# Patient Record
Sex: MALE | Race: WHITE | URBAN - METROPOLITAN AREA
[De-identification: names, ages, dates, MRNs, and addresses within clinical notes are randomized per-mention and may not be internally consistent; named-entity substitution may affect disease eponyms.]

---

## 2017-06-11 ENCOUNTER — EMERGENCY (EMERGENCY)
Facility: HOSPITAL | Age: 67
LOS: 0 days | Discharge: HOME | End: 2017-06-11

## 2017-06-11 DIAGNOSIS — K29.70 GASTRITIS, UNSPECIFIED, WITHOUT BLEEDING: ICD-10-CM

## 2017-06-11 DIAGNOSIS — N40.0 BENIGN PROSTATIC HYPERPLASIA WITHOUT LOWER URINARY TRACT SYMPTOMS: ICD-10-CM

## 2017-06-11 DIAGNOSIS — N23 UNSPECIFIED RENAL COLIC: ICD-10-CM

## 2017-06-11 DIAGNOSIS — I25.10 ATHEROSCLEROTIC HEART DISEASE OF NATIVE CORONARY ARTERY WITHOUT ANGINA PECTORIS: ICD-10-CM

## 2017-06-11 DIAGNOSIS — K35.3 ACUTE APPENDICITIS WITH LOCALIZED PERITONITIS: ICD-10-CM

## 2017-06-11 DIAGNOSIS — I10 ESSENTIAL (PRIMARY) HYPERTENSION: ICD-10-CM

## 2017-06-11 DIAGNOSIS — R07.9 CHEST PAIN, UNSPECIFIED: ICD-10-CM

## 2017-06-11 DIAGNOSIS — G47.30 SLEEP APNEA, UNSPECIFIED: ICD-10-CM

## 2017-06-26 ENCOUNTER — EMERGENCY (EMERGENCY)
Facility: HOSPITAL | Age: 67
LOS: 0 days | Discharge: HOME | End: 2017-06-26

## 2017-06-26 DIAGNOSIS — K35.3 ACUTE APPENDICITIS WITH LOCALIZED PERITONITIS: ICD-10-CM

## 2017-06-26 DIAGNOSIS — M54.9 DORSALGIA, UNSPECIFIED: ICD-10-CM

## 2017-06-26 DIAGNOSIS — Z91.040 LATEX ALLERGY STATUS: ICD-10-CM

## 2017-06-26 DIAGNOSIS — E78.00 PURE HYPERCHOLESTEROLEMIA, UNSPECIFIED: ICD-10-CM

## 2017-06-26 DIAGNOSIS — Z90.49 ACQUIRED ABSENCE OF OTHER SPECIFIED PARTS OF DIGESTIVE TRACT: ICD-10-CM

## 2017-06-26 DIAGNOSIS — Z79.82 LONG TERM (CURRENT) USE OF ASPIRIN: ICD-10-CM

## 2017-06-26 DIAGNOSIS — I10 ESSENTIAL (PRIMARY) HYPERTENSION: ICD-10-CM

## 2017-06-26 DIAGNOSIS — I25.10 ATHEROSCLEROTIC HEART DISEASE OF NATIVE CORONARY ARTERY WITHOUT ANGINA PECTORIS: ICD-10-CM

## 2017-06-26 DIAGNOSIS — K29.70 GASTRITIS, UNSPECIFIED, WITHOUT BLEEDING: ICD-10-CM

## 2017-06-26 DIAGNOSIS — Z88.2 ALLERGY STATUS TO SULFONAMIDES: ICD-10-CM

## 2017-06-26 DIAGNOSIS — N23 UNSPECIFIED RENAL COLIC: ICD-10-CM

## 2017-06-26 DIAGNOSIS — M79.1 MYALGIA: ICD-10-CM

## 2017-06-26 DIAGNOSIS — M79.602 PAIN IN LEFT ARM: ICD-10-CM

## 2017-06-26 DIAGNOSIS — G47.30 SLEEP APNEA, UNSPECIFIED: ICD-10-CM

## 2017-06-26 DIAGNOSIS — Z79.02 LONG TERM (CURRENT) USE OF ANTITHROMBOTICS/ANTIPLATELETS: ICD-10-CM

## 2017-06-26 DIAGNOSIS — I25.2 OLD MYOCARDIAL INFARCTION: ICD-10-CM

## 2017-06-26 DIAGNOSIS — R07.9 CHEST PAIN, UNSPECIFIED: ICD-10-CM

## 2017-06-26 DIAGNOSIS — K21.9 GASTRO-ESOPHAGEAL REFLUX DISEASE WITHOUT ESOPHAGITIS: ICD-10-CM

## 2017-06-26 DIAGNOSIS — N40.0 BENIGN PROSTATIC HYPERPLASIA WITHOUT LOWER URINARY TRACT SYMPTOMS: ICD-10-CM

## 2017-06-28 DIAGNOSIS — Z79.02 LONG TERM (CURRENT) USE OF ANTITHROMBOTICS/ANTIPLATELETS: ICD-10-CM

## 2017-06-28 DIAGNOSIS — K21.9 GASTRO-ESOPHAGEAL REFLUX DISEASE WITHOUT ESOPHAGITIS: ICD-10-CM

## 2017-06-28 DIAGNOSIS — Z88.2 ALLERGY STATUS TO SULFONAMIDES: ICD-10-CM

## 2017-06-28 DIAGNOSIS — R06.02 SHORTNESS OF BREATH: ICD-10-CM

## 2017-06-28 DIAGNOSIS — Z91.040 LATEX ALLERGY STATUS: ICD-10-CM

## 2017-06-28 DIAGNOSIS — Z79.82 LONG TERM (CURRENT) USE OF ASPIRIN: ICD-10-CM

## 2017-06-28 DIAGNOSIS — Z88.8 ALLERGY STATUS TO OTHER DRUGS, MEDICAMENTS AND BIOLOGICAL SUBSTANCES STATUS: ICD-10-CM

## 2017-06-28 DIAGNOSIS — R07.89 OTHER CHEST PAIN: ICD-10-CM

## 2017-06-28 DIAGNOSIS — E78.00 PURE HYPERCHOLESTEROLEMIA, UNSPECIFIED: ICD-10-CM

## 2017-06-28 DIAGNOSIS — I10 ESSENTIAL (PRIMARY) HYPERTENSION: ICD-10-CM

## 2017-06-28 DIAGNOSIS — Z88.1 ALLERGY STATUS TO OTHER ANTIBIOTIC AGENTS STATUS: ICD-10-CM

## 2017-06-28 DIAGNOSIS — Z79.899 OTHER LONG TERM (CURRENT) DRUG THERAPY: ICD-10-CM

## 2017-07-07 ENCOUNTER — EMERGENCY (EMERGENCY)
Facility: HOSPITAL | Age: 67
LOS: 0 days | Discharge: HOME | End: 2017-07-07

## 2017-07-07 DIAGNOSIS — K29.70 GASTRITIS, UNSPECIFIED, WITHOUT BLEEDING: ICD-10-CM

## 2017-07-07 DIAGNOSIS — Z02.9 ENCOUNTER FOR ADMINISTRATIVE EXAMINATIONS, UNSPECIFIED: ICD-10-CM

## 2017-07-07 DIAGNOSIS — Z91.040 LATEX ALLERGY STATUS: ICD-10-CM

## 2017-07-07 DIAGNOSIS — N23 UNSPECIFIED RENAL COLIC: ICD-10-CM

## 2017-07-07 DIAGNOSIS — K35.3 ACUTE APPENDICITIS WITH LOCALIZED PERITONITIS: ICD-10-CM

## 2017-07-07 DIAGNOSIS — I10 ESSENTIAL (PRIMARY) HYPERTENSION: ICD-10-CM

## 2017-07-07 DIAGNOSIS — Z95.5 PRESENCE OF CORONARY ANGIOPLASTY IMPLANT AND GRAFT: ICD-10-CM

## 2017-07-07 DIAGNOSIS — R07.9 CHEST PAIN, UNSPECIFIED: ICD-10-CM

## 2017-07-07 DIAGNOSIS — M54.9 DORSALGIA, UNSPECIFIED: ICD-10-CM

## 2017-07-07 DIAGNOSIS — I25.2 OLD MYOCARDIAL INFARCTION: ICD-10-CM

## 2017-07-07 DIAGNOSIS — Z96.659 PRESENCE OF UNSPECIFIED ARTIFICIAL KNEE JOINT: ICD-10-CM

## 2017-07-07 DIAGNOSIS — I25.10 ATHEROSCLEROTIC HEART DISEASE OF NATIVE CORONARY ARTERY WITHOUT ANGINA PECTORIS: ICD-10-CM

## 2017-07-07 DIAGNOSIS — E78.00 PURE HYPERCHOLESTEROLEMIA, UNSPECIFIED: ICD-10-CM

## 2017-07-07 DIAGNOSIS — Z88.2 ALLERGY STATUS TO SULFONAMIDES: ICD-10-CM

## 2017-07-07 DIAGNOSIS — Z90.49 ACQUIRED ABSENCE OF OTHER SPECIFIED PARTS OF DIGESTIVE TRACT: ICD-10-CM

## 2017-07-07 DIAGNOSIS — N39.0 URINARY TRACT INFECTION, SITE NOT SPECIFIED: ICD-10-CM

## 2017-07-07 DIAGNOSIS — K21.9 GASTRO-ESOPHAGEAL REFLUX DISEASE WITHOUT ESOPHAGITIS: ICD-10-CM

## 2017-07-07 DIAGNOSIS — K38.8 OTHER SPECIFIED DISEASES OF APPENDIX: ICD-10-CM

## 2017-07-07 DIAGNOSIS — G47.30 SLEEP APNEA, UNSPECIFIED: ICD-10-CM

## 2017-07-07 DIAGNOSIS — N40.0 BENIGN PROSTATIC HYPERPLASIA WITHOUT LOWER URINARY TRACT SYMPTOMS: ICD-10-CM

## 2017-07-09 ENCOUNTER — INPATIENT (INPATIENT)
Facility: HOSPITAL | Age: 67
LOS: 2 days | Discharge: HOME | End: 2017-07-12
Attending: SURGERY

## 2017-07-09 DIAGNOSIS — I25.10 ATHEROSCLEROTIC HEART DISEASE OF NATIVE CORONARY ARTERY WITHOUT ANGINA PECTORIS: ICD-10-CM

## 2017-07-09 DIAGNOSIS — K35.3 ACUTE APPENDICITIS WITH LOCALIZED PERITONITIS: ICD-10-CM

## 2017-07-09 DIAGNOSIS — I10 ESSENTIAL (PRIMARY) HYPERTENSION: ICD-10-CM

## 2017-07-09 DIAGNOSIS — N40.0 BENIGN PROSTATIC HYPERPLASIA WITHOUT LOWER URINARY TRACT SYMPTOMS: ICD-10-CM

## 2017-07-09 DIAGNOSIS — K29.70 GASTRITIS, UNSPECIFIED, WITHOUT BLEEDING: ICD-10-CM

## 2017-07-09 DIAGNOSIS — N23 UNSPECIFIED RENAL COLIC: ICD-10-CM

## 2017-07-09 DIAGNOSIS — R07.9 CHEST PAIN, UNSPECIFIED: ICD-10-CM

## 2017-07-09 DIAGNOSIS — G47.30 SLEEP APNEA, UNSPECIFIED: ICD-10-CM

## 2017-07-13 PROBLEM — Z00.00 ENCOUNTER FOR PREVENTIVE HEALTH EXAMINATION: Status: ACTIVE | Noted: 2017-07-13

## 2017-07-14 ENCOUNTER — INPATIENT (INPATIENT)
Facility: HOSPITAL | Age: 67
LOS: 3 days | Discharge: HOME | End: 2017-07-18
Attending: SURGERY

## 2017-07-14 DIAGNOSIS — K21.9 GASTRO-ESOPHAGEAL REFLUX DISEASE WITHOUT ESOPHAGITIS: ICD-10-CM

## 2017-07-14 DIAGNOSIS — R10.9 UNSPECIFIED ABDOMINAL PAIN: ICD-10-CM

## 2017-07-14 DIAGNOSIS — Z96.653 PRESENCE OF ARTIFICIAL KNEE JOINT, BILATERAL: ICD-10-CM

## 2017-07-14 DIAGNOSIS — K35.3 ACUTE APPENDICITIS WITH LOCALIZED PERITONITIS: ICD-10-CM

## 2017-07-14 DIAGNOSIS — Z91.041 RADIOGRAPHIC DYE ALLERGY STATUS: ICD-10-CM

## 2017-07-14 DIAGNOSIS — K29.70 GASTRITIS, UNSPECIFIED, WITHOUT BLEEDING: ICD-10-CM

## 2017-07-14 DIAGNOSIS — E78.5 HYPERLIPIDEMIA, UNSPECIFIED: ICD-10-CM

## 2017-07-14 DIAGNOSIS — R07.9 CHEST PAIN, UNSPECIFIED: ICD-10-CM

## 2017-07-14 DIAGNOSIS — G47.33 OBSTRUCTIVE SLEEP APNEA (ADULT) (PEDIATRIC): ICD-10-CM

## 2017-07-14 DIAGNOSIS — I10 ESSENTIAL (PRIMARY) HYPERTENSION: ICD-10-CM

## 2017-07-14 DIAGNOSIS — Z91.040 LATEX ALLERGY STATUS: ICD-10-CM

## 2017-07-14 DIAGNOSIS — I25.10 ATHEROSCLEROTIC HEART DISEASE OF NATIVE CORONARY ARTERY WITHOUT ANGINA PECTORIS: ICD-10-CM

## 2017-07-14 DIAGNOSIS — N40.0 BENIGN PROSTATIC HYPERPLASIA WITHOUT LOWER URINARY TRACT SYMPTOMS: ICD-10-CM

## 2017-07-14 DIAGNOSIS — Z88.2 ALLERGY STATUS TO SULFONAMIDES: ICD-10-CM

## 2017-07-14 DIAGNOSIS — N23 UNSPECIFIED RENAL COLIC: ICD-10-CM

## 2017-07-14 DIAGNOSIS — G47.30 SLEEP APNEA, UNSPECIFIED: ICD-10-CM

## 2017-07-14 DIAGNOSIS — I25.2 OLD MYOCARDIAL INFARCTION: ICD-10-CM

## 2017-07-14 DIAGNOSIS — Z95.5 PRESENCE OF CORONARY ANGIOPLASTY IMPLANT AND GRAFT: ICD-10-CM

## 2017-07-21 DIAGNOSIS — M54.9 DORSALGIA, UNSPECIFIED: ICD-10-CM

## 2017-07-21 DIAGNOSIS — I25.2 OLD MYOCARDIAL INFARCTION: ICD-10-CM

## 2017-07-21 DIAGNOSIS — I51.9 HEART DISEASE, UNSPECIFIED: ICD-10-CM

## 2017-07-21 DIAGNOSIS — K21.9 GASTRO-ESOPHAGEAL REFLUX DISEASE WITHOUT ESOPHAGITIS: ICD-10-CM

## 2017-07-21 DIAGNOSIS — I10 ESSENTIAL (PRIMARY) HYPERTENSION: ICD-10-CM

## 2017-07-21 DIAGNOSIS — T81.4XXA INFECTION FOLLOWING A PROCEDURE, INITIAL ENCOUNTER: ICD-10-CM

## 2017-07-21 DIAGNOSIS — Z95.5 PRESENCE OF CORONARY ANGIOPLASTY IMPLANT AND GRAFT: ICD-10-CM

## 2017-07-21 DIAGNOSIS — K65.1 PERITONEAL ABSCESS: ICD-10-CM

## 2017-07-21 DIAGNOSIS — G47.30 SLEEP APNEA, UNSPECIFIED: ICD-10-CM

## 2017-07-21 DIAGNOSIS — Z96.659 PRESENCE OF UNSPECIFIED ARTIFICIAL KNEE JOINT: ICD-10-CM

## 2017-07-21 DIAGNOSIS — E78.5 HYPERLIPIDEMIA, UNSPECIFIED: ICD-10-CM

## 2017-07-25 ENCOUNTER — EMERGENCY (EMERGENCY)
Facility: HOSPITAL | Age: 67
LOS: 0 days | Discharge: HOME | End: 2017-07-25

## 2017-07-25 DIAGNOSIS — I25.10 ATHEROSCLEROTIC HEART DISEASE OF NATIVE CORONARY ARTERY WITHOUT ANGINA PECTORIS: ICD-10-CM

## 2017-07-25 DIAGNOSIS — G47.30 SLEEP APNEA, UNSPECIFIED: ICD-10-CM

## 2017-07-25 DIAGNOSIS — Z90.49 ACQUIRED ABSENCE OF OTHER SPECIFIED PARTS OF DIGESTIVE TRACT: ICD-10-CM

## 2017-07-25 DIAGNOSIS — Z79.82 LONG TERM (CURRENT) USE OF ASPIRIN: ICD-10-CM

## 2017-07-25 DIAGNOSIS — K21.9 GASTRO-ESOPHAGEAL REFLUX DISEASE WITHOUT ESOPHAGITIS: ICD-10-CM

## 2017-07-25 DIAGNOSIS — N40.0 BENIGN PROSTATIC HYPERPLASIA WITHOUT LOWER URINARY TRACT SYMPTOMS: ICD-10-CM

## 2017-07-25 DIAGNOSIS — I10 ESSENTIAL (PRIMARY) HYPERTENSION: ICD-10-CM

## 2017-07-25 DIAGNOSIS — K29.70 GASTRITIS, UNSPECIFIED, WITHOUT BLEEDING: ICD-10-CM

## 2017-07-25 DIAGNOSIS — R07.9 CHEST PAIN, UNSPECIFIED: ICD-10-CM

## 2017-07-25 DIAGNOSIS — M54.6 PAIN IN THORACIC SPINE: ICD-10-CM

## 2017-07-25 DIAGNOSIS — I51.9 HEART DISEASE, UNSPECIFIED: ICD-10-CM

## 2017-07-25 DIAGNOSIS — R50.9 FEVER, UNSPECIFIED: ICD-10-CM

## 2017-07-25 DIAGNOSIS — N23 UNSPECIFIED RENAL COLIC: ICD-10-CM

## 2017-07-25 DIAGNOSIS — R10.13 EPIGASTRIC PAIN: ICD-10-CM

## 2017-07-25 DIAGNOSIS — K35.3 ACUTE APPENDICITIS WITH LOCALIZED PERITONITIS: ICD-10-CM

## 2017-07-25 DIAGNOSIS — K62.5 HEMORRHAGE OF ANUS AND RECTUM: ICD-10-CM

## 2017-07-25 DIAGNOSIS — E78.00 PURE HYPERCHOLESTEROLEMIA, UNSPECIFIED: ICD-10-CM

## 2017-07-25 DIAGNOSIS — I25.2 OLD MYOCARDIAL INFARCTION: ICD-10-CM

## 2017-07-25 DIAGNOSIS — I11.9 HYPERTENSIVE HEART DISEASE WITHOUT HEART FAILURE: ICD-10-CM

## 2017-07-27 ENCOUNTER — APPOINTMENT (OUTPATIENT)
Dept: SURGERY | Facility: CLINIC | Age: 67
End: 2017-07-27
Payer: MEDICARE

## 2017-07-27 VITALS
HEIGHT: 71 IN | SYSTOLIC BLOOD PRESSURE: 138 MMHG | WEIGHT: 295 LBS | BODY MASS INDEX: 41.3 KG/M2 | DIASTOLIC BLOOD PRESSURE: 86 MMHG

## 2017-07-27 DIAGNOSIS — E66.01 MORBID (SEVERE) OBESITY DUE TO EXCESS CALORIES: ICD-10-CM

## 2017-07-27 DIAGNOSIS — K35.2 ACUTE APPENDICITIS WITH GENERALIZED PERITONITIS: ICD-10-CM

## 2017-07-27 PROCEDURE — 99024 POSTOP FOLLOW-UP VISIT: CPT | Mod: NC

## 2017-12-17 ENCOUNTER — EMERGENCY (EMERGENCY)
Facility: HOSPITAL | Age: 67
LOS: 0 days | Discharge: HOME | End: 2017-12-17

## 2017-12-17 DIAGNOSIS — Z90.49 ACQUIRED ABSENCE OF OTHER SPECIFIED PARTS OF DIGESTIVE TRACT: ICD-10-CM

## 2017-12-17 DIAGNOSIS — N23 UNSPECIFIED RENAL COLIC: ICD-10-CM

## 2017-12-17 DIAGNOSIS — R07.9 CHEST PAIN, UNSPECIFIED: ICD-10-CM

## 2017-12-17 DIAGNOSIS — N40.0 BENIGN PROSTATIC HYPERPLASIA WITHOUT LOWER URINARY TRACT SYMPTOMS: ICD-10-CM

## 2017-12-17 DIAGNOSIS — K29.70 GASTRITIS, UNSPECIFIED, WITHOUT BLEEDING: ICD-10-CM

## 2017-12-17 DIAGNOSIS — Z79.899 OTHER LONG TERM (CURRENT) DRUG THERAPY: ICD-10-CM

## 2017-12-17 DIAGNOSIS — K35.3 ACUTE APPENDICITIS WITH LOCALIZED PERITONITIS: ICD-10-CM

## 2017-12-17 DIAGNOSIS — R04.0 EPISTAXIS: ICD-10-CM

## 2017-12-17 DIAGNOSIS — Z79.82 LONG TERM (CURRENT) USE OF ASPIRIN: ICD-10-CM

## 2017-12-17 DIAGNOSIS — K21.9 GASTRO-ESOPHAGEAL REFLUX DISEASE WITHOUT ESOPHAGITIS: ICD-10-CM

## 2017-12-17 DIAGNOSIS — Z79.02 LONG TERM (CURRENT) USE OF ANTITHROMBOTICS/ANTIPLATELETS: ICD-10-CM

## 2017-12-17 DIAGNOSIS — Z91.040 LATEX ALLERGY STATUS: ICD-10-CM

## 2017-12-17 DIAGNOSIS — I10 ESSENTIAL (PRIMARY) HYPERTENSION: ICD-10-CM

## 2017-12-17 DIAGNOSIS — I25.2 OLD MYOCARDIAL INFARCTION: ICD-10-CM

## 2017-12-17 DIAGNOSIS — Z96.659 PRESENCE OF UNSPECIFIED ARTIFICIAL KNEE JOINT: ICD-10-CM

## 2017-12-17 DIAGNOSIS — G47.30 SLEEP APNEA, UNSPECIFIED: ICD-10-CM

## 2017-12-17 DIAGNOSIS — E78.00 PURE HYPERCHOLESTEROLEMIA, UNSPECIFIED: ICD-10-CM

## 2017-12-17 DIAGNOSIS — I25.10 ATHEROSCLEROTIC HEART DISEASE OF NATIVE CORONARY ARTERY WITHOUT ANGINA PECTORIS: ICD-10-CM

## 2017-12-17 DIAGNOSIS — Z95.5 PRESENCE OF CORONARY ANGIOPLASTY IMPLANT AND GRAFT: ICD-10-CM

## 2018-06-14 ENCOUNTER — APPOINTMENT (OUTPATIENT)
Dept: UROLOGY | Facility: CLINIC | Age: 68
End: 2018-06-14

## 2018-08-09 ENCOUNTER — EMERGENCY (EMERGENCY)
Facility: HOSPITAL | Age: 68
LOS: 0 days | Discharge: HOME | End: 2018-08-09
Attending: EMERGENCY MEDICINE | Admitting: EMERGENCY MEDICINE

## 2018-08-09 VITALS
SYSTOLIC BLOOD PRESSURE: 159 MMHG | OXYGEN SATURATION: 97 % | TEMPERATURE: 98 F | RESPIRATION RATE: 18 BRPM | HEIGHT: 70 IN | DIASTOLIC BLOOD PRESSURE: 73 MMHG | HEART RATE: 65 BPM | WEIGHT: 298.06 LBS

## 2018-08-09 VITALS — HEART RATE: 66 BPM | SYSTOLIC BLOOD PRESSURE: 154 MMHG | DIASTOLIC BLOOD PRESSURE: 82 MMHG

## 2018-08-09 DIAGNOSIS — Z87.442 PERSONAL HISTORY OF URINARY CALCULI: ICD-10-CM

## 2018-08-09 DIAGNOSIS — Z91.040 LATEX ALLERGY STATUS: ICD-10-CM

## 2018-08-09 DIAGNOSIS — Z91.09 OTHER ALLERGY STATUS, OTHER THAN TO DRUGS AND BIOLOGICAL SUBSTANCES: ICD-10-CM

## 2018-08-09 DIAGNOSIS — R35.0 FREQUENCY OF MICTURITION: ICD-10-CM

## 2018-08-09 DIAGNOSIS — R10.9 UNSPECIFIED ABDOMINAL PAIN: ICD-10-CM

## 2018-08-09 DIAGNOSIS — Z88.2 ALLERGY STATUS TO SULFONAMIDES: ICD-10-CM

## 2018-08-09 LAB
ALBUMIN SERPL ELPH-MCNC: 4.4 G/DL — SIGNIFICANT CHANGE UP (ref 3.5–5.2)
ALP SERPL-CCNC: 112 U/L — SIGNIFICANT CHANGE UP (ref 30–115)
ALT FLD-CCNC: 13 U/L — SIGNIFICANT CHANGE UP (ref 0–41)
ANION GAP SERPL CALC-SCNC: 12 MMOL/L — SIGNIFICANT CHANGE UP (ref 7–14)
APPEARANCE UR: CLEAR — SIGNIFICANT CHANGE UP
AST SERPL-CCNC: 32 U/L — SIGNIFICANT CHANGE UP (ref 0–41)
BACTERIA # UR AUTO: ABNORMAL
BASOPHILS # BLD AUTO: 0.02 K/UL — SIGNIFICANT CHANGE UP (ref 0–0.2)
BASOPHILS NFR BLD AUTO: 0.3 % — SIGNIFICANT CHANGE UP (ref 0–1)
BILIRUB SERPL-MCNC: 1.3 MG/DL — HIGH (ref 0.2–1.2)
BILIRUB UR-MCNC: NEGATIVE — SIGNIFICANT CHANGE UP
BUN SERPL-MCNC: 14 MG/DL — SIGNIFICANT CHANGE UP (ref 10–20)
CALCIUM SERPL-MCNC: 9.8 MG/DL — SIGNIFICANT CHANGE UP (ref 8.5–10.1)
CHLORIDE SERPL-SCNC: 97 MMOL/L — LOW (ref 98–110)
CO2 SERPL-SCNC: 29 MMOL/L — SIGNIFICANT CHANGE UP (ref 17–32)
COLOR SPEC: YELLOW — SIGNIFICANT CHANGE UP
CREAT SERPL-MCNC: 1 MG/DL — SIGNIFICANT CHANGE UP (ref 0.7–1.5)
DIFF PNL FLD: ABNORMAL
EOSINOPHIL # BLD AUTO: 0.05 K/UL — SIGNIFICANT CHANGE UP (ref 0–0.7)
EOSINOPHIL NFR BLD AUTO: 0.7 % — SIGNIFICANT CHANGE UP (ref 0–8)
EPI CELLS # UR: ABNORMAL /HPF
GLUCOSE SERPL-MCNC: 104 MG/DL — HIGH (ref 70–99)
GLUCOSE UR QL: NEGATIVE MG/DL — SIGNIFICANT CHANGE UP
HCT VFR BLD CALC: 46.1 % — SIGNIFICANT CHANGE UP (ref 42–52)
HGB BLD-MCNC: 16.5 G/DL — SIGNIFICANT CHANGE UP (ref 14–18)
IMM GRANULOCYTES NFR BLD AUTO: 0.3 % — SIGNIFICANT CHANGE UP (ref 0.1–0.3)
KETONES UR-MCNC: NEGATIVE — SIGNIFICANT CHANGE UP
LACTATE SERPL-SCNC: 1.4 MMOL/L — SIGNIFICANT CHANGE UP (ref 0.5–2.2)
LEUKOCYTE ESTERASE UR-ACNC: ABNORMAL
LIDOCAIN IGE QN: 21 U/L — SIGNIFICANT CHANGE UP (ref 7–60)
LYMPHOCYTES # BLD AUTO: 2.27 K/UL — SIGNIFICANT CHANGE UP (ref 1.2–3.4)
LYMPHOCYTES # BLD AUTO: 32.7 % — SIGNIFICANT CHANGE UP (ref 20.5–51.1)
MCHC RBC-ENTMCNC: 32.2 PG — HIGH (ref 27–31)
MCHC RBC-ENTMCNC: 35.8 G/DL — SIGNIFICANT CHANGE UP (ref 32–37)
MCV RBC AUTO: 89.9 FL — SIGNIFICANT CHANGE UP (ref 80–94)
MONOCYTES # BLD AUTO: 0.67 K/UL — HIGH (ref 0.1–0.6)
MONOCYTES NFR BLD AUTO: 9.6 % — HIGH (ref 1.7–9.3)
NEUTROPHILS # BLD AUTO: 3.92 K/UL — SIGNIFICANT CHANGE UP (ref 1.4–6.5)
NEUTROPHILS NFR BLD AUTO: 56.4 % — SIGNIFICANT CHANGE UP (ref 42.2–75.2)
NITRITE UR-MCNC: NEGATIVE — SIGNIFICANT CHANGE UP
NRBC # BLD: 0 /100 WBCS — SIGNIFICANT CHANGE UP (ref 0–0)
PH UR: 6.5 — SIGNIFICANT CHANGE UP (ref 5–8)
PLATELET # BLD AUTO: 247 K/UL — SIGNIFICANT CHANGE UP (ref 130–400)
POTASSIUM SERPL-MCNC: 5.7 MMOL/L — HIGH (ref 3.5–5)
POTASSIUM SERPL-SCNC: 5.7 MMOL/L — HIGH (ref 3.5–5)
PROT SERPL-MCNC: 8.2 G/DL — HIGH (ref 6–8)
PROT UR-MCNC: NEGATIVE MG/DL — SIGNIFICANT CHANGE UP
RBC # BLD: 5.13 M/UL — SIGNIFICANT CHANGE UP (ref 4.7–6.1)
RBC # FLD: 12.3 % — SIGNIFICANT CHANGE UP (ref 11.5–14.5)
SODIUM SERPL-SCNC: 138 MMOL/L — SIGNIFICANT CHANGE UP (ref 135–146)
SP GR SPEC: 1.01 — SIGNIFICANT CHANGE UP (ref 1.01–1.03)
UROBILINOGEN FLD QL: 0.2 MG/DL — SIGNIFICANT CHANGE UP (ref 0.2–0.2)
WBC # BLD: 6.95 K/UL — SIGNIFICANT CHANGE UP (ref 4.8–10.8)
WBC # FLD AUTO: 6.95 K/UL — SIGNIFICANT CHANGE UP (ref 4.8–10.8)
WBC UR QL: SIGNIFICANT CHANGE UP /HPF

## 2018-08-09 RX ORDER — SODIUM CHLORIDE 9 MG/ML
1000 INJECTION INTRAMUSCULAR; INTRAVENOUS; SUBCUTANEOUS ONCE
Qty: 0 | Refills: 0 | Status: COMPLETED | OUTPATIENT
Start: 2018-08-09 | End: 2018-08-09

## 2018-08-09 RX ADMIN — SODIUM CHLORIDE 1000 MILLILITER(S): 9 INJECTION INTRAMUSCULAR; INTRAVENOUS; SUBCUTANEOUS at 12:42

## 2018-08-09 NOTE — ED PROVIDER NOTE - NS ED ROS FT
Review of Systems:  	•	CONSTITUTIONAL - no fever, no diaphoresis, no chills  	•	SKIN - no rash  	•	EYES - no eye pain, no blurry vision  	•	ENT - no change in hearing, no sore throat, no ear pain or tinnitus  	•	RESPIRATORY - no shortness of breath, no cough  	•	CARDIAC - no chest pain, no palpitations  	•	GI - + left flank pain  	•	GENITO-URINARY - no discharge, no dysuria; no hematuria, no increased urinary frequency  	•	MUSCULOSKELETAL - no joint paint, no swelling, no redness  	•	NEUROLOGIC - no weakness, no headache, no paresthesias, no LOC

## 2018-08-09 NOTE — ED PROVIDER NOTE - PHYSICAL EXAMINATION
CONST: Well appearing in NAD  EYES: PERRL, EOMI, Sclera and conjunctiva clear.   ENT: No nasal discharge. TM's clear B/L without drainage. Oropharynx normal appearing, no erythema or exudates. Uvula midline.  NECK: Non-tender, no meningeal signs  CARD: Normal S1 S2; Normal rate and rhythm  RESP: Equal BS B/L, No wheezes, rhonchi or rales. No distress  GI: + left cva tenderness, normal BS, no guarding or rebound tenderness, Soft, non-tender, non-distended.  MS: Normal ROM in all extremities. No midline spinal tenderness.  SKIN: Warm, dry, no acute rashes. Good turgor  NEURO: A&Ox3, No focal deficits. Strength 5/5 with no sensory deficits. Steady gait

## 2018-08-09 NOTE — ED PROVIDER NOTE - PROGRESS NOTE DETAILS
we discussed indications to return at this time patient is improved I will continue to monitor at thist aida

## 2018-08-09 NOTE — ED ADULT NURSE NOTE - NSIMPLEMENTINTERV_GEN_ALL_ED
Implemented All Universal Safety Interventions:  [place] to call system. Call bell, personal items and telephone within reach. Instruct patient to call for assistance. Room bathroom lighting operational. Non-slip footwear when patient is off stretcher. Physically safe environment: no spills, clutter or unnecessary equipment. Stretcher in lowest position, wheels locked, appropriate side rails in place.

## 2018-08-09 NOTE — ED PROVIDER NOTE - OBJECTIVE STATEMENT
68 year old male with pmhx noted, h/o of kidney stones,  presents with left flank pain x 1 day. Pt admits to urinary frequency. No fever, chills, N/V/D, CP, SOB, or penile or testicular pain.

## 2018-08-09 NOTE — ED PROVIDER NOTE - MEDICAL DECISION MAKING DETAILS
I have personally performed a history and physical exam on this patient and personally directed the management of the patient. Patient presents for evaluation of left flank pain and increased urinary frequency with no fevers no chills no vomiting, he has a history of kidneys stones and states that this is similar to prior. he denies any chest pain or shortness of breath. he  denies any dysuria or hesitancy.  On physical exam the patient is nc/at perrla eomi oropharynx clear cta b/l, rrr s1s2 noted abd-soft nt nd bs +e xt from with no focal deficits   A/P- patient given IV fluids, iv zofran we will obtain labs as well as ct scan considering symptoms.

## 2018-08-10 LAB
CULTURE RESULTS: NO GROWTH — SIGNIFICANT CHANGE UP
SPECIMEN SOURCE: SIGNIFICANT CHANGE UP

## 2018-08-19 ENCOUNTER — EMERGENCY (EMERGENCY)
Facility: HOSPITAL | Age: 68
LOS: 0 days | Discharge: LEFT BEFORE DISCHARGE | End: 2018-08-19
Attending: EMERGENCY MEDICINE | Admitting: EMERGENCY MEDICINE

## 2018-08-19 VITALS
TEMPERATURE: 98 F | RESPIRATION RATE: 18 BRPM | HEIGHT: 70 IN | WEIGHT: 294.98 LBS | HEART RATE: 62 BPM | SYSTOLIC BLOOD PRESSURE: 192 MMHG | DIASTOLIC BLOOD PRESSURE: 106 MMHG | OXYGEN SATURATION: 95 %

## 2018-08-19 VITALS — HEART RATE: 63 BPM | DIASTOLIC BLOOD PRESSURE: 62 MMHG | SYSTOLIC BLOOD PRESSURE: 124 MMHG

## 2018-08-19 DIAGNOSIS — R10.9 UNSPECIFIED ABDOMINAL PAIN: ICD-10-CM

## 2018-08-19 DIAGNOSIS — Z90.49 ACQUIRED ABSENCE OF OTHER SPECIFIED PARTS OF DIGESTIVE TRACT: ICD-10-CM

## 2018-08-19 DIAGNOSIS — I10 ESSENTIAL (PRIMARY) HYPERTENSION: ICD-10-CM

## 2018-08-19 DIAGNOSIS — Z79.02 LONG TERM (CURRENT) USE OF ANTITHROMBOTICS/ANTIPLATELETS: ICD-10-CM

## 2018-08-19 DIAGNOSIS — E78.5 HYPERLIPIDEMIA, UNSPECIFIED: ICD-10-CM

## 2018-08-19 DIAGNOSIS — Z79.82 LONG TERM (CURRENT) USE OF ASPIRIN: ICD-10-CM

## 2018-08-19 DIAGNOSIS — I25.2 OLD MYOCARDIAL INFARCTION: ICD-10-CM

## 2018-08-19 DIAGNOSIS — R31.9 HEMATURIA, UNSPECIFIED: ICD-10-CM

## 2018-08-19 DIAGNOSIS — Z91.040 LATEX ALLERGY STATUS: ICD-10-CM

## 2018-08-19 DIAGNOSIS — L98.429 NON-PRESSURE CHRONIC ULCER OF BACK WITH UNSPECIFIED SEVERITY: ICD-10-CM

## 2018-08-19 DIAGNOSIS — Z95.5 PRESENCE OF CORONARY ANGIOPLASTY IMPLANT AND GRAFT: ICD-10-CM

## 2018-08-19 DIAGNOSIS — Z88.2 ALLERGY STATUS TO SULFONAMIDES: ICD-10-CM

## 2018-08-19 NOTE — ED PROVIDER NOTE - NS ED ROS FT
Review of Systems    Constitutional: (-) fever  Cardiovascular: (-) chest pain, (-) syncope  Respiratory: (-) cough, (-) shortness of breath  Gastrointestinal: (-) vomiting, (-) diarrhea  Musculoskeletal: (-) neck pain,  Integumentary: (-) rash, (-) edema  Neurological: (-) headache

## 2018-08-19 NOTE — ED PROVIDER NOTE - OBJECTIVE STATEMENT
69 y/o M with PMH HTN, HLD, MI with stents on ASA and Plavix, s/p appendectomy, s/p cholecystectomy, here 1 wk ago for same symptoms with neg CT with IV contrast, negative labs, known hematuria, presents with b/l flank pain x 1 wk. Denies CP, palpitations, SOB, abdominal pain, n/v/d, black or bloody stools, fevers, sweats, chills, trauma, fall, cough, recent travel, recent illness, sick contacts, leg pain/swelling, urinary symptoms, rash, testicular pain. last bowel movement today and normal without blood. denies any current symptoms.

## 2018-08-19 NOTE — ED PROVIDER NOTE - PROGRESS NOTE DETAILS
pt told nurse was going to elope, only wanted to check urine to see that no infection. pt eloped. for an unknown reason patient eloped prior to completion of care

## 2018-08-19 NOTE — ED ADULT NURSE NOTE - NSIMPLEMENTINTERV_GEN_ALL_ED
Implemented All Universal Safety Interventions:  [place] to call system. Call bell, personal items and telephone within reach. Instruct patient to call for assistance. Room bathroom lighting operational. Non-slip footwear when patient is off stretcher. Physically safe environment: no spills, clutter or unnecessary equipment. Stretcher in lowest position, wheels locked, appropriate side rails in place.

## 2018-08-19 NOTE — ED ADULT NURSE NOTE - NSELOPED_ED_ALL_ED
Patient and/or family announced that they are leaving. They were advised to stay, advised to return if worse./Physician notified/Patient's chart was referred to charge nurse/supervisor for disposition of prescription and/or discharge instructions.

## 2018-08-19 NOTE — ED PROVIDER NOTE - PHYSICAL EXAMINATION
PHYSICAL EXAM:    GENERAL: Alert, appears stated age, well appearing, non-toxic  SKIN: Warm, pink and dry. MMM.   EYE: Normal lids/conjunctiva  ENT: Normal hearing, patent oropharynx  NECK: +supple. No meningismus  Pulm: Bilateral BS, normal resp effort, no wheezes, stridor, or retractions  CV: RRR, no M/R/G, 2+ pulses  Abd: soft, non-tender, non-distended, no hepatosplenomegaly. no CVA tenderness. no rebound/guarding/RLQ TTP.   Mskel: no erythema, cyanosis, edema. no calf tenderness. no spinal TTP.   Neuro: AAOx3,  5/5 strength throughout. normal gait.

## 2018-08-19 NOTE — ED PROVIDER NOTE - MEDICAL DECISION MAKING DETAILS
I have personally performed a history and physical exam on this patient and personally directed the management of the patient. Patient presents for flank pain.  He has a history of kidney stones for an unknown reason he eloped prior to completing care

## 2018-08-28 ENCOUNTER — EMERGENCY (EMERGENCY)
Facility: HOSPITAL | Age: 68
LOS: 0 days | Discharge: HOME | End: 2018-08-28
Attending: EMERGENCY MEDICINE | Admitting: EMERGENCY MEDICINE

## 2018-08-28 VITALS
SYSTOLIC BLOOD PRESSURE: 180 MMHG | WEIGHT: 294.98 LBS | HEART RATE: 70 BPM | OXYGEN SATURATION: 95 % | DIASTOLIC BLOOD PRESSURE: 81 MMHG | TEMPERATURE: 98 F | RESPIRATION RATE: 18 BRPM | HEIGHT: 70 IN

## 2018-08-28 VITALS
DIASTOLIC BLOOD PRESSURE: 88 MMHG | SYSTOLIC BLOOD PRESSURE: 173 MMHG | RESPIRATION RATE: 18 BRPM | OXYGEN SATURATION: 98 % | HEART RATE: 80 BPM | TEMPERATURE: 97 F

## 2018-08-28 DIAGNOSIS — Z91.040 LATEX ALLERGY STATUS: ICD-10-CM

## 2018-08-28 DIAGNOSIS — I10 ESSENTIAL (PRIMARY) HYPERTENSION: ICD-10-CM

## 2018-08-28 DIAGNOSIS — M54.9 DORSALGIA, UNSPECIFIED: ICD-10-CM

## 2018-08-28 DIAGNOSIS — R10.9 UNSPECIFIED ABDOMINAL PAIN: ICD-10-CM

## 2018-08-28 DIAGNOSIS — Z79.899 OTHER LONG TERM (CURRENT) DRUG THERAPY: ICD-10-CM

## 2018-08-28 DIAGNOSIS — E78.00 PURE HYPERCHOLESTEROLEMIA, UNSPECIFIED: ICD-10-CM

## 2018-08-28 DIAGNOSIS — R10.13 EPIGASTRIC PAIN: ICD-10-CM

## 2018-08-28 DIAGNOSIS — Z79.02 LONG TERM (CURRENT) USE OF ANTITHROMBOTICS/ANTIPLATELETS: ICD-10-CM

## 2018-08-28 DIAGNOSIS — Z91.09 OTHER ALLERGY STATUS, OTHER THAN TO DRUGS AND BIOLOGICAL SUBSTANCES: ICD-10-CM

## 2018-08-28 DIAGNOSIS — Z79.82 LONG TERM (CURRENT) USE OF ASPIRIN: ICD-10-CM

## 2018-08-28 DIAGNOSIS — Z87.442 PERSONAL HISTORY OF URINARY CALCULI: ICD-10-CM

## 2018-08-28 DIAGNOSIS — Z88.2 ALLERGY STATUS TO SULFONAMIDES: ICD-10-CM

## 2018-08-28 LAB
ALBUMIN SERPL ELPH-MCNC: 4.4 G/DL — SIGNIFICANT CHANGE UP (ref 3.5–5.2)
ALP SERPL-CCNC: 104 U/L — SIGNIFICANT CHANGE UP (ref 30–115)
ALT FLD-CCNC: 11 U/L — SIGNIFICANT CHANGE UP (ref 0–41)
ANION GAP SERPL CALC-SCNC: 12 MMOL/L — SIGNIFICANT CHANGE UP (ref 7–14)
APPEARANCE UR: CLEAR — SIGNIFICANT CHANGE UP
AST SERPL-CCNC: 15 U/L — SIGNIFICANT CHANGE UP (ref 0–41)
BACTERIA # UR AUTO: ABNORMAL
BASOPHILS # BLD AUTO: 0.03 K/UL — SIGNIFICANT CHANGE UP (ref 0–0.2)
BASOPHILS NFR BLD AUTO: 0.4 % — SIGNIFICANT CHANGE UP (ref 0–1)
BILIRUB DIRECT SERPL-MCNC: 0.2 MG/DL — SIGNIFICANT CHANGE UP (ref 0–0.2)
BILIRUB INDIRECT FLD-MCNC: 1 MG/DL — SIGNIFICANT CHANGE UP (ref 0.2–1.2)
BILIRUB SERPL-MCNC: 1.2 MG/DL — SIGNIFICANT CHANGE UP (ref 0.2–1.2)
BILIRUB UR-MCNC: NEGATIVE — SIGNIFICANT CHANGE UP
BUN SERPL-MCNC: 12 MG/DL — SIGNIFICANT CHANGE UP (ref 10–20)
CALCIUM SERPL-MCNC: 9.5 MG/DL — SIGNIFICANT CHANGE UP (ref 8.5–10.1)
CHLORIDE SERPL-SCNC: 95 MMOL/L — LOW (ref 98–110)
CO2 SERPL-SCNC: 31 MMOL/L — SIGNIFICANT CHANGE UP (ref 17–32)
COLOR SPEC: YELLOW — SIGNIFICANT CHANGE UP
CREAT SERPL-MCNC: 1 MG/DL — SIGNIFICANT CHANGE UP (ref 0.7–1.5)
DIFF PNL FLD: ABNORMAL
EOSINOPHIL # BLD AUTO: 0.05 K/UL — SIGNIFICANT CHANGE UP (ref 0–0.7)
EOSINOPHIL NFR BLD AUTO: 0.7 % — SIGNIFICANT CHANGE UP (ref 0–8)
EPI CELLS # UR: ABNORMAL /HPF
GLUCOSE SERPL-MCNC: 103 MG/DL — HIGH (ref 70–99)
GLUCOSE UR QL: NEGATIVE MG/DL — SIGNIFICANT CHANGE UP
HCT VFR BLD CALC: 43.8 % — SIGNIFICANT CHANGE UP (ref 42–52)
HGB BLD-MCNC: 15.2 G/DL — SIGNIFICANT CHANGE UP (ref 14–18)
IMM GRANULOCYTES NFR BLD AUTO: 0.4 % — HIGH (ref 0.1–0.3)
KETONES UR-MCNC: NEGATIVE — SIGNIFICANT CHANGE UP
LACTATE SERPL-SCNC: 1.8 MMOL/L — SIGNIFICANT CHANGE UP (ref 0.5–2.2)
LEUKOCYTE ESTERASE UR-ACNC: ABNORMAL
LIDOCAIN IGE QN: 19 U/L — SIGNIFICANT CHANGE UP (ref 7–60)
LYMPHOCYTES # BLD AUTO: 1.84 K/UL — SIGNIFICANT CHANGE UP (ref 1.2–3.4)
LYMPHOCYTES # BLD AUTO: 26.9 % — SIGNIFICANT CHANGE UP (ref 20.5–51.1)
MCHC RBC-ENTMCNC: 31.4 PG — HIGH (ref 27–31)
MCHC RBC-ENTMCNC: 34.7 G/DL — SIGNIFICANT CHANGE UP (ref 32–37)
MCV RBC AUTO: 90.5 FL — SIGNIFICANT CHANGE UP (ref 80–94)
MONOCYTES # BLD AUTO: 0.76 K/UL — HIGH (ref 0.1–0.6)
MONOCYTES NFR BLD AUTO: 11.1 % — HIGH (ref 1.7–9.3)
NEUTROPHILS # BLD AUTO: 4.14 K/UL — SIGNIFICANT CHANGE UP (ref 1.4–6.5)
NEUTROPHILS NFR BLD AUTO: 60.5 % — SIGNIFICANT CHANGE UP (ref 42.2–75.2)
NITRITE UR-MCNC: NEGATIVE — SIGNIFICANT CHANGE UP
NRBC # BLD: 0 /100 WBCS — SIGNIFICANT CHANGE UP (ref 0–0)
PH UR: 6 — SIGNIFICANT CHANGE UP (ref 5–8)
PLATELET # BLD AUTO: 202 K/UL — SIGNIFICANT CHANGE UP (ref 130–400)
POTASSIUM SERPL-MCNC: 3.7 MMOL/L — SIGNIFICANT CHANGE UP (ref 3.5–5)
POTASSIUM SERPL-SCNC: 3.7 MMOL/L — SIGNIFICANT CHANGE UP (ref 3.5–5)
PROT SERPL-MCNC: 7.5 G/DL — SIGNIFICANT CHANGE UP (ref 6–8)
PROT UR-MCNC: NEGATIVE MG/DL — SIGNIFICANT CHANGE UP
RBC # BLD: 4.84 M/UL — SIGNIFICANT CHANGE UP (ref 4.7–6.1)
RBC # FLD: 12.3 % — SIGNIFICANT CHANGE UP (ref 11.5–14.5)
RBC CASTS # UR COMP ASSIST: SIGNIFICANT CHANGE UP /HPF
SODIUM SERPL-SCNC: 138 MMOL/L — SIGNIFICANT CHANGE UP (ref 135–146)
SP GR SPEC: 1.01 — SIGNIFICANT CHANGE UP (ref 1.01–1.03)
UROBILINOGEN FLD QL: 0.2 MG/DL — SIGNIFICANT CHANGE UP (ref 0.2–0.2)
WBC # BLD: 6.85 K/UL — SIGNIFICANT CHANGE UP (ref 4.8–10.8)
WBC # FLD AUTO: 6.85 K/UL — SIGNIFICANT CHANGE UP (ref 4.8–10.8)
WBC UR QL: SIGNIFICANT CHANGE UP /HPF

## 2018-08-28 RX ORDER — SUCRALFATE 1 G
1 TABLET ORAL
Qty: 28 | Refills: 0
Start: 2018-08-28 | End: 2018-09-03

## 2018-08-28 RX ORDER — MORPHINE SULFATE 50 MG/1
6 CAPSULE, EXTENDED RELEASE ORAL ONCE
Qty: 0 | Refills: 0 | Status: DISCONTINUED | OUTPATIENT
Start: 2018-08-28 | End: 2018-08-28

## 2018-08-28 RX ORDER — SODIUM CHLORIDE 9 MG/ML
3 INJECTION INTRAMUSCULAR; INTRAVENOUS; SUBCUTANEOUS ONCE
Qty: 0 | Refills: 0 | Status: COMPLETED | OUTPATIENT
Start: 2018-08-28 | End: 2018-08-28

## 2018-08-28 RX ORDER — FAMOTIDINE 10 MG/ML
20 INJECTION INTRAVENOUS ONCE
Qty: 0 | Refills: 0 | Status: COMPLETED | OUTPATIENT
Start: 2018-08-28 | End: 2018-08-28

## 2018-08-28 RX ADMIN — MORPHINE SULFATE 6 MILLIGRAM(S): 50 CAPSULE, EXTENDED RELEASE ORAL at 06:34

## 2018-08-28 RX ADMIN — FAMOTIDINE 100 MILLIGRAM(S): 10 INJECTION INTRAVENOUS at 06:34

## 2018-08-28 RX ADMIN — SODIUM CHLORIDE 3 MILLILITER(S): 9 INJECTION INTRAMUSCULAR; INTRAVENOUS; SUBCUTANEOUS at 06:12

## 2018-08-28 NOTE — ED PROVIDER NOTE - MEDICAL DECISION MAKING DETAILS
68yM p/w abdomianl pin  intermittent x 2 week s- had normal CT 1 week ago  seen by GI - given PPI, and bentyl - last EGD 1 year ago  with "inflammation'  pt mentions  1episdoe of hematuria - now resolved -  normal BM no fever no syncope - no chest pain sob diaphoresis -  no paresthesia numbness or pain to LE.  no change in stool color. PE" alert nontoxic no pallor cvs rrr resp cta abd soft mild epigastric tenderness cva  tenderness -  5/5 strength LE .  CT and labs within normal limits-  discussed outpt follow up with GI,  return to ED instructions and follow up with urology for cystoscopy  pt verbalizes understanding 68yM p/w abdominal pin  intermittent x 2 week s- had normal CT 1 week ago  seen by GI - given PPI, and bentyl - last EGD 1 year ago  with "inflammation'  pt mentions  1episdoe of hematuria - now resolved -  normal BM no fever no syncope - no chest pain sob diaphoresis -  no paresthesia numbness or pain to LE.  no change in stool color. PE" alert nontoxic no pallor cvs rrr resp cta abd soft mild epigastric tenderness cva  tenderness -  5/5 strength LE .  CT and labs within normal limits-  discussed outpt follow up with GI,  return to ED instructions and follow up with urology for cystoscopy  pt verbalizes understanding

## 2018-08-28 NOTE — ED ADULT NURSE NOTE - NSIMPLEMENTINTERV_GEN_ALL_ED
Implemented All Universal Safety Interventions:  [place] to call system. Call bell, personal items and telephone within reach. Instruct patient to call for assistance. Room bathroom lighting operational. Non-slip footwear when patient is off stretcher. Physically safe environment: no spills, clutter or unnecessary equipment. Stretcher in lowest position, wheels locked, appropriate side rails in place.

## 2018-08-28 NOTE — ED PROVIDER NOTE - NS ED ROS FT
Constitutional: (-) fever  Eyes/ENT: (-) blurry vision, (-) epistaxis  Cardiovascular: (-) chest pain, (-) syncope  Respiratory: (-) cough,  Gastrointestinal: (-) vomiting, (-) diarrhea  Musculoskeletal: (-) neck pain, (+) back pain, (-) joint pain  Integumentary: (-) rash, (-) edema  Neurological: (-) headache, (-) altered mental status

## 2018-08-28 NOTE — ED PROVIDER NOTE - OBJECTIVE STATEMENT
68 year old male past medical history of renal stones, high cholestrol states that he is having pain in his right flank that started yesterday in his back and is now in the front of his abd. no nausea, vomiting, diarrhea, no headache, no dizziness, no dysuria, no hematura, no chest pain.

## 2018-11-28 ENCOUNTER — APPOINTMENT (OUTPATIENT)
Dept: UROLOGY | Facility: CLINIC | Age: 68
End: 2018-11-28

## 2018-12-05 PROBLEM — N20.0 CALCULUS OF KIDNEY: Chronic | Status: ACTIVE | Noted: 2018-08-28

## 2018-12-05 PROBLEM — I10 ESSENTIAL (PRIMARY) HYPERTENSION: Chronic | Status: ACTIVE | Noted: 2018-08-28

## 2018-12-05 PROBLEM — E78.00 PURE HYPERCHOLESTEROLEMIA, UNSPECIFIED: Chronic | Status: ACTIVE | Noted: 2018-08-28

## 2018-12-05 PROBLEM — G47.30 SLEEP APNEA, UNSPECIFIED: Chronic | Status: ACTIVE | Noted: 2018-08-28

## 2018-12-14 ENCOUNTER — APPOINTMENT (OUTPATIENT)
Dept: UROLOGY | Facility: CLINIC | Age: 68
End: 2018-12-14
Payer: MEDICARE

## 2018-12-14 VITALS
DIASTOLIC BLOOD PRESSURE: 70 MMHG | SYSTOLIC BLOOD PRESSURE: 162 MMHG | HEART RATE: 67 BPM | HEIGHT: 70 IN | BODY MASS INDEX: 41.52 KG/M2 | WEIGHT: 290 LBS

## 2018-12-14 DIAGNOSIS — E78.5 HYPERLIPIDEMIA, UNSPECIFIED: ICD-10-CM

## 2018-12-14 DIAGNOSIS — R79.89 OTHER SPECIFIED ABNORMAL FINDINGS OF BLOOD CHEMISTRY: ICD-10-CM

## 2018-12-14 DIAGNOSIS — Z87.448 PERSONAL HISTORY OF OTHER DISEASES OF URINARY SYSTEM: ICD-10-CM

## 2018-12-14 DIAGNOSIS — R35.0 FREQUENCY OF MICTURITION: ICD-10-CM

## 2018-12-14 DIAGNOSIS — N20.0 CALCULUS OF KIDNEY: ICD-10-CM

## 2018-12-14 DIAGNOSIS — G47.30 SLEEP APNEA, UNSPECIFIED: ICD-10-CM

## 2018-12-14 DIAGNOSIS — R30.0 DYSURIA: ICD-10-CM

## 2018-12-14 DIAGNOSIS — Z80.9 FAMILY HISTORY OF MALIGNANT NEOPLASM, UNSPECIFIED: ICD-10-CM

## 2018-12-14 DIAGNOSIS — N48.83 ACQUIRED BURIED PENIS: ICD-10-CM

## 2018-12-14 DIAGNOSIS — R39.198 OTHER DIFFICULTIES WITH MICTURITION: ICD-10-CM

## 2018-12-14 DIAGNOSIS — Z78.9 OTHER SPECIFIED HEALTH STATUS: ICD-10-CM

## 2018-12-14 DIAGNOSIS — E66.01 MORBID (SEVERE) OBESITY DUE TO EXCESS CALORIES: ICD-10-CM

## 2018-12-14 DIAGNOSIS — B35.6 TINEA CRURIS: ICD-10-CM

## 2018-12-14 DIAGNOSIS — R35.1 NOCTURIA: ICD-10-CM

## 2018-12-14 DIAGNOSIS — N39.41 URGE INCONTINENCE: ICD-10-CM

## 2018-12-14 DIAGNOSIS — B35.9 DERMATOPHYTOSIS, UNSPECIFIED: ICD-10-CM

## 2018-12-14 DIAGNOSIS — K21.9 GASTRO-ESOPHAGEAL REFLUX DISEASE W/OUT ESOPHAGITIS: ICD-10-CM

## 2018-12-14 DIAGNOSIS — R39.15 URGENCY OF URINATION: ICD-10-CM

## 2018-12-14 DIAGNOSIS — I10 ESSENTIAL (PRIMARY) HYPERTENSION: ICD-10-CM

## 2018-12-14 DIAGNOSIS — R33.9 RETENTION OF URINE, UNSPECIFIED: ICD-10-CM

## 2018-12-14 DIAGNOSIS — N47.1 PHIMOSIS: ICD-10-CM

## 2018-12-14 PROCEDURE — 99203 OFFICE O/P NEW LOW 30 MIN: CPT

## 2018-12-14 RX ORDER — ATORVASTATIN CALCIUM 20 MG/1
20 TABLET, FILM COATED ORAL
Refills: 0 | Status: ACTIVE | COMMUNITY

## 2018-12-14 RX ORDER — SUCRALFATE 1 G/1
TABLET ORAL
Refills: 0 | Status: ACTIVE | COMMUNITY

## 2018-12-14 RX ORDER — ATENOLOL 50 MG/1
50 TABLET ORAL
Refills: 0 | Status: ACTIVE | COMMUNITY

## 2018-12-14 RX ORDER — CLOPIDOGREL BISULFATE 75 MG/1
75 TABLET, FILM COATED ORAL
Refills: 0 | Status: ACTIVE | COMMUNITY

## 2018-12-14 RX ORDER — VALSARTAN AND HYDROCHLOROTHIAZIDE 160; 25 MG/1; MG/1
160-25 TABLET, FILM COATED ORAL
Refills: 0 | Status: ACTIVE | COMMUNITY

## 2018-12-14 RX ORDER — POTASSIUM CITRATE 10 MEQ/1
TABLET, EXTENDED RELEASE ORAL
Refills: 0 | Status: ACTIVE | COMMUNITY

## 2018-12-14 RX ORDER — CLOTRIMAZOLE AND BETAMETHASONE DIPROPIONATE 10; .5 MG/G; MG/G
1-0.05 CREAM TOPICAL
Qty: 1 | Refills: 1 | Status: ACTIVE | COMMUNITY
Start: 2018-12-14 | End: 1900-01-01

## 2018-12-14 NOTE — ASSESSMENT
[FreeTextEntry1] : His physical exam in addition to the morbid obesity shows it to be walking slowly due to severe trouble with both knees. He has dermatophytosis under both breasts, both groins, the gluteal cleft, as well as balanitis behind the fibrotic scarred foreskin which is turtled into the suprapubic fat pad. I cannot tell the BC reflex is I cannot get to the head of the penis but his rectal tone was acceptable though not great. His prostate at least 35 perhaps 40 g in size.\par \par With respect to the low testosterone going to repeat the labs of the have not been drawn in almost 8 months and I want a more complete set. Depending on what we find we may recommend testosterone replacement and or the use of Arimidex.\par \par With respect to his voiding dysfunction I’m going to ask him to keep a record of his intake and output for 24 hours noting when he had incontinence. We will try and get a clean-catch urine for urinalysis, culture and cytology. Going to send him for a renal and bladder ultrasound to see if there any signs of obstructive uropathy and when he comes in we will try and get a flow study though again it will be difficult because of the anatomical changes around the penis. I’m also going to get a PSA especially as we are considering making his testosterone back to normal.\par He also has erectile dysfunction but given his numerous comorbidities he and his wife decided to let sleeping dogs lie. Please note his quality of life being a five instead of the six is to a certain extent as he somewhat fatalistic as he asked me if he leaves everything alone and does nothing what could be the deleterious outcomes. This is a 68-year-old man who what’s the bed three or four times at night and walks around with a diaper and wants to know what happens if he does nothing.\par

## 2018-12-14 NOTE — LETTER HEADER
[FreeTextEntry3] : Lien Kwong M.D.\par Director of Urology\par [facility]\par [address] [place] [address], [address]\[place] [address]

## 2018-12-14 NOTE — LETTER BODY
[Dear  ___] : Dear  [unfilled], [Consult Letter:] : I had the pleasure of evaluating your patient, [unfilled]. [Please see my note below.] : Please see my note below. [Consult Closing:] : Thank you very much for allowing me to participate in the care of this patient.  If you have any questions, please do not hesitate to contact me. [Sincerely,] : Sincerely, [FreeTextEntry2] : Nitin Chavarria MD\par [address]\[place] [address]\par  [DrJune  ___] : Dr. HAILE

## 2018-12-14 NOTE — PHYSICAL EXAM
[General Appearance - Well Developed] : well developed [General Appearance - Well Nourished] : well nourished [Normal Appearance] : normal appearance [Well Groomed] : well groomed [General Appearance - In No Acute Distress] : no acute distress [Heart Rate And Rhythm] : Heart rate and rhythm were normal [] : no respiratory distress [Respiration, Rhythm And Depth] : normal respiratory rhythm and effort [Exaggerated Use Of Accessory Muscles For Inspiration] : no accessory muscle use [Auscultation Breath Sounds / Voice Sounds] : lungs were clear to auscultation bilaterally [Abdomen Soft] : soft [Abdomen Tenderness] : non-tender [Costovertebral Angle Tenderness] : no ~M costovertebral angle tenderness [Rectal Exam - Rectum] : digital rectal exam was normal [Prostate Tenderness] : the prostate was not tender [No Prostate Nodules] : no prostate nodules [Prostate Size ___ gm] : prostate size [unfilled] gm [Oriented To Time, Place, And Person] : oriented to person, place, and time [Affect] : the affect was normal [Mood] : the mood was normal [Not Anxious] : not anxious [Inguinal Lymph Nodes Enlarged Bilaterally] : inguinal [FreeTextEntry1] : dtrs difficult to ellicit

## 2018-12-14 NOTE — REVIEW OF SYSTEMS
[Feeling Poorly] : feeling poorly [Feeling Tired] : feeling tired [Heartburn] : heartburn [Joint Pain] : joint pain [see HPI] : see HPI [Erectile Dysfunction] : erectile dysfunction [Negative] : Heme/Lymph

## 2018-12-14 NOTE — HISTORY OF PRESENT ILLNESS
[Urinary Incontinence] : urinary incontinence [Urinary Urgency] : urinary urgency [Urinary Frequency] : urinary frequency [Nocturia] : nocturia [Straining] : straining [Weak Stream] : weak stream [Intermittency] : intermittency [Erectile Dysfunction] : Erectile Dysfunction [FreeTextEntry1] : Mark is a 68-year-old male who was referred for a subspecialty consultation by Dr. EDWIN Chavarria. For the last two years he’s had gradually increasing urologic issues as follows.\par \par His penis has slowly turned old, the foreskin has scarred down and now he cannot pull it back to clean. The tip of the penis can barely make it to the opening but when he urinates a lot of times it will hit the skin and then just rip out.\par \par He also has severe lower urinary tract symptoms as follows:\par 	Incomplete emptying – three\par 	Frequency – three\par 	Intermittency – two\par 	Urgency – two (at this point he also has some mild urge incontinence\par 	Slow stream – two\par 	Strain – two\par 	Nocturia times three with nocturnal enuresis most of the time to the point that he wears a diaper to bed\par The quality of life as it relates to this is a five.\par  [Urinary Retention] : no urinary retention [Dysuria] : no dysuria [Hematuria - Gross] : no gross hematuria [Hematuria - Microscopic] : no microscopic hematuria

## 2018-12-15 ENCOUNTER — LABORATORY RESULT (OUTPATIENT)
Age: 68
End: 2018-12-15

## 2018-12-15 ENCOUNTER — OUTPATIENT (OUTPATIENT)
Dept: OUTPATIENT SERVICES | Facility: HOSPITAL | Age: 68
LOS: 1 days | Discharge: HOME | End: 2018-12-15

## 2018-12-15 DIAGNOSIS — N39.41 URGE INCONTINENCE: ICD-10-CM

## 2018-12-15 DIAGNOSIS — E66.01 MORBID (SEVERE) OBESITY DUE TO EXCESS CALORIES: ICD-10-CM

## 2018-12-15 DIAGNOSIS — R39.15 URGENCY OF URINATION: ICD-10-CM

## 2018-12-15 DIAGNOSIS — R79.89 OTHER SPECIFIED ABNORMAL FINDINGS OF BLOOD CHEMISTRY: ICD-10-CM

## 2018-12-15 DIAGNOSIS — R35.1 NOCTURIA: ICD-10-CM

## 2018-12-15 DIAGNOSIS — R30.0 DYSURIA: ICD-10-CM

## 2018-12-15 DIAGNOSIS — R39.198 OTHER DIFFICULTIES WITH MICTURITION: ICD-10-CM

## 2018-12-17 LAB
ALBUMIN SERPL ELPH-MCNC: 4.5 G/DL
ALP BLD-CCNC: 115 U/L
ALT SERPL-CCNC: 12 U/L
AST SERPL-CCNC: 15 U/L
BASOPHILS # BLD AUTO: 0.03 K/UL
BASOPHILS NFR BLD AUTO: 0.4 %
BILIRUB DIRECT SERPL-MCNC: <0.2 MG/DL
BILIRUB INDIRECT SERPL-MCNC: >0.8 MG/DL
BILIRUB SERPL-MCNC: 1 MG/DL
EOSINOPHIL # BLD AUTO: 0.05 K/UL
EOSINOPHIL NFR BLD AUTO: 0.6 %
ESTRADIOL SERPL-MCNC: 27 PG/ML
HCT VFR BLD CALC: 47.6 %
HGB BLD-MCNC: 15.8 G/DL
IMM GRANULOCYTES NFR BLD AUTO: 0.4 %
LH SERPL-ACNC: 26.7 IU/L
LYMPHOCYTES # BLD AUTO: 2.79 K/UL
LYMPHOCYTES NFR BLD AUTO: 33.7 %
MAN DIFF?: NORMAL
MCHC RBC-ENTMCNC: 31.3 PG
MCHC RBC-ENTMCNC: 33.2 G/DL
MCV RBC AUTO: 94.3 FL
MONOCYTES # BLD AUTO: 0.89 K/UL
MONOCYTES NFR BLD AUTO: 10.8 %
NEUTROPHILS # BLD AUTO: 4.48 K/UL
NEUTROPHILS NFR BLD AUTO: 54.1 %
PLATELET # BLD AUTO: 280 K/UL
PROLACTIN SERPL-MCNC: 13.7 NG/ML
PROT SERPL-MCNC: 7.8 G/DL
PSA FREE FLD-MCNC: 32 %
PSA FREE SERPL-MCNC: 0.65 NG/ML
PSA SERPL-MCNC: 2.06 NG/ML
RBC # BLD: 5.05 M/UL
RBC # FLD: 12.9 %
WBC # FLD AUTO: 8.27 K/UL

## 2018-12-18 LAB
APPEARANCE: ABNORMAL
BACTERIA UR CULT: ABNORMAL
BILIRUBIN URINE: NEGATIVE
BLOOD URINE: ABNORMAL
COLOR: YELLOW
GLUCOSE QUALITATIVE U: NEGATIVE MG/DL
KETONES URINE: NEGATIVE
LEUKOCYTE ESTERASE URINE: ABNORMAL
NITRITE URINE: NEGATIVE
PH URINE: 6
PROTEIN URINE: 30 MG/DL
SHBG SERPL-SCNC: 58 NMOL/L
SPECIFIC GRAVITY URINE: 1.02
URINE CYTOLOGY: NORMAL
UROBILINOGEN URINE: 0.2 MG/DL (ref 0.2–?)

## 2018-12-21 ENCOUNTER — RX RENEWAL (OUTPATIENT)
Age: 68
End: 2018-12-21

## 2018-12-21 RX ORDER — CLOTRIMAZOLE 10 MG/G
1 CREAM TOPICAL
Qty: 1 | Refills: 2 | Status: ACTIVE | COMMUNITY
Start: 2018-12-14 | End: 1900-01-01

## 2018-12-24 LAB
% FREE TESTOSTERONE - ESO: 0.8 %
FREE TESTOSTERONE - ESO: 24 PG/ML
SHBG-ESOTERIX: 81.6 NMOL/L
TESTOSTERONE SERUM - ESO: 297 NG/DL

## 2018-12-27 ENCOUNTER — EMERGENCY (EMERGENCY)
Facility: HOSPITAL | Age: 68
LOS: 0 days | Discharge: LEFT BEFORE DISCHARGE | End: 2018-12-27
Attending: EMERGENCY MEDICINE | Admitting: EMERGENCY MEDICINE

## 2018-12-27 VITALS
HEIGHT: 70 IN | WEIGHT: 289.91 LBS | OXYGEN SATURATION: 96 % | RESPIRATION RATE: 20 BRPM | DIASTOLIC BLOOD PRESSURE: 95 MMHG | TEMPERATURE: 97 F | SYSTOLIC BLOOD PRESSURE: 185 MMHG | HEART RATE: 67 BPM

## 2018-12-27 DIAGNOSIS — Z91.013 ALLERGY TO SEAFOOD: ICD-10-CM

## 2018-12-27 DIAGNOSIS — M54.5 LOW BACK PAIN: ICD-10-CM

## 2018-12-27 DIAGNOSIS — Z79.02 LONG TERM (CURRENT) USE OF ANTITHROMBOTICS/ANTIPLATELETS: ICD-10-CM

## 2018-12-27 DIAGNOSIS — R35.0 FREQUENCY OF MICTURITION: ICD-10-CM

## 2018-12-27 DIAGNOSIS — Z88.2 ALLERGY STATUS TO SULFONAMIDES: ICD-10-CM

## 2018-12-27 DIAGNOSIS — Z87.19 PERSONAL HISTORY OF OTHER DISEASES OF THE DIGESTIVE SYSTEM: Chronic | ICD-10-CM

## 2018-12-27 DIAGNOSIS — E78.00 PURE HYPERCHOLESTEROLEMIA, UNSPECIFIED: ICD-10-CM

## 2018-12-27 DIAGNOSIS — Z90.49 ACQUIRED ABSENCE OF OTHER SPECIFIED PARTS OF DIGESTIVE TRACT: Chronic | ICD-10-CM

## 2018-12-27 DIAGNOSIS — Z79.82 LONG TERM (CURRENT) USE OF ASPIRIN: ICD-10-CM

## 2018-12-27 DIAGNOSIS — Z79.2 LONG TERM (CURRENT) USE OF ANTIBIOTICS: ICD-10-CM

## 2018-12-27 DIAGNOSIS — R10.9 UNSPECIFIED ABDOMINAL PAIN: ICD-10-CM

## 2018-12-27 DIAGNOSIS — I10 ESSENTIAL (PRIMARY) HYPERTENSION: ICD-10-CM

## 2018-12-27 DIAGNOSIS — Z88.8 ALLERGY STATUS TO OTHER DRUGS, MEDICAMENTS AND BIOLOGICAL SUBSTANCES: ICD-10-CM

## 2018-12-27 DIAGNOSIS — Z79.899 OTHER LONG TERM (CURRENT) DRUG THERAPY: ICD-10-CM

## 2018-12-27 LAB
ALBUMIN SERPL ELPH-MCNC: 4.1 G/DL — SIGNIFICANT CHANGE UP (ref 3.5–5.2)
ALP SERPL-CCNC: 89 U/L — SIGNIFICANT CHANGE UP (ref 30–115)
ALT FLD-CCNC: 11 U/L — SIGNIFICANT CHANGE UP (ref 0–41)
ANION GAP SERPL CALC-SCNC: 8 MMOL/L — SIGNIFICANT CHANGE UP (ref 7–14)
AST SERPL-CCNC: 27 U/L — SIGNIFICANT CHANGE UP (ref 0–41)
BASOPHILS # BLD AUTO: 0.02 K/UL — SIGNIFICANT CHANGE UP (ref 0–0.2)
BASOPHILS NFR BLD AUTO: 0.3 % — SIGNIFICANT CHANGE UP (ref 0–1)
BILIRUB SERPL-MCNC: 0.7 MG/DL — SIGNIFICANT CHANGE UP (ref 0.2–1.2)
BUN SERPL-MCNC: 20 MG/DL — SIGNIFICANT CHANGE UP (ref 10–20)
CALCIUM SERPL-MCNC: 9.6 MG/DL — SIGNIFICANT CHANGE UP (ref 8.5–10.1)
CHLORIDE SERPL-SCNC: 97 MMOL/L — LOW (ref 98–110)
CO2 SERPL-SCNC: 32 MMOL/L — SIGNIFICANT CHANGE UP (ref 17–32)
CREAT SERPL-MCNC: 1.2 MG/DL — SIGNIFICANT CHANGE UP (ref 0.7–1.5)
EOSINOPHIL # BLD AUTO: 0.05 K/UL — SIGNIFICANT CHANGE UP (ref 0–0.7)
EOSINOPHIL NFR BLD AUTO: 0.7 % — SIGNIFICANT CHANGE UP (ref 0–8)
GLUCOSE SERPL-MCNC: 101 MG/DL — HIGH (ref 70–99)
HCT VFR BLD CALC: 40.2 % — LOW (ref 42–52)
HGB BLD-MCNC: 14.3 G/DL — SIGNIFICANT CHANGE UP (ref 14–18)
IMM GRANULOCYTES NFR BLD AUTO: 0.4 % — HIGH (ref 0.1–0.3)
LYMPHOCYTES # BLD AUTO: 2.23 K/UL — SIGNIFICANT CHANGE UP (ref 1.2–3.4)
LYMPHOCYTES # BLD AUTO: 31.9 % — SIGNIFICANT CHANGE UP (ref 20.5–51.1)
MCHC RBC-ENTMCNC: 32.1 PG — HIGH (ref 27–31)
MCHC RBC-ENTMCNC: 35.6 G/DL — SIGNIFICANT CHANGE UP (ref 32–37)
MCV RBC AUTO: 90.3 FL — SIGNIFICANT CHANGE UP (ref 80–94)
MONOCYTES # BLD AUTO: 0.66 K/UL — HIGH (ref 0.1–0.6)
MONOCYTES NFR BLD AUTO: 9.4 % — HIGH (ref 1.7–9.3)
NEUTROPHILS # BLD AUTO: 4 K/UL — SIGNIFICANT CHANGE UP (ref 1.4–6.5)
NEUTROPHILS NFR BLD AUTO: 57.3 % — SIGNIFICANT CHANGE UP (ref 42.2–75.2)
NRBC # BLD: 0 /100 WBCS — SIGNIFICANT CHANGE UP (ref 0–0)
PLATELET # BLD AUTO: 156 K/UL — SIGNIFICANT CHANGE UP (ref 130–400)
POTASSIUM SERPL-MCNC: 5.1 MMOL/L — HIGH (ref 3.5–5)
POTASSIUM SERPL-SCNC: 5.1 MMOL/L — HIGH (ref 3.5–5)
PROT SERPL-MCNC: 7 G/DL — SIGNIFICANT CHANGE UP (ref 6–8)
RBC # BLD: 4.45 M/UL — LOW (ref 4.7–6.1)
RBC # FLD: SIGNIFICANT CHANGE UP % (ref 11.5–14.5)
SODIUM SERPL-SCNC: 137 MMOL/L — SIGNIFICANT CHANGE UP (ref 135–146)
WBC # BLD: 6.99 K/UL — SIGNIFICANT CHANGE UP (ref 4.8–10.8)
WBC # FLD AUTO: 6.99 K/UL — SIGNIFICANT CHANGE UP (ref 4.8–10.8)

## 2018-12-27 NOTE — ED PROVIDER NOTE - OBJECTIVE STATEMENT
68y M PMH of chronic back pain and sciatica presents with left lower back pain x1mo. Pt states the pain is aching and dull. It is worse with quick movements after sitting for prolonged periods. He has been being txd with lidocaine injections at his orthopedics office that provides relief of the sxs. Pt presents now because the pain has made it more difficult for him to become comfortable in bed. He denies numbness, weakness, incontinence, trauma, dysuria, hematuria, fever, SOB, CP, N/V/D/C

## 2018-12-27 NOTE — ED PROVIDER NOTE - PROGRESS NOTE DETAILS
Patient refusing IV and CT abdomen at this time. Will leave AMA, understands risks of leaving against medical advice including severe disability and death. DORA Kurtz: I was directly involved in the care and management of this patient while supervising PA Fellow

## 2018-12-27 NOTE — ED ADULT NURSE NOTE - NSIMPLEMENTINTERV_GEN_ALL_ED
Implemented All Universal Safety Interventions:  [place] to call system. Call bell, personal items and telephone within reach. Instruct patient to call for assistance. Room bathroom lighting operational. Non-slip footwear when patient is off stretcher. Physically safe environment: no spills, clutter or unnecessary equipment. Stretcher in lowest position, wheels locked, appropriate side rails in place.

## 2018-12-27 NOTE — ED PROVIDER NOTE - CARE PROVIDER_API CALL
Lien Kwong), Urology  [address] [place] [address]  [address]  [place] [address]  Phone: [phone]  Fax: [phone]

## 2018-12-27 NOTE — ED PROVIDER NOTE - NS ED ROS FT
Constitutional: (-) fever  Eyes/ENT: (-) blurry vision, (-) epistaxis  Cardiovascular: (-) chest pain, (-) syncope  Respiratory: (-) cough, (-) shortness of breath  Gastrointestinal: (-) vomiting, (-) diarrhea  Musculoskeletal: (-) neck pain, (+) back pain, (-) joint pain  Integumentary: (-) rash  Neurological: (-) headache, (-) altered mental status  Psychiatric: (-) hallucinations  Allergic/Immunologic: (-) pruritus

## 2018-12-27 NOTE — ED PROVIDER NOTE - MEDICAL DECISION MAKING DETAILS
rec further work up to include CT scan and labs,  Pt refusing, The patient wishes to leave against medical advice.  I have discussed the risks, benefits and alternatives (including the possibility of worsening of disease, pain, permanent disability, and/or death) with the patient and his/her family (if available).  The patient voices understanding of these risks, benefits, and alternatives and still wishes to sign out against medical advice.  The patient is awake, alert, oriented  x 3 and has demonstrated capacity to refuse/direct care.  I have advised the patient that they can and should return immediately should they develop any worse/different/additional symptoms, or if they change their mind and want to continue their care.

## 2018-12-27 NOTE — ED PROVIDER NOTE - ATTENDING CONTRIBUTION TO CARE
69 yo M PMHx noted presents with c/o left sided back/flank pain on and off x 1 month.  Pt wa seen by his pain specialist and had lidocaine injection yesterday without relief,  no fever or chills, no dysuria, Has urinary frequency dulce at night, being worked up by urology but cancelled his appt for sono.  no fever or chills, no n/v/d.  On exam pt in NAD AAO x 3, abd soft nt nd, no rash, Ext atrumatic, FROM

## 2018-12-27 NOTE — ED PROVIDER NOTE - PHYSICAL EXAMINATION
VITAL SIGNS: I have reviewed nursing notes and confirm.  CONSTITUTIONAL: Well-developed; well-nourished; in no acute distress.  SKIN: Skin exam is warm and dry, no acute rash.  NECK: Supple; non tender.  CARD: S1, S2 normal  RESP: No wheezes, rales or rhonchi. Speaking in full sentences.   ABD: Normal bowel sounds; soft; non-distended; non-tender; No rebound or guarding. No CVA tenderness.  EXT: Tenderness to palpation B/L lower back with palpable spasm, no midline tenderness. B/L pitting edema of lower extremities. No clubbing or cyanosis   NEURO: Alert, oriented. Grossly unremarkable. No focal deficits. Gait intact, balance intact, sensation intact, strength intact

## 2018-12-31 ENCOUNTER — EMERGENCY (EMERGENCY)
Facility: HOSPITAL | Age: 68
LOS: 0 days | Discharge: HOME | End: 2018-12-31
Admitting: INTERNAL MEDICINE

## 2018-12-31 ENCOUNTER — INPATIENT (INPATIENT)
Facility: HOSPITAL | Age: 68
LOS: 0 days | Discharge: HOME | End: 2019-01-01
Attending: INTERNAL MEDICINE | Admitting: INTERNAL MEDICINE

## 2018-12-31 VITALS
SYSTOLIC BLOOD PRESSURE: 178 MMHG | RESPIRATION RATE: 20 BRPM | HEART RATE: 79 BPM | TEMPERATURE: 97 F | DIASTOLIC BLOOD PRESSURE: 94 MMHG | OXYGEN SATURATION: 96 %

## 2018-12-31 DIAGNOSIS — I25.10 ATHEROSCLEROTIC HEART DISEASE OF NATIVE CORONARY ARTERY WITHOUT ANGINA PECTORIS: ICD-10-CM

## 2018-12-31 DIAGNOSIS — I25.2 OLD MYOCARDIAL INFARCTION: ICD-10-CM

## 2018-12-31 DIAGNOSIS — E78.5 HYPERLIPIDEMIA, UNSPECIFIED: ICD-10-CM

## 2018-12-31 DIAGNOSIS — Z95.5 PRESENCE OF CORONARY ANGIOPLASTY IMPLANT AND GRAFT: ICD-10-CM

## 2018-12-31 DIAGNOSIS — Z87.19 PERSONAL HISTORY OF OTHER DISEASES OF THE DIGESTIVE SYSTEM: Chronic | ICD-10-CM

## 2018-12-31 DIAGNOSIS — M54.5 LOW BACK PAIN: ICD-10-CM

## 2018-12-31 DIAGNOSIS — J44.9 CHRONIC OBSTRUCTIVE PULMONARY DISEASE, UNSPECIFIED: ICD-10-CM

## 2018-12-31 DIAGNOSIS — R07.9 CHEST PAIN, UNSPECIFIED: ICD-10-CM

## 2018-12-31 DIAGNOSIS — Z90.49 ACQUIRED ABSENCE OF OTHER SPECIFIED PARTS OF DIGESTIVE TRACT: Chronic | ICD-10-CM

## 2018-12-31 DIAGNOSIS — R07.89 OTHER CHEST PAIN: ICD-10-CM

## 2018-12-31 DIAGNOSIS — G89.29 OTHER CHRONIC PAIN: ICD-10-CM

## 2018-12-31 DIAGNOSIS — I10 ESSENTIAL (PRIMARY) HYPERTENSION: ICD-10-CM

## 2018-12-31 DIAGNOSIS — Z95.5 PRESENCE OF CORONARY ANGIOPLASTY IMPLANT AND GRAFT: Chronic | ICD-10-CM

## 2018-12-31 DIAGNOSIS — M19.90 UNSPECIFIED OSTEOARTHRITIS, UNSPECIFIED SITE: ICD-10-CM

## 2018-12-31 DIAGNOSIS — R26.9 UNSPECIFIED ABNORMALITIES OF GAIT AND MOBILITY: ICD-10-CM

## 2018-12-31 DIAGNOSIS — Z99.89 DEPENDENCE ON OTHER ENABLING MACHINES AND DEVICES: ICD-10-CM

## 2018-12-31 DIAGNOSIS — R11.0 NAUSEA: ICD-10-CM

## 2018-12-31 DIAGNOSIS — G47.33 OBSTRUCTIVE SLEEP APNEA (ADULT) (PEDIATRIC): ICD-10-CM

## 2018-12-31 DIAGNOSIS — Z90.49 ACQUIRED ABSENCE OF OTHER SPECIFIED PARTS OF DIGESTIVE TRACT: ICD-10-CM

## 2018-12-31 LAB
ALBUMIN SERPL ELPH-MCNC: 4 G/DL — SIGNIFICANT CHANGE UP (ref 3.5–5.2)
ALP SERPL-CCNC: 98 U/L — SIGNIFICANT CHANGE UP (ref 30–115)
ALT FLD-CCNC: 12 U/L — SIGNIFICANT CHANGE UP (ref 0–41)
ANION GAP SERPL CALC-SCNC: 12 MMOL/L — SIGNIFICANT CHANGE UP (ref 7–14)
APPEARANCE UR: CLEAR — SIGNIFICANT CHANGE UP
APTT BLD: 25.7 SEC — LOW (ref 27–39.2)
AST SERPL-CCNC: 18 U/L — SIGNIFICANT CHANGE UP (ref 0–41)
BASOPHILS # BLD AUTO: 0.02 K/UL — SIGNIFICANT CHANGE UP (ref 0–0.2)
BASOPHILS NFR BLD AUTO: 0.3 % — SIGNIFICANT CHANGE UP (ref 0–1)
BILIRUB SERPL-MCNC: 1.1 MG/DL — SIGNIFICANT CHANGE UP (ref 0.2–1.2)
BILIRUB UR-MCNC: NEGATIVE — SIGNIFICANT CHANGE UP
BUN SERPL-MCNC: 13 MG/DL — SIGNIFICANT CHANGE UP (ref 10–20)
CALCIUM SERPL-MCNC: 9.2 MG/DL — SIGNIFICANT CHANGE UP (ref 8.5–10.1)
CHLORIDE SERPL-SCNC: 96 MMOL/L — LOW (ref 98–110)
CK MB CFR SERPL CALC: 4.2 NG/ML — SIGNIFICANT CHANGE UP (ref 0.6–6.3)
CK SERPL-CCNC: 133 U/L — SIGNIFICANT CHANGE UP (ref 0–225)
CO2 SERPL-SCNC: 29 MMOL/L — SIGNIFICANT CHANGE UP (ref 17–32)
COLOR SPEC: YELLOW — SIGNIFICANT CHANGE UP
CREAT SERPL-MCNC: 0.9 MG/DL — SIGNIFICANT CHANGE UP (ref 0.7–1.5)
DIFF PNL FLD: ABNORMAL
EOSINOPHIL # BLD AUTO: 0.04 K/UL — SIGNIFICANT CHANGE UP (ref 0–0.7)
EOSINOPHIL NFR BLD AUTO: 0.7 % — SIGNIFICANT CHANGE UP (ref 0–8)
GLUCOSE SERPL-MCNC: 100 MG/DL — HIGH (ref 70–99)
GLUCOSE UR QL: NEGATIVE MG/DL — SIGNIFICANT CHANGE UP
HCT VFR BLD CALC: 40.8 % — LOW (ref 42–52)
HGB BLD-MCNC: 14.5 G/DL — SIGNIFICANT CHANGE UP (ref 14–18)
IMM GRANULOCYTES NFR BLD AUTO: 0.3 % — SIGNIFICANT CHANGE UP (ref 0.1–0.3)
INR BLD: 1.34 RATIO — HIGH (ref 0.65–1.3)
KETONES UR-MCNC: NEGATIVE — SIGNIFICANT CHANGE UP
LEUKOCYTE ESTERASE UR-ACNC: ABNORMAL
LYMPHOCYTES # BLD AUTO: 1.76 K/UL — SIGNIFICANT CHANGE UP (ref 1.2–3.4)
LYMPHOCYTES # BLD AUTO: 30.7 % — SIGNIFICANT CHANGE UP (ref 20.5–51.1)
MCHC RBC-ENTMCNC: 31.5 PG — HIGH (ref 27–31)
MCHC RBC-ENTMCNC: 35.5 G/DL — SIGNIFICANT CHANGE UP (ref 32–37)
MCV RBC AUTO: 88.7 FL — SIGNIFICANT CHANGE UP (ref 80–94)
MONOCYTES # BLD AUTO: 0.57 K/UL — SIGNIFICANT CHANGE UP (ref 0.1–0.6)
MONOCYTES NFR BLD AUTO: 9.9 % — HIGH (ref 1.7–9.3)
NEUTROPHILS # BLD AUTO: 3.33 K/UL — SIGNIFICANT CHANGE UP (ref 1.4–6.5)
NEUTROPHILS NFR BLD AUTO: 58.1 % — SIGNIFICANT CHANGE UP (ref 42.2–75.2)
NITRITE UR-MCNC: NEGATIVE — SIGNIFICANT CHANGE UP
NRBC # BLD: 0 /100 WBCS — SIGNIFICANT CHANGE UP (ref 0–0)
PH UR: 6 — SIGNIFICANT CHANGE UP (ref 5–8)
PLATELET # BLD AUTO: 196 K/UL — SIGNIFICANT CHANGE UP (ref 130–400)
POTASSIUM SERPL-MCNC: 3.6 MMOL/L — SIGNIFICANT CHANGE UP (ref 3.5–5)
POTASSIUM SERPL-SCNC: 3.6 MMOL/L — SIGNIFICANT CHANGE UP (ref 3.5–5)
PROT SERPL-MCNC: 7.1 G/DL — SIGNIFICANT CHANGE UP (ref 6–8)
PROT UR-MCNC: NEGATIVE MG/DL — SIGNIFICANT CHANGE UP
PROTHROM AB SERPL-ACNC: 14.6 SEC — HIGH (ref 9.95–12.87)
RBC # BLD: 4.6 M/UL — LOW (ref 4.7–6.1)
RBC # FLD: 12.3 % — SIGNIFICANT CHANGE UP (ref 11.5–14.5)
RBC CASTS # UR COMP ASSIST: ABNORMAL /HPF
SODIUM SERPL-SCNC: 137 MMOL/L — SIGNIFICANT CHANGE UP (ref 135–146)
SP GR SPEC: 1.01 — SIGNIFICANT CHANGE UP (ref 1.01–1.03)
TROPONIN T SERPL-MCNC: <0.01 NG/ML — SIGNIFICANT CHANGE UP
TROPONIN T SERPL-MCNC: <0.01 NG/ML — SIGNIFICANT CHANGE UP
UROBILINOGEN FLD QL: 0.2 MG/DL — SIGNIFICANT CHANGE UP (ref 0.2–0.2)
WBC # BLD: 5.74 K/UL — SIGNIFICANT CHANGE UP (ref 4.8–10.8)
WBC # FLD AUTO: 5.74 K/UL — SIGNIFICANT CHANGE UP (ref 4.8–10.8)
WBC UR QL: SIGNIFICANT CHANGE UP /HPF

## 2018-12-31 RX ORDER — CLOPIDOGREL BISULFATE 75 MG/1
75 TABLET, FILM COATED ORAL DAILY
Qty: 0 | Refills: 0 | Status: DISCONTINUED | OUTPATIENT
Start: 2018-12-31 | End: 2019-01-01

## 2018-12-31 RX ORDER — ATENOLOL 25 MG/1
50 TABLET ORAL DAILY
Qty: 0 | Refills: 0 | Status: DISCONTINUED | OUTPATIENT
Start: 2018-12-31 | End: 2019-01-01

## 2018-12-31 RX ORDER — LOSARTAN POTASSIUM 100 MG/1
50 TABLET, FILM COATED ORAL DAILY
Qty: 0 | Refills: 0 | Status: DISCONTINUED | OUTPATIENT
Start: 2018-12-31 | End: 2019-01-01

## 2018-12-31 RX ORDER — ASPIRIN/CALCIUM CARB/MAGNESIUM 324 MG
162 TABLET ORAL ONCE
Qty: 0 | Refills: 0 | Status: COMPLETED | OUTPATIENT
Start: 2018-12-31 | End: 2018-12-31

## 2018-12-31 RX ORDER — SUCRALFATE 1 G
1 TABLET ORAL
Qty: 0 | Refills: 0 | Status: DISCONTINUED | OUTPATIENT
Start: 2018-12-31 | End: 2019-01-01

## 2018-12-31 RX ORDER — HYDROCHLOROTHIAZIDE 25 MG
25 TABLET ORAL DAILY
Qty: 0 | Refills: 0 | Status: DISCONTINUED | OUTPATIENT
Start: 2018-12-31 | End: 2019-01-01

## 2018-12-31 RX ORDER — ONDANSETRON 8 MG/1
4 TABLET, FILM COATED ORAL ONCE
Qty: 0 | Refills: 0 | Status: COMPLETED | OUTPATIENT
Start: 2018-12-31 | End: 2018-12-31

## 2018-12-31 RX ORDER — ASPIRIN/CALCIUM CARB/MAGNESIUM 324 MG
325 TABLET ORAL ONCE
Qty: 0 | Refills: 0 | Status: DISCONTINUED | OUTPATIENT
Start: 2018-12-31 | End: 2018-12-31

## 2018-12-31 RX ORDER — ATORVASTATIN CALCIUM 80 MG/1
20 TABLET, FILM COATED ORAL AT BEDTIME
Qty: 0 | Refills: 0 | Status: DISCONTINUED | OUTPATIENT
Start: 2018-12-31 | End: 2019-01-01

## 2018-12-31 RX ORDER — OXYCODONE AND ACETAMINOPHEN 5; 325 MG/1; MG/1
2 TABLET ORAL ONCE
Qty: 0 | Refills: 0 | Status: DISCONTINUED | OUTPATIENT
Start: 2018-12-31 | End: 2018-12-31

## 2018-12-31 RX ORDER — HYDROCHLOROTHIAZIDE 25 MG
25 TABLET ORAL ONCE
Qty: 0 | Refills: 0 | Status: DISCONTINUED | OUTPATIENT
Start: 2018-12-31 | End: 2019-01-01

## 2018-12-31 RX ORDER — ASPIRIN/CALCIUM CARB/MAGNESIUM 324 MG
81 TABLET ORAL DAILY
Qty: 0 | Refills: 0 | Status: DISCONTINUED | OUTPATIENT
Start: 2018-12-31 | End: 2019-01-01

## 2018-12-31 RX ORDER — OXYCODONE AND ACETAMINOPHEN 5; 325 MG/1; MG/1
1 TABLET ORAL EVERY 6 HOURS
Qty: 0 | Refills: 0 | Status: DISCONTINUED | OUTPATIENT
Start: 2018-12-31 | End: 2019-01-01

## 2018-12-31 RX ADMIN — ATENOLOL 50 MILLIGRAM(S): 25 TABLET ORAL at 12:08

## 2018-12-31 RX ADMIN — LOSARTAN POTASSIUM 50 MILLIGRAM(S): 100 TABLET, FILM COATED ORAL at 21:29

## 2018-12-31 RX ADMIN — Medication 1 GRAM(S): at 23:38

## 2018-12-31 RX ADMIN — ONDANSETRON 4 MILLIGRAM(S): 8 TABLET, FILM COATED ORAL at 06:58

## 2018-12-31 RX ADMIN — OXYCODONE AND ACETAMINOPHEN 1 TABLET(S): 5; 325 TABLET ORAL at 18:40

## 2018-12-31 RX ADMIN — CLOPIDOGREL BISULFATE 75 MILLIGRAM(S): 75 TABLET, FILM COATED ORAL at 12:08

## 2018-12-31 RX ADMIN — Medication 1 GRAM(S): at 18:40

## 2018-12-31 RX ADMIN — OXYCODONE AND ACETAMINOPHEN 2 TABLET(S): 5; 325 TABLET ORAL at 21:00

## 2018-12-31 RX ADMIN — OXYCODONE AND ACETAMINOPHEN 2 TABLET(S): 5; 325 TABLET ORAL at 22:30

## 2018-12-31 RX ADMIN — Medication 162 MILLIGRAM(S): at 06:58

## 2018-12-31 RX ADMIN — Medication 1 GRAM(S): at 12:08

## 2018-12-31 RX ADMIN — ATORVASTATIN CALCIUM 20 MILLIGRAM(S): 80 TABLET, FILM COATED ORAL at 21:29

## 2018-12-31 NOTE — ED PROVIDER NOTE - PHYSICAL EXAMINATION
CONSTITUTIONAL: Well-appearing; well-nourished; in no apparent distress.   EYES: PERRL; EOM intact.   ENT: normal nose; no rhinorrhea; normal pharynx with no tonsillar hypertrophy.   NECK: Supple; non-tender; no cervical lymphadenopathy. No JVD.   CARDIOVASCULAR: Normal S1, S2; no murmurs, rubs, or gallops.   RESPIRATORY: Normal chest excursion with respiration; breath sounds clear and equal bilaterally; no wheezes, rhonchi, or rales.  GI/: Normal bowel sounds; non-distended; non-tender; no palpable organomegaly.   MS: No evidence of trauma or deformity. no pitting edema to b/l LE. No midline tenderness to back and neck.   SKIN: Normal for age and race; warm; dry; good turgor; no apparent lesions or exudate.   NEURO/PSYCH: A & O x 4; grossly unremarkable.

## 2018-12-31 NOTE — ED PROVIDER NOTE - PROGRESS NOTE DETAILS
signout received from DORA lopes, will follow labs and re-assess d/w dr. acevedo: approves non CCU tele

## 2018-12-31 NOTE — H&P ADULT - NSHPPHYSICALEXAM_GEN_ALL_CORE
PHYSICAL EXAM:  GENERAL: NAD, obese  HEAD:  Atraumatic, Normocephalic  EYES: EOMI, PERRLA, conjunctiva and sclera clear  NECK: Supple, No JVD  CHEST/LUNG: Clear to auscultation bilaterally  HEART: S1,S2 Regular rate and rhythm  ABDOMEN: Soft, nontender, obese, Bowel sounds present and normoactive  EXTREMITIES:  2+ peripheral pulses bilaterally and symmetrically, motor and sensory intact  PSYCH: AAOx3, normal mood and affect  NEUROLOGY: non-focal, muscle strength 5/5 all extremities

## 2018-12-31 NOTE — H&P ADULT - NSHPLABSRESULTS_GEN_ALL_CORE
14.5   5.74  )-----------( 196      ( 31 Dec 2018 06:25 )             40.8           137  |  96<L>  |  13  ----------------------------<  100<H>  3.6   |  29  |  0.9    Ca    9.2      31 Dec 2018 06:25    TPro  7.1  /  Alb  4.0  /  TBili  1.1  /  DBili  x   /  AST  18  /  ALT  12  /  AlkPhos  98                    Urinalysis Basic - ( 31 Dec 2018 06:25 )    Color: Yellow / Appearance: Clear / S.015 / pH: x  Gluc: x / Ketone: Negative  / Bili: Negative / Urobili: 0.2 mg/dL   Blood: x / Protein: Negative mg/dL / Nitrite: Negative   Leuk Esterase: Trace / RBC: 3-5 /HPF / WBC 3-5 /HPF   Sq Epi: x / Non Sq Epi: x / Bacteria: x        PT/INR - ( 31 Dec 2018 06:25 )   PT: 14.60 sec;   INR: 1.34 ratio         PTT - ( 31 Dec 2018 06:25 )  PTT:25.7 sec    Lactate Trend      CARDIAC MARKERS ( 31 Dec 2018 06:25 )  x     / <0.01 ng/mL / x     / x     / x

## 2018-12-31 NOTE — H&P ADULT - ASSESSMENT
DX; chest pain  admit to med-surg  tele monitor  labs/ecg/c-xray  cex3  pain mgmt  cardiology consult  continue previous meds  pt/rehab eval  monitor vss  monitor pt

## 2018-12-31 NOTE — ED PROVIDER NOTE - NS ED ROS FT
Constitutional: no fever, chills, no recent weight loss, change in appetite or malaise  Eyes: no redness/discharge/pain/vision changes  ENT: no rhinorrhea/ear pain/sore throat  Cardiac: see HPI  Respiratory: No cough or respiratory distress  GI: No nausea, vomiting, diarrhea or abdominal pain.  : No dysuria, frequency, urgency or hematuria  MS: + chronic back pain + flank pain, no loss of ROM, no weakness  Neuro: No headache or weakness. No LOC.  Skin: No skin rash.  Endocrine: No history of thyroid disease or diabetes.  Except as documented in the HPI, all other systems are negative.

## 2018-12-31 NOTE — PHYSICAL THERAPY INITIAL EVALUATION ADULT - IMPAIRMENTS FOUND, PT EVAL
ergonomics and body mechanics/gait, locomotion, and balance/posture/aerobic capacity/endurance/muscle strength

## 2018-12-31 NOTE — PROGRESS NOTE ADULT - SUBJECTIVE AND OBJECTIVE BOX
68y old male pmhx below presents to the ED complaining of 2days hx of intermittent chest pain that progressively got worse last night associated with nausea, no vomit. pt also complains of chronic back pain, but denies SOB, abdominal pain, fever or chills    PAST MEDICAL & SURGICAL HISTORY:  MI (myocardial infarction)  Arthritis  Kidney stones  Sleep apnea  Hypertension  High blood cholesterol  H/O heart artery stent  History of appendectomy  H/O chronic cholecystitis: cholecystectomy    MEDICATIONS  (STANDING):  aspirin  chewable 81 milliGRAM(s) Oral daily  ATENolol  Tablet 50 milliGRAM(s) Oral daily  atorvastatin 20 milliGRAM(s) Oral at bedtime  clopidogrel Tablet 75 milliGRAM(s) Oral daily  sucralfate 1 Gram(s) Oral four times a day    MEDICATIONS  (PRN):  oxyCODONE    5 mG/acetaminophen 325 mG 1 Tablet(s) Oral every 6 hours PRN Moderate Pain (4 - 6)    Vital Signs Last 24 Hrs  T(C): 36.1 (31 Dec 2018 05:54), Max: 36.1 (31 Dec 2018 05:54)  T(F): 96.9 (31 Dec 2018 05:54), Max: 96.9 (31 Dec 2018 05:54)  HR: 71 (31 Dec 2018 07:55) (71 - 79)  BP: 148/68 (31 Dec 2018 07:55) (148/68 - 178/94)  BP(mean): --  RR: 18 (31 Dec 2018 07:55) (18 - 20)  SpO2: 95% (31 Dec 2018 07:55) (95% - 96%)      PHYSICAL EXAM :     General: Alert awake oriented x 3   HEENT : PERRLA EOMI  Lungs : CTA   CARD s1 s2 rrr  abd soft obese  ext no c/c/e  neuro alert awake oriented x3                         14.5   5.74  )-----------( 196      ( 31 Dec 2018 06:25 )             40.8   12-    137  |  96<L>  |  13  ----------------------------<  100<H>  3.6   |  29  |  0.9    Ca    9.2      31 Dec 2018 06:25    TPro  7.1  /  Alb  4.0  /  TBili  1.1  /  DBili  x   /  AST  18  /  ALT  12  /  AlkPhos  98  12-    CARDIAC MARKERS ( 31 Dec 2018 06:25 )  x     / <0.01 ng/mL / x     / x     / x        Ventricular Rate 77 BPM    Atrial Rate 77 BPM    P-R Interval 180 ms    QRS Duration 98 ms    Q-T Interval 416 ms    QTC Calculation(Bezet) 470 ms    P Axis 59 degrees    R Axis -31 degrees    T Axis 31 degrees    Diagnosis Line Sinus rhythm with occasional Premature ventricular complexes and Premature  atrial complexes  Left axis deviation  Voltage criteria for left ventricular hypertrophy  Confirmed by SUNITHA LAW MD ([id number]) on 2018 11:44:31 AM      EXAM:  XR CHEST PORTABLE IMMED 1V            PROCEDURE DATE:  2018            INTERPRETATION:  Clinical History / Reason for exam: Pain    Comparison : Chest radiograph 2018.    Technique/Positionin frontal views.    Findings:    Support devices: Telemetry leads.    Cardiac/mediastinum/hilum: Unremarkable.    Lung parenchyma/Pleura: Bibasilar focal atelectasis.    Skeleton/soft tissues: Stable. Elevation of the right hemidiaphragm,   decreased.    Impression:      Bibasilarfocal atelectasis.                  DAVID GAONA M.D., ATTENDING RADIOLOGIST  This document has been electronically signed. Dec 31 2018  9:35AM

## 2018-12-31 NOTE — PHYSICAL THERAPY INITIAL EVALUATION ADULT - PERSONAL SAFETY AND JUDGMENT, REHAB EVAL
Post-Care Instructions: I reviewed with the patient in detail post-care instructions. Patient is to wear sun protection. Patients may expect sunburn like redness, discomfort and scabbing. Protocol For Broad Band Uvb: The patient received Broad Band UVB. Eye And Genital Shields: yes Treatment Number: [id number] Protocol For Photochemotherapy: Baby Oil And Nbuvb: The patient received Photochemotherapy: Baby Oil and NBUVB (baby oil applied to all lesions prior to phototherapy). Protocol For Photochemotherapy: Tar And Nbuvb (Goeckerman Treatment): The patient received Photochemotherapy: Tar and NBUVB (Goeckerman treatment). Consent: The risks were reviewed with the patient including but not limited to: burn, pigmentary changes, pain, blistering, scabbing, redness, Protocol For Protocol For Photochemotherapy For Severe Photoresponsive Dermatoses: Bath Puva: The patient received Photochemotherapy for severe photoresponsive dermatoses: Bath PUVA requiring at least 4 to 8 hours of care under direct physician supervision. Protocol For Photochemotherapy For Severe Photoresponsive Dermatoses: Petrolatum And Broad Band Uvb: The patient received Photochemotherapyfor severe photoresponsive dermatoses: Petrolatum and Broad Band UVB requiring at least 4 to 8 hours of care under direct physician supervision. Protocol For Photochemotherapy For Severe Photoresponsive Dermatoses: Petrolatum And Nbuvb: The patient received Photochemotherapy for severe photoresponsive dermatoses: Petrolatum and NBUVB requiring at least 4 to 8 hours of care under direct physician supervision. Protocol For Photochemotherapy: Petrolatum And Broad Band Uvb: The patient received Photochemotherapy: Petrolatum and Broad Band UVB. Protocol For Photochemotherapy: Petrolatum And Nbuvb: The patient received Photochemotherapy: Petrolatum and NBUVB (petrolatum applied to all lesions prior to phototherapy). Protocol For Bath Puva: The patient received Bath PUVA. intact Protocol For Puva: The patient received PUVA. Total Body Time: 1:20 max Detail Level: Generalized Protocol For Photochemotherapy For Severe Photoresponsive Dermatoses: Tar And Nbuvb (Goeckerman Treatment): The patient received Photochemotherapy for severe photoresponsive dermatoses: Tar and NBUVB (Goeckerman treatment) requiring at least 4 to 8 hours of care under direct physician supervision. Protocol For Photochemotherapy: Tar And Broad Band Uvb (Goeckerman Treatment): The patient received Photochemotherapy: Tar and Broad Band UVB (Goeckerman treatment). Protocol For Photochemotherapy For Severe Photoresponsive Dermatoses: Tar And Broad Band Uvb (Goeckerman Treatment): The patient received Photochemotherapy for severe photoresponsive dermatoses: Tar and Broad Band UVB (Goeckerman treatment) requiring at least 4 to 8 hours of care under direct physician supervision. Protocol For Nbuvb: The patient received NBUVB. Protocol: Photochemotherapy: Petrolatum and NBUVB Protocol For Photochemotherapy For Severe Photoresponsive Dermatoses: Puva: The patient received Photochemotherapy for severe photoresponsive dermatoses: PUVA requiring at least 4 to 8 hours of care under direct physician supervision. Price (Use Numbers Only, No Special Characters Or $): 5.00

## 2018-12-31 NOTE — ED PROVIDER NOTE - ATTENDING CONTRIBUTION TO CARE
I was present for and supervised the key and critical aspects of the procedures performed during the care of the patient. Patient presents for evaluation of chest pain he has a history of 2 prior stents last placed in , he also is htn, dm, non-smoker whose father  at 53 of mi presents with chest pain that started several hours prior to evaluation he denies any shortness of breath he denies any cough fevers or chills.  on physical exam he is nc/at perrla eomi oropharynx clear cta b/l, rrr s1s2 noted abd-soft nt nd bs+ ext from with no focal deficits.    A/p- given patients history of cad we obtained ekg, and obtained cardic enzymes I will admit the patient for potential provacative testing

## 2018-12-31 NOTE — PHYSICAL THERAPY INITIAL EVALUATION ADULT - GAIT DEVIATIONS NOTED, PT EVAL
increased time in double stance/decreased step length/decreased ping/decreased weight-shifting ability

## 2018-12-31 NOTE — ED PROVIDER NOTE - MEDICAL DECISION MAKING DETAILS
Patient will be admitted to a monitored setting. In my opinion, in patient treatment is medically justifiable and appropriate: continued intermittent CP with a PMHx of CAD.  pt has NANDINI 3 risk

## 2018-12-31 NOTE — ED PROVIDER NOTE - OBJECTIVE STATEMENT
67 yo male hx HTN/HLD/Arthritis/CAD s/p 2 stents and MI in 1998 present c/o substernal chest pain x 2 days. pain was sharper tonight so he came back to ED for evaluation. chest pain radiating to his back associates with nausea without vomiting. denies diaphoresis associates with the pain. patient also complains of left flank pain for few days. he was seen in ED 3 days ago for the pain but didn't complete the work up 2/2 difficulty on blood draw. also reported coughing with white sputum.   denies fever/chill/abd pain/v/d/urinary sxs.

## 2018-12-31 NOTE — ED ADULT NURSE NOTE - PMH
Arthritis    High blood cholesterol    Hypertension    Kidney stones    MI (myocardial infarction)    Sleep apnea

## 2018-12-31 NOTE — PROGRESS NOTE ADULT - ASSESSMENT
69 y/o male with intermittent chest pressure ; hx of copd and alysa     CP= r/o - enzymes   continue meds    ALYSA - cpap     discussed with patient     ad[facility] care cpr

## 2018-12-31 NOTE — PHYSICAL THERAPY INITIAL EVALUATION ADULT - GENERAL OBSERVATIONS, REHAB EVAL
14:45 - 15:05.  Chart reviewed. Patient available to be seen for physical therapy, confirmed with nurse. Patient encountered semi-reclined in bed. Patient denies pain at rest, agreeable for PT evaluation.

## 2019-01-01 ENCOUNTER — TRANSCRIPTION ENCOUNTER (OUTPATIENT)
Age: 69
End: 2019-01-01

## 2019-01-01 VITALS
HEART RATE: 67 BPM | DIASTOLIC BLOOD PRESSURE: 70 MMHG | TEMPERATURE: 98 F | SYSTOLIC BLOOD PRESSURE: 160 MMHG | RESPIRATION RATE: 16 BRPM

## 2019-01-01 LAB
ANION GAP SERPL CALC-SCNC: 8 MMOL/L — SIGNIFICANT CHANGE UP (ref 7–14)
BASOPHILS # BLD AUTO: 0.01 K/UL — SIGNIFICANT CHANGE UP (ref 0–0.2)
BASOPHILS NFR BLD AUTO: 0.2 % — SIGNIFICANT CHANGE UP (ref 0–1)
BUN SERPL-MCNC: 13 MG/DL — SIGNIFICANT CHANGE UP (ref 10–20)
CALCIUM SERPL-MCNC: 9.5 MG/DL — SIGNIFICANT CHANGE UP (ref 8.5–10.1)
CHLORIDE SERPL-SCNC: 99 MMOL/L — SIGNIFICANT CHANGE UP (ref 98–110)
CK MB CFR SERPL CALC: 3.8 NG/ML — SIGNIFICANT CHANGE UP (ref 0.6–6.3)
CK SERPL-CCNC: 114 U/L — SIGNIFICANT CHANGE UP (ref 0–225)
CO2 SERPL-SCNC: 34 MMOL/L — HIGH (ref 17–32)
CREAT SERPL-MCNC: 1 MG/DL — SIGNIFICANT CHANGE UP (ref 0.7–1.5)
CULTURE RESULTS: SIGNIFICANT CHANGE UP
EOSINOPHIL # BLD AUTO: 0.06 K/UL — SIGNIFICANT CHANGE UP (ref 0–0.7)
EOSINOPHIL NFR BLD AUTO: 1 % — SIGNIFICANT CHANGE UP (ref 0–8)
GLUCOSE SERPL-MCNC: 97 MG/DL — SIGNIFICANT CHANGE UP (ref 70–99)
HCT VFR BLD CALC: 45.4 % — SIGNIFICANT CHANGE UP (ref 42–52)
HGB BLD-MCNC: 15.4 G/DL — SIGNIFICANT CHANGE UP (ref 14–18)
IMM GRANULOCYTES NFR BLD AUTO: 0.3 % — SIGNIFICANT CHANGE UP (ref 0.1–0.3)
LYMPHOCYTES # BLD AUTO: 2.31 K/UL — SIGNIFICANT CHANGE UP (ref 1.2–3.4)
LYMPHOCYTES # BLD AUTO: 38.6 % — SIGNIFICANT CHANGE UP (ref 20.5–51.1)
MCHC RBC-ENTMCNC: 31 PG — SIGNIFICANT CHANGE UP (ref 27–31)
MCHC RBC-ENTMCNC: 33.9 G/DL — SIGNIFICANT CHANGE UP (ref 32–37)
MCV RBC AUTO: 91.5 FL — SIGNIFICANT CHANGE UP (ref 80–94)
MONOCYTES # BLD AUTO: 0.53 K/UL — SIGNIFICANT CHANGE UP (ref 0.1–0.6)
MONOCYTES NFR BLD AUTO: 8.8 % — SIGNIFICANT CHANGE UP (ref 1.7–9.3)
NEUTROPHILS # BLD AUTO: 3.06 K/UL — SIGNIFICANT CHANGE UP (ref 1.4–6.5)
NEUTROPHILS NFR BLD AUTO: 51.1 % — SIGNIFICANT CHANGE UP (ref 42.2–75.2)
NRBC # BLD: 0 /100 WBCS — SIGNIFICANT CHANGE UP (ref 0–0)
PLATELET # BLD AUTO: 212 K/UL — SIGNIFICANT CHANGE UP (ref 130–400)
POTASSIUM SERPL-MCNC: 4.1 MMOL/L — SIGNIFICANT CHANGE UP (ref 3.5–5)
POTASSIUM SERPL-SCNC: 4.1 MMOL/L — SIGNIFICANT CHANGE UP (ref 3.5–5)
RBC # BLD: 4.96 M/UL — SIGNIFICANT CHANGE UP (ref 4.7–6.1)
RBC # FLD: 12.5 % — SIGNIFICANT CHANGE UP (ref 11.5–14.5)
SODIUM SERPL-SCNC: 141 MMOL/L — SIGNIFICANT CHANGE UP (ref 135–146)
SPECIMEN SOURCE: SIGNIFICANT CHANGE UP
TROPONIN T SERPL-MCNC: <0.01 NG/ML — SIGNIFICANT CHANGE UP
WBC # BLD: 5.99 K/UL — SIGNIFICANT CHANGE UP (ref 4.8–10.8)
WBC # FLD AUTO: 5.99 K/UL — SIGNIFICANT CHANGE UP (ref 4.8–10.8)

## 2019-01-01 RX ADMIN — LOSARTAN POTASSIUM 50 MILLIGRAM(S): 100 TABLET, FILM COATED ORAL at 06:46

## 2019-01-01 RX ADMIN — CLOPIDOGREL BISULFATE 75 MILLIGRAM(S): 75 TABLET, FILM COATED ORAL at 11:21

## 2019-01-01 RX ADMIN — Medication 25 MILLIGRAM(S): at 06:46

## 2019-01-01 RX ADMIN — Medication 1 GRAM(S): at 06:45

## 2019-01-01 RX ADMIN — Medication 1 GRAM(S): at 11:21

## 2019-01-01 RX ADMIN — ATENOLOL 50 MILLIGRAM(S): 25 TABLET ORAL at 06:46

## 2019-01-01 RX ADMIN — Medication 81 MILLIGRAM(S): at 11:21

## 2019-01-01 NOTE — DISCHARGE NOTE ADULT - CARE PLAN
Principal Discharge DX:	Chest pain  Goal:	improvement  Assessment and plan of treatment:	continue meds follow up with dr gibson

## 2019-01-01 NOTE — PROGRESS NOTE ADULT - SUBJECTIVE AND OBJECTIVE BOX
68y old male pmhx below presents to the ED complaining of 2days hx of intermittent chest pain that progressively got worse last night associated with nausea, no vomit. pt also complains of chronic back pain, but denies SOB, abdominal pain, fever or chills    feeling better - reviewed rehab recommendations     PAST MEDICAL & SURGICAL HISTORY:  MI (myocardial infarction)  Arthritis  Kidney stones  Sleep apnea  Hypertension  High blood cholesterol  H/O heart artery stent  History of appendectomy  H/O chronic cholecystitis: cholecystectomy    MEDICATIONS  (STANDING):  aspirin  chewable 81 milliGRAM(s) Oral daily  ATENolol  Tablet 50 milliGRAM(s) Oral daily  atorvastatin 20 milliGRAM(s) Oral at bedtime  clopidogrel Tablet 75 milliGRAM(s) Oral daily  sucralfate 1 Gram(s) Oral four times a day    MEDICATIONS  (PRN):  oxyCODONE    5 mG/acetaminophen 325 mG 1 Tablet(s) Oral every 6 hours PRN Moderate Pain (4 - 6)    Vital Signs Last 24 Hrs  T(C): 36.8 (2019 14:11), Max: 36.8 (2019 14:11)  T(F): 98.3 (2019 14:11), Max: 98.3 (2019 14:11)  HR: 67 (2019 14:11) (67 - 70)  BP: 160/70 (2019 14:11) (160/70 - 165/76)  BP(mean): --  RR: 16 (2019 14:11) (16 - 16)  SpO2: 96% (31 Dec 2018 21:42) (96% - 96%)  PHYSICAL EXAM :     General: Alert awake oriented x 3   HEENT : PERRLA EOMI  Lungs : CTA   CARD s1 s2 rrr  abd soft obese  ext no c/c/e  neuro alert awake oriented x3                                    15.4   5.99  )-----------( 212      ( 2019 07:11 )             45.4     -    141  |  99  |  13  ----------------------------<  97  4.1   |  34<H>  |  1.0    Ca    9.5      2019 07:11    TPro  7.1  /  Alb  4.0  /  TBili  1.1  /  DBili  x   /  AST  18  /  ALT  12  /  AlkPhos  98  12-      CARDIAC MARKERS ( 2019 07:11 )  x     / <0.01 ng/mL / 114 U/L / x     / 3.8 ng/mL  CARDIAC MARKERS ( 31 Dec 2018 20:04 )  x     / <0.01 ng/mL / 133 U/L / x     / 4.2 ng/mL  CARDIAC MARKERS ( 31 Dec 2018 06:25 )  x     / <0.01 ng/mL / x     / x     / x                 Ventricular Rate 77 BPM    Atrial Rate 77 BPM    P-R Interval 180 ms    QRS Duration 98 ms    Q-T Interval 416 ms    QTC Calculation(Bezet) 470 ms    P Axis 59 degrees    R Axis -31 degrees    T Axis 31 degrees    Diagnosis Line Sinus rhythm with occasional Premature ventricular complexes and Premature  atrial complexes  Left axis deviation  Voltage criteria for left ventricular hypertrophy  Confirmed by SUNITHA LAW MD ([id number]) on 2018 11:44:31 AM      EXAM:  XR CHEST PORTABLE IMMED 1V            PROCEDURE DATE:  2018            INTERPRETATION:  Clinical History / Reason for exam: Pain    Comparison : Chest radiograph 2018.    Technique/Positionin frontal views.    Findings:    Support devices: Telemetry leads.    Cardiac/mediastinum/hilum: Unremarkable.    Lung parenchyma/Pleura: Bibasilar focal atelectasis.    Skeleton/soft tissues: Stable. Elevation of the right hemidiaphragm,   decreased.    Impression:      Bibasilarfocal atelectasis.                  DAVID GAONA M.D., ATTENDING RADIOLOGIST  This document has been electronically signed. Dec 31 2018  9:35AM

## 2019-01-01 NOTE — CONSULT NOTE ADULT - ASSESSMENT
IMPRESSION: Rehab of 69 y/o m rehab  for  LBP GD     PRECAUTIONS: [  ] Cardiac  [  ] Respiratory  [  ] Seizures [  ] Contact Isolation  [  ] Droplet Isolation  [fall  ] Other    Weight Bearing Status:     RECOMMENDATION:    Out of Bed to Chair     DVT/Decubiti Prophylaxis    REHAB PLAN:     [ xx  ] Bedside P/T 3-5 times a week   [   ]   Bedside O/T  2-3 times a week             [   ] No Rehab Therapy Indicated                   [   ]  Speech Therapy   Conditioning/ROM                                    ADL  Bed Mobility                                               Conditioning/ROM  Transfers                                                     Bed Mobility  Sitting /Standing Balance                         Transfers                                        Gait Training                                               Sitting/Standing Balance  Stair Training [   ]Applicable                    Home equipment Eval                                                                        Splinting  [   ] Only      GOALS:   ADL   [  x ]   Independent                    Transfers  [   ] Independent                          Ambulation  [   x] Independent     [ x   ] With device                            [x   ]  CG                                                         [   ]  CG                                                                  [   ] CG                            [    ] Min A                                                   [   ] Min A                                                              [   ] Min  A          DISCHARGE PLAN:   [   ]  Good candidate for Intensive Rehabilitation/Hospital based-4A [facility]                                             Will tolerate 3hrs Intensive Rehab Daily                                       [    xx]  Short Term Rehab in Skilled Nursing Facility pain david                                        [    ]  Home with Outpatient or VN services                                         [    ]  Possible Candidate for Intensive Hospital based Rehab

## 2019-01-01 NOTE — DISCHARGE NOTE ADULT - PATIENT PORTAL LINK FT
You can access the [facility] Patient Portal, offered by [facility], by registering with the following website: [email or web address]

## 2019-01-01 NOTE — DISCHARGE NOTE ADULT - REASON FOR REFUSAL (REFER PATIENT TO HEALTHCARE PROVIDER FOR FOLLOW-UP):
Surgery Consult    Subjective:      Ruperto Arrieta is a 46 y.o. male admitted 8/22 with complaint of several weeks odynophagia, weight loss and rash. He has been evaluated by GI and ID. Etiology remains unclear and he is awaiting transfer to [address] [place] [address]. We have been consulted for skin biopsy of sternal skin.      Past Medical History:   Diagnosis Date    Positive H. pylori test 2018     Past Surgical History:   Procedure Laterality Date    HX ENDOSCOPY  2018      Family History   Problem Relation Age of Onset    Family history unknown: Yes     Social History     Social History    Marital status:      Spouse name: N/A    Number of children: N/A    Years of education: N/A     Social History Main Topics    Smoking status: Never Smoker    Smokeless tobacco: Never Used    Alcohol use No    Drug use: No    Sexual activity: Not Asked     Other Topics Concern    None     Social History Narrative      Current Facility-Administered Medications   Medication Dose Route Frequency    predniSONE (DELTASONE) tablet 60 mg  60 mg Oral DAILY WITH BREAKFAST    magic mouthwash cpd (without sucralfate)  5 mL Oral AC&HS    ondansetron (ZOFRAN) injection 4 mg  4 mg IntraVENous Q4H PRN    anidulafungin (ERAXIS) 100 mg in 0.9% sodium chloride 130 mL IVPB  100 mg IntraVENous Q24H    sodium chloride (NS) flush 5-10 mL  5-10 mL IntraVENous Q8H    sodium chloride (NS) flush 5-10 mL  5-10 mL IntraVENous PRN    enoxaparin (LOVENOX) injection 40 mg  40 mg SubCUTAneous Q24H    nystatin (MYCOSTATIN) 100,000 unit/mL oral suspension 500,000 Units  500,000 Units Oral QID    0.9% sodium chloride infusion  125 mL/hr IntraVENous CONTINUOUS    morphine injection 2 mg  2 mg IntraVENous Q4H PRN      No Known Allergies    Review of Systems:REVIEW OF SYSTEMS:     []     Unable to obtain  ROS due to  []    mental status change  []    sedated   []    intubated   []    Total of 12 systems reviewed as follows:    Constitutional: + weight loss, neg for fevers, chills, malaise  Eyes: negative for blurry vision  Ears, nose, mouth, throat, and face: negative for sore throat  Respiratory: negative for SOB  Cardiovascular: negative for CP  Gastrointestinal: negative for nausea, vomiting, abdominal pain, diarrhea, constipation, melena, hematochezia  Genitourinary: negative for dysuria  Integument/breast: + for skin rash  Hematologic/lymphatic: neg for bruising  Musculoskeletal: negative for muscle aches  Neurological: no dizziness or h/a    Objective:      Patient Vitals for the past 8 hrs:   BP Temp Pulse Resp SpO2   18 1513 115/72 98.3 °F (36.8 °C) 84 18 99 %   18 1155 106/69 98.4 °F (36.9 °C) 77 18 100 %       Temp (24hrs), Av.5 °F (36.9 °C), Min:98.3 °F (36.8 °C), Max:99.4 °F (37.4 °C)      Physical Exam:  General:  Alert, cooperative, no distress, appears stated age. Eyes:  Conjunctivae/corneas clear. Nose: Nares normal. Septum midline   Mouth/Throat: Crusted lesions on lips. Neck: Supple, symmetrical, trachea midline   Lungs:   Clear to auscultation bilaterally. Heart:  Regular rate and rhythm   Abdomen:   Soft, non-tender, non-distended, no rebound, no guarding, normal bowel sounds   Extremities: Extremities normal, atraumatic, no cyanosis or edema. Skin: Skin warm. Multiple oval, scabbed skin lesions on trunk and extremities. Neuro: Alert, oriented, speech clear     Labs: Recent Labs      18   0343   WBC  6.8   HGB  12.6   HCT  37.3   PLT  321     Recent Labs      18   0343  18   1957   NA  140  137   K  4.0  3.7   CL  107  101   CO2  29  30   GLU  83  81   BUN  7  10   CREA  0.82  0.86   CA  8.2*  8.3*   ALB   --   3.3*   TBILI   --   0.2   SGOT   --   21   ALT   --   29     No results for input(s): INR in the last 72 hours.     No lab exists for component: INREXT      Assessment and Plan:     Rash of unclear etiology  Consult for skin biopsy    Discussed indication for skin biopsy and he is agreeable. Bedside punch biopsy performed, please see separate procedure note, and specimen delivered to pathology lab.        Signed By: ESPERANZA Briseno     August 24, 2018 ref

## 2019-01-01 NOTE — DISCHARGE NOTE ADULT - LAUNCH MEDICATION RECONCILIATION
Patient states took pre-medicine per dr junior for allergy to dye. Took benadryl last night at 1900 this am 0100 and 0600. Took prednisone 50 po 17 hours ago, 7 hours ago  and at 0600 this am .  
<<-----Click here for Discharge Medication Review

## 2019-01-01 NOTE — DISCHARGE NOTE ADULT - HOSPITAL COURSE
68y old male pmhx below presents to the ED complaining of 2days hx of intermittent chest pain that progressively got worse last night associated with nausea, no vomit. pt also complains of chronic back pain, but denies SOB, abdominal pain, fever or chills  69 y/o male with intermittent chest pressure ; hx of copd and alysa     CP= r/o - enzymes   ruled out seen by cardiology follow up outpatient     chronic back pain / gait    discussed with patient at length this is a workers compensation case will need   authorization following with dr cha and dr linares at [facility]     ALYSA - cpap     discussed with patient

## 2019-01-01 NOTE — CONSULT NOTE ADULT - SUBJECTIVE AND OBJECTIVE BOX
CARDIOLOGY CONSULT NOTE     CHIEF COMPLAINT/REASON FOR CONSULT:    HPI:  68y old male pmhx below presents to the ED complaining of 2days hx of intermittent chest pain that progressively got worse at night associated with nausea, no vomit. Pt also complains of chronic back pain, but denies SOB, abdominal pain, fever or chills (31 Dec 2018 09:39)      PAST MEDICAL & SURGICAL HISTORY:  MI (myocardial infarction)  Arthritis  Kidney stones  Sleep apnea  Hypertension  High blood cholesterol  H/O heart artery stent  History of appendectomy  H/O chronic cholecystitis: cholecystectomy      Cardiac Risks:   [x ]HTN, [ ] DM, [ ] Smoking, [x ] FH,  [ x] Lipids        MEDICATIONS:  MEDICATIONS  (STANDING):  aspirin  chewable 81 milliGRAM(s) Oral daily  ATENolol  Tablet 50 milliGRAM(s) Oral daily  atorvastatin 20 milliGRAM(s) Oral at bedtime  clopidogrel Tablet 75 milliGRAM(s) Oral daily  hydrochlorothiazide 25 milliGRAM(s) Oral daily  hydrochlorothiazide 25 milliGRAM(s) Oral once  losartan 50 milliGRAM(s) Oral daily  sucralfate 1 Gram(s) Oral four times a day      FAMILY HISTORY:  CAD (coronary artery disease) (Father)      SOCIAL HISTORY:      [ ] Marital status   Allergies    fish (Unknown)  iodine (Unknown)  latex (Unknown)  magnesium sulfate (Unknown)  sulfa drugs (Unknown)        	    REVIEW OF SYSTEMS:  CONSTITUTIONAL: No fever, weight loss, or fatigue  EYES: No eye pain, visual disturbances, or discharge  ENMT:  No difficulty hearing, tinnitus, vertigo; No sinus or throat pain  NECK: No pain or stiffness  RESPIRATORY: No cough, wheezing, chills or hemoptysis; No Shortness of Breath  CARDIOVASCULAR: See above  GASTROINTESTINAL: No abdominal or epigastric pain. No nausea, vomiting, or hematemesis; No diarrhea or constipation. No melena or hematochezia.  GENITOURINARY: No dysuria, frequency, hematuria, or incontinence  NEUROLOGICAL: No headaches, memory loss, loss of strength, numbness, or tremors  SKIN: No itching, burning, rashes, or lesions   	        PHYSICAL EXAM:  T(C): 35.3 (01-01-19 @ 05:57), Max: 36.9 (12-31-18 @ 14:16)  HR: 70 (01-01-19 @ 05:57) (69 - 86)  BP: 165/76 (01-01-19 @ 05:57) (162/71 - 178/74)  RR: 16 (01-01-19 @ 05:57) (16 - 18)  SpO2: 96% (12-31-18 @ 21:42) (96% - 98%)  Wt(kg): --  I&O's Summary      Appearance: Normal	  Psychiatry: A & O x 3, Mood & affect appropriate  HEENT:   Normal oral mucosa, PERRL, EOMI	  Lymphatic: No lymphadenopathy  Cardiovascular: Normal S1 S2,RRR, No JVD, No murmurs  Respiratory: Lungs clear to auscultation	  Gastrointestinal:  Soft, Non-tender, + BS	  Skin: No rashes, No ecchymoses, No cyanosis	  Neurologic: Non-focal  Extremities: Normal range of motion, No clubbing, cyanosis or edema  Vascular: Peripheral pulses palpable 2+ bilaterally      ECG:  	< from: 12 Lead ECG (12.31.18 @ 06:34) >  Diagnosis Line Sinus rhythm with occasional Premature ventricular complexes and Premature  atrial complexes  Left axis deviation  Voltage criteria for left ventricular hypertrophy  Confirmed by SUNITHA LAW MD ([id number]) on 12/31/2018 11:44:31 AM    < end of copied text >      	  LABS:	 	    CARDIAC MARKERS:                                    15.4   5.99  )-----------( 212      ( 01 Jan 2019 07:11 )             45.4     01-01    141  |  99  |  13  ----------------------------<  97  4.1   |  34<H>  |  1.0    Ca    9.5      01 Jan 2019 07:11    TPro  7.1  /  Alb  4.0  /  TBili  1.1  /  DBili  x   /  AST  18  /  ALT  12  /  AlkPhos  98  12-31    PT/INR - ( 31 Dec 2018 06:25 )   PT: 14.60 sec;   INR: 1.34 ratio         PTT - ( 31 Dec 2018 06:25 )  PTT:25.7 sec CARDIOLOGY CONSULT NOTE     CHIEF COMPLAINT/REASON FOR CONSULT:    HPI:  68y old male pmhx below presents to the ED complaining of 2days hx of intermittent chest pain that progressively got worse at night associated with nausea, no vomit. Pt also complains of chronic back pain, but denies SOB, abdominal pain, fever or chills (31 Dec 2018 09:39)      PAST MEDICAL & SURGICAL HISTORY:  MI (myocardial infarction)  Arthritis  Kidney stones  Sleep apnea  Hypertension  High blood cholesterol  H/O heart artery stent  History of appendectomy  H/O chronic cholecystitis: cholecystectomy      Cardiac Risks:   [x ]HTN, [ ] DM, [ ] Smoking, [x ] FH,  [ x] Lipids        MEDICATIONS:  MEDICATIONS  (STANDING):  aspirin  chewable 81 milliGRAM(s) Oral daily  ATENolol  Tablet 50 milliGRAM(s) Oral daily  atorvastatin 20 milliGRAM(s) Oral at bedtime  clopidogrel Tablet 75 milliGRAM(s) Oral daily  hydrochlorothiazide 25 milliGRAM(s) Oral daily  hydrochlorothiazide 25 milliGRAM(s) Oral once  losartan 50 milliGRAM(s) Oral daily  sucralfate 1 Gram(s) Oral four times a day      FAMILY HISTORY:  CAD (coronary artery disease) (Father)      SOCIAL HISTORY:      [ ] Marital status   Allergies    fish (Unknown)  iodine (Unknown)  latex (Unknown)  magnesium sulfate (Unknown)  sulfa drugs (Unknown)        	    REVIEW OF SYSTEMS:  CONSTITUTIONAL: No fever, weight loss, or fatigue  EYES: No eye pain, visual disturbances, or discharge  ENMT:  No difficulty hearing, tinnitus, vertigo; No sinus or throat pain  NECK: No pain or stiffness  RESPIRATORY: No cough, wheezing, chills or hemoptysis; No Shortness of Breath  CARDIOVASCULAR: See above  GASTROINTESTINAL: No abdominal or epigastric pain. No nausea, vomiting, or hematemesis; No diarrhea or constipation. No melena or hematochezia.  GENITOURINARY: No dysuria, frequency, hematuria, or incontinence  NEUROLOGICAL: No headaches, memory loss, loss of strength, numbness, or tremors  SKIN: No itching, burning, rashes, or lesions   	        PHYSICAL EXAM:  T(C): 35.3 (01-01-19 @ 05:57), Max: 36.9 (12-31-18 @ 14:16)  HR: 70 (01-01-19 @ 05:57) (69 - 86)  BP: 165/76 (01-01-19 @ 05:57) (162/71 - 178/74)  RR: 16 (01-01-19 @ 05:57) (16 - 18)  SpO2: 96% (12-31-18 @ 21:42) (96% - 98%)  Wt(kg): --  I&O's Summary      Appearance: Normal	  Psychiatry: A & O x 3, Mood & affect appropriate  HEENT:   Normal oral mucosa, PERRL, EOMI	  Lymphatic: No lymphadenopathy  Cardiovascular: Normal S1 S2,RRR, No JVD, No murmurs  Respiratory: Lungs clear to auscultation	  Gastrointestinal:  Soft, Non-tender, + BS	  Skin: No rashes, No ecchymoses, No cyanosis	  Neurologic: Non-focal  Extremities: Normal range of motion, No clubbing, cyanosis or edema. No calf pain  Vascular: Peripheral pulses palpable 2+ bilaterally      ECG:  	< from: 12 Lead ECG (12.31.18 @ 06:34) >  Diagnosis Line Sinus rhythm with occasional Premature ventricular complexes and Premature  atrial complexes  Left axis deviation  Voltage criteria for left ventricular hypertrophy  Confirmed by ANI MALIK, SUNITHA ([id number]) on 12/31/2018 11:44:31 AM    < end of copied text >      	  LABS:	 	    CARDIAC MARKERS:                                    15.4   5.99  )-----------( 212      ( 01 Jan 2019 07:11 )             45.4     01-01    141  |  99  |  13  ----------------------------<  97  4.1   |  34<H>  |  1.0    Ca    9.5      01 Jan 2019 07:11    TPro  7.1  /  Alb  4.0  /  TBili  1.1  /  DBili  x   /  AST  18  /  ALT  12  /  AlkPhos  98  12-31    PT/INR - ( 31 Dec 2018 06:25 )   PT: 14.60 sec;   INR: 1.34 ratio         PTT - ( 31 Dec 2018 06:25 )  PTT:25.7 sec

## 2019-01-01 NOTE — DISCHARGE NOTE ADULT - MEDICATION SUMMARY - MEDICATIONS TO TAKE
I will START or STAY ON the medications listed below when I get home from the hospital:    aspirin 81 mg oral tablet  -- 1 tab(s) by mouth once a day  -- Indication: For H/O heart artery stent    atorvastatin 20 mg oral tablet  -- Indication: For H/O heart artery stent    valsartan-hydrochlorothiazide 160mg-25mg oral tablet  -- Indication: For H/O heart artery stent    Plavix  -- Indication: For MI (myocardial infarction)    atenolol 50 mg oral tablet  -- Indication: For H/O heart artery stent    Zantac 150 oral tablet  -- Indication: For gerd    sucralfate 1 g oral tablet  -- 1 tab(s) by mouth 4 times a day   -- Do not take dairy products, antacids, or iron preparations within one hour of this medication.  It is very important that you take or use this exactly as directed.  Do not skip doses or discontinue unless directed by your doctor.  Take medication on an empty stomach 1 hour before or 2 to 3 hours after a meal unless otherwise directed by your doctor.    -- Indication: For gerd

## 2019-01-01 NOTE — CONSULT NOTE ADULT - ASSESSMENT
Patient with cardiac stent 1998. Followed by Dr Brunson. He had pleuritic pain Could last hours. Not exertional. Now pain under l axilla tender palpation. Would ambulate. Check CXR EKG. F/U DR Brunson, Consider out patent stress echo Patient with cardiac stent 1998. Followed by Dr Brunson. He had pleuritic pain Could last hours. Not exertional. Now pain under l axilla tender palpation. Would ambulate. Check CXR EKG. F/U Dr Brunson, Consider out patient stress echo

## 2019-01-01 NOTE — DISCHARGE NOTE ADULT - CARE PROVIDER_API CALL
Mario Brunson  [address] [place] [id number]  Phone: [phone]  Fax: (   )    -    gosia cha  [address] [facility]  Phone: [phone]  Fax: (   )    -

## 2019-01-01 NOTE — DISCHARGE NOTE ADULT - NS AS ACTIVITY OBS
Driving allowed/Walking-Indoors allowed/Walking-Outdoors allowed/Stairs allowed/No Heavy lifting/straining

## 2019-01-01 NOTE — CONSULT NOTE ADULT - SUBJECTIVE AND OBJECTIVE BOX
HPI: 68y old male pmhx below presents to the ED complaining of 2days hx of intermittent chest pain that progressively got worse last night associated with nausea, no vomit. pt also complains of chronic back pain, but denies SOB, abdominal pain, fever or chills (31 Dec 2018 09:39)    PTN  REFERRED TO ACUTE  REHAB  FOR  EVAL AND  TX   PAST MEDICAL & SURGICAL HISTORY:  MI (myocardial infarction)  Arthritis  Kidney stones  Sleep apnea  Hypertension  High blood cholesterol  H/O heart artery stent  History of appendectomy  H/O chronic cholecystitis: cholecystectomy      Hospital Course:    TODAY'S SUBJECTIVE & REVIEW OF SYMPTOMS:     Constitutional WNL   Cardio WNL   Resp WNL   GI WNL  Heme WNL  Endo WNL  Skin WNL  MSK WNL  Neuro WNL  Cognitive WNL  Psych WNL      MEDICATIONS  (STANDING):  aspirin  chewable 81 milliGRAM(s) Oral daily  ATENolol  Tablet 50 milliGRAM(s) Oral daily  atorvastatin 20 milliGRAM(s) Oral at bedtime  clopidogrel Tablet 75 milliGRAM(s) Oral daily  hydrochlorothiazide 25 milliGRAM(s) Oral daily  hydrochlorothiazide 25 milliGRAM(s) Oral once  losartan 50 milliGRAM(s) Oral daily  sucralfate 1 Gram(s) Oral four times a day    MEDICATIONS  (PRN):  oxyCODONE    5 mG/acetaminophen 325 mG 1 Tablet(s) Oral every 6 hours PRN Moderate Pain (4 - 6)      FAMILY HISTORY:  CAD (coronary artery disease) (Father)      Allergies    fish (Unknown)  iodine (Unknown)  latex (Unknown)  magnesium sulfate (Unknown)  sulfa drugs (Unknown)    Intolerances        SOCIAL HISTORY:    [  ] Etoh  [  ] Smoking  [  ] Substance abuse     Home Environment:  [  ] Home Alone  [x  ] Lives with Family  [  ] Home Health Aid    Dwelling:  [  ] Apartment  [ x ] Private House  [  ] Adult Home  [  ] Skilled Nursing Facility      [  ] Short Term  [  ] Long Term  [ x ] Stairs       Elevator [  ]    FUNCTIONAL STATUS PTA: (Check all that apply)  Ambulation: [ x  ]Independent    [  ] Dependent     [  ] Non-Ambulatory  Assistive Device: [  ] SA Cane  [  ]  Q Cane  [  ] Walker  [  ]  Wheelchair  ADL : [  x] Independent  [  ]  Dependent       Vital Signs Last 24 Hrs  T(C): 36.8 (2019 14:11), Max: 36.8 (2019 14:11)  T(F): 98.3 (2019 14:11), Max: 98.3 (2019 14:11)  HR: 67 (2019 14:11) (67 - 70)  BP: 160/70 (2019 14:11) (160/70 - 165/76)  BP(mean): --  RR: 16 (2019 14:11) (16 - 16)  SpO2: 96% (31 Dec 2018 21:42) (96% - 96%)      PHYSICAL EXAM: Alert & Oriented X3  GENERAL: NAD, well-groomed, well-developed  HEAD:  Atraumatic, Normocephalic  EYES: EOMI, PERRLA, conjunctiva and sclera clear  NECK: Supple, No JVD, Normal thyroid  CHEST/LUNG: Clear to percussion bilaterally; No rales, rhonchi, wheezing, or rubs  HEART: Regular rate and rhythm; No murmurs, rubs, or gallops  ABDOMEN: Soft, Nontender, Nondistended; Bowel sounds present  EXTREMITIES:  2+ Peripheral Pulses, No clubbing, cyanosis, or edema    NERVOUS SYSTEM:  Cranial Nerves 2-12 intact [ x ] Abnormal  [  ]  ROM: WFL all extremities [x  ]  Abnormal [  ]  Motor Strength: WFL all extremities  [x  ]  Abnormal [  ]  Sensation: intact to light touch [ x ] Abnormal [  ]  Reflexes: Symmetric [ x ]  Abnormal [  ]    FUNCTIONAL STATUS:  Bed Mobility: Independent [  ]  Supervision [ x ]  Needs Assistance [  ]  N/A [  ]  Transfers: Independent [  ]  Supervision [ x ]  Needs Assistance [ x ]  N/A [  ]   Ambulation: Independent [  ]  Supervision [ x ]  Needs Assistance [  x]  N/A [  ]  ADL: Independent [ x ] Requires Assistance [  ] N/A [  ]  SEE PT IE NOTES     LABS:                        15.4   5.99  )-----------( 212      ( 2019 07:11 )             45.4     01-    141  |  99  |  13  ----------------------------<  97  4.1   |  34<H>  |  1.0    Ca    9.5      2019 07:11    TPro  7.1  /  Alb  4.0  /  TBili  1.1  /  DBili  x   /  AST  18  /  ALT  12  /  AlkPhos  98  12-31    PT/INR - ( 31 Dec 2018 06:25 )   PT: 14.60 sec;   INR: 1.34 ratio         PTT - ( 31 Dec 2018 06:25 )  PTT:25.7 sec  Urinalysis Basic - ( 31 Dec 2018 06:25 )    Color: Yellow / Appearance: Clear / S.015 / pH: x  Gluc: x / Ketone: Negative  / Bili: Negative / Urobili: 0.2 mg/dL   Blood: x / Protein: Negative mg/dL / Nitrite: Negative   Leuk Esterase: Trace / RBC: 3-5 /HPF / WBC 3-5 /HPF   Sq Epi: x / Non Sq Epi: x / Bacteria: x        RADIOLOGY & ADDITIONAL STUDIES:    Assesment:

## 2019-01-01 NOTE — PROGRESS NOTE ADULT - ASSESSMENT
69 y/o male with intermittent chest pressure ; hx of copd and alysa     CP= r/o - enzymes   ruled out seen by cardiology follow up outpatient     chronic back pain / gait    discussed with patient at length this is a workers compensation case will need   authorization following with dr cha and dr linares at [facility]     ALYSA - cpap     discussed with patient     advance care cpr

## 2019-01-01 NOTE — DISCHARGE NOTE ADULT - PROVIDER TOKENS
FREE:[LAST:[Caracta],FIRST:[Mario],PHONE:[phone]],FAX:[(   )    -],ADDRESS:[[address] [place] [address]]],FREE:[LAST:[starla],FIRST:[gosia],PHONE:[phone]],FAX:[(   )    -],ADDRESS:[[address] [place] [address]]

## 2019-01-14 ENCOUNTER — FORM ENCOUNTER (OUTPATIENT)
Age: 69
End: 2019-01-14

## 2019-01-15 ENCOUNTER — OUTPATIENT (OUTPATIENT)
Dept: OUTPATIENT SERVICES | Facility: HOSPITAL | Age: 69
LOS: 1 days | Discharge: HOME | End: 2019-01-15

## 2019-01-15 DIAGNOSIS — Z87.19 PERSONAL HISTORY OF OTHER DISEASES OF THE DIGESTIVE SYSTEM: Chronic | ICD-10-CM

## 2019-01-15 DIAGNOSIS — Z95.5 PRESENCE OF CORONARY ANGIOPLASTY IMPLANT AND GRAFT: Chronic | ICD-10-CM

## 2019-01-15 DIAGNOSIS — Z90.49 ACQUIRED ABSENCE OF OTHER SPECIFIED PARTS OF DIGESTIVE TRACT: Chronic | ICD-10-CM

## 2019-01-15 DIAGNOSIS — N39.41 URGE INCONTINENCE: ICD-10-CM

## 2019-01-15 PROBLEM — I21.9 ACUTE MYOCARDIAL INFARCTION, UNSPECIFIED: Chronic | Status: ACTIVE | Noted: 2018-12-31

## 2019-01-15 PROBLEM — M19.90 UNSPECIFIED OSTEOARTHRITIS, UNSPECIFIED SITE: Chronic | Status: ACTIVE | Noted: 2018-12-31

## 2019-02-14 ENCOUNTER — APPOINTMENT (OUTPATIENT)
Dept: UROLOGY | Facility: CLINIC | Age: 69
End: 2019-02-14

## 2019-03-12 ENCOUNTER — APPOINTMENT (OUTPATIENT)
Dept: UROLOGY | Facility: CLINIC | Age: 69
End: 2019-03-12

## 2019-09-13 ENCOUNTER — EMERGENCY (EMERGENCY)
Facility: HOSPITAL | Age: 69
LOS: 0 days | Discharge: HOME | End: 2019-09-13
Attending: EMERGENCY MEDICINE | Admitting: EMERGENCY MEDICINE
Payer: MEDICARE

## 2019-09-13 VITALS
SYSTOLIC BLOOD PRESSURE: 174 MMHG | RESPIRATION RATE: 17 BRPM | DIASTOLIC BLOOD PRESSURE: 81 MMHG | TEMPERATURE: 97 F | HEART RATE: 68 BPM | OXYGEN SATURATION: 97 %

## 2019-09-13 DIAGNOSIS — N12 TUBULO-INTERSTITIAL NEPHRITIS, NOT SPECIFIED AS ACUTE OR CHRONIC: ICD-10-CM

## 2019-09-13 DIAGNOSIS — Z90.49 ACQUIRED ABSENCE OF OTHER SPECIFIED PARTS OF DIGESTIVE TRACT: Chronic | ICD-10-CM

## 2019-09-13 DIAGNOSIS — Z87.19 PERSONAL HISTORY OF OTHER DISEASES OF THE DIGESTIVE SYSTEM: Chronic | ICD-10-CM

## 2019-09-13 DIAGNOSIS — Z91.013 ALLERGY TO SEAFOOD: ICD-10-CM

## 2019-09-13 DIAGNOSIS — Z91.040 LATEX ALLERGY STATUS: ICD-10-CM

## 2019-09-13 DIAGNOSIS — R10.9 UNSPECIFIED ABDOMINAL PAIN: ICD-10-CM

## 2019-09-13 DIAGNOSIS — Z88.8 ALLERGY STATUS TO OTHER DRUGS, MEDICAMENTS AND BIOLOGICAL SUBSTANCES STATUS: ICD-10-CM

## 2019-09-13 DIAGNOSIS — Z95.5 PRESENCE OF CORONARY ANGIOPLASTY IMPLANT AND GRAFT: Chronic | ICD-10-CM

## 2019-09-13 DIAGNOSIS — Z88.2 ALLERGY STATUS TO SULFONAMIDES: ICD-10-CM

## 2019-09-13 LAB
ALBUMIN SERPL ELPH-MCNC: 4.6 G/DL — SIGNIFICANT CHANGE UP (ref 3.5–5.2)
ALP SERPL-CCNC: 115 U/L — SIGNIFICANT CHANGE UP (ref 30–115)
ALT FLD-CCNC: 12 U/L — SIGNIFICANT CHANGE UP (ref 0–41)
ANION GAP SERPL CALC-SCNC: 12 MMOL/L — SIGNIFICANT CHANGE UP (ref 7–14)
APPEARANCE UR: CLEAR — SIGNIFICANT CHANGE UP
AST SERPL-CCNC: 22 U/L — SIGNIFICANT CHANGE UP (ref 0–41)
BACTERIA # UR AUTO: ABNORMAL
BASOPHILS # BLD AUTO: 0.03 K/UL — SIGNIFICANT CHANGE UP (ref 0–0.2)
BASOPHILS NFR BLD AUTO: 0.4 % — SIGNIFICANT CHANGE UP (ref 0–1)
BILIRUB SERPL-MCNC: 1 MG/DL — SIGNIFICANT CHANGE UP (ref 0.2–1.2)
BILIRUB UR-MCNC: NEGATIVE — SIGNIFICANT CHANGE UP
BUN SERPL-MCNC: 19 MG/DL — SIGNIFICANT CHANGE UP (ref 10–20)
CALCIUM SERPL-MCNC: 9.7 MG/DL — SIGNIFICANT CHANGE UP (ref 8.5–10.1)
CHLORIDE SERPL-SCNC: 100 MMOL/L — SIGNIFICANT CHANGE UP (ref 98–110)
CO2 SERPL-SCNC: 28 MMOL/L — SIGNIFICANT CHANGE UP (ref 17–32)
COD CRY URNS QL: NEGATIVE — SIGNIFICANT CHANGE UP
COLOR SPEC: YELLOW — SIGNIFICANT CHANGE UP
CREAT SERPL-MCNC: 0.9 MG/DL — SIGNIFICANT CHANGE UP (ref 0.7–1.5)
DIFF PNL FLD: ABNORMAL
EOSINOPHIL # BLD AUTO: 0.12 K/UL — SIGNIFICANT CHANGE UP (ref 0–0.7)
EOSINOPHIL NFR BLD AUTO: 1.6 % — SIGNIFICANT CHANGE UP (ref 0–8)
EPI CELLS # UR: ABNORMAL /HPF
GLUCOSE SERPL-MCNC: 109 MG/DL — HIGH (ref 70–99)
GLUCOSE UR QL: NEGATIVE MG/DL — SIGNIFICANT CHANGE UP
GRAN CASTS # UR COMP ASSIST: NEGATIVE — SIGNIFICANT CHANGE UP
HCT VFR BLD CALC: 45 % — SIGNIFICANT CHANGE UP (ref 42–52)
HGB BLD-MCNC: 15.8 G/DL — SIGNIFICANT CHANGE UP (ref 14–18)
HYALINE CASTS # UR AUTO: NEGATIVE — SIGNIFICANT CHANGE UP
IMM GRANULOCYTES NFR BLD AUTO: 0.3 % — SIGNIFICANT CHANGE UP (ref 0.1–0.3)
KETONES UR-MCNC: NEGATIVE — SIGNIFICANT CHANGE UP
LACTATE SERPL-SCNC: 1.3 MMOL/L — SIGNIFICANT CHANGE UP (ref 0.5–2.2)
LEUKOCYTE ESTERASE UR-ACNC: ABNORMAL
LIDOCAIN IGE QN: 20 U/L — SIGNIFICANT CHANGE UP (ref 7–60)
LYMPHOCYTES # BLD AUTO: 2.45 K/UL — SIGNIFICANT CHANGE UP (ref 1.2–3.4)
LYMPHOCYTES # BLD AUTO: 32 % — SIGNIFICANT CHANGE UP (ref 20.5–51.1)
MCHC RBC-ENTMCNC: 32.4 PG — HIGH (ref 27–31)
MCHC RBC-ENTMCNC: 35.1 G/DL — SIGNIFICANT CHANGE UP (ref 32–37)
MCV RBC AUTO: 92.2 FL — SIGNIFICANT CHANGE UP (ref 80–94)
MONOCYTES # BLD AUTO: 0.9 K/UL — HIGH (ref 0.1–0.6)
MONOCYTES NFR BLD AUTO: 11.7 % — HIGH (ref 1.7–9.3)
NEUTROPHILS # BLD AUTO: 4.14 K/UL — SIGNIFICANT CHANGE UP (ref 1.4–6.5)
NEUTROPHILS NFR BLD AUTO: 54 % — SIGNIFICANT CHANGE UP (ref 42.2–75.2)
NITRITE UR-MCNC: NEGATIVE — SIGNIFICANT CHANGE UP
NRBC # BLD: 0 /100 WBCS — SIGNIFICANT CHANGE UP (ref 0–0)
PH UR: 7 — SIGNIFICANT CHANGE UP (ref 5–8)
PLATELET # BLD AUTO: 246 K/UL — SIGNIFICANT CHANGE UP (ref 130–400)
POTASSIUM SERPL-MCNC: 4.2 MMOL/L — SIGNIFICANT CHANGE UP (ref 3.5–5)
POTASSIUM SERPL-SCNC: 4.2 MMOL/L — SIGNIFICANT CHANGE UP (ref 3.5–5)
PROT SERPL-MCNC: 7.8 G/DL — SIGNIFICANT CHANGE UP (ref 6–8)
PROT UR-MCNC: NEGATIVE MG/DL — SIGNIFICANT CHANGE UP
RBC # BLD: 4.88 M/UL — SIGNIFICANT CHANGE UP (ref 4.7–6.1)
RBC # FLD: 12.7 % — SIGNIFICANT CHANGE UP (ref 11.5–14.5)
RBC CASTS # UR COMP ASSIST: SIGNIFICANT CHANGE UP /HPF
SODIUM SERPL-SCNC: 140 MMOL/L — SIGNIFICANT CHANGE UP (ref 135–146)
SP GR SPEC: 1.01 — SIGNIFICANT CHANGE UP (ref 1.01–1.03)
TRI-PHOS CRY UR QL COMP ASSIST: NEGATIVE — SIGNIFICANT CHANGE UP
URATE CRY FLD QL MICRO: NEGATIVE — SIGNIFICANT CHANGE UP
UROBILINOGEN FLD QL: 0.2 MG/DL — SIGNIFICANT CHANGE UP (ref 0.2–0.2)
WBC # BLD: 7.66 K/UL — SIGNIFICANT CHANGE UP (ref 4.8–10.8)
WBC # FLD AUTO: 7.66 K/UL — SIGNIFICANT CHANGE UP (ref 4.8–10.8)
WBC UR QL: ABNORMAL /HPF

## 2019-09-13 PROCEDURE — 74176 CT ABD & PELVIS W/O CONTRAST: CPT | Mod: 26

## 2019-09-13 PROCEDURE — 99284 EMERGENCY DEPT VISIT MOD MDM: CPT | Mod: GC

## 2019-09-13 RX ORDER — CEFPODOXIME PROXETIL 100 MG
1 TABLET ORAL
Qty: 20 | Refills: 0
Start: 2019-09-13 | End: 2019-09-22

## 2019-09-13 RX ORDER — ONDANSETRON 8 MG/1
4 TABLET, FILM COATED ORAL ONCE
Refills: 0 | Status: COMPLETED | OUTPATIENT
Start: 2019-09-13 | End: 2019-09-13

## 2019-09-13 RX ORDER — SODIUM CHLORIDE 9 MG/ML
2000 INJECTION, SOLUTION INTRAVENOUS ONCE
Refills: 0 | Status: COMPLETED | OUTPATIENT
Start: 2019-09-13 | End: 2019-09-13

## 2019-09-13 RX ORDER — KETOROLAC TROMETHAMINE 30 MG/ML
15 SYRINGE (ML) INJECTION ONCE
Refills: 0 | Status: DISCONTINUED | OUTPATIENT
Start: 2019-09-13 | End: 2019-09-13

## 2019-09-13 RX ADMIN — ONDANSETRON 4 MILLIGRAM(S): 8 TABLET, FILM COATED ORAL at 18:15

## 2019-09-13 RX ADMIN — Medication 15 MILLIGRAM(S): at 18:04

## 2019-09-13 RX ADMIN — SODIUM CHLORIDE 1333.33 MILLILITER(S): 9 INJECTION, SOLUTION INTRAVENOUS at 18:03

## 2019-09-13 NOTE — ED PROVIDER NOTE - NS ED ROS FT
Constitutional: No fevers.   Eyes:  No visual changes, eye pain or discharge.  ENMT:  No hearing changes, pain, no sore throat or runny nose, no difficulty swallowing  Cardiac:  No chest pain, SOB or edema.   Respiratory:  No cough or respiratory distress. No hemoptysis.  GI:  +nausea.   :  +left flank pain.   MS:  No myalgia, muscle weakness.  Neuro:  No headache or weakness.  No LOC.  Skin:  No skin rash.   Endocrine: No history of thyroid disease or diabetes.

## 2019-09-13 NOTE — ED PROVIDER NOTE - CARE PROVIDER_API CALL
Lien Kwong)  Urology  [address] [place] [address], [address]  [place] [address]  Phone: [phone]  Fax: [phone]  Follow Up Time:

## 2019-09-13 NOTE — ED PROVIDER NOTE - OBJECTIVE STATEMENT
Patient is a 68 yo M w/ hx of MI s/p PCI, kidney stones (passed without intervention), GERD p/w left flank pain. Pain is intermittent, sharp, radiating to LUQ, moderate x 4 days associated with increased urination and nausea. No trauma; no fevers; no hematuria, no dysuria.

## 2019-09-13 NOTE — ED PROVIDER NOTE - PHYSICAL EXAMINATION
CONSTITUTIONAL: Well-developed; well-nourished; in no acute distress.   SKIN: warm, dry.  HEAD: Normocephalic; atraumatic.  EYES: PERRL, EOMI, no conjunctival erythema  ENT: No nasal discharge; airway clear.  NECK: Supple; non tender.  CARD: S1, S2 normal; no murmurs, gallops, or rubs. Regular rate and rhythm.   RESP: No wheezes, rales or rhonchi.  ABD: soft ntnd.  : Left CVA tenderness.   EXT: Normal ROM.  No clubbing, cyanosis or edema.   NEURO: Alert, oriented, grossly unremarkable.  PSYCH: Cooperative, appropriate.

## 2019-09-13 NOTE — ED PROVIDER NOTE - PATIENT PORTAL LINK FT
You can access the FollowMyHealth Patient Portal offered by [facility] by registering at the following website: [email or web address]. By joining [employer]’s FollowMyHealth portal, you will also be able to view your health information using other applications (apps) compatible with our system.

## 2019-09-13 NOTE — ED PROVIDER NOTE - PROGRESS NOTE DETAILS
Results discussed with patient; will send abx to pharmacy. instructed to follow up with urology and close return precautions discussed with understanding.

## 2019-09-13 NOTE — ED PROVIDER NOTE - ATTENDING CONTRIBUTION TO CARE
Pt is a 68 yo male with jhx of kidney stones who reports 4d of L flank pain similar to prior episodes radiating into L groin.  Mild anusea.  No fever, no dysuria.  Preior stones passed withot intervention.    Exam: soft NT abdomen, NAD, L CVA soreness, cap refill <2s, mMM  Plan: labs, ua, ct

## 2019-09-17 ENCOUNTER — EMERGENCY (EMERGENCY)
Facility: HOSPITAL | Age: 69
LOS: 0 days | Discharge: HOME | End: 2019-09-17
Attending: EMERGENCY MEDICINE | Admitting: EMERGENCY MEDICINE
Payer: MEDICARE

## 2019-09-17 VITALS
OXYGEN SATURATION: 97 % | SYSTOLIC BLOOD PRESSURE: 201 MMHG | RESPIRATION RATE: 19 BRPM | DIASTOLIC BLOOD PRESSURE: 102 MMHG | HEART RATE: 87 BPM | TEMPERATURE: 98 F

## 2019-09-17 DIAGNOSIS — R10.84 GENERALIZED ABDOMINAL PAIN: ICD-10-CM

## 2019-09-17 DIAGNOSIS — R11.0 NAUSEA: ICD-10-CM

## 2019-09-17 DIAGNOSIS — Z91.040 LATEX ALLERGY STATUS: ICD-10-CM

## 2019-09-17 DIAGNOSIS — M54.6 PAIN IN THORACIC SPINE: ICD-10-CM

## 2019-09-17 DIAGNOSIS — R10.9 UNSPECIFIED ABDOMINAL PAIN: ICD-10-CM

## 2019-09-17 DIAGNOSIS — Z90.49 ACQUIRED ABSENCE OF OTHER SPECIFIED PARTS OF DIGESTIVE TRACT: Chronic | ICD-10-CM

## 2019-09-17 DIAGNOSIS — I10 ESSENTIAL (PRIMARY) HYPERTENSION: ICD-10-CM

## 2019-09-17 DIAGNOSIS — Z87.19 PERSONAL HISTORY OF OTHER DISEASES OF THE DIGESTIVE SYSTEM: Chronic | ICD-10-CM

## 2019-09-17 DIAGNOSIS — Z88.8 ALLERGY STATUS TO OTHER DRUGS, MEDICAMENTS AND BIOLOGICAL SUBSTANCES STATUS: ICD-10-CM

## 2019-09-17 DIAGNOSIS — Z79.899 OTHER LONG TERM (CURRENT) DRUG THERAPY: ICD-10-CM

## 2019-09-17 DIAGNOSIS — Z88.2 ALLERGY STATUS TO SULFONAMIDES: ICD-10-CM

## 2019-09-17 DIAGNOSIS — Z91.018 ALLERGY TO OTHER FOODS: ICD-10-CM

## 2019-09-17 DIAGNOSIS — Z95.5 PRESENCE OF CORONARY ANGIOPLASTY IMPLANT AND GRAFT: Chronic | ICD-10-CM

## 2019-09-17 LAB
ALBUMIN SERPL ELPH-MCNC: 4.6 G/DL — SIGNIFICANT CHANGE UP (ref 3.5–5.2)
ALP SERPL-CCNC: 112 U/L — SIGNIFICANT CHANGE UP (ref 30–115)
ALT FLD-CCNC: 12 U/L — SIGNIFICANT CHANGE UP (ref 0–41)
ANION GAP SERPL CALC-SCNC: 13 MMOL/L — SIGNIFICANT CHANGE UP (ref 7–14)
APPEARANCE UR: CLEAR — SIGNIFICANT CHANGE UP
AST SERPL-CCNC: 26 U/L — SIGNIFICANT CHANGE UP (ref 0–41)
BACTERIA # UR AUTO: SIGNIFICANT CHANGE UP /HPF
BASOPHILS # BLD AUTO: 0.03 K/UL — SIGNIFICANT CHANGE UP (ref 0–0.2)
BASOPHILS NFR BLD AUTO: 0.4 % — SIGNIFICANT CHANGE UP (ref 0–1)
BILIRUB SERPL-MCNC: 1 MG/DL — SIGNIFICANT CHANGE UP (ref 0.2–1.2)
BILIRUB UR-MCNC: NEGATIVE — SIGNIFICANT CHANGE UP
BUN SERPL-MCNC: 14 MG/DL — SIGNIFICANT CHANGE UP (ref 10–20)
CALCIUM SERPL-MCNC: 9.7 MG/DL — SIGNIFICANT CHANGE UP (ref 8.5–10.1)
CHLORIDE SERPL-SCNC: 97 MMOL/L — LOW (ref 98–110)
CO2 SERPL-SCNC: 29 MMOL/L — SIGNIFICANT CHANGE UP (ref 17–32)
COLOR SPEC: YELLOW — SIGNIFICANT CHANGE UP
COMMENT - URINE: SIGNIFICANT CHANGE UP
CREAT SERPL-MCNC: 0.9 MG/DL — SIGNIFICANT CHANGE UP (ref 0.7–1.5)
DIFF PNL FLD: ABNORMAL
EOSINOPHIL # BLD AUTO: 0.04 K/UL — SIGNIFICANT CHANGE UP (ref 0–0.7)
EOSINOPHIL NFR BLD AUTO: 0.5 % — SIGNIFICANT CHANGE UP (ref 0–8)
EPI CELLS # UR: ABNORMAL /HPF
GLUCOSE SERPL-MCNC: 110 MG/DL — HIGH (ref 70–99)
GLUCOSE UR QL: NEGATIVE MG/DL — SIGNIFICANT CHANGE UP
HCT VFR BLD CALC: 45.1 % — SIGNIFICANT CHANGE UP (ref 42–52)
HGB BLD-MCNC: 15.8 G/DL — SIGNIFICANT CHANGE UP (ref 14–18)
IMM GRANULOCYTES NFR BLD AUTO: 0.2 % — SIGNIFICANT CHANGE UP (ref 0.1–0.3)
KETONES UR-MCNC: NEGATIVE — SIGNIFICANT CHANGE UP
LACTATE SERPL-SCNC: 1.5 MMOL/L — SIGNIFICANT CHANGE UP (ref 0.5–2.2)
LEUKOCYTE ESTERASE UR-ACNC: NEGATIVE — SIGNIFICANT CHANGE UP
LIDOCAIN IGE QN: 15 U/L — SIGNIFICANT CHANGE UP (ref 7–60)
LYMPHOCYTES # BLD AUTO: 2.69 K/UL — SIGNIFICANT CHANGE UP (ref 1.2–3.4)
LYMPHOCYTES # BLD AUTO: 32.6 % — SIGNIFICANT CHANGE UP (ref 20.5–51.1)
MCHC RBC-ENTMCNC: 32.3 PG — HIGH (ref 27–31)
MCHC RBC-ENTMCNC: 35 G/DL — SIGNIFICANT CHANGE UP (ref 32–37)
MCV RBC AUTO: 92.2 FL — SIGNIFICANT CHANGE UP (ref 80–94)
MONOCYTES # BLD AUTO: 0.78 K/UL — HIGH (ref 0.1–0.6)
MONOCYTES NFR BLD AUTO: 9.5 % — HIGH (ref 1.7–9.3)
NEUTROPHILS # BLD AUTO: 4.69 K/UL — SIGNIFICANT CHANGE UP (ref 1.4–6.5)
NEUTROPHILS NFR BLD AUTO: 56.8 % — SIGNIFICANT CHANGE UP (ref 42.2–75.2)
NITRITE UR-MCNC: NEGATIVE — SIGNIFICANT CHANGE UP
NRBC # BLD: 0 /100 WBCS — SIGNIFICANT CHANGE UP (ref 0–0)
PH UR: 6.5 — SIGNIFICANT CHANGE UP (ref 5–8)
PLATELET # BLD AUTO: 247 K/UL — SIGNIFICANT CHANGE UP (ref 130–400)
POTASSIUM SERPL-MCNC: 4.2 MMOL/L — SIGNIFICANT CHANGE UP (ref 3.5–5)
POTASSIUM SERPL-SCNC: 4.2 MMOL/L — SIGNIFICANT CHANGE UP (ref 3.5–5)
PROT SERPL-MCNC: 7.9 G/DL — SIGNIFICANT CHANGE UP (ref 6–8)
PROT UR-MCNC: NEGATIVE MG/DL — SIGNIFICANT CHANGE UP
RBC # BLD: 4.89 M/UL — SIGNIFICANT CHANGE UP (ref 4.7–6.1)
RBC # FLD: 12.8 % — SIGNIFICANT CHANGE UP (ref 11.5–14.5)
RBC CASTS # UR COMP ASSIST: SIGNIFICANT CHANGE UP /HPF
SODIUM SERPL-SCNC: 139 MMOL/L — SIGNIFICANT CHANGE UP (ref 135–146)
SP GR SPEC: 1.01 — SIGNIFICANT CHANGE UP (ref 1.01–1.03)
UROBILINOGEN FLD QL: 0.2 MG/DL — SIGNIFICANT CHANGE UP (ref 0.2–0.2)
WBC # BLD: 8.25 K/UL — SIGNIFICANT CHANGE UP (ref 4.8–10.8)
WBC # FLD AUTO: 8.25 K/UL — SIGNIFICANT CHANGE UP (ref 4.8–10.8)
WBC UR QL: SIGNIFICANT CHANGE UP /HPF

## 2019-09-17 PROCEDURE — 99284 EMERGENCY DEPT VISIT MOD MDM: CPT | Mod: GC

## 2019-09-17 PROCEDURE — 71045 X-RAY EXAM CHEST 1 VIEW: CPT | Mod: 26

## 2019-09-17 RX ORDER — KETOROLAC TROMETHAMINE 30 MG/ML
15 SYRINGE (ML) INJECTION ONCE
Refills: 0 | Status: DISCONTINUED | OUTPATIENT
Start: 2019-09-17 | End: 2019-09-17

## 2019-09-17 RX ORDER — CYCLOBENZAPRINE HYDROCHLORIDE 10 MG/1
1 TABLET, FILM COATED ORAL
Qty: 21 | Refills: 0
Start: 2019-09-17 | End: 2019-09-23

## 2019-09-17 RX ORDER — SODIUM CHLORIDE 9 MG/ML
1000 INJECTION INTRAMUSCULAR; INTRAVENOUS; SUBCUTANEOUS ONCE
Refills: 0 | Status: COMPLETED | OUTPATIENT
Start: 2019-09-17 | End: 2019-09-17

## 2019-09-17 RX ORDER — ONDANSETRON 8 MG/1
4 TABLET, FILM COATED ORAL ONCE
Refills: 0 | Status: COMPLETED | OUTPATIENT
Start: 2019-09-17 | End: 2019-09-17

## 2019-09-17 RX ORDER — IBUPROFEN 200 MG
1 TABLET ORAL
Qty: 21 | Refills: 0
Start: 2019-09-17 | End: 2019-09-23

## 2019-09-17 RX ADMIN — SODIUM CHLORIDE 1000 MILLILITER(S): 9 INJECTION INTRAMUSCULAR; INTRAVENOUS; SUBCUTANEOUS at 18:49

## 2019-09-17 RX ADMIN — ONDANSETRON 4 MILLIGRAM(S): 8 TABLET, FILM COATED ORAL at 18:49

## 2019-09-17 RX ADMIN — Medication 15 MILLIGRAM(S): at 18:50

## 2019-09-17 NOTE — ED ADULT NURSE NOTE - NSIMPLEMENTINTERV_GEN_ALL_ED
Implemented All Universal Safety Interventions:  [place] to call system. Call bell, personal items and telephone within reach. Instruct patient to call for assistance. Room bathroom lighting operational. Non-slip footwear when patient is off stretcher. Physically safe environment: no spills, clutter or unnecessary equipment. Stretcher in lowest position, wheels locked, appropriate side rails in place.

## 2019-09-17 NOTE — ED PROVIDER NOTE - PATIENT PORTAL LINK FT
You can access the FollowMyHealth Patient Portal offered by [facility] by registering at the following website: [email or web address]. By joining [employer]’s FollowMyHealth portal, you will also be able to view your health information using other applications (apps) compatible with our system. You can access the FollowMyHealth Patient Portal offered by [facility] by registering at the following website: [email or web address]. By joining [employer]’s FollowMyHealth portal, you will also be able to view your health information using other applications (apps) compatible with our system.

## 2019-09-17 NOTE — ED PROVIDER NOTE - CARE PLAN
Principal Discharge DX:	Abdominal pain  Goal:	medical management  Assessment and plan of treatment:	complete course of Vantin   outpt urology follow up  Secondary Diagnosis:	Hypertension  Secondary Diagnosis:	MI (myocardial infarction)  Secondary Diagnosis:	H/O heart artery stent  Secondary Diagnosis:	Arthritis Principal Discharge DX:	Back pain  Goal:	medical management  Assessment and plan of treatment:	complete course of Vantin   outpt urology follow up  Secondary Diagnosis:	Hypertension

## 2019-09-17 NOTE — ED PROVIDER NOTE - ATTENDING CONTRIBUTION TO CARE
70yo M history of HTN DL OA CAD PCI MI nephrolithiasis cholecystectomy appendectomy presenting with flank pain- per pt, L flank pain for several days, mild nausea, no vomiting. Here 9/13, dx-d pyelo, started on vantin. Per pt, sx not improving. +pain. No fevers/chills, vomiting, abdominal pain, chest pain, shortness of breath, dysuria/hematuria, trauma, urinary retention. No other complaints. Compliant with abx.  Constitutional: Well appearing. No acute distress. Non toxic.   Eyes: PERRLA. Extraocular movements intact, no entrapment. Conjunctiva normal.   ENT: No nasal discharge. Moist mucus membranes.  Neck: Supple, non tender, full range of motion.  CV: RRR no murmurs, rubs, or gallops. +S1S2.   Pulm: Clear to auscultation bilaterally. Normal work of breathing.  Abd: soft NT ND +BS.   +b/l paraspinal lumbar tenderness, flank tenderness  no midline CTL spine ttp no saddle anesthesia  Ext: Warm and well perfused x4, moving all extremities, no edema.   Psy: Cooperative, appropriate.   Skin: Warm, dry, no rash  Neuro: CN2-12 grossly intact no sensory or motor deficits throughout, no drift, no ataxia

## 2019-09-17 NOTE — ED PROVIDER NOTE - OBJECTIVE STATEMENT
68 yo M w/ hx of MI s/p PCI, kidney stones (passed without intervention), GERD p/w left flank pain. As per patient, Pain is intermittent, sharp, radiating to LUQ, moderate x 4 days associated with increased urination and nausea. No trauma; no fevers; no hematuria, no dysuria.  To note, patient presented to ER on 09/15/19 and was given Toradol. Lab and CT abdomen was negative for acute findings. He was discharged from ED with Vantin.

## 2019-09-17 NOTE — ED PROVIDER NOTE - CLINICAL SUMMARY MEDICAL DECISION MAKING FREE TEXT BOX
68yo M history of HTN DL OA CAD PCI MI nephrolithiasis cholecystectomy appendectomy presenting with flank pain- per pt, L flank pain for several days, mild nausea, no vomiting. Here 9/13, dx-d pyelo, started on vantin. Per pt, sx not improving. +pain. No fevers/chills, vomiting, abdominal pain, chest pain, shortness of breath, dysuria/hematuria, trauma, urinary retention. No other complaints. Compliant with abx. labs imaging ekg reviewed. Aware of all results, given a copy of all available results, comfortable with discharge and follow-up outpatient, strict return precautions given. Endorses understanding of all of this and aware that they can return at any time for new or concerning symptoms. No further questions or concerns at this time

## 2019-09-17 NOTE — ED PROVIDER NOTE - NSFOLLOWUPINSTRUCTIONS_ED_ALL_ED_FT
Pyelonephritis is a kidney infection. In most cases, the infection clears up with treatment and does not cause further problems. More severe infections or chronic infections can sometimes spread to the bloodstream or lead to other problems with the kidneys. Symptoms include frequent or painful urination, abdominal pain, back pain, flank pain, fever/chills, nausea, or vomiting. If you were prescribed an antibiotic medicine, take it as told by your health care provider. Do not stop taking the antibiotic even if you start to feel better.    SEEK IMMEDIATE MEDICAL CARE IF YOU HAVE ANY OF THE FOLLOWING SYMPTOMS: inability to hold down antibiotics or fluids, worsening pain, dizziness/lightheadedness, or change in mental status Back Pain    Back pain is very common in adults. The cause of back pain is rarely dangerous and the pain often gets better over time. The cause of your back pain may not be known and may include strain of muscles or ligaments, degeneration of the spinal disks, or arthritis. Occasionally the pain may radiate down your leg(s). Over-the-counter medicines to reduce pain and inflammation are often the most helpful. Stretching and remaining active frequently helps the healing process.     SEEK IMMEDIATE MEDICAL CARE IF YOU HAVE THE FOLLOWING SYMPTOMS: bowel or bladder control problems, unusual weakness or numbness in your arms or legs, nausea or vomiting, abdominal pain, fever, dizziness/lightheadedness.     Pyelonephritis    Pyelonephritis is a kidney infection. The kidneys are the organs that filter a person's blood and move waste out of the bloodstream and into the urine. Urine passes from the kidneys, through the ureters, and into the bladder. In most cases, the infection clears up with treatment and does not cause further problems. More severe infections or chronic infections can sometimes spread to the bloodstream or lead to other problems with the kidneys. Symptoms include frequent or painful urination, abdominal pain, back pain, flank pain, fever/chills, nausea, or vomiting. If you were prescribed an antibiotic medicine, take it as told by your health care provider. Do not stop taking the antibiotic even if you start to feel better.    SEEK IMMEDIATE MEDICAL CARE IF YOU HAVE THE FOLLOWING SYMPTOMS: inability to hold down antibiotics or fluids, worsening pain, dizziness/lightheadedness, or change in mental status.

## 2019-09-17 NOTE — ED PROVIDER NOTE - NS ED ROS FT
CONSTITUTIONAL: No weakness, fevers or chills  EYES/ENT: No visual changes;  No vertigo or throat pain   NECK: No pain or stiffness  RESPIRATORY: No cough, wheezing, hemoptysis; No shortness of breath  CARDIOVASCULAR: No chest pain or palpitations  GASTROINTESTINAL: (+) abdominal pain, (+) nausea, no vomiting, or hematemesis; No diarrhea or constipation. No melena or hematochezia.  GENITOURINARY: No dysuria, frequency or hematuria  NEUROLOGICAL: No numbness or weakness  SKIN: No itching, rashes

## 2019-09-17 NOTE — ED PROVIDER NOTE - PHYSICAL EXAMINATION
T(C): 36.4 (09-17-19 @ 17:03), Max: 36.4 (09-17-19 @ 17:03)  HR: 87 (09-17-19 @ 17:03) (87 - 87)  BP: 201/102 (09-17-19 @ 17:03) (201/102 - 201/102)  RR: 19 (09-17-19 @ 17:03) (19 - 19)  SpO2: 97% (09-17-19 @ 17:03) (97% - 97%)      GENERAL: NAD, well-developed  HEAD:  Atraumatic, Normocephalic  EYES: EOMI, PERRLA, conjunctiva and sclera clear  ENT: Normal tympanic membrane. No nasal obstruction or discharge. No tonsillar exudate, swelling or erythema.  NECK: Supple, No JVD  CHEST/LUNG: Clear to auscultation bilaterally; No wheeze  HEART: Regular rate and rhythm; No murmurs, rubs, or gallops  ABDOMEN: Soft, diffuse tenderness over both CVA, Nondistended; Bowel sounds present  EXTREMITIES:  2+ Peripheral Pulses, No clubbing, cyanosis, or edema  PSYCH: AAOx3  NEUROLOGY: non-focal  SKIN: No rashes or lesions  MSK: diffuse tenderness over upper and mid back

## 2019-09-17 NOTE — ED PROVIDER NOTE - NSFOLLOWUPCLINICS_GEN_ALL_ED_FT
[facility] ([facility])  Rehabilitation  [facility] [address] [place] [address]  Phone: [phone]  Fax:   Follow Up Time:     [facility] ([facility])  Rehabilitation  [address] [place] [id number]  Phone: [phone]  Fax:   Follow Up Time:

## 2019-09-18 LAB
CULTURE RESULTS: SIGNIFICANT CHANGE UP
SPECIMEN SOURCE: SIGNIFICANT CHANGE UP

## 2020-01-21 ENCOUNTER — INPATIENT (INPATIENT)
Facility: HOSPITAL | Age: 70
LOS: 1 days | Discharge: HOME | End: 2020-01-23
Attending: HOSPITALIST | Admitting: HOSPITALIST
Payer: MEDICARE

## 2020-01-21 VITALS
WEIGHT: 220.02 LBS | TEMPERATURE: 98 F | SYSTOLIC BLOOD PRESSURE: 145 MMHG | RESPIRATION RATE: 18 BRPM | HEART RATE: 78 BPM | HEIGHT: 71 IN | OXYGEN SATURATION: 99 % | DIASTOLIC BLOOD PRESSURE: 85 MMHG

## 2020-01-21 DIAGNOSIS — Z90.49 ACQUIRED ABSENCE OF OTHER SPECIFIED PARTS OF DIGESTIVE TRACT: Chronic | ICD-10-CM

## 2020-01-21 DIAGNOSIS — Z87.19 PERSONAL HISTORY OF OTHER DISEASES OF THE DIGESTIVE SYSTEM: Chronic | ICD-10-CM

## 2020-01-21 DIAGNOSIS — Z95.5 PRESENCE OF CORONARY ANGIOPLASTY IMPLANT AND GRAFT: Chronic | ICD-10-CM

## 2020-01-21 DIAGNOSIS — R42 DIZZINESS AND GIDDINESS: ICD-10-CM

## 2020-01-21 LAB
ALBUMIN SERPL ELPH-MCNC: 4.3 G/DL — SIGNIFICANT CHANGE UP (ref 3.5–5.2)
ALP SERPL-CCNC: 113 U/L — SIGNIFICANT CHANGE UP (ref 30–115)
ALT FLD-CCNC: 11 U/L — SIGNIFICANT CHANGE UP (ref 0–41)
ANION GAP SERPL CALC-SCNC: 15 MMOL/L — HIGH (ref 7–14)
AST SERPL-CCNC: 23 U/L — SIGNIFICANT CHANGE UP (ref 0–41)
BILIRUB SERPL-MCNC: 1.2 MG/DL — SIGNIFICANT CHANGE UP (ref 0.2–1.2)
BUN SERPL-MCNC: 19 MG/DL — SIGNIFICANT CHANGE UP (ref 10–20)
CALCIUM SERPL-MCNC: 9.2 MG/DL — SIGNIFICANT CHANGE UP (ref 8.5–10.1)
CHLORIDE SERPL-SCNC: 96 MMOL/L — LOW (ref 98–110)
CO2 SERPL-SCNC: 27 MMOL/L — SIGNIFICANT CHANGE UP (ref 17–32)
CREAT SERPL-MCNC: 0.9 MG/DL — SIGNIFICANT CHANGE UP (ref 0.7–1.5)
GLUCOSE SERPL-MCNC: 139 MG/DL — HIGH (ref 70–99)
HCT VFR BLD CALC: 41.9 % — LOW (ref 42–52)
HGB BLD-MCNC: 14.7 G/DL — SIGNIFICANT CHANGE UP (ref 14–18)
MCHC RBC-ENTMCNC: 32 PG — HIGH (ref 27–31)
MCHC RBC-ENTMCNC: 35.1 G/DL — SIGNIFICANT CHANGE UP (ref 32–37)
MCV RBC AUTO: 91.3 FL — SIGNIFICANT CHANGE UP (ref 80–94)
NRBC # BLD: 0 /100 WBCS — SIGNIFICANT CHANGE UP (ref 0–0)
PLATELET # BLD AUTO: 207 K/UL — SIGNIFICANT CHANGE UP (ref 130–400)
POTASSIUM SERPL-MCNC: 4.2 MMOL/L — SIGNIFICANT CHANGE UP (ref 3.5–5)
POTASSIUM SERPL-SCNC: 4.2 MMOL/L — SIGNIFICANT CHANGE UP (ref 3.5–5)
PROT SERPL-MCNC: 7.4 G/DL — SIGNIFICANT CHANGE UP (ref 6–8)
RBC # BLD: 4.59 M/UL — LOW (ref 4.7–6.1)
RBC # FLD: 12.4 % — SIGNIFICANT CHANGE UP (ref 11.5–14.5)
SODIUM SERPL-SCNC: 138 MMOL/L — SIGNIFICANT CHANGE UP (ref 135–146)
TROPONIN T SERPL-MCNC: 0.01 NG/ML — SIGNIFICANT CHANGE UP
TROPONIN T SERPL-MCNC: 0.01 NG/ML — SIGNIFICANT CHANGE UP
WBC # BLD: 7.96 K/UL — SIGNIFICANT CHANGE UP (ref 4.8–10.8)
WBC # FLD AUTO: 7.96 K/UL — SIGNIFICANT CHANGE UP (ref 4.8–10.8)

## 2020-01-21 PROCEDURE — 99283 EMERGENCY DEPT VISIT LOW MDM: CPT

## 2020-01-21 PROCEDURE — 71260 CT THORAX DX C+: CPT | Mod: 26

## 2020-01-21 PROCEDURE — 71046 X-RAY EXAM CHEST 2 VIEWS: CPT | Mod: 26

## 2020-01-21 PROCEDURE — 99223 1ST HOSP IP/OBS HIGH 75: CPT

## 2020-01-21 PROCEDURE — 99285 EMERGENCY DEPT VISIT HI MDM: CPT

## 2020-01-21 PROCEDURE — 72125 CT NECK SPINE W/O DYE: CPT | Mod: 26

## 2020-01-21 PROCEDURE — 70450 CT HEAD/BRAIN W/O DYE: CPT | Mod: 26

## 2020-01-21 PROCEDURE — 74177 CT ABD & PELVIS W/CONTRAST: CPT | Mod: 26

## 2020-01-21 RX ORDER — ATENOLOL 25 MG/1
50 TABLET ORAL DAILY
Refills: 0 | Status: DISCONTINUED | OUTPATIENT
Start: 2020-01-21 | End: 2020-01-23

## 2020-01-21 RX ORDER — SODIUM CHLORIDE 9 MG/ML
1000 INJECTION INTRAMUSCULAR; INTRAVENOUS; SUBCUTANEOUS ONCE
Refills: 0 | Status: COMPLETED | OUTPATIENT
Start: 2020-01-21 | End: 2020-01-21

## 2020-01-21 RX ORDER — HYDROCHLOROTHIAZIDE 25 MG
25 TABLET ORAL EVERY 12 HOURS
Refills: 0 | Status: DISCONTINUED | OUTPATIENT
Start: 2020-01-21 | End: 2020-01-22

## 2020-01-21 RX ORDER — ENOXAPARIN SODIUM 100 MG/ML
40 INJECTION SUBCUTANEOUS DAILY
Refills: 0 | Status: DISCONTINUED | OUTPATIENT
Start: 2020-01-21 | End: 2020-01-23

## 2020-01-21 RX ORDER — ASPIRIN/CALCIUM CARB/MAGNESIUM 324 MG
81 TABLET ORAL DAILY
Refills: 0 | Status: DISCONTINUED | OUTPATIENT
Start: 2020-01-21 | End: 2020-01-23

## 2020-01-21 RX ORDER — CLOPIDOGREL BISULFATE 75 MG/1
0 TABLET, FILM COATED ORAL
Qty: 0 | Refills: 0 | DISCHARGE

## 2020-01-21 RX ORDER — IBUPROFEN 200 MG
400 TABLET ORAL EVERY 6 HOURS
Refills: 0 | Status: DISCONTINUED | OUTPATIENT
Start: 2020-01-21 | End: 2020-01-22

## 2020-01-21 RX ORDER — DIPHENHYDRAMINE HCL 50 MG
50 CAPSULE ORAL ONCE
Refills: 0 | Status: COMPLETED | OUTPATIENT
Start: 2020-01-21 | End: 2020-01-21

## 2020-01-21 RX ORDER — ATORVASTATIN CALCIUM 80 MG/1
20 TABLET, FILM COATED ORAL AT BEDTIME
Refills: 0 | Status: DISCONTINUED | OUTPATIENT
Start: 2020-01-21 | End: 2020-01-23

## 2020-01-21 RX ORDER — DIAZEPAM 5 MG
5 TABLET ORAL ONCE
Refills: 0 | Status: DISCONTINUED | OUTPATIENT
Start: 2020-01-21 | End: 2020-01-21

## 2020-01-21 RX ORDER — CLOPIDOGREL BISULFATE 75 MG/1
75 TABLET, FILM COATED ORAL DAILY
Refills: 0 | Status: DISCONTINUED | OUTPATIENT
Start: 2020-01-21 | End: 2020-01-23

## 2020-01-21 RX ORDER — ACETAMINOPHEN 500 MG
975 TABLET ORAL ONCE
Refills: 0 | Status: COMPLETED | OUTPATIENT
Start: 2020-01-21 | End: 2020-01-21

## 2020-01-21 RX ORDER — MECLIZINE HCL 12.5 MG
25 TABLET ORAL ONCE
Refills: 0 | Status: COMPLETED | OUTPATIENT
Start: 2020-01-21 | End: 2020-01-21

## 2020-01-21 RX ADMIN — Medication 25 MILLIGRAM(S): at 21:11

## 2020-01-21 RX ADMIN — ATORVASTATIN CALCIUM 20 MILLIGRAM(S): 80 TABLET, FILM COATED ORAL at 21:11

## 2020-01-21 RX ADMIN — Medication 5 MILLIGRAM(S): at 13:49

## 2020-01-21 RX ADMIN — SODIUM CHLORIDE 1000 MILLILITER(S): 9 INJECTION INTRAMUSCULAR; INTRAVENOUS; SUBCUTANEOUS at 10:00

## 2020-01-21 NOTE — ED PROVIDER NOTE - PATIENT PORTAL LINK FT
You can access the FollowMyHealth Patient Portal offered by [facility] by registering at the following website: [email or web address]. By joining [employer]’s FollowMyHealth portal, you will also be able to view your health information using other applications (apps) compatible with our system.

## 2020-01-21 NOTE — ED ADULT TRIAGE NOTE - CHIEF COMPLAINT QUOTE
pt BIBA from home, pt states he was walking to the bathroom, became dizzy and fell, pt denies LOC but states he hit the back of his head. pt took 5mg of oxycodone 2 hours prior

## 2020-01-21 NOTE — ED PROVIDER NOTE - CARE PLAN
Principal Discharge DX:	Dizziness Principal Discharge DX:	Dizziness  Secondary Diagnosis:	Fall from standing, initial encounter  Secondary Diagnosis:	Closed head injury  Secondary Diagnosis:	Neck pain, acute

## 2020-01-21 NOTE — H&P ADULT - NSHPPHYSICALEXAM_GEN_ALL_CORE
Gen NAD, A&Ox3  CV +S1 and S2, RR  Resp +air entry B/L  Abd obese, soft, NT, ND  Ext no CT  Neuro CN II-XII grossly intact    Vital Signs Last 24 Hrs  T(C): 36.8 (21 Jan 2020 09:02), Max: 36.8 (21 Jan 2020 09:02)  T(F): 98.2 (21 Jan 2020 09:02), Max: 98.2 (21 Jan 2020 09:02)  HR: 76 (21 Jan 2020 16:15) (76 - 78)  BP: 141/76 (21 Jan 2020 17:09) (141/76 - 145/85)  BP(mean): --  RR: 18 (21 Jan 2020 17:09) (18 - 18)  SpO2: 99% (21 Jan 2020 17:09) (99% - 99%)

## 2020-01-21 NOTE — H&P ADULT - NSHPLABSRESULTS_GEN_ALL_CORE
14.7   7.96  )-----------( 207      ( 21 Jan 2020 09:51 )             41.9       01-21    138  |  96<L>  |  19  ----------------------------<  139<H>  4.2   |  27  |  0.9    Ca    9.2      21 Jan 2020 09:51    TPro  7.4  /  Alb  4.3  /  TBili  1.2  /  DBili  x   /  AST  23  /  ALT  11  /  AlkPhos  113  01-21              CARDIAC MARKERS ( 21 Jan 2020 09:51 )  x     / 0.01 ng/mL / x     / x     / x            < from: CT Abdomen and Pelvis w/ IV Cont (01.21.20 @ 12:21) >    1.  No CT evidence of acute traumatic injury in the chest abdomen or pelvis.  2.  Chronic findings in the body of the report.          < end of copied text >    < from: CT Cervical Spine No Cont (01.21.20 @ 12:21) >    MPRESSION:  Head CT: Small right parietal scalp hematoma. No acute intracranial hemorrhage or calvarial fracture. Stable chronic findings as above.    Cervical spine CT: No acute cervical spine fracture or evidence of traumatic malalignment. Multilevel cervical spondylosis with most notable degree of canal stenosis at C4/C5 and C5/C6 as above.    < end of copied text >        < from: CT Chest w/ IV Cont (01.21.20 @ 12:21) >    1.  No CT evidence of acute traumatic injury in the chest abdomen or pelvis.  2.  Chronic findings in the body of the report.    < end of copied text >

## 2020-01-21 NOTE — H&P ADULT - NSICDXPASTMEDICALHX_GEN_ALL_CORE_FT
PAST MEDICAL HISTORY:  Arthritis     High blood cholesterol     Hypertension     Kidney stones     MI (myocardial infarction)     Sleep apnea

## 2020-01-21 NOTE — ED PROVIDER NOTE - IMAGING STUDIES ADDITIONAL INFORMATION FREE TEXT
CXR: unclear posterior rib abnormality by ribs 5,6. DW radiology who could not say for certain if this was the area of pt's pain complaint.  CT Head - No acute bony injury, intracranial hemorrhage or territorial infarct.

## 2020-01-21 NOTE — ED PROVIDER NOTE - ATTENDING CONTRIBUTION TO CARE
69 y M PMH HTN, CAD x2 stents on plavix, pw dizziness episode resolved spontaneously, fell backwards onto back of head, back. complaining of headache and pain throughout back. No fever, chills, nausea, vomiting, diarrhea, dysuria, hematuria, lasting dizziness, vision changes, numbness, tingling, weakness.  Exam: NAD, NCAT, HEENT: mmm, EOMI, PERRLA, Neck: supple, ttp mild b/l muscular components, none midline, nl ROM, Heart: RRR, no murmur, Lungs: BCTA, no signs of increased WOB, Abd: NTND, no guarding or rebound, no hernia palpated, no CVAT. MSK: diffuse ttp of elements of posterior ribs and low back, not midline, though present b/l. Neuro: A&Ox3, CN II-XII intact, normal strength 5/5 all aspects of b/l UE and LE, no drift of b/l UE and LE, nl sensation throughout all 4 ext, normal speech and coordination.   A/P: Eval for traumatic injury associated with fall. Screen for causes of dizziness, though less likely a central cause as it is resolved now and neuro exam normal.

## 2020-01-21 NOTE — H&P ADULT - HISTORY OF PRESENT ILLNESS
68 y/o male with PMH CAD, s/p cardiac stents x 2 presented to ER following a fall at home. Pt reports taking a percocet this morning around 6 am and stood up from couch an hour later and subsequently felt dizzy, continued to walk to kitchen, dizziness persisted and pt fell, hitting head on tile. Pt reports mild pain to area. He denies associated symptoms including chest pain, palpitations, visual changes,or other complaints.  Pt currently reports resolution in symptoms.     Pt follows with cardiology and is scheduled for next follow up in 1 month.

## 2020-01-21 NOTE — ED PROVIDER NOTE - CLINICAL SUMMARY MEDICAL DECISION MAKING FREE TEXT BOX
pw dizziness leading to a fall. Imaging including CXR, followed by CT for acute fracture, intracranial injury or bleeding or cervical spinal fracture negative. Persistently feeling unsteady to walk. Patient to be admitted to an inpatient floor. Case discussed with and care endorsed to Admitting physician.

## 2020-01-21 NOTE — ED ADULT NURSE NOTE - NSIMPLEMENTINTERV_GEN_ALL_ED
Implemented All Fall Risk Interventions:  [place] to call system. Call bell, personal items and telephone within reach. Instruct patient to call for assistance. Room bathroom lighting operational. Non-slip footwear when patient is off stretcher. Physically safe environment: no spills, clutter or unnecessary equipment. Stretcher in lowest position, wheels locked, appropriate side rails in place. Provide visual cue, wrist band, yellow gown, etc. Monitor gait and stability. Monitor for mental status changes and reorient to person, place, and time. Review medications for side effects contributing to fall risk. Reinforce activity limits and safety measures with patient and family.

## 2020-01-21 NOTE — ED PROVIDER NOTE - OBJECTIVE STATEMENT
68 y/o male with pmhx of CAD x 2 stents, HTN, DLD presents with dizziness. Patient states  he was on his way back from the bathroom, felt lightheaded and sustained a fall backward secondary to the lightheadedness. Patient admits to being able to get back and ambulate. Denies syncope. Admits to lightheadedness and mild headache at this time. Denies fevers/chills, sob, chest pain, n/v/d, abdominal pain, dysuria, leg swelling, extremity pain/injury.

## 2020-01-21 NOTE — H&P ADULT - NSICDXPASTSURGICALHX_GEN_ALL_CORE_FT
PAST SURGICAL HISTORY:  H/O chronic cholecystitis cholecystectomy    H/O heart artery stent     History of appendectomy

## 2020-01-21 NOTE — ED PROVIDER NOTE - NS ED ROS FT
Constitutional: See HPI.  Eyes: No visual changes, eye pain or discharge.  ENMT: No hearing changes, pain, discharge or infections. No neck pain or stiffness.  Cardiac: No chest pain, SOB or edema. No chest pain with exertion.  Respiratory: No cough or respiratory distress.   GI: No nausea, vomiting, diarrhea or abdominal pain.  : No dysuria, frequency or burning.  MS: No myalgia, muscle weakness, joint pain or back pain.  Neuro: +lightheadedness; No headache or weakness. No LOC.  Skin: No skin rash.  Endo: No hx of DM, thyroid disease  Except as documented in HPI, all other review of systems is negative

## 2020-01-21 NOTE — H&P ADULT - ASSESSMENT
Pt is a 68 y/o male with hx CAD/Cardiac stents, admitted with dizziness, s/p fall    Case d/w Dr Unger, who advised  Cardiac enzymes x 2 sets  Cardiology Consult  Neurology Consult  TTE  CTA Head/Neck  Orthostatic VS  Continue current meds  DVT Prophylaxis Pt is a 70 y/o male with hx CADx 2Cardiac stents, admitted with dizziness, s/p fall with head trauma, no LOC, likley secondary to opioid use.   Due to risk factors will rule out neurogenic and cardiogenic shock    Case d/w Dr Unger, who advised  Cardiac enzymes x 2 sets  Cardiology Consult  Neurology Consult  TTE  CTA Head/Neck  Orthostatic VS  Continue current meds  PT/Rehab Consult  DVT Prophylaxis

## 2020-01-21 NOTE — ED PROVIDER NOTE - PROGRESS NOTE DETAILS
Arturo: Patient is feeling better, no longer complaining of dizziness. Will D/C with follow up. Arturo: Patient reassessed - states he doesn't feel steady on his feet, would like to stay in the hospital. Arturo: Patient reassessed after valium - states he still doesn't feel steady on his feet. Will admit.

## 2020-01-21 NOTE — ED PROVIDER NOTE - PHYSICAL EXAMINATION
CONSTITUTIONAL: Well-developed; well-nourished; in no acute distress.   SKIN: warm, dry  HEAD: Normocephalic; atraumatic.  EYES: no conj injection  ENT: No nasal discharge; airway clear.  NECK: Supple; non tender.  CARD: S1, S2 normal; no murmurs, gallops, or rubs. Regular rate and rhythm.   RESP: No wheezes, rales or rhonchi.  ABD: soft ntnd  EXT: Normal ROM.  No clubbing, cyanosis or edema.   NEURO: Alert, oriented, grossly unremarkable; CN II-XII intact, 5/5 motor strength, neurovascularly intact  PSYCH: Cooperative, appropriate.

## 2020-01-22 LAB
ANION GAP SERPL CALC-SCNC: 14 MMOL/L — SIGNIFICANT CHANGE UP (ref 7–14)
APPEARANCE UR: CLEAR — SIGNIFICANT CHANGE UP
BILIRUB UR-MCNC: NEGATIVE — SIGNIFICANT CHANGE UP
BUN SERPL-MCNC: 21 MG/DL — HIGH (ref 10–20)
CALCIUM SERPL-MCNC: 9 MG/DL — SIGNIFICANT CHANGE UP (ref 8.5–10.1)
CHLORIDE SERPL-SCNC: 99 MMOL/L — SIGNIFICANT CHANGE UP (ref 98–110)
CO2 SERPL-SCNC: 26 MMOL/L — SIGNIFICANT CHANGE UP (ref 17–32)
COLOR SPEC: YELLOW — SIGNIFICANT CHANGE UP
CREAT SERPL-MCNC: 0.9 MG/DL — SIGNIFICANT CHANGE UP (ref 0.7–1.5)
DIFF PNL FLD: NEGATIVE — SIGNIFICANT CHANGE UP
GLUCOSE SERPL-MCNC: 110 MG/DL — HIGH (ref 70–99)
GLUCOSE UR QL: NEGATIVE MG/DL — SIGNIFICANT CHANGE UP
HCT VFR BLD CALC: 39 % — LOW (ref 42–52)
HGB BLD-MCNC: 13.7 G/DL — LOW (ref 14–18)
KETONES UR-MCNC: ABNORMAL
LEUKOCYTE ESTERASE UR-ACNC: NEGATIVE — SIGNIFICANT CHANGE UP
MAGNESIUM SERPL-MCNC: 1.7 MG/DL — LOW (ref 1.8–2.4)
MCHC RBC-ENTMCNC: 32.2 PG — HIGH (ref 27–31)
MCHC RBC-ENTMCNC: 35.1 G/DL — SIGNIFICANT CHANGE UP (ref 32–37)
MCV RBC AUTO: 91.8 FL — SIGNIFICANT CHANGE UP (ref 80–94)
NITRITE UR-MCNC: NEGATIVE — SIGNIFICANT CHANGE UP
NRBC # BLD: 0 /100 WBCS — SIGNIFICANT CHANGE UP (ref 0–0)
PH UR: 6 — SIGNIFICANT CHANGE UP (ref 5–8)
PHOSPHATE SERPL-MCNC: 3.9 MG/DL — SIGNIFICANT CHANGE UP (ref 2.1–4.9)
PLATELET # BLD AUTO: 208 K/UL — SIGNIFICANT CHANGE UP (ref 130–400)
POTASSIUM SERPL-MCNC: 3.2 MMOL/L — LOW (ref 3.5–5)
POTASSIUM SERPL-SCNC: 3.2 MMOL/L — LOW (ref 3.5–5)
PROT UR-MCNC: NEGATIVE MG/DL — SIGNIFICANT CHANGE UP
RBC # BLD: 4.25 M/UL — LOW (ref 4.7–6.1)
RBC # FLD: 12.7 % — SIGNIFICANT CHANGE UP (ref 11.5–14.5)
SODIUM SERPL-SCNC: 139 MMOL/L — SIGNIFICANT CHANGE UP (ref 135–146)
SP GR SPEC: 1.02 — SIGNIFICANT CHANGE UP (ref 1.01–1.03)
TROPONIN T SERPL-MCNC: 0.02 NG/ML — HIGH
UROBILINOGEN FLD QL: 0.2 MG/DL — SIGNIFICANT CHANGE UP (ref 0.2–0.2)
WBC # BLD: 6.95 K/UL — SIGNIFICANT CHANGE UP (ref 4.8–10.8)
WBC # FLD AUTO: 6.95 K/UL — SIGNIFICANT CHANGE UP (ref 4.8–10.8)

## 2020-01-22 PROCEDURE — 99222 1ST HOSP IP/OBS MODERATE 55: CPT

## 2020-01-22 PROCEDURE — 70498 CT ANGIOGRAPHY NECK: CPT | Mod: 26

## 2020-01-22 PROCEDURE — 70496 CT ANGIOGRAPHY HEAD: CPT | Mod: 26

## 2020-01-22 PROCEDURE — 99233 SBSQ HOSP IP/OBS HIGH 50: CPT

## 2020-01-22 RX ORDER — HYDROCHLOROTHIAZIDE 25 MG
25 TABLET ORAL DAILY
Refills: 0 | Status: DISCONTINUED | OUTPATIENT
Start: 2020-01-22 | End: 2020-01-23

## 2020-01-22 RX ORDER — TRAMADOL HYDROCHLORIDE 50 MG/1
25 TABLET ORAL ONCE
Refills: 0 | Status: DISCONTINUED | OUTPATIENT
Start: 2020-01-22 | End: 2020-01-22

## 2020-01-22 RX ORDER — ACETAMINOPHEN WITH CODEINE 300MG-30MG
1 TABLET ORAL ONCE
Refills: 0 | Status: DISCONTINUED | OUTPATIENT
Start: 2020-01-22 | End: 2020-01-22

## 2020-01-22 RX ORDER — POTASSIUM CHLORIDE 20 MEQ
40 PACKET (EA) ORAL ONCE
Refills: 0 | Status: COMPLETED | OUTPATIENT
Start: 2020-01-22 | End: 2020-01-22

## 2020-01-22 RX ORDER — DIPHENHYDRAMINE HCL 50 MG
50 CAPSULE ORAL ONCE
Refills: 0 | Status: COMPLETED | OUTPATIENT
Start: 2020-01-22 | End: 2020-01-22

## 2020-01-22 RX ADMIN — Medication 400 MILLIGRAM(S): at 04:03

## 2020-01-22 RX ADMIN — ENOXAPARIN SODIUM 40 MILLIGRAM(S): 100 INJECTION SUBCUTANEOUS at 11:23

## 2020-01-22 RX ADMIN — Medication 1 TABLET(S): at 06:23

## 2020-01-22 RX ADMIN — Medication 81 MILLIGRAM(S): at 11:23

## 2020-01-22 RX ADMIN — ATORVASTATIN CALCIUM 20 MILLIGRAM(S): 80 TABLET, FILM COATED ORAL at 21:24

## 2020-01-22 RX ADMIN — Medication 50 MILLIGRAM(S): at 11:21

## 2020-01-22 RX ADMIN — Medication 40 MILLIEQUIVALENT(S): at 11:22

## 2020-01-22 RX ADMIN — Medication 25 MILLIGRAM(S): at 05:26

## 2020-01-22 RX ADMIN — TRAMADOL HYDROCHLORIDE 25 MILLIGRAM(S): 50 TABLET ORAL at 22:54

## 2020-01-22 RX ADMIN — CLOPIDOGREL BISULFATE 75 MILLIGRAM(S): 75 TABLET, FILM COATED ORAL at 11:23

## 2020-01-22 RX ADMIN — ATENOLOL 50 MILLIGRAM(S): 25 TABLET ORAL at 05:26

## 2020-01-22 RX ADMIN — Medication 400 MILLIGRAM(S): at 04:33

## 2020-01-22 RX ADMIN — Medication 20 MILLILITER(S): at 02:00

## 2020-01-22 NOTE — CONSULT NOTE ADULT - ASSESSMENT
Patient with cardiac stent 1998. He has back problems on comp. Claims slipped and fell and hit head. No co sob palpitations. He now denies dizzy. Continue meds. F/U Dr Brunson

## 2020-01-22 NOTE — PHYSICAL THERAPY INITIAL EVALUATION ADULT - GAIT DEVIATIONS NOTED, PT EVAL
decreased ping/stooped posture, dec heel strike/pushoff/decreased step length/decreased weight-shifting ability

## 2020-01-22 NOTE — CONSULT NOTE ADULT - ASSESSMENT
IMPRESSION: Rehab of 70 y/o  m  rehab  for  gd  debility      PRECAUTIONS: [  ] Cardiac  [  ] Respiratory  [  ] Seizures [  ] Contact Isolation  [  ] Droplet Isolation  [ FALL ] Other    Weight Bearing Status:     RECOMMENDATION:    Out of Bed to Chair     DVT/Decubiti Prophylaxis    REHAB PLAN:     [  x ] Bedside P/T 3-5 times a week   [   ]   Bedside O/T  2-3 times a week             [   ] No Rehab Therapy Indicated                   [   ]  Speech Therapy   Conditioning/ROM                                    ADL  Bed Mobility                                               Conditioning/ROM  Transfers                                                     Bed Mobility  Sitting /Standing Balance                         Transfers                                        Gait Training                                               Sitting/Standing Balance  Stair Training [   ]Applicable                    Home equipment Eval                                                                        Splinting  [   ] Only      GOALS:   ADL   [x  ]   Independent                    Transfers  [  x ] Independent                          Ambulation  [  x ] Independent     [    ] With device                            [  x ]  CG                                                         [ x  ]  CG                                                                  [  x ] CG                            [    ] Min A                                                   [   ] Min A                                                              [   ] Min  A          DISCHARGE PLAN:   [   ]  Good candidate for Intensive Rehabilitation/Hospital based-4A [facility]                                             Will tolerate 3hrs Intensive Rehab Daily                                       [    ]  Short Term Rehab in Skilled Nursing Facility                                       [  xx  ]  Home with Outpatient or VN services                                         [    ]  Possible Candidate for Intensive Hospital based Rehab

## 2020-01-22 NOTE — CONSULT NOTE ADULT - SUBJECTIVE AND OBJECTIVE BOX
HPI: 70 y/o male with PMH CAD, s/p cardiac stents x 2 presented to ER following a fall at home. Pt reports taking a percocet this morning around 6 am and stood up from couch an hour later and subsequently felt dizzy, continued to walk to kitchen, dizziness persisted and pt fell, hitting head on tile. Pt reports mild pain to area. He denies associated symptoms including chest pain, palpitations, visual changes,or other complaints.  Pt currently reports resolution in symptoms.   Pt follows with cardiology and is scheduled for next follow up in 1 month. ptn  seen and exam  at  bed side       PTN  REFERRED TO ACUTE  REHAB  FOR  EVAL AND  TX   PAST MEDICAL & SURGICAL HISTORY:  MI (myocardial infarction)  Arthritis  Kidney stones  Sleep apnea  Hypertension  High blood cholesterol  H/O heart artery stent  History of appendectomy  H/O chronic cholecystitis: cholecystectomy  bl  tkr  oa    Hospital Course:    TODAY'S SUBJECTIVE & REVIEW OF SYMPTOMS:     Constitutional WNL   Cardio WNL   Resp WNL   GI WNL  Heme WNL  Endo WNL  Skin WNL  MSK WNL  Neuro WNL  Cognitive WNL  Psych WNL      MEDICATIONS  (STANDING):  aspirin  chewable 81 milliGRAM(s) Oral daily  ATENolol  Tablet 50 milliGRAM(s) Oral daily  atorvastatin 20 milliGRAM(s) Oral at bedtime  clopidogrel Tablet 75 milliGRAM(s) Oral daily  enoxaparin Injectable 40 milliGRAM(s) SubCutaneous daily  hydrochlorothiazide 25 milliGRAM(s) Oral daily    MEDICATIONS  (PRN):      FAMILY HISTORY:  CAD (coronary artery disease) (Father)      Allergies    fish (Unknown)  iodine (Rash)  latex (Unknown)  magnesium sulfate (Rash)  sulfa drugs (Rash)    Intolerances        SOCIAL HISTORY:    [  ] Etoh  [  ] Smoking  [  ] Substance abuse     Home Environment:  [  ] Home Alone  [ x ] Lives with Family  [  ] Home Health Aid    Dwelling:  [  ] Apartment  [ x ] Private House  [  ] Adult Home  [  ] Skilled Nursing Facility      [  ] Short Term  [  ] Long Term  [ x ] Stairs       Elevator [  ]    FUNCTIONAL STATUS PTA: (Check all that apply)  Ambulation: [ x  ]Independent    [  ] Dependent     [  ] Non-Ambulatory  Assistive Device: [ x ] SA Cane  [  ]  Q Cane  [  ] Walker  [  ]  Wheelchair  ADL : [ x ] Independent  [  ]  Dependent       Vital Signs Last 24 Hrs  T(C): 36.8 (22 Jan 2020 14:09), Max: 36.8 (22 Jan 2020 14:09)  T(F): 98.3 (22 Jan 2020 14:09), Max: 98.3 (22 Jan 2020 14:09)  HR: 79 (22 Jan 2020 14:09) (77 - 80)  BP: 101/54 (22 Jan 2020 14:09) (101/54 - 165/78)  BP(mean): --  RR: 16 (22 Jan 2020 14:09) (16 - 16)  SpO2: 96% (22 Jan 2020 15:14) (96% - 96%)      PHYSICAL EXAM: Alert & Oriented X3  GENERAL: NAD, well-groomed, well-developed  HEAD:  Atraumatic, Normocephalic  EYES: EOMI, PERRLA, conjunctiva and sclera clear  NECK: Supple, No JVD, Normal thyroid  CHEST/LUNG: Clear to percussion bilaterally; No rales, rhonchi, wheezing, or rubs  HEART: Regular rate and rhythm; No murmurs, rubs, or gallops  ABDOMEN: Soft, Nontender, Nondistended; Bowel sounds present  EXTREMITIES:  2+ Peripheral Pulses, No clubbing, cyanosis, or edema    NERVOUS SYSTEM:  Cranial Nerves 2-12 intact [ x ] Abnormal  [  ]  ROM: WFL all extremities [ x ]  Abnormal [  ]  Motor Strength: WFL all extremities  [x  ]  Abnormal [  ]  Sensation: intact to light touch [ x ] Abnormal [  ]  Reflexes: Symmetric [x  ]  Abnormal [  ]    FUNCTIONAL STATUS:  Bed Mobility: Independent [  ]  Supervision [x  ]  Needs Assistance [  ]  N/A [  ]  Transfers: Independent [  ]  Supervision [x  ]  Needs Assistance [  ]  N/A [  ]   Ambulation: Independent [  ]  Supervision [x  ]  Needs Assistance [  ]  N/A [  ]  ADL: Independent [ x ] Requires Assistance [  ] N/A [  ]  SEE PT/OT IE NOTES    LABS:                        13.7   6.95  )-----------( 208      ( 22 Jan 2020 07:16 )             39.0     01-22    139  |  99  |  21<H>  ----------------------------<  110<H>  3.2<L>   |  26  |  0.9    Ca    9.0      22 Jan 2020 07:16  Phos  3.9     01-22  Mg     1.7     01-22    TPro  7.4  /  Alb  4.3  /  TBili  1.2  /  DBili  x   /  AST  23  /  ALT  11  /  AlkPhos  113  01-21          RADIOLOGY & ADDITIONAL STUDIES:    Assesment:

## 2020-01-22 NOTE — PHYSICAL THERAPY INITIAL EVALUATION ADULT - GENERAL OBSERVATIONS, REHAB EVAL
10:55-11:20 Chart reviewed. Pt encountered semireclined in bed,  may be seen by Physical Therapist as confirmed with Nurse. Patient denied pain at rest but "legs feel weak"; +tele

## 2020-01-22 NOTE — PROGRESS NOTE ADULT - ASSESSMENT
69M PMHx CAD s/p stent 1998, sciatica, HTN here with mechanical fall in setting of dizziness.    #Fall, mechanical  pt unclear why he fell, unclear if associated with dizziness or back pain  taking oxycodone 5 for past 3 mos due to back pain  orthostatic neg  possible bppv  f/u neuro  PT  #CAD  dapt  lipitor 20  #Sciatica  pain control  outpt f/u  #HTN  hctz 25  atenolol 50  #DVT ppx  lovenox    #Progress Note Handoff:  Pending (specify):  Consults_________, Tests________, Test Results_______, Other___PT______  Family discussion:d/w pt at bedside re: treatment plan, primary dx  Disposition: Home___/SNF___/Other________/Unknown at this time__x______

## 2020-01-22 NOTE — CHART NOTE - NSCHARTNOTEFT_GEN_A_CORE
Patient notes swelling, redness and pain to both feet after he took his statin pill. On my exam there is some mild swelling and redness to both feet, no signs of trauma. Feet are not warm (doubt trauma, allergic reaction, infection) For now will try weak opioid and advise patient not to get out of bed without assistance

## 2020-01-22 NOTE — PROGRESS NOTE ADULT - SUBJECTIVE AND OBJECTIVE BOX
INTERVAL HPI/OVERNIGHT EVENTS:    SUBJECTIVE: Patient seen and examined at bedside.     no cp, sob, abd pain, fever  +dizziness, no room spinning, lightheadedness, no nausea, vomiting    OBJECTIVE:    VITAL SIGNS:  Vital Signs Last 24 Hrs  T(C): 36.7 (22 Jan 2020 05:11), Max: 36.7 (22 Jan 2020 05:11)  T(F): 98 (22 Jan 2020 05:11), Max: 98 (22 Jan 2020 05:11)  HR: 80 (21 Jan 2020 22:08) (76 - 80)  BP: 119/60 (21 Jan 2020 22:08) (119/60 - 165/78)  BP(mean): --  RR: 16 (21 Jan 2020 22:08) (16 - 18)  SpO2: 98% (21 Jan 2020 17:27) (98% - 99%)      PHYSICAL EXAM:    General: NAD  HEENT: NC/AT; PERRL, clear conjunctiva  Neck: supple  Respiratory: CTA b/l  Cardiovascular: +S1/S2; RRR  Abdomen: soft, NT/ND; +BS x4  Extremities: WWP, 2+ peripheral pulses b/l; 1+ pit edema bl  Skin: normal color and turgor; no rash  Neurological:    MEDICATIONS:  MEDICATIONS  (STANDING):  aspirin  chewable 81 milliGRAM(s) Oral daily  ATENolol  Tablet 50 milliGRAM(s) Oral daily  atorvastatin 20 milliGRAM(s) Oral at bedtime  clopidogrel Tablet 75 milliGRAM(s) Oral daily  diphenhydrAMINE   Injectable 50 milliGRAM(s) IV Push once  enoxaparin Injectable 40 milliGRAM(s) SubCutaneous daily  hydrochlorothiazide 25 milliGRAM(s) Oral every 12 hours  potassium chloride    Tablet ER 40 milliEquivalent(s) Oral once    MEDICATIONS  (PRN):      ALLERGIES:  Allergies    fish (Unknown)  iodine (Rash)  latex (Unknown)  magnesium sulfate (Rash)  sulfa drugs (Rash)    Intolerances        LABS:                        13.7   6.95  )-----------( 208      ( 22 Jan 2020 07:16 )             39.0     Hemoglobin: 13.7 g/dL (01-22 @ 07:16)  Hemoglobin: 14.7 g/dL (01-21 @ 09:51)    CBC Full  -  ( 22 Jan 2020 07:16 )  WBC Count : 6.95 K/uL  RBC Count : 4.25 M/uL  Hemoglobin : 13.7 g/dL  Hematocrit : 39.0 %  Platelet Count - Automated : 208 K/uL  Mean Cell Volume : 91.8 fL  Mean Cell Hemoglobin : 32.2 pg  Mean Cell Hemoglobin Concentration : 35.1 g/dL  Auto Neutrophil # : x  Auto Lymphocyte # : x  Auto Monocyte # : x  Auto Eosinophil # : x  Auto Basophil # : x  Auto Neutrophil % : x  Auto Lymphocyte % : x  Auto Monocyte % : x  Auto Eosinophil % : x  Auto Basophil % : x    01-22    139  |  99  |  21<H>  ----------------------------<  110<H>  3.2<L>   |  26  |  0.9    Ca    9.0      22 Jan 2020 07:16  Phos  3.9     01-22  Mg     1.7     01-22    TPro  7.4  /  Alb  4.3  /  TBili  1.2  /  DBili  x   /  AST  23  /  ALT  11  /  AlkPhos  113  01-21    Creatinine Trend: 0.9<--, 0.9<--  LIVER FUNCTIONS - ( 21 Jan 2020 09:51 )  Alb: 4.3 g/dL / Pro: 7.4 g/dL / ALK PHOS: 113 U/L / ALT: 11 U/L / AST: 23 U/L / GGT: x               hs Troponin:              CSF:                      EKG:   MICROBIOLOGY:    IMAGING:      Labs, imaging, EKG personally reviewed    RADIOLOGY & ADDITIONAL TESTS: Reviewed.

## 2020-01-22 NOTE — CONSULT NOTE ADULT - SUBJECTIVE AND OBJECTIVE BOX
CARDIOLOGY CONSULT NOTE     CHIEF COMPLAINT/REASON FOR CONSULT:    HPI:  70 y/o male with PMH CAD, s/p cardiac stents x 2 presented to ER following a fall at home. Pt reports taking a percocet this morning around 6 am and stood up from couch an hour later and subsequently   pt fell, hitting head on tile. Pt reports mild pain to area. He denies associated symptoms including chest pain, palpitations, visual changes,or other complaints.  Pt currently reports resolution in symptoms.     Pt follows with cardiology and is scheduled for next follow up in 1 month. (21 Jan 2020 16:53)      PAST MEDICAL & SURGICAL HISTORY:  MI (myocardial infarction)  Arthritis  Kidney stones  Sleep apnea  Hypertension  High blood cholesterol  H/O heart artery stent  History of appendectomy  H/O chronic cholecystitis: cholecystectomy      Cardiac Risks:   [x ]HTN, [ ] DM, [ ] Smoking, [ x] FH,  [x ] Lipids        MEDICATIONS:  MEDICATIONS  (STANDING):  aspirin  chewable 81 milliGRAM(s) Oral daily  ATENolol  Tablet 50 milliGRAM(s) Oral daily  atorvastatin 20 milliGRAM(s) Oral at bedtime  clopidogrel Tablet 75 milliGRAM(s) Oral daily  diphenhydrAMINE   Injectable 50 milliGRAM(s) IV Push once  enoxaparin Injectable 40 milliGRAM(s) SubCutaneous daily  hydrochlorothiazide 25 milliGRAM(s) Oral every 12 hours      FAMILY HISTORY:  CAD (coronary artery disease) (Father)      SOCIAL HISTORY:      [ ] Marital status    Allergies    fish (Unknown)  iodine (Rash)  latex (Unknown)  magnesium sulfate (Rash)  sulfa drugs (Rash)        	    REVIEW OF SYSTEMS:  CONSTITUTIONAL: No fever, weight loss, or fatigue  EYES: No eye pain, visual disturbances, or discharge  ENMT:  No difficulty hearing, tinnitus, vertigo; No sinus or throat pain  NECK: No pain or stiffness  RESPIRATORY: No cough, wheezing, chills or hemoptysis; No Shortness of Breath  CARDIOVASCULAR: No chest pain, palpitations, passing out, dizziness, or leg swelling  GASTROINTESTINAL: No abdominal or epigastric pain. No nausea, vomiting, or hematemesis; No diarrhea or constipation. No melena or hematochezia.  GENITOURINARY: No dysuria, frequency, hematuria, or incontinence  NEUROLOGICAL: No headaches, memory loss, loss of strength, numbness, or tremors  SKIN: No itching, burning, rashes, or lesions   	        PHYSICAL EXAM:  T(C): 36.7 (01-22-20 @ 05:11), Max: 36.7 (01-22-20 @ 05:11)  HR: 80 (01-21-20 @ 22:08) (76 - 80)  BP: 119/60 (01-21-20 @ 22:08) (119/60 - 165/78)  RR: 16 (01-21-20 @ 22:08) (16 - 18)  SpO2: 98% (01-21-20 @ 17:27) (98% - 99%)  Wt(kg): --  I&O's Summary      Appearance: Normal	  Psychiatry: A & O x 3, Mood & affect appropriate  HEENT:   Normal oral mucosa, PERRL, EOMI	  Lymphatic: No lymphadenopathy  Cardiovascular: Normal S1 S2,RRR, No JVD, No murmurs  Respiratory: Lungs clear to auscultation	  Gastrointestinal:  Soft, Non-tender, + BS	  Skin: No rashes, No ecchymoses, No cyanosis	  Neurologic: Non-focal  Extremities: Normal range of motion, No clubbing, cyanosis or edema  Vascular: Peripheral pulses palpable 2+ bilaterally      ECG:  	not available    	  LABS:	 	    CARDIAC MARKERS:                                    13.7   6.95  )-----------( 208      ( 22 Jan 2020 07:16 )             39.0     01-22    139  |  99  |  21<H>  ----------------------------<  110<H>  3.2<L>   |  26  |  0.9    Ca    9.0      22 Jan 2020 07:16  Phos  3.9     01-22  Mg     1.7     01-22    TPro  7.4  /  Alb  4.3  /  TBili  1.2  /  DBili  x   /  AST  23  /  ALT  11  /  AlkPhos  113  01-21

## 2020-01-22 NOTE — CONSULT NOTE ADULT - SUBJECTIVE AND OBJECTIVE BOX
Neurology Consultation note    Name  LIZZY KENT    HPI:  70 y/o male with PMH CAD, s/p cardiac stents x 2 presented to ER following a fall at home. Pt reports taking a percocet this morning around 6 am and stood up from couch an hour later and subsequently felt dizzy, continued to walk to kitchen, dizziness persisted and pt fell, hitting head on tile. Pt reports mild pain to area. He denies associated symptoms including chest pain, palpitations, visual changes,or other complaints.  Pt currently reports resolution in symptoms.     Pt follows with cardiology and is scheduled for next follow up in 1 month. (21 Jan 2020 16:53)      NEURO  PATIENT IS A 68 YO MAN WITH A TRANSIENT EPISODE OF DIZZINESS (NON DESCRIPT) AND FALL 2/2 DIZZINESS. PATIENT WTH HX AS ABOVE  PATIENT IS AT BASELINE TODAY WITH NORMAL EXAM. PATIENT HAD TAKEN OXYCODONE FOR BACK PAIN FOR FIRST TIME PRIOR TO THIS  PATIENT WITH CHRONIC B SCIATICA    Vital Signs Last 24 Hrs  T(C): 36.7 (22 Jan 2020 05:11), Max: 36.7 (22 Jan 2020 05:11)  T(F): 98 (22 Jan 2020 05:11), Max: 98 (22 Jan 2020 05:11)  HR: 80 (21 Jan 2020 22:08) (76 - 80)  BP: 119/60 (21 Jan 2020 22:08) (119/60 - 165/78)  BP(mean): --  RR: 16 (21 Jan 2020 22:08) (16 - 18)  SpO2: 98% (21 Jan 2020 17:27) (98% - 99%)    Neurological Exam:   Mental status: Awake, alert and oriented x3.  Recent and remote memory intact.  Naming, repetition and comprehension intact.  Attention/concentration intact.  No dysarthria, no aphasia.  Fund of knowledge appropriate.    Cranial nerves: Pupils equally round and reactive to light, visual fields full, no nystagmus, extraocular muscles intact, V1 through V3 intact bilaterally and symmetric, face symmetric, hearing intact to finger rub, palate elevation symmetric, tongue was midline.  Motor:  MRC grading 5/5 b/l UE/LE.   strength 5/5.  Normal tone and bulk.  No abnormal movements.    Sensation:DECREASED VIBR TO KNEES  Coordination: No dysmetria on finger-to-nose and heel-to-shin.  No dysdiadokinesia.  Reflexes: 1+ X 4   Gait: Narrow and steady. No ataxia.  Romberg negative    Medications  aspirin  chewable 81 milliGRAM(s) Oral daily  ATENolol  Tablet 50 milliGRAM(s) Oral daily  atorvastatin 20 milliGRAM(s) Oral at bedtime  clopidogrel Tablet 75 milliGRAM(s) Oral daily  enoxaparin Injectable 40 milliGRAM(s) SubCutaneous daily  hydrochlorothiazide 25 milliGRAM(s) Oral daily      Lab  01-22    139  |  99  |  21<H>  ----------------------------<  110<H>  3.2<L>   |  26  |  0.9    Ca    9.0      22 Jan 2020 07:16  Phos  3.9     01-22  Mg     1.7     01-22    TPro  7.4  /  Alb  4.3  /  TBili  1.2  /  DBili  x   /  AST  23  /  ALT  11  /  AlkPhos  113  01-21                          13.7   6.95  )-----------( 208      ( 22 Jan 2020 07:16 )             39.0     LIVER FUNCTIONS - ( 21 Jan 2020 09:51 )  Alb: 4.3 g/dL / Pro: 7.4 g/dL / ALK PHOS: 113 U/L / ALT: 11 U/L / AST: 23 U/L / GGT: x             1.7        Radiology  CT Head No Cont:   EXAM:  CT CERVICAL SPINE        EXAM:  CT BRAIN            PROCEDURE DATE:  01/21/2020            INTERPRETATION:  CLINICAL HISTORY / REASON FOR EXAM: Trauma. Status post fall with dizziness.    TECHNIQUE: A noncontrast head CT and a noncontrast cervical spine CT were performed. The head CT was performed with contiguous 5 mm transaxial images from the skull base to vertex. Images were reviewed in brain, stroke, subdural and bone windows.  The cervical spine CT was performed with transaxial thinsection images from the occiput to the T2 level.  Coronal and sagittal reformatted images were created from the transaxial source data.  Images were reviewed in bone and soft tissue windows.    COMPARISON: CT head from 7/10/2017    FINDINGS:    Head CT:  There is a new small right parietal scalp hematoma. The underlying calvarium is intact. There is no acute intracranial hemorrhage, mass effect from vasogenic edema or evidence for acute large vascular territory infarct. Stable mild chronic microvascular changes as manifested by few patchy areas of low-attenuation in the bihemispheric white matter.    The ventricles, sulci and cisternal spaces are stable in size and configuration demonstrating age-appropriate cerebral volume loss.  There is nomidline shift or abnormal extra-axial fluid collection.    The calvarium is intact.  There are no osteoblastic or lytic calvarial or skull base lesions.  The paranasal sinuses and mastoid air cells are clear.    Cervical spine CT:  There is straightening of the normal cervical lordosis with mild anterolisthesis of C4 on C5, likely on a degenerative basis. There are no acute fractures or evidence of traumatic malalignment. The vertebral body heights are maintained. Multilevel facet alignment is preserved. The atlanto-dental interval is within normal limits and the craniocervical junction is unremarkable. There are no  suspicious osteoblastic or lytic lesions.    There is no evidence of prevertebral soft tissue swelling or epidural hematoma.    There are multilevel degenerative changes including intervertebral disc space narrowing, disc osteophyte complex, uncovertebral joint spurring, facet arthropathy and ligamentum flavum thickening. Findings contribute to multilevel canal and foraminal stenosis, the degree of which is suboptimally assessed on this modality but appears to contribute to the greatest degree of canal stenosis at C4/C5 and C5/C6 there is at least moderate to perhaps severe canal stenosis. Varying degrees of multilevel moderate neural foraminal narrowing.    The visualized soft tissues of the neck have an unremarkable unenhanced appearance. The lung apices are clear.    IMPRESSION:  Head CT: Small right parietal scalp hematoma. No acute intracranial hemorrhage or calvarial fracture. Stable chronic findings as above.    Cervical spine CT: No acute cervical spine fracture or evidence of traumatic malalignment. Multilevel cervical spondylosis with most notable degree of canal stenosis at C4/C5 and C5/C6 as above.                  EARL MURRAY   This document has been electronically signed. Jan 21 2020 12:27PM             (01-21-20 @ 12:21)      Assessment: 68 YO MAN WITH FALL 2/2 DIZZINESS AFTER TAKING OXYCODONE . PT IS OPIATE NAIVE  PATIENT WITH CHRONIC B SCIATICA  THERE WAS NO LOC. EVENT LIKELY MED RELATED  PT IS AT BASELINE  I WOULD LIKE TO WORK HIM UP FOR L/S RADICULOPATHY AS WELL AS POLYNEUROPATHY AS AN OUTPATIENT    Plan:  EMG AND MRI L/S SPINE AS OUTPATIENT  NO OTHER INPT W/U AT THIS TIME   PLEASE CALL WITH ANY QUESTIONS

## 2020-01-23 ENCOUNTER — TRANSCRIPTION ENCOUNTER (OUTPATIENT)
Age: 70
End: 2020-01-23

## 2020-01-23 VITALS
SYSTOLIC BLOOD PRESSURE: 124 MMHG | HEART RATE: 68 BPM | DIASTOLIC BLOOD PRESSURE: 64 MMHG | RESPIRATION RATE: 16 BRPM | TEMPERATURE: 96 F

## 2020-01-23 LAB
ANION GAP SERPL CALC-SCNC: 12 MMOL/L — SIGNIFICANT CHANGE UP (ref 7–14)
BUN SERPL-MCNC: 24 MG/DL — HIGH (ref 10–20)
CALCIUM SERPL-MCNC: 9.3 MG/DL — SIGNIFICANT CHANGE UP (ref 8.5–10.1)
CHLORIDE SERPL-SCNC: 100 MMOL/L — SIGNIFICANT CHANGE UP (ref 98–110)
CO2 SERPL-SCNC: 31 MMOL/L — SIGNIFICANT CHANGE UP (ref 17–32)
CREAT SERPL-MCNC: 0.9 MG/DL — SIGNIFICANT CHANGE UP (ref 0.7–1.5)
ESTIMATED AVERAGE GLUCOSE: 105 MG/DL — SIGNIFICANT CHANGE UP (ref 68–114)
GLUCOSE SERPL-MCNC: 106 MG/DL — HIGH (ref 70–99)
HBA1C BLD-MCNC: 5.3 % — SIGNIFICANT CHANGE UP (ref 4–5.6)
HCT VFR BLD CALC: 41.5 % — LOW (ref 42–52)
HCV AB S/CO SERPL IA: 0.12 S/CO — SIGNIFICANT CHANGE UP (ref 0–0.99)
HCV AB SERPL-IMP: SIGNIFICANT CHANGE UP
HGB BLD-MCNC: 14.2 G/DL — SIGNIFICANT CHANGE UP (ref 14–18)
INR BLD: 1.33 RATIO — HIGH (ref 0.65–1.3)
MAGNESIUM SERPL-MCNC: 1.8 MG/DL — SIGNIFICANT CHANGE UP (ref 1.8–2.4)
MCHC RBC-ENTMCNC: 32.2 PG — HIGH (ref 27–31)
MCHC RBC-ENTMCNC: 34.2 G/DL — SIGNIFICANT CHANGE UP (ref 32–37)
MCV RBC AUTO: 94.1 FL — HIGH (ref 80–94)
NRBC # BLD: 0 /100 WBCS — SIGNIFICANT CHANGE UP (ref 0–0)
PHOSPHATE SERPL-MCNC: 3.1 MG/DL — SIGNIFICANT CHANGE UP (ref 2.1–4.9)
PLATELET # BLD AUTO: 214 K/UL — SIGNIFICANT CHANGE UP (ref 130–400)
POTASSIUM SERPL-MCNC: 4 MMOL/L — SIGNIFICANT CHANGE UP (ref 3.5–5)
POTASSIUM SERPL-SCNC: 4 MMOL/L — SIGNIFICANT CHANGE UP (ref 3.5–5)
PROTHROM AB SERPL-ACNC: 15.2 SEC — HIGH (ref 9.95–12.87)
RBC # BLD: 4.41 M/UL — LOW (ref 4.7–6.1)
RBC # FLD: 12.6 % — SIGNIFICANT CHANGE UP (ref 11.5–14.5)
SODIUM SERPL-SCNC: 143 MMOL/L — SIGNIFICANT CHANGE UP (ref 135–146)
WBC # BLD: 7.56 K/UL — SIGNIFICANT CHANGE UP (ref 4.8–10.8)
WBC # FLD AUTO: 7.56 K/UL — SIGNIFICANT CHANGE UP (ref 4.8–10.8)

## 2020-01-23 PROCEDURE — 99239 HOSP IP/OBS DSCHRG MGMT >30: CPT

## 2020-01-23 RX ADMIN — Medication 81 MILLIGRAM(S): at 12:01

## 2020-01-23 RX ADMIN — CLOPIDOGREL BISULFATE 75 MILLIGRAM(S): 75 TABLET, FILM COATED ORAL at 12:01

## 2020-01-23 RX ADMIN — Medication 25 MILLIGRAM(S): at 06:10

## 2020-01-23 RX ADMIN — ATENOLOL 50 MILLIGRAM(S): 25 TABLET ORAL at 06:10

## 2020-01-23 NOTE — DISCHARGE NOTE PROVIDER - NSDCCPCAREPLAN_GEN_ALL_CORE_FT
PRINCIPAL DISCHARGE DIAGNOSIS  Diagnosis: Dizziness  Assessment and Plan of Treatment: -   - You came in with dizziness, syncopal episode with a fall. Fall likely from oxycodone 5 but we needed to make sure there was no cardiac or neurological issues. We did orthostatic blood pressure readings which were negative. Cardiology saw you inpatient and fails to find any cardiac issues at this time. Neurology saw you and recommends outpatient work up with MRI and EEG.

## 2020-01-23 NOTE — PROGRESS NOTE ADULT - ASSESSMENT
69M PMHx CAD s/p stent 1998, sciatica, HTN here with mechanical fall in setting of dizziness.    #Fall, mechanical  pt unclear why he fell, unclear if associated with dizziness or back pain  taking oxycodone 5 for past 3 mos due to back pain  orthostatic neg  possible bppv  outpt mri l/s spine, emg per neuro  ambulated well with PT  stable for d/c today, outpt pmd f/u  #CAD  dapt  lipitor 20  #Sciatica  pain control  outpt f/u  #HTN  hctz 25  atenolol 50  #DVT ppx  lovenox

## 2020-01-23 NOTE — DISCHARGE NOTE PROVIDER - CARE PROVIDER_API CALL
Caryn Adler)  Neurology  [address] [place] [address], [address]  [place] [address]  Phone: [phone]  Fax: [phone]  Follow Up Time: 1 week    Henrique Brunson  [address] [place] [address] [place] [address]  Phone: [phone]  Fax: (   )    -  Follow Up Time: 1-3 days

## 2020-01-23 NOTE — DISCHARGE NOTE PROVIDER - NSDCMRMEDTOKEN_GEN_ALL_CORE_FT
aspirin 81 mg oral tablet: 1 tab(s) orally once a day  atenolol 50 mg oral tablet:   atorvastatin 20 mg oral tablet:   Plavix: 75 milligram(s) orally once a day  valsartan-hydrochlorothiazide 160mg-25mg oral tablet: 1 tab(s) orally every 12 hours  Zantac 150 oral tablet:

## 2020-01-23 NOTE — DISCHARGE NOTE PROVIDER - CARE PROVIDERS DIRECT ADDRESSES
[email or web address]

## 2020-01-23 NOTE — PROGRESS NOTE ADULT - SUBJECTIVE AND OBJECTIVE BOX
INTERVAL HPI/OVERNIGHT EVENTS:    SUBJECTIVE: Patient seen and examined at bedside.     no cp, sob, abd pain, fever  no syncope, lightheadedness, ha, dizziness    OBJECTIVE:    VITAL SIGNS:  Vital Signs Last 24 Hrs  T(C): 35.6 (2020 04:52), Max: 36.8 (2020 14:09)  T(F): 96.1 (2020 04:52), Max: 98.3 (2020 14:09)  HR: 68 (2020 04:52) (68 - 79)  BP: 124/64 (2020 04:52) (101/54 - 142/69)  BP(mean): --  RR: 16 (2020 04:52) (16 - 16)  SpO2: 96% (2020 15:14) (96% - 96%)      PHYSICAL EXAM:    General: NAD  HEENT: NC/AT; PERRL, clear conjunctiva  Neck: supple  Respiratory: CTA b/l  Cardiovascular: +S1/S2; RRR  Abdomen: soft, NT/ND; +BS x4  Extremities: WWP, 2+ peripheral pulses b/l; 1+ pit edema bl  Skin: normal color and turgor; no rash  Neurological:    MEDICATIONS:  MEDICATIONS  (STANDING):  aspirin  chewable 81 milliGRAM(s) Oral daily  ATENolol  Tablet 50 milliGRAM(s) Oral daily  atorvastatin 20 milliGRAM(s) Oral at bedtime  clopidogrel Tablet 75 milliGRAM(s) Oral daily  enoxaparin Injectable 40 milliGRAM(s) SubCutaneous daily  hydrochlorothiazide 25 milliGRAM(s) Oral daily    MEDICATIONS  (PRN):      ALLERGIES:  Allergies    fish (Unknown)  iodine (Rash)  latex (Unknown)  magnesium sulfate (Rash)  sulfa drugs (Rash)    Intolerances        LABS:                        14.2   7.56  )-----------( 214      ( 2020 12:22 )             41.5     Hemoglobin: 14.2 g/dL ( @ 12:22)  Hemoglobin: 13.7 g/dL ( @ 07:16)  Hemoglobin: 14.7 g/dL ( @ 09:51)    CBC Full  -  ( 2020 12:22 )  WBC Count : 7.56 K/uL  RBC Count : 4.41 M/uL  Hemoglobin : 14.2 g/dL  Hematocrit : 41.5 %  Platelet Count - Automated : 214 K/uL  Mean Cell Volume : 94.1 fL  Mean Cell Hemoglobin : 32.2 pg  Mean Cell Hemoglobin Concentration : 34.2 g/dL  Auto Neutrophil # : x  Auto Lymphocyte # : x  Auto Monocyte # : x  Auto Eosinophil # : x  Auto Basophil # : x  Auto Neutrophil % : x  Auto Lymphocyte % : x  Auto Monocyte % : x  Auto Eosinophil % : x  Auto Basophil % : x        143  |  100  |  24<H>  ----------------------------<  106<H>  4.0   |  31  |  0.9    Ca    9.3      2020 12:22  Phos  3.1       Mg     1.8           Creatinine Trend: 0.9<--, 0.9<--, 0.9<--    PT/INR - ( 2020 12:22 )   PT: 15.20 sec;   INR: 1.33 ratio             hs Troponin:            Urinalysis Basic - ( 2020 20:47 )    Color: Yellow / Appearance: Clear / S.020 / pH: x  Gluc: x / Ketone: Trace  / Bili: Negative / Urobili: 0.2 mg/dL   Blood: x / Protein: Negative mg/dL / Nitrite: Negative   Leuk Esterase: Negative / RBC: x / WBC x   Sq Epi: x / Non Sq Epi: x / Bacteria: x      CSF:                      EKG:   MICROBIOLOGY:    IMAGING:      Labs, imaging, EKG personally reviewed    RADIOLOGY & ADDITIONAL TESTS: Reviewed.

## 2020-01-23 NOTE — DISCHARGE NOTE NURSING/CASE MANAGEMENT/SOCIAL WORK - PATIENT PORTAL LINK FT
You can access the FollowMyHealth Patient Portal offered by [facility] by registering at the following website: [email or web address]. By joining "[employer]’s FollowMyHealth portal, you will also be able to view your health information using other applications (apps) compatible with our system.

## 2020-01-23 NOTE — DISCHARGE NOTE PROVIDER - PROVIDER TOKENS
PROVIDER:[TOKEN:[[id number]],FOLLOWUP:[1 week]],FREE:[LAST:[Carmilla],FIRST:[Henrique],PHONE:[phone]],FAX:[(   [phone]    -],ADDRESS:[[address] [place] [address]],FOLLOWUP:[1-3 days]]

## 2020-01-23 NOTE — DISCHARGE NOTE PROVIDER - HOSPITAL COURSE
To be completed by the attending 69M PMHx CAD s/p stent 1998, sciatica, HTN here with mechanical fall in setting of dizziness.    No syncope per pt, unclear why he fell. Orthostatics neg, seen by PT, ambulated well with walker; has walker at home. He was seen by neuro, will need outpt f/u for emg and mri spine. He was stable for d/c on 1/23 needs outpt pmd and neuro f/u.        31 minutes spent on discharge planning.

## 2020-01-28 DIAGNOSIS — E78.5 HYPERLIPIDEMIA, UNSPECIFIED: ICD-10-CM

## 2020-01-28 DIAGNOSIS — Z82.49 FAMILY HISTORY OF ISCHEMIC HEART DISEASE AND OTHER DISEASES OF THE CIRCULATORY SYSTEM: ICD-10-CM

## 2020-01-28 DIAGNOSIS — I25.2 OLD MYOCARDIAL INFARCTION: ICD-10-CM

## 2020-01-28 DIAGNOSIS — R42 DIZZINESS AND GIDDINESS: ICD-10-CM

## 2020-01-28 DIAGNOSIS — G47.33 OBSTRUCTIVE SLEEP APNEA (ADULT) (PEDIATRIC): ICD-10-CM

## 2020-01-28 DIAGNOSIS — Z79.82 LONG TERM (CURRENT) USE OF ASPIRIN: ICD-10-CM

## 2020-01-28 DIAGNOSIS — Z96.653 PRESENCE OF ARTIFICIAL KNEE JOINT, BILATERAL: ICD-10-CM

## 2020-01-28 DIAGNOSIS — Z87.442 PERSONAL HISTORY OF URINARY CALCULI: ICD-10-CM

## 2020-01-28 DIAGNOSIS — Z79.01 LONG TERM (CURRENT) USE OF ANTICOAGULANTS: ICD-10-CM

## 2020-01-28 DIAGNOSIS — I25.10 ATHEROSCLEROTIC HEART DISEASE OF NATIVE CORONARY ARTERY WITHOUT ANGINA PECTORIS: ICD-10-CM

## 2020-01-28 DIAGNOSIS — Z91.013 ALLERGY TO SEAFOOD: ICD-10-CM

## 2020-01-28 DIAGNOSIS — M54.30 SCIATICA, UNSPECIFIED SIDE: ICD-10-CM

## 2020-01-28 DIAGNOSIS — Z88.2 ALLERGY STATUS TO SULFONAMIDES: ICD-10-CM

## 2020-01-28 DIAGNOSIS — T40.2X5A ADVERSE EFFECT OF OTHER OPIOIDS, INITIAL ENCOUNTER: ICD-10-CM

## 2020-01-28 DIAGNOSIS — I10 ESSENTIAL (PRIMARY) HYPERTENSION: ICD-10-CM

## 2020-01-28 DIAGNOSIS — Z95.5 PRESENCE OF CORONARY ANGIOPLASTY IMPLANT AND GRAFT: ICD-10-CM

## 2020-02-03 ENCOUNTER — APPOINTMENT (OUTPATIENT)
Dept: NEUROLOGY | Facility: CLINIC | Age: 70
End: 2020-02-03

## 2020-03-26 ENCOUNTER — EMERGENCY (EMERGENCY)
Facility: HOSPITAL | Age: 70
LOS: 0 days | Discharge: HOME | End: 2020-03-26
Admitting: INTERNAL MEDICINE

## 2020-03-26 ENCOUNTER — INPATIENT (INPATIENT)
Facility: HOSPITAL | Age: 70
LOS: 0 days | Discharge: HOME | End: 2020-03-27
Attending: INTERNAL MEDICINE | Admitting: INTERNAL MEDICINE
Payer: MEDICARE

## 2020-03-26 VITALS
HEART RATE: 103 BPM | RESPIRATION RATE: 20 BRPM | HEIGHT: 70 IN | WEIGHT: 270.07 LBS | TEMPERATURE: 98 F | OXYGEN SATURATION: 98 %

## 2020-03-26 DIAGNOSIS — Z95.5 PRESENCE OF CORONARY ANGIOPLASTY IMPLANT AND GRAFT: Chronic | ICD-10-CM

## 2020-03-26 DIAGNOSIS — Z90.49 ACQUIRED ABSENCE OF OTHER SPECIFIED PARTS OF DIGESTIVE TRACT: Chronic | ICD-10-CM

## 2020-03-26 DIAGNOSIS — Z91.041 RADIOGRAPHIC DYE ALLERGY STATUS: ICD-10-CM

## 2020-03-26 DIAGNOSIS — I10 ESSENTIAL (PRIMARY) HYPERTENSION: ICD-10-CM

## 2020-03-26 DIAGNOSIS — Z91.040 LATEX ALLERGY STATUS: ICD-10-CM

## 2020-03-26 DIAGNOSIS — G47.33 OBSTRUCTIVE SLEEP APNEA (ADULT) (PEDIATRIC): ICD-10-CM

## 2020-03-26 DIAGNOSIS — I25.10 ATHEROSCLEROTIC HEART DISEASE OF NATIVE CORONARY ARTERY WITHOUT ANGINA PECTORIS: ICD-10-CM

## 2020-03-26 DIAGNOSIS — Z87.19 PERSONAL HISTORY OF OTHER DISEASES OF THE DIGESTIVE SYSTEM: Chronic | ICD-10-CM

## 2020-03-26 DIAGNOSIS — R07.89 OTHER CHEST PAIN: ICD-10-CM

## 2020-03-26 DIAGNOSIS — Z88.2 ALLERGY STATUS TO SULFONAMIDES: ICD-10-CM

## 2020-03-26 DIAGNOSIS — Z91.013 ALLERGY TO SEAFOOD: ICD-10-CM

## 2020-03-26 DIAGNOSIS — R07.9 CHEST PAIN, UNSPECIFIED: ICD-10-CM

## 2020-03-26 DIAGNOSIS — E78.5 HYPERLIPIDEMIA, UNSPECIFIED: ICD-10-CM

## 2020-03-26 DIAGNOSIS — Z95.5 PRESENCE OF CORONARY ANGIOPLASTY IMPLANT AND GRAFT: ICD-10-CM

## 2020-03-26 LAB
ALBUMIN SERPL ELPH-MCNC: 3.9 G/DL — SIGNIFICANT CHANGE UP (ref 3.5–5.2)
ALP SERPL-CCNC: 93 U/L — SIGNIFICANT CHANGE UP (ref 30–115)
ALT FLD-CCNC: 11 U/L — SIGNIFICANT CHANGE UP (ref 0–41)
ANION GAP SERPL CALC-SCNC: 14 MMOL/L — SIGNIFICANT CHANGE UP (ref 7–14)
AST SERPL-CCNC: 35 U/L — SIGNIFICANT CHANGE UP (ref 0–41)
BILIRUB SERPL-MCNC: 1 MG/DL — SIGNIFICANT CHANGE UP (ref 0.2–1.2)
BUN SERPL-MCNC: 19 MG/DL — SIGNIFICANT CHANGE UP (ref 10–20)
CALCIUM SERPL-MCNC: 9 MG/DL — SIGNIFICANT CHANGE UP (ref 8.5–10.1)
CHLORIDE SERPL-SCNC: 100 MMOL/L — SIGNIFICANT CHANGE UP (ref 98–110)
CO2 SERPL-SCNC: 25 MMOL/L — SIGNIFICANT CHANGE UP (ref 17–32)
CREAT SERPL-MCNC: 1 MG/DL — SIGNIFICANT CHANGE UP (ref 0.7–1.5)
D DIMER BLD IA.RAPID-MCNC: 357 NG/ML DDU — HIGH (ref 0–230)
FLU A RESULT: NEGATIVE — SIGNIFICANT CHANGE UP
FLU A RESULT: NEGATIVE — SIGNIFICANT CHANGE UP
FLUAV AG NPH QL: NEGATIVE — SIGNIFICANT CHANGE UP
FLUBV AG NPH QL: NEGATIVE — SIGNIFICANT CHANGE UP
GLUCOSE SERPL-MCNC: 103 MG/DL — HIGH (ref 70–99)
HCT VFR BLD CALC: 39.7 % — LOW (ref 42–52)
HGB BLD-MCNC: 13.9 G/DL — LOW (ref 14–18)
LIDOCAIN IGE QN: 20 U/L — SIGNIFICANT CHANGE UP (ref 7–60)
MCHC RBC-ENTMCNC: 32 PG — HIGH (ref 27–31)
MCHC RBC-ENTMCNC: 35 G/DL — SIGNIFICANT CHANGE UP (ref 32–37)
MCV RBC AUTO: 91.3 FL — SIGNIFICANT CHANGE UP (ref 80–94)
NRBC # BLD: 0 /100 WBCS — SIGNIFICANT CHANGE UP (ref 0–0)
PLATELET # BLD AUTO: 191 K/UL — SIGNIFICANT CHANGE UP (ref 130–400)
POTASSIUM SERPL-MCNC: 4.2 MMOL/L — SIGNIFICANT CHANGE UP (ref 3.5–5)
POTASSIUM SERPL-SCNC: 4.2 MMOL/L — SIGNIFICANT CHANGE UP (ref 3.5–5)
PROT SERPL-MCNC: 7 G/DL — SIGNIFICANT CHANGE UP (ref 6–8)
RBC # BLD: 4.35 M/UL — LOW (ref 4.7–6.1)
RBC # FLD: 12.7 % — SIGNIFICANT CHANGE UP (ref 11.5–14.5)
RSV RESULT: NEGATIVE — SIGNIFICANT CHANGE UP
RSV RNA RESP QL NAA+PROBE: NEGATIVE — SIGNIFICANT CHANGE UP
SODIUM SERPL-SCNC: 139 MMOL/L — SIGNIFICANT CHANGE UP (ref 135–146)
TROPONIN T SERPL-MCNC: 0.01 NG/ML — SIGNIFICANT CHANGE UP
TROPONIN T SERPL-MCNC: 0.01 NG/ML — SIGNIFICANT CHANGE UP
WBC # BLD: 5.83 K/UL — SIGNIFICANT CHANGE UP (ref 4.8–10.8)
WBC # FLD AUTO: 5.83 K/UL — SIGNIFICANT CHANGE UP (ref 4.8–10.8)

## 2020-03-26 PROCEDURE — 93970 EXTREMITY STUDY: CPT | Mod: 26

## 2020-03-26 PROCEDURE — 71045 X-RAY EXAM CHEST 1 VIEW: CPT | Mod: 26

## 2020-03-26 PROCEDURE — 76937 US GUIDE VASCULAR ACCESS: CPT | Mod: 26

## 2020-03-26 PROCEDURE — 99285 EMERGENCY DEPT VISIT HI MDM: CPT | Mod: GC

## 2020-03-26 PROCEDURE — 36620 INSERTION CATHETER ARTERY: CPT

## 2020-03-26 RX ORDER — DIPHENHYDRAMINE HCL 50 MG
50 CAPSULE ORAL ONCE
Refills: 0 | Status: COMPLETED | OUTPATIENT
Start: 2020-03-26 | End: 2020-03-26

## 2020-03-26 RX ORDER — ACETAMINOPHEN 500 MG
975 TABLET ORAL ONCE
Refills: 0 | Status: COMPLETED | OUTPATIENT
Start: 2020-03-26 | End: 2020-03-26

## 2020-03-26 RX ORDER — HEPARIN SODIUM 5000 [USP'U]/ML
5000 INJECTION INTRAVENOUS; SUBCUTANEOUS EVERY 8 HOURS
Refills: 0 | Status: DISCONTINUED | OUTPATIENT
Start: 2020-03-26 | End: 2020-03-27

## 2020-03-26 RX ORDER — PANTOPRAZOLE SODIUM 20 MG/1
40 TABLET, DELAYED RELEASE ORAL ONCE
Refills: 0 | Status: COMPLETED | OUTPATIENT
Start: 2020-03-26 | End: 2020-03-26

## 2020-03-26 RX ORDER — ASPIRIN/CALCIUM CARB/MAGNESIUM 324 MG
81 TABLET ORAL DAILY
Refills: 0 | Status: DISCONTINUED | OUTPATIENT
Start: 2020-03-26 | End: 2020-03-27

## 2020-03-26 RX ORDER — CLOPIDOGREL BISULFATE 75 MG/1
75 TABLET, FILM COATED ORAL DAILY
Refills: 0 | Status: DISCONTINUED | OUTPATIENT
Start: 2020-03-26 | End: 2020-03-27

## 2020-03-26 RX ORDER — ACETAMINOPHEN 500 MG
650 TABLET ORAL EVERY 6 HOURS
Refills: 0 | Status: DISCONTINUED | OUTPATIENT
Start: 2020-03-26 | End: 2020-03-27

## 2020-03-26 RX ORDER — SUCRALFATE 1 G
1 TABLET ORAL ONCE
Refills: 0 | Status: COMPLETED | OUTPATIENT
Start: 2020-03-26 | End: 2020-03-26

## 2020-03-26 RX ORDER — ASPIRIN/CALCIUM CARB/MAGNESIUM 324 MG
243 TABLET ORAL ONCE
Refills: 0 | Status: COMPLETED | OUTPATIENT
Start: 2020-03-26 | End: 2020-03-26

## 2020-03-26 RX ORDER — ATENOLOL 25 MG/1
50 TABLET ORAL DAILY
Refills: 0 | Status: DISCONTINUED | OUTPATIENT
Start: 2020-03-26 | End: 2020-03-27

## 2020-03-26 RX ORDER — ATORVASTATIN CALCIUM 80 MG/1
20 TABLET, FILM COATED ORAL AT BEDTIME
Refills: 0 | Status: DISCONTINUED | OUTPATIENT
Start: 2020-03-26 | End: 2020-03-27

## 2020-03-26 RX ORDER — FAMOTIDINE 10 MG/ML
20 INJECTION INTRAVENOUS ONCE
Refills: 0 | Status: COMPLETED | OUTPATIENT
Start: 2020-03-26 | End: 2020-03-26

## 2020-03-26 RX ADMIN — Medication 975 MILLIGRAM(S): at 16:18

## 2020-03-26 RX ADMIN — Medication 50 MILLIGRAM(S): at 09:40

## 2020-03-26 RX ADMIN — Medication 125 MILLIGRAM(S): at 09:40

## 2020-03-26 RX ADMIN — Medication 975 MILLIGRAM(S): at 14:00

## 2020-03-26 RX ADMIN — Medication 1 GRAM(S): at 16:17

## 2020-03-26 RX ADMIN — HEPARIN SODIUM 5000 UNIT(S): 5000 INJECTION INTRAVENOUS; SUBCUTANEOUS at 23:32

## 2020-03-26 RX ADMIN — Medication 243 MILLIGRAM(S): at 07:58

## 2020-03-26 RX ADMIN — ATORVASTATIN CALCIUM 20 MILLIGRAM(S): 80 TABLET, FILM COATED ORAL at 23:32

## 2020-03-26 RX ADMIN — FAMOTIDINE 20 MILLIGRAM(S): 10 INJECTION INTRAVENOUS at 07:58

## 2020-03-26 RX ADMIN — Medication 50 MILLIGRAM(S): at 14:00

## 2020-03-26 NOTE — ED PROVIDER NOTE - CLINICAL SUMMARY MEDICAL DECISION MAKING FREE TEXT BOX
69y male above PMH with a few days intermittent burning epigastric pain radiating up into chest worse when supine, no exertional component, no dyspnea, +mild nonproductive cough, no f/c, no leg pain or swelling, no melena/hematochezia, on exam vital signs appreciated, nontoxic appearing, head nc/at, perrla, EOMI, conj pink op clear neck supple cor rrr lungs cta abd snt no c/c/e pulses equal calves nontender neuro intact, labs and studies reviewed, pt not comfortable going home while still having pain, will admit

## 2020-03-26 NOTE — H&P ADULT - ASSESSMENT
69y M w/ PMH of CAD s/p stent x 3, HTN, HLD, and KELSEY presents with CP that woke him up from sleep earlier today.    1. Chest pain, ACS ruled out. Likely secondary to GERD: Will obtain 3rd set of cardic enzymes tomorrow AM. D-dimer was elevated, but low suspicion for PE. LE duplex negative for acute DVT  2. Fever, r/o COVID: will follow-up COVID results. Likely DC tomorrow with self-isolation    GI/DVT PPX

## 2020-03-26 NOTE — ED ADULT NURSE NOTE - NSIMPLEMENTINTERV_GEN_ALL_ED
Implemented All Universal Safety Interventions:  [place] to call system. Call bell, personal items and telephone within reach. Instruct patient to call for assistance. Room bathroom lighting operational. Non-slip footwear when patient is off stretcher. Physically safe environment: no spills, clutter or unnecessary equipment. Stretcher in lowest position, wheels locked, appropriate side rails in place.

## 2020-03-26 NOTE — ED PROVIDER NOTE - PROGRESS NOTE DETAILS
Pt has unknown allergy to iodine. Will give steroids before getting contrast study. Discussed with vascular tech. Duplex neg for DVTs. labs and studies reviewed, ddimer noted, do not suspect PE, now with fever, covid is possible, swab sent, will admit; ddimer noted, do not suspect PE, pt unable to get CT as multiple lines have infiltrated

## 2020-03-26 NOTE — H&P ADULT - HISTORY OF PRESENT ILLNESS
69y M w/ PMH of CAD s/p stent x 3, HTN, HLD, and KELSEY presents with CP that woke him up from sleep earlier today. Substernal pressure sensation in epigastric/lower substernal region without radiation. Worse with deep inspiration, no alleviating factors. Denies fever, chills, nasal congestion, sore throat, SOB, abd pain, n/v.

## 2020-03-26 NOTE — ED PROVIDER NOTE - OBJECTIVE STATEMENT
69y M w/ PMH of CAD s/p stent x 3, HTN, HLD, and KELSEY presents with CP that woke him up from sleep earlier today. Substernal pressure sensation in epigastric/lower substernal region without radiation. Worse with deep inspiration, no alleviating factors. Denies fever, chills, nasal congestion, sore throat, SOB, abd pain, n/v, or numbness/tingling.

## 2020-03-26 NOTE — CHART NOTE - NSCHARTNOTEFT_GEN_A_CORE
Patient had multiple failed attempts at 18G IV placement for CT Angio Chest, Right AC 18G successfully placed under sonogram guidance, IV infiltrated while patient was in CT scan, patient explained need for test to rule out PE although suspicion is low, patient declines for test at this time, patient denies shortness of breath, will continue to monitor closely.

## 2020-03-27 ENCOUNTER — TRANSCRIPTION ENCOUNTER (OUTPATIENT)
Age: 70
End: 2020-03-27

## 2020-03-27 VITALS
DIASTOLIC BLOOD PRESSURE: 74 MMHG | SYSTOLIC BLOOD PRESSURE: 145 MMHG | RESPIRATION RATE: 16 BRPM | HEART RATE: 72 BPM | TEMPERATURE: 98 F

## 2020-03-27 LAB
ANION GAP SERPL CALC-SCNC: 17 MMOL/L — HIGH (ref 7–14)
BUN SERPL-MCNC: 21 MG/DL — HIGH (ref 10–20)
CALCIUM SERPL-MCNC: 9.2 MG/DL — SIGNIFICANT CHANGE UP (ref 8.5–10.1)
CHLORIDE SERPL-SCNC: 100 MMOL/L — SIGNIFICANT CHANGE UP (ref 98–110)
CO2 SERPL-SCNC: 24 MMOL/L — SIGNIFICANT CHANGE UP (ref 17–32)
CREAT SERPL-MCNC: 1 MG/DL — SIGNIFICANT CHANGE UP (ref 0.7–1.5)
GLUCOSE SERPL-MCNC: 127 MG/DL — HIGH (ref 70–99)
HCT VFR BLD CALC: 45.3 % — SIGNIFICANT CHANGE UP (ref 42–52)
HGB BLD-MCNC: 16.3 G/DL — SIGNIFICANT CHANGE UP (ref 14–18)
MCHC RBC-ENTMCNC: 32.3 PG — HIGH (ref 27–31)
MCHC RBC-ENTMCNC: 36 G/DL — SIGNIFICANT CHANGE UP (ref 32–37)
MCV RBC AUTO: 89.7 FL — SIGNIFICANT CHANGE UP (ref 80–94)
NRBC # BLD: 0 /100 WBCS — SIGNIFICANT CHANGE UP (ref 0–0)
PLATELET # BLD AUTO: 206 K/UL — SIGNIFICANT CHANGE UP (ref 130–400)
POTASSIUM SERPL-MCNC: 4.1 MMOL/L — SIGNIFICANT CHANGE UP (ref 3.5–5)
POTASSIUM SERPL-SCNC: 4.1 MMOL/L — SIGNIFICANT CHANGE UP (ref 3.5–5)
RBC # BLD: 5.05 M/UL — SIGNIFICANT CHANGE UP (ref 4.7–6.1)
RBC # FLD: 12.5 % — SIGNIFICANT CHANGE UP (ref 11.5–14.5)
SARS-COV-2 RNA SPEC QL NAA+PROBE: SIGNIFICANT CHANGE UP
SODIUM SERPL-SCNC: 141 MMOL/L — SIGNIFICANT CHANGE UP (ref 135–146)
TROPONIN T SERPL-MCNC: 0.01 NG/ML — SIGNIFICANT CHANGE UP
WBC # BLD: 8.85 K/UL — SIGNIFICANT CHANGE UP (ref 4.8–10.8)
WBC # FLD AUTO: 8.85 K/UL — SIGNIFICANT CHANGE UP (ref 4.8–10.8)

## 2020-03-27 PROCEDURE — 99238 HOSP IP/OBS DSCHRG MGMT 30/<: CPT

## 2020-03-27 RX ORDER — RANITIDINE HYDROCHLORIDE 150 MG/1
0 TABLET, FILM COATED ORAL
Qty: 0 | Refills: 0 | DISCHARGE

## 2020-03-27 RX ADMIN — ATENOLOL 50 MILLIGRAM(S): 25 TABLET ORAL at 05:19

## 2020-03-27 RX ADMIN — CLOPIDOGREL BISULFATE 75 MILLIGRAM(S): 75 TABLET, FILM COATED ORAL at 11:11

## 2020-03-27 RX ADMIN — Medication 81 MILLIGRAM(S): at 11:11

## 2020-03-27 RX ADMIN — HEPARIN SODIUM 5000 UNIT(S): 5000 INJECTION INTRAVENOUS; SUBCUTANEOUS at 05:19

## 2020-03-27 NOTE — PROGRESS NOTE ADULT - ASSESSMENT
69y M w/ PMH of CAD s/p stent x 3, HTN, HLD, and KELSEY presents with CP that woke him up from sleep earlier today.    1. Chest pain, ACS ruled out. Likely secondary to GERD: D-dimer was elevated, but low suspicion for PE. LE duplex negative for acute DVT  2. Fever, r/o COVID: will follow-up COVID results. DC today with self isolation at home    GI/DVT PPX    DC planning

## 2020-03-27 NOTE — DISCHARGE NOTE NURSING/CASE MANAGEMENT/SOCIAL WORK - PATIENT PORTAL LINK FT
You can access the FollowMyHealth Patient Portal offered by [facility] by registering at the following website: [email or web address]. By joining [employer]’s FollowMyHealth portal, you will also be able to view your health information using other applications (apps) compatible with our system.

## 2020-03-27 NOTE — PROGRESS NOTE ADULT - SUBJECTIVE AND OBJECTIVE BOX
YOLILIZZY  69y, Male  Allergy: fish (Unknown)  iodine (Rash)  latex (Unknown)  magnesium sulfate (Rash)  sulfa drugs (Rash)    Hospital Day: 1d    Patient seen and examined earlier today. No acute events overnight.    PMH/PSH:  PAST MEDICAL & SURGICAL HISTORY:  MI (myocardial infarction)  Arthritis  Kidney stones  Sleep apnea  Hypertension  High blood cholesterol  H/O heart artery stent  History of appendectomy  H/O chronic cholecystitis: cholecystectomy    VITALS:  T(F): 98.2 (03-27-20 @ 05:00), Max: 98.2 (03-26-20 @ 21:33)  HR: 72 (03-27-20 @ 05:00)  BP: 145/74 (03-27-20 @ 05:00) (144/66 - 169/80)  RR: 16 (03-27-20 @ 05:00)  SpO2: 96% (03-26-20 @ 23:57)    TESTS & MEASUREMENTS:  Weight (Kg): 117.5 (03-26-20 @ 17:43)  BMI (kg/m2): 37.2 (03-26)                        16.3   8.85  )-----------( 206      ( 27 Mar 2020 09:30 )             45.3     03-27    141  |  100  |  21<H>  ----------------------------<  127<H>  4.1   |  24  |  1.0    Ca    9.2      27 Mar 2020 09:30    TPro  7.0  /  Alb  3.9  /  TBili  1.0  /  DBili  x   /  AST  35  /  ALT  11  /  AlkPhos  93  03-26    LIVER FUNCTIONS - ( 26 Mar 2020 08:00 )  Alb: 3.9 g/dL / Pro: 7.0 g/dL / ALK PHOS: 93 U/L / ALT: 11 U/L / AST: 35 U/L / GGT: x           CARDIAC MARKERS ( 27 Mar 2020 09:30 )  x     / 0.01 ng/mL / x     / x     / x      CARDIAC MARKERS ( 26 Mar 2020 10:52 )  x     / 0.01 ng/mL / x     / x     / x      CARDIAC MARKERS ( 26 Mar 2020 08:00 )  x     / 0.01 ng/mL / x     / x     / x        RECENT DIAGNOSTIC ORDERS:  COVID-19 PCR: STAT  Specimen Source: Nasopharyngeal (03-26-20 @ 13:25)  Diet, Regular:   DASH/TLC Sodium & Cholesterol Restricted (03-27-20 @ 00:43)    MEDICATIONS:  MEDICATIONS  (STANDING):  aspirin  chewable 81 milliGRAM(s) Oral daily  ATENolol Oral Tab/Cap - Peds 50 milliGRAM(s) Oral daily  atorvastatin 20 milliGRAM(s) Oral at bedtime  clopidogrel Tablet 75 milliGRAM(s) Oral daily  heparin  Injectable 5000 Unit(s) SubCutaneous every 8 hours    MEDICATIONS  (PRN):  acetaminophen   Tablet .. 650 milliGRAM(s) Oral every 6 hours PRN Temp greater or equal to 38C (100.4F)  aluminum hydroxide/magnesium hydroxide/simethicone Suspension 30 milliLiter(s) Oral every 6 hours PRN Dyspepsia    HOME MEDICATIONS:  aspirin 81 mg oral tablet (03-26)  atenolol 50 mg oral tablet (03-26)  atorvastatin 20 mg oral tablet (03-26)  Plavix (03-26)  valsartan-hydrochlorothiazide 160mg-25mg oral tablet (03-26)  Zantac 150 oral tablet (03-26)    REVIEW OF SYSTEMS:  All other review of systems is negative unless indicated above.     PHYSICAL EXAM:  GENERAL: NAD  HEENT: No Swelling  CHEST/LUNG: Good air entry, No wheezing  HEART: RRR, No murmurs  ABDOMEN: Soft, Bowel sounds present  EXTREMITIES:  No clubbing

## 2020-03-27 NOTE — DISCHARGE NOTE PROVIDER - NSDCCPCAREPLAN_GEN_ALL_CORE_FT
PRINCIPAL DISCHARGE DIAGNOSIS  Diagnosis: Chest pain, unspecified type  Assessment and Plan of Treatment: Follow up with your PCP within 2-4 weeks

## 2020-03-27 NOTE — DISCHARGE NOTE PROVIDER - HOSPITAL COURSE
69y M w/ PMH of CAD s/p stent x 3, HTN, HLD, and KELSEY presents with CP that woke him up from sleep earlier today. Substernal pressure sensation in epigastric/lower substernal region without radiation. Worse with deep inspiration, no alleviating factors. Denies fever, chills, nasal congestion, sore throat, SOB, abd pain, n/v.        ACS was ruled out during admission. Patient's symptoms are likely due to GERD. Coronavirus test was sent due to low grade fever. Patient is medically stable for discharge.

## 2020-03-27 NOTE — DISCHARGE NOTE PROVIDER - NSDCMRMEDTOKEN_GEN_ALL_CORE_FT
aspirin 81 mg oral tablet: 1 tab(s) orally once a day  atenolol 50 mg oral tablet:   atorvastatin 20 mg oral tablet:   Plavix: 75 milligram(s) orally once a day  valsartan-hydrochlorothiazide 160mg-25mg oral tablet: 1 tab(s) orally every 12 hours

## 2020-04-09 DIAGNOSIS — R50.9 FEVER, UNSPECIFIED: ICD-10-CM

## 2020-04-09 DIAGNOSIS — Z91.040 LATEX ALLERGY STATUS: ICD-10-CM

## 2020-04-09 DIAGNOSIS — Z91.013 ALLERGY TO SEAFOOD: ICD-10-CM

## 2020-04-09 DIAGNOSIS — M19.90 UNSPECIFIED OSTEOARTHRITIS, UNSPECIFIED SITE: ICD-10-CM

## 2020-04-09 DIAGNOSIS — Z91.09 OTHER ALLERGY STATUS, OTHER THAN TO DRUGS AND BIOLOGICAL SUBSTANCES: ICD-10-CM

## 2020-04-09 DIAGNOSIS — I10 ESSENTIAL (PRIMARY) HYPERTENSION: ICD-10-CM

## 2020-04-09 DIAGNOSIS — Z20.828 CONTACT WITH AND (SUSPECTED) EXPOSURE TO OTHER VIRAL COMMUNICABLE DISEASES: ICD-10-CM

## 2020-04-09 DIAGNOSIS — K21.9 GASTRO-ESOPHAGEAL REFLUX DISEASE WITHOUT ESOPHAGITIS: ICD-10-CM

## 2020-04-09 DIAGNOSIS — G47.33 OBSTRUCTIVE SLEEP APNEA (ADULT) (PEDIATRIC): ICD-10-CM

## 2020-04-09 DIAGNOSIS — Z88.2 ALLERGY STATUS TO SULFONAMIDES: ICD-10-CM

## 2020-04-09 DIAGNOSIS — E78.5 HYPERLIPIDEMIA, UNSPECIFIED: ICD-10-CM

## 2020-04-09 DIAGNOSIS — Z95.5 PRESENCE OF CORONARY ANGIOPLASTY IMPLANT AND GRAFT: ICD-10-CM

## 2020-04-09 DIAGNOSIS — Z79.82 LONG TERM (CURRENT) USE OF ASPIRIN: ICD-10-CM

## 2020-04-09 DIAGNOSIS — I25.2 OLD MYOCARDIAL INFARCTION: ICD-10-CM

## 2020-04-09 DIAGNOSIS — I25.10 ATHEROSCLEROTIC HEART DISEASE OF NATIVE CORONARY ARTERY WITHOUT ANGINA PECTORIS: ICD-10-CM

## 2020-04-09 DIAGNOSIS — R07.9 CHEST PAIN, UNSPECIFIED: ICD-10-CM

## 2020-05-26 ENCOUNTER — RX RENEWAL (OUTPATIENT)
Age: 70
End: 2020-05-26

## 2020-06-01 ENCOUNTER — INPATIENT (INPATIENT)
Facility: HOSPITAL | Age: 70
LOS: 0 days | Discharge: NURSING, REHAB OR LONG-TERM CARE | End: 2020-06-02
Attending: HOSPITALIST | Admitting: HOSPITALIST
Payer: MEDICARE

## 2020-06-01 ENCOUNTER — EMERGENCY (EMERGENCY)
Facility: HOSPITAL | Age: 70
LOS: 0 days | Discharge: HOME | End: 2020-06-01
Admitting: HOSPITALIST

## 2020-06-01 VITALS
SYSTOLIC BLOOD PRESSURE: 180 MMHG | OXYGEN SATURATION: 96 % | TEMPERATURE: 96 F | HEART RATE: 67 BPM | RESPIRATION RATE: 18 BRPM | WEIGHT: 279.99 LBS | DIASTOLIC BLOOD PRESSURE: 87 MMHG

## 2020-06-01 DIAGNOSIS — G47.33 OBSTRUCTIVE SLEEP APNEA (ADULT) (PEDIATRIC): ICD-10-CM

## 2020-06-01 DIAGNOSIS — Z95.5 PRESENCE OF CORONARY ANGIOPLASTY IMPLANT AND GRAFT: Chronic | ICD-10-CM

## 2020-06-01 DIAGNOSIS — Z87.19 PERSONAL HISTORY OF OTHER DISEASES OF THE DIGESTIVE SYSTEM: Chronic | ICD-10-CM

## 2020-06-01 DIAGNOSIS — Z90.49 ACQUIRED ABSENCE OF OTHER SPECIFIED PARTS OF DIGESTIVE TRACT: Chronic | ICD-10-CM

## 2020-06-01 DIAGNOSIS — Z91.040 LATEX ALLERGY STATUS: ICD-10-CM

## 2020-06-01 DIAGNOSIS — Z95.5 PRESENCE OF CORONARY ANGIOPLASTY IMPLANT AND GRAFT: ICD-10-CM

## 2020-06-01 DIAGNOSIS — R07.89 OTHER CHEST PAIN: ICD-10-CM

## 2020-06-01 DIAGNOSIS — I25.10 ATHEROSCLEROTIC HEART DISEASE OF NATIVE CORONARY ARTERY WITHOUT ANGINA PECTORIS: ICD-10-CM

## 2020-06-01 DIAGNOSIS — Z88.2 ALLERGY STATUS TO SULFONAMIDES: ICD-10-CM

## 2020-06-01 DIAGNOSIS — Z74.09 OTHER REDUCED MOBILITY: ICD-10-CM

## 2020-06-01 DIAGNOSIS — E78.5 HYPERLIPIDEMIA, UNSPECIFIED: ICD-10-CM

## 2020-06-01 DIAGNOSIS — I10 ESSENTIAL (PRIMARY) HYPERTENSION: ICD-10-CM

## 2020-06-01 DIAGNOSIS — Z79.02 LONG TERM (CURRENT) USE OF ANTITHROMBOTICS/ANTIPLATELETS: ICD-10-CM

## 2020-06-01 DIAGNOSIS — Z79.82 LONG TERM (CURRENT) USE OF ASPIRIN: ICD-10-CM

## 2020-06-01 DIAGNOSIS — Z91.041 RADIOGRAPHIC DYE ALLERGY STATUS: ICD-10-CM

## 2020-06-01 DIAGNOSIS — Z96.653 PRESENCE OF ARTIFICIAL KNEE JOINT, BILATERAL: ICD-10-CM

## 2020-06-01 DIAGNOSIS — Z91.013 ALLERGY TO SEAFOOD: ICD-10-CM

## 2020-06-01 LAB
ALBUMIN SERPL ELPH-MCNC: 4.4 G/DL — SIGNIFICANT CHANGE UP (ref 3.5–5.2)
ALP SERPL-CCNC: 119 U/L — HIGH (ref 30–115)
ALT FLD-CCNC: 9 U/L — SIGNIFICANT CHANGE UP (ref 0–41)
ANION GAP SERPL CALC-SCNC: 12 MMOL/L — SIGNIFICANT CHANGE UP (ref 7–14)
APPEARANCE UR: CLEAR — SIGNIFICANT CHANGE UP
APTT BLD: 29.9 SEC — SIGNIFICANT CHANGE UP (ref 27–39.2)
AST SERPL-CCNC: 15 U/L — SIGNIFICANT CHANGE UP (ref 0–41)
BACTERIA # UR AUTO: ABNORMAL
BASOPHILS # BLD AUTO: 0.02 K/UL — SIGNIFICANT CHANGE UP (ref 0–0.2)
BASOPHILS NFR BLD AUTO: 0.3 % — SIGNIFICANT CHANGE UP (ref 0–1)
BILIRUB SERPL-MCNC: 1.2 MG/DL — SIGNIFICANT CHANGE UP (ref 0.2–1.2)
BILIRUB UR-MCNC: NEGATIVE — SIGNIFICANT CHANGE UP
BUN SERPL-MCNC: 16 MG/DL — SIGNIFICANT CHANGE UP (ref 10–20)
CALCIUM SERPL-MCNC: 9.5 MG/DL — SIGNIFICANT CHANGE UP (ref 8.5–10.1)
CHLORIDE SERPL-SCNC: 98 MMOL/L — SIGNIFICANT CHANGE UP (ref 98–110)
CO2 SERPL-SCNC: 29 MMOL/L — SIGNIFICANT CHANGE UP (ref 17–32)
COLOR SPEC: YELLOW — SIGNIFICANT CHANGE UP
CREAT SERPL-MCNC: 0.9 MG/DL — SIGNIFICANT CHANGE UP (ref 0.7–1.5)
DIFF PNL FLD: ABNORMAL
EOSINOPHIL # BLD AUTO: 0.08 K/UL — SIGNIFICANT CHANGE UP (ref 0–0.7)
EOSINOPHIL NFR BLD AUTO: 1.4 % — SIGNIFICANT CHANGE UP (ref 0–8)
EPI CELLS # UR: ABNORMAL /HPF
ETHANOL SERPL-MCNC: <10 MG/DL — SIGNIFICANT CHANGE UP
GLUCOSE SERPL-MCNC: 93 MG/DL — SIGNIFICANT CHANGE UP (ref 70–99)
GLUCOSE UR QL: NEGATIVE MG/DL — SIGNIFICANT CHANGE UP
HCT VFR BLD CALC: 44 % — SIGNIFICANT CHANGE UP (ref 42–52)
HGB BLD-MCNC: 15.6 G/DL — SIGNIFICANT CHANGE UP (ref 14–18)
IMM GRANULOCYTES NFR BLD AUTO: 0.3 % — SIGNIFICANT CHANGE UP (ref 0.1–0.3)
INR BLD: 1.3 RATIO — SIGNIFICANT CHANGE UP (ref 0.65–1.3)
KETONES UR-MCNC: NEGATIVE — SIGNIFICANT CHANGE UP
LACTATE SERPL-SCNC: 2.3 MMOL/L — HIGH (ref 0.7–2)
LEUKOCYTE ESTERASE UR-ACNC: NEGATIVE — SIGNIFICANT CHANGE UP
LIDOCAIN IGE QN: 18 U/L — SIGNIFICANT CHANGE UP (ref 7–60)
LYMPHOCYTES # BLD AUTO: 1.93 K/UL — SIGNIFICANT CHANGE UP (ref 1.2–3.4)
LYMPHOCYTES # BLD AUTO: 32.8 % — SIGNIFICANT CHANGE UP (ref 20.5–51.1)
MCHC RBC-ENTMCNC: 32.4 PG — HIGH (ref 27–31)
MCHC RBC-ENTMCNC: 35.5 G/DL — SIGNIFICANT CHANGE UP (ref 32–37)
MCV RBC AUTO: 91.3 FL — SIGNIFICANT CHANGE UP (ref 80–94)
MONOCYTES # BLD AUTO: 0.55 K/UL — SIGNIFICANT CHANGE UP (ref 0.1–0.6)
MONOCYTES NFR BLD AUTO: 9.3 % — SIGNIFICANT CHANGE UP (ref 1.7–9.3)
NEUTROPHILS # BLD AUTO: 3.29 K/UL — SIGNIFICANT CHANGE UP (ref 1.4–6.5)
NEUTROPHILS NFR BLD AUTO: 55.9 % — SIGNIFICANT CHANGE UP (ref 42.2–75.2)
NITRITE UR-MCNC: NEGATIVE — SIGNIFICANT CHANGE UP
NRBC # BLD: 0 /100 WBCS — SIGNIFICANT CHANGE UP (ref 0–0)
PH UR: 6.5 — SIGNIFICANT CHANGE UP (ref 5–8)
PLATELET # BLD AUTO: 205 K/UL — SIGNIFICANT CHANGE UP (ref 130–400)
POTASSIUM SERPL-MCNC: 3.8 MMOL/L — SIGNIFICANT CHANGE UP (ref 3.5–5)
POTASSIUM SERPL-SCNC: 3.8 MMOL/L — SIGNIFICANT CHANGE UP (ref 3.5–5)
PROT SERPL-MCNC: 7.2 G/DL — SIGNIFICANT CHANGE UP (ref 6–8)
PROT UR-MCNC: NEGATIVE MG/DL — SIGNIFICANT CHANGE UP
PROTHROM AB SERPL-ACNC: 14.9 SEC — HIGH (ref 9.95–12.87)
RBC # BLD: 4.82 M/UL — SIGNIFICANT CHANGE UP (ref 4.7–6.1)
RBC # FLD: 12.6 % — SIGNIFICANT CHANGE UP (ref 11.5–14.5)
SODIUM SERPL-SCNC: 139 MMOL/L — SIGNIFICANT CHANGE UP (ref 135–146)
SP GR SPEC: 1.01 — SIGNIFICANT CHANGE UP (ref 1.01–1.03)
TROPONIN T SERPL-MCNC: <0.01 NG/ML — SIGNIFICANT CHANGE UP
TROPONIN T SERPL-MCNC: <0.01 NG/ML — SIGNIFICANT CHANGE UP
UROBILINOGEN FLD QL: 0.2 MG/DL — SIGNIFICANT CHANGE UP (ref 0.2–0.2)
WBC # BLD: 5.89 K/UL — SIGNIFICANT CHANGE UP (ref 4.8–10.8)
WBC # FLD AUTO: 5.89 K/UL — SIGNIFICANT CHANGE UP (ref 4.8–10.8)
WBC UR QL: SIGNIFICANT CHANGE UP /HPF

## 2020-06-01 PROCEDURE — 70450 CT HEAD/BRAIN W/O DYE: CPT | Mod: 26

## 2020-06-01 PROCEDURE — 72125 CT NECK SPINE W/O DYE: CPT | Mod: 26

## 2020-06-01 PROCEDURE — 99283 EMERGENCY DEPT VISIT LOW MDM: CPT | Mod: GC

## 2020-06-01 PROCEDURE — 99285 EMERGENCY DEPT VISIT HI MDM: CPT | Mod: GC

## 2020-06-01 PROCEDURE — 71045 X-RAY EXAM CHEST 1 VIEW: CPT | Mod: 26

## 2020-06-01 PROCEDURE — 74177 CT ABD & PELVIS W/CONTRAST: CPT | Mod: 26

## 2020-06-01 PROCEDURE — 71260 CT THORAX DX C+: CPT | Mod: 26

## 2020-06-01 RX ORDER — ACETAMINOPHEN 500 MG
650 TABLET ORAL EVERY 6 HOURS
Refills: 0 | Status: DISCONTINUED | OUTPATIENT
Start: 2020-06-01 | End: 2020-06-02

## 2020-06-01 RX ORDER — LOSARTAN POTASSIUM 100 MG/1
100 TABLET, FILM COATED ORAL DAILY
Refills: 0 | Status: DISCONTINUED | OUTPATIENT
Start: 2020-06-01 | End: 2020-06-02

## 2020-06-01 RX ORDER — PANTOPRAZOLE SODIUM 20 MG/1
40 TABLET, DELAYED RELEASE ORAL
Refills: 0 | Status: DISCONTINUED | OUTPATIENT
Start: 2020-06-01 | End: 2020-06-02

## 2020-06-01 RX ORDER — HYDROCHLOROTHIAZIDE 25 MG
25 TABLET ORAL DAILY
Refills: 0 | Status: DISCONTINUED | OUTPATIENT
Start: 2020-06-01 | End: 2020-06-02

## 2020-06-01 RX ORDER — ATORVASTATIN CALCIUM 80 MG/1
1 TABLET, FILM COATED ORAL
Qty: 0 | Refills: 0 | DISCHARGE

## 2020-06-01 RX ORDER — ATENOLOL 25 MG/1
50 TABLET ORAL DAILY
Refills: 0 | Status: DISCONTINUED | OUTPATIENT
Start: 2020-06-01 | End: 2020-06-02

## 2020-06-01 RX ORDER — CLOPIDOGREL BISULFATE 75 MG/1
75 TABLET, FILM COATED ORAL DAILY
Refills: 0 | Status: DISCONTINUED | OUTPATIENT
Start: 2020-06-01 | End: 2020-06-02

## 2020-06-01 RX ORDER — MORPHINE SULFATE 50 MG/1
4 CAPSULE, EXTENDED RELEASE ORAL ONCE
Refills: 0 | Status: DISCONTINUED | OUTPATIENT
Start: 2020-06-01 | End: 2020-06-01

## 2020-06-01 RX ORDER — SUCRALFATE 1 G
1 TABLET ORAL
Refills: 0 | Status: DISCONTINUED | OUTPATIENT
Start: 2020-06-01 | End: 2020-06-02

## 2020-06-01 RX ORDER — DIPHENHYDRAMINE HCL 50 MG
50 CAPSULE ORAL ONCE
Refills: 0 | Status: COMPLETED | OUTPATIENT
Start: 2020-06-01 | End: 2020-06-01

## 2020-06-01 RX ORDER — ASPIRIN/CALCIUM CARB/MAGNESIUM 324 MG
81 TABLET ORAL DAILY
Refills: 0 | Status: DISCONTINUED | OUTPATIENT
Start: 2020-06-01 | End: 2020-06-02

## 2020-06-01 RX ORDER — ATENOLOL 25 MG/1
0 TABLET ORAL
Qty: 0 | Refills: 0 | DISCHARGE

## 2020-06-01 RX ORDER — ATORVASTATIN CALCIUM 80 MG/1
0 TABLET, FILM COATED ORAL
Qty: 0 | Refills: 0 | DISCHARGE

## 2020-06-01 RX ORDER — POTASSIUM CHLORIDE 20 MEQ
20 PACKET (EA) ORAL DAILY
Refills: 0 | Status: DISCONTINUED | OUTPATIENT
Start: 2020-06-01 | End: 2020-06-02

## 2020-06-01 RX ORDER — CHLORHEXIDINE GLUCONATE 213 G/1000ML
1 SOLUTION TOPICAL
Refills: 0 | Status: DISCONTINUED | OUTPATIENT
Start: 2020-06-01 | End: 2020-06-02

## 2020-06-01 RX ORDER — ATORVASTATIN CALCIUM 80 MG/1
20 TABLET, FILM COATED ORAL
Qty: 0 | Refills: 0 | DISCHARGE

## 2020-06-01 RX ORDER — ONDANSETRON 8 MG/1
4 TABLET, FILM COATED ORAL ONCE
Refills: 0 | Status: COMPLETED | OUTPATIENT
Start: 2020-06-01 | End: 2020-06-01

## 2020-06-01 RX ORDER — ENOXAPARIN SODIUM 100 MG/ML
40 INJECTION SUBCUTANEOUS DAILY
Refills: 0 | Status: DISCONTINUED | OUTPATIENT
Start: 2020-06-01 | End: 2020-06-02

## 2020-06-01 RX ORDER — LIDOCAINE 4 G/100G
1 CREAM TOPICAL DAILY
Refills: 0 | Status: DISCONTINUED | OUTPATIENT
Start: 2020-06-01 | End: 2020-06-02

## 2020-06-01 RX ORDER — SODIUM CHLORIDE 9 MG/ML
1000 INJECTION INTRAMUSCULAR; INTRAVENOUS; SUBCUTANEOUS
Refills: 0 | Status: DISCONTINUED | OUTPATIENT
Start: 2020-06-01 | End: 2020-06-02

## 2020-06-01 RX ORDER — ATORVASTATIN CALCIUM 80 MG/1
20 TABLET, FILM COATED ORAL AT BEDTIME
Refills: 0 | Status: DISCONTINUED | OUTPATIENT
Start: 2020-06-01 | End: 2020-06-02

## 2020-06-01 RX ADMIN — LOSARTAN POTASSIUM 100 MILLIGRAM(S): 100 TABLET, FILM COATED ORAL at 17:59

## 2020-06-01 RX ADMIN — LIDOCAINE 1 PATCH: 4 CREAM TOPICAL at 17:59

## 2020-06-01 RX ADMIN — LIDOCAINE 1 PATCH: 4 CREAM TOPICAL at 18:00

## 2020-06-01 RX ADMIN — SODIUM CHLORIDE 60 MILLILITER(S): 9 INJECTION INTRAMUSCULAR; INTRAVENOUS; SUBCUTANEOUS at 17:20

## 2020-06-01 RX ADMIN — Medication 50 MILLIGRAM(S): at 09:38

## 2020-06-01 RX ADMIN — PANTOPRAZOLE SODIUM 40 MILLIGRAM(S): 20 TABLET, DELAYED RELEASE ORAL at 17:58

## 2020-06-01 RX ADMIN — ONDANSETRON 4 MILLIGRAM(S): 8 TABLET, FILM COATED ORAL at 09:22

## 2020-06-01 RX ADMIN — Medication 650 MILLIGRAM(S): at 17:57

## 2020-06-01 RX ADMIN — Medication 81 MILLIGRAM(S): at 15:44

## 2020-06-01 RX ADMIN — ATORVASTATIN CALCIUM 20 MILLIGRAM(S): 80 TABLET, FILM COATED ORAL at 21:21

## 2020-06-01 RX ADMIN — MORPHINE SULFATE 4 MILLIGRAM(S): 50 CAPSULE, EXTENDED RELEASE ORAL at 09:22

## 2020-06-01 RX ADMIN — Medication 25 MILLIGRAM(S): at 17:58

## 2020-06-01 RX ADMIN — Medication 125 MILLIGRAM(S): at 09:38

## 2020-06-01 RX ADMIN — Medication 1 GRAM(S): at 17:59

## 2020-06-01 RX ADMIN — ENOXAPARIN SODIUM 40 MILLIGRAM(S): 100 INJECTION SUBCUTANEOUS at 17:58

## 2020-06-01 RX ADMIN — Medication 20 MILLIEQUIVALENT(S): at 15:45

## 2020-06-01 NOTE — H&P ADULT - NSHPREVIEWOFSYSTEMS_GEN_ALL_CORE
· CONSTITUTIONAL: no fever and no chills.  · EYES: no discharge, no irritation, no pain, no redness, and no visual changes.  · ENMT: Ears: no ear pain and no hearing problems. Nose: no nasal congestion and no nasal drainage. Mouth/Throat: no dysphagia, no hoarseness and no throat pain. Neck: no lumps, no pain, no stiffness and no swollen glands.  · CARDIOVASCULAR: no chest pain and no edema.  · RESPIRATORY: no chest pain, no cough, and no shortness of breath.  · GASTROINTESTINAL: no abdominal pain, no bloating, no constipation, no diarrhea, no nausea and no vomiting.  · GENITOURINARY: no dysuria, no frequency, and no hematuria.  · MUSCULOSKELETAL: no back pain, no gout, no musculoskeletal pain, no neck pain, and no weakness.  · SKIN: + contusion, no abrasions, no jaundice, no lesions, no pruritis, and no rashes.  · NEURO: no loss of consciousness, no headache, no sensory deficits, and no weakness.

## 2020-06-01 NOTE — ED PROVIDER NOTE - ATTENDING CONTRIBUTION TO CARE
69 M to ED s/p fall  he was walking down steps last night and at the bottom of the steps had a fall onto L side  + head trauma, + L chest wall trauma, no extrem injury.  Exam as noted, CTAB, RRR, abdomen soft NTND,

## 2020-06-01 NOTE — PHYSICAL THERAPY INITIAL EVALUATION ADULT - GENERAL OBSERVATIONS, REHAB EVAL
15:35-15:49. Chart reviewed. Pt available to be seen by PT, confirmed with nurse. pt encountered semi-reclined in bed, reports some pain to his LUE, and would like to work with PT now.

## 2020-06-01 NOTE — H&P ADULT - HISTORY OF PRESENT ILLNESS
70 yo M with hx of CAD s/p stent x 3 on plavix, HTN, HLD, and KELSEY, bilateral knee replacement, presents for evaluation of fall, onset last night, associated with pain to the left chest, unknown LOC per pt. No fever, no chills, no headache, no n/v/d, no back pain, no abdominal pain. Pt states that he was walking last night and then fell. He cannot recall if he tripped and fell or if he fainted. Pt denies any other symptoms. 68 yo M with hx of CAD s/p stent x 3 on plavix, HTN, HLD, and KELSEY, bilateral knee replacement, presents for evaluation of fall, onset last night, associated with pain to the left chest, unknown LOC per pt. No fever, no chills, no headache, no n/v/d, no back pain, no abdominal pain. Pt states that he was walking last night and then fell. He cannot recall if he tripped and fell or if he fainted. Pt denies any other symptoms.    Pt states having a crowded house with his wife and two disabled grandchildren   Pt states feeling weak all the time.   Pt was receiving epidural injections from Pain Management but has not follow up since covid started. He also stopped taking his opiates due to increase falls over the past few months.     No change in medications since being discharged  Is on plavix since stents placed in 1998

## 2020-06-01 NOTE — H&P ADULT - ATTENDING COMMENTS
Patient is a 70 yo M with hx of CAD s/p stent x 3 on plavix, HTN, HLD, and KELSEY, bilateral knee replacement, presents with fall, associated with pain to the left chest. Patient is not able to recall if he had LOS. Patient fall is likely mechanical fall; will r/o pre-syncopal episode in the setting of extensive cardiac hx vs orthostatic hypotension in the setting of diuretic use.  PLAN  -telemetry, ECHO, 2nd set of trop, cardiac eval given extensive hx  -f/u orthostatic vitals; if positive bolus 1L NS   -PT eval, fall precautions  -cont home medications    Agree with the assessment and plan by Zane JOHN; discussed the case with him.

## 2020-06-01 NOTE — PHYSICAL THERAPY INITIAL EVALUATION ADULT - ADDITIONAL COMMENTS
pt reports living in a house with his wife. pt reports about 6 steps to enter and about 10 once inside. pt reports primarily amb with a SC, however has a RW at home in case. pt reports being independent with ADL's.

## 2020-06-01 NOTE — H&P ADULT - ASSESSMENT
68 yo M with hx of CAD s/p stent x 3 on plavix, HTN, HLD, and KELSEY, bilateral knee replacement, presents for evaluation of fall, onset last night, associated with pain to the left chest, unknown LOC per pt. No fever, no chills, no headache, no n/v/d, no back pain, no abdominal pain. Pt states that he was walking last night and then fell. He cannot recall if he tripped and fell or if he fainted. Pt denies any other symptoms.    # S/P FALL mechanical vs syncope which i think is less likely   - PT eval   - fall pre-caution   - poss dispo to STR  - orthostatic vitals     # ACS   - first set CE negative   - order CE 2nd set   - echo   - tele monitoring   - cards eval done in 1/20 : no inpt intervention and to f/u with own cardiologist     # cad with stents 1998  - c/w plavix/ asa/ statin     # HTN   - c/w all home meds   - monitor vitals    # gerd   - c/w carafate     DVT/ GI PROPHYLAXIS    CASE D/W DR. PARSONS 68 yo M with hx of CAD s/p stent x 3 on plavix, HTN, HLD, and KELSEY, bilateral knee replacement, presents for evaluation of fall, onset last night, associated with pain to the left chest, unknown LOC per pt. No fever, no chills, no headache, no n/v/d, no back pain, no abdominal pain. Pt states that he was walking last night and then fell. He cannot recall if he tripped and fell or if he fainted. Pt denies any other symptoms.    # S/P FALL mechanical vs syncope which i think is less likely   - PT eval   - fall pre-caution   - poss dispo to STR  - orthostatic vitals     # ACS   - first set CE negative   - order CE 2nd set   - echo   - tele monitoring   - cards eval done in 1/20 : no inpt intervention and to f/u with own cardiologist   - repeat lactate in am after gentle hydration     # cad with stents 1998  - c/w plavix/ asa/ statin     # HTN   - c/w all home meds   - monitor vitals    # gerd   - c/w carafate     DVT/ GI PROPHYLAXIS    CASE D/W DR. PARSONS 70 yo M with hx of CAD s/p stent x 3 on plavix, HTN, HLD, and KELSEY, bilateral knee replacement, presents for evaluation of fall, onset last night, associated with pain to the left chest, unknown LOC per pt. No fever, no chills, no headache, no n/v/d, no back pain, no abdominal pain. Pt states that he was walking last night and then fell. He cannot recall if he tripped and fell or if he fainted. Pt denies any other symptoms.    # S/P FALL mechanical vs syncope which i think is less likely   - PT eval   - fall pre-caution   - poss dispo to STR  - orthostatic vitals   - b12/ tsh levels in am   - routine labs in am   - lidocaine patch for lbp     # ACS   - first set CE negative   - order CE 2nd set   - echo   - tele monitoring   - cards eval done in 1/20 : no inpt intervention and to f/u with own cardiologist   - repeat lactate in am after gentle hydration     # cad with stents 1998  - c/w plavix/ asa/ statin     # HTN   - c/w all home meds   - monitor vitals    # gerd   - c/w carafate     DVT/ GI PROPHYLAXIS    CASE D/W DR. PARSONS

## 2020-06-01 NOTE — ED PROVIDER NOTE - OBJECTIVE STATEMENT
70 yo M with hx of CAD s/p stent x 3 on plavix, HTN, HLD, and KELSEY, bilateral knee replacement, presents for evaluation of fall, onset last night, associated with pain to the left chest, unknown LOC per pt. No fever, no chills, no headache, no n/v/d, no back pain, no abdominal pain. Pt states that he was walking last night and then fell. He cannot recall if he tripped and fell or if he fainted. Pt denies any other symptoms.

## 2020-06-01 NOTE — ED PROVIDER NOTE - PROGRESS NOTE DETAILS
pt in no distress pt states "too weak to walk", will not ambulate in the ED Case discussed with Dr. Domínguez, hospitalist.

## 2020-06-01 NOTE — H&P ADULT - NSHPLABSRESULTS_GEN_ALL_CORE
06-01    139  |  98  |  16  ----------------------------<  93  3.8   |  29  |  0.9    Ca    9.5      01 Jun 2020 08:29    TPro  7.2  /  Alb  4.4  /  TBili  1.2  /  DBili  x   /  AST  15  /  ALT  9   /  AlkPhos  119<H>  06-01                            15.6   5.89  )-----------( 205      ( 01 Jun 2020 08:29 )             44.0     CAPILLARY BLOOD GLUCOSE        CARDIAC MARKERS ( 01 Jun 2020 08:29 )  x     / <0.01 ng/mL / x     / x     / x        < from: CT Abdomen and Pelvis w/ IV Cont (06.01.20 @ 11:14) >    IMPRESSION:     1.  No CT evidence of acute traumatic injury in the chest abdomen or pelvis.  2.  Chronic findings above.    < end of copied text >    < from: CT Head No Cont (06.01.20 @ 11:14) >      IMPRESSION:    In comparison with the prior noncontrast CT scan of the brain dated January 21, 2020:    No acute intracranial hemorrhage.    Resorption of the previously described right parietal extracalvarial hematoma.    Otherwise stable examination.    < end of copied text >    < from: Xray Chest 1 View-PORTABLE IMMEDIATE (06.01.20 @ 08:57) >    IMPRESSION:     Low lung volumes. Bibasilar atelectasis.    No focal consolidation.    < end of copied text >

## 2020-06-01 NOTE — ED ADULT TRIAGE NOTE - CCCP TRG CHIEF CMPLNT
FINAL REPORT



EXAM:  XR ABDOMEN 2V



HISTORY:  constipation 



COMPARISON:  None available. 



FINDINGS:  

AP views of the abdomen obtained. Moderate stool in the rectosigmoid colon. Gas scattered within non 
dilated bowl loops. No gross pathologic calcifications. Bony structures are grossly intact.



IMPRESSION:  

Moderate stool in the rectosigmoid colon. Nonobstructive bowel gas pattern. fall

## 2020-06-01 NOTE — PHYSICAL THERAPY INITIAL EVALUATION ADULT - IMPAIRMENTS FOUND, PT EVAL
posture/muscle strength/ergonomics and body mechanics/gait, locomotion, and balance/aerobic capacity/endurance

## 2020-06-01 NOTE — ED PROVIDER NOTE - CARDIAC, MLM
Normal rate, regular rhythm.  Heart sounds S1, S2.  No murmurs, rubs or gallops. Chest wall: contusion and tenderness to ribs 6/7th

## 2020-06-01 NOTE — ED ADULT NURSE NOTE - NSIMPLEMENTINTERV_GEN_ALL_ED
Implemented All Fall with Harm Risk Interventions:  [place] to call system. Call bell, personal items and telephone within reach. Instruct patient to call for assistance. Room bathroom lighting operational. Non-slip footwear when patient is off stretcher. Physically safe environment: no spills, clutter or unnecessary equipment. Stretcher in lowest position, wheels locked, appropriate side rails in place. Provide visual cue, wrist band, yellow gown, etc. Monitor gait and stability. Monitor for mental status changes and reorient to person, place, and time. Review medications for side effects contributing to fall risk. Reinforce activity limits and safety measures with patient and family. Provide visual clues: red socks.

## 2020-06-01 NOTE — H&P ADULT - NSHPPHYSICALEXAM_GEN_ALL_CORE
Vital Signs Last 24 Hrs  T(C): 35.7 (01 Jun 2020 07:07), Max: 35.7 (01 Jun 2020 07:07)  T(F): 96.3 (01 Jun 2020 07:07), Max: 96.3 (01 Jun 2020 07:07)  HR: 67 (01 Jun 2020 07:07) (67 - 67)  BP: 180/87 (01 Jun 2020 07:07) (180/87 - 180/87)  RR: 18 (01 Jun 2020 07:07) (18 - 18)  SpO2: 96% (01 Jun 2020 07:07) (96% - 96%)    · CONSTITUTIONAL: Well appearing, awake, alert, oriented to person, place, time/situation  · ENMT: Airway patent, Nasal mucosa clear. Mouth with normal mucosa. Throat has no vesicles, no oropharyngeal exudates and uvula is midline.  · EYES: Clear bilaterally, pupils equal, round and reactive to light, EOMI.  · CARDIAC: Normal rate, regular rhythm.  Heart sounds S1, S2.  No murmurs, rubs or gallops. Chest wall: contusion and tenderness to ribs 6/7th  · RESPIRATORY: Breath sounds clear and equal bilaterally.  · GASTROINTESTINAL: Abdomen soft, non-tender, non distended, no rebound, no rigidity, no guarding.  · MUSCULOSKELETAL: Spine appears normal, range of motion is not limited, no muscle or joint tenderness  · NEUROLOGICAL: Alert and oriented, no focal deficits, no motor or sensory deficits.  · SKIN: Skin normal color for race, warm, dry and intact. No evidence of rash. Vital Signs Last 24 Hrs  T(C): 35.7 (01 Jun 2020 07:07), Max: 35.7 (01 Jun 2020 07:07)  T(F): 96.3 (01 Jun 2020 07:07), Max: 96.3 (01 Jun 2020 07:07)  HR: 67 (01 Jun 2020 07:07) (67 - 67)  BP: 180/87 (01 Jun 2020 07:07) (180/87 - 180/87)  RR: 18 (01 Jun 2020 07:07) (18 - 18)  SpO2: 96% (01 Jun 2020 07:07) (96% - 96%)    · CONSTITUTIONAL: Well appearing, awake, alert, oriented to person, place, time/situation  · ENMT: Airway patent, Nasal mucosa clear. Mouth with normal mucosa.   · EYES: Clear bilaterally,  EOMI.  · CARDIAC: Normal rate, regular rhythm.  Heart sounds S1, S2.  No murmurs, rubs or gallops.     Chest wall: contusion and tenderness to ribs 6/7th  · RESPIRATORY: Breath sounds clear and equal bilaterally.  · GASTROINTESTINAL:  Obese Abdomen soft, non-tender, non distended, no rebound, no rigidity, no guarding.  · MUSCULOSKELETAL: Spine appears normal, range of motion is limited due to pain in b/l knees TKR scars healed b/l                                   min. ecchymosis noted in left flank region with TTP , no edeme noted of the LE  · NEUROLOGICAL: Alert and oriented, no focal deficits, no motor or sensory deficits.  · SKIN: Skin normal color for race, warm, dry and intact. No evidence of rash.

## 2020-06-01 NOTE — ED ADULT TRIAGE NOTE - CHIEF COMPLAINT QUOTE
BIBA for fall. PT states he fell last night around 10pm. PT states he is not sure how he fell. Denies LOC or hitting his head. PT on plavix. C/o back pain

## 2020-06-02 ENCOUNTER — TRANSCRIPTION ENCOUNTER (OUTPATIENT)
Age: 70
End: 2020-06-02

## 2020-06-02 VITALS
HEART RATE: 79 BPM | RESPIRATION RATE: 16 BRPM | TEMPERATURE: 97 F | SYSTOLIC BLOOD PRESSURE: 133 MMHG | DIASTOLIC BLOOD PRESSURE: 65 MMHG

## 2020-06-02 LAB
ANION GAP SERPL CALC-SCNC: 12 MMOL/L — SIGNIFICANT CHANGE UP (ref 7–14)
BUN SERPL-MCNC: 22 MG/DL — HIGH (ref 10–20)
CALCIUM SERPL-MCNC: 9.2 MG/DL — SIGNIFICANT CHANGE UP (ref 8.5–10.1)
CHLORIDE SERPL-SCNC: 98 MMOL/L — SIGNIFICANT CHANGE UP (ref 98–110)
CO2 SERPL-SCNC: 29 MMOL/L — SIGNIFICANT CHANGE UP (ref 17–32)
CREAT SERPL-MCNC: 1 MG/DL — SIGNIFICANT CHANGE UP (ref 0.7–1.5)
GLUCOSE SERPL-MCNC: 119 MG/DL — HIGH (ref 70–99)
HCT VFR BLD CALC: 42 % — SIGNIFICANT CHANGE UP (ref 42–52)
HGB BLD-MCNC: 14.8 G/DL — SIGNIFICANT CHANGE UP (ref 14–18)
LACTATE SERPL-SCNC: 2.4 MMOL/L — HIGH (ref 0.7–2)
MAGNESIUM SERPL-MCNC: 1.7 MG/DL — LOW (ref 1.8–2.4)
MCHC RBC-ENTMCNC: 32.3 PG — HIGH (ref 27–31)
MCHC RBC-ENTMCNC: 35.2 G/DL — SIGNIFICANT CHANGE UP (ref 32–37)
MCV RBC AUTO: 91.7 FL — SIGNIFICANT CHANGE UP (ref 80–94)
NRBC # BLD: 0 /100 WBCS — SIGNIFICANT CHANGE UP (ref 0–0)
PLATELET # BLD AUTO: 235 K/UL — SIGNIFICANT CHANGE UP (ref 130–400)
POTASSIUM SERPL-MCNC: 4.2 MMOL/L — SIGNIFICANT CHANGE UP (ref 3.5–5)
POTASSIUM SERPL-SCNC: 4.2 MMOL/L — SIGNIFICANT CHANGE UP (ref 3.5–5)
RBC # BLD: 4.58 M/UL — LOW (ref 4.7–6.1)
RBC # FLD: 12.3 % — SIGNIFICANT CHANGE UP (ref 11.5–14.5)
SARS-COV-2 RNA SPEC QL NAA+PROBE: SIGNIFICANT CHANGE UP
SODIUM SERPL-SCNC: 139 MMOL/L — SIGNIFICANT CHANGE UP (ref 135–146)
TROPONIN T SERPL-MCNC: <0.01 NG/ML — SIGNIFICANT CHANGE UP
TSH SERPL-MCNC: 1.43 UIU/ML — SIGNIFICANT CHANGE UP (ref 0.27–4.2)
VIT B12 SERPL-MCNC: 164 PG/ML — LOW (ref 232–1245)
WBC # BLD: 8.77 K/UL — SIGNIFICANT CHANGE UP (ref 4.8–10.8)
WBC # FLD AUTO: 8.77 K/UL — SIGNIFICANT CHANGE UP (ref 4.8–10.8)

## 2020-06-02 PROCEDURE — 99238 HOSP IP/OBS DSCHRG MGMT 30/<: CPT

## 2020-06-02 RX ORDER — MAGNESIUM OXIDE 400 MG ORAL TABLET 241.3 MG
400 TABLET ORAL ONCE
Refills: 0 | Status: COMPLETED | OUTPATIENT
Start: 2020-06-02 | End: 2020-06-02

## 2020-06-02 RX ORDER — LIDOCAINE 4 G/100G
1 CREAM TOPICAL ONCE
Refills: 0 | Status: COMPLETED | OUTPATIENT
Start: 2020-06-02 | End: 2020-06-02

## 2020-06-02 RX ORDER — KETOROLAC TROMETHAMINE 30 MG/ML
15 SYRINGE (ML) INJECTION ONCE
Refills: 0 | Status: DISCONTINUED | OUTPATIENT
Start: 2020-06-02 | End: 2020-06-02

## 2020-06-02 RX ORDER — ATENOLOL 25 MG/1
50 TABLET ORAL
Qty: 0 | Refills: 0 | DISCHARGE

## 2020-06-02 RX ORDER — KETOROLAC TROMETHAMINE 30 MG/ML
30 SYRINGE (ML) INJECTION ONCE
Refills: 0 | Status: DISCONTINUED | OUTPATIENT
Start: 2020-06-02 | End: 2020-06-02

## 2020-06-02 RX ORDER — PANTOPRAZOLE SODIUM 20 MG/1
40 TABLET, DELAYED RELEASE ORAL ONCE
Refills: 0 | Status: COMPLETED | OUTPATIENT
Start: 2020-06-02 | End: 2020-06-02

## 2020-06-02 RX ORDER — LIDOCAINE 4 G/100G
2 CREAM TOPICAL DAILY
Refills: 0 | Status: DISCONTINUED | OUTPATIENT
Start: 2020-06-02 | End: 2020-06-02

## 2020-06-02 RX ORDER — ACETAMINOPHEN 500 MG
2 TABLET ORAL
Qty: 240 | Refills: 0
Start: 2020-06-02 | End: 2020-07-01

## 2020-06-02 RX ORDER — DIPHENHYDRAMINE HCL 50 MG
25 CAPSULE ORAL ONCE
Refills: 0 | Status: DISCONTINUED | OUTPATIENT
Start: 2020-06-02 | End: 2020-06-02

## 2020-06-02 RX ORDER — LIDOCAINE 4 G/100G
0 CREAM TOPICAL
Qty: 0 | Refills: 0 | DISCHARGE
Start: 2020-06-02

## 2020-06-02 RX ORDER — MAGNESIUM SULFATE 500 MG/ML
2 VIAL (ML) INJECTION ONCE
Refills: 0 | Status: DISCONTINUED | OUTPATIENT
Start: 2020-06-02 | End: 2020-06-02

## 2020-06-02 RX ORDER — ACETAMINOPHEN 500 MG
650 TABLET ORAL EVERY 6 HOURS
Refills: 0 | Status: DISCONTINUED | OUTPATIENT
Start: 2020-06-02 | End: 2020-06-02

## 2020-06-02 RX ADMIN — Medication 25 MILLIGRAM(S): at 05:24

## 2020-06-02 RX ADMIN — LIDOCAINE 1 PATCH: 4 CREAM TOPICAL at 09:18

## 2020-06-02 RX ADMIN — LOSARTAN POTASSIUM 100 MILLIGRAM(S): 100 TABLET, FILM COATED ORAL at 06:57

## 2020-06-02 RX ADMIN — Medication 20 MILLIEQUIVALENT(S): at 11:52

## 2020-06-02 RX ADMIN — MAGNESIUM OXIDE 400 MG ORAL TABLET 400 MILLIGRAM(S): 241.3 TABLET ORAL at 14:27

## 2020-06-02 RX ADMIN — LIDOCAINE 1 PATCH: 4 CREAM TOPICAL at 06:15

## 2020-06-02 RX ADMIN — Medication 650 MILLIGRAM(S): at 07:00

## 2020-06-02 RX ADMIN — ENOXAPARIN SODIUM 40 MILLIGRAM(S): 100 INJECTION SUBCUTANEOUS at 11:52

## 2020-06-02 RX ADMIN — CLOPIDOGREL BISULFATE 75 MILLIGRAM(S): 75 TABLET, FILM COATED ORAL at 11:52

## 2020-06-02 RX ADMIN — ATENOLOL 50 MILLIGRAM(S): 25 TABLET ORAL at 05:22

## 2020-06-02 RX ADMIN — CHLORHEXIDINE GLUCONATE 1 APPLICATION(S): 213 SOLUTION TOPICAL at 05:22

## 2020-06-02 RX ADMIN — LIDOCAINE 1 PATCH: 4 CREAM TOPICAL at 07:48

## 2020-06-02 RX ADMIN — Medication 650 MILLIGRAM(S): at 03:02

## 2020-06-02 RX ADMIN — Medication 15 MILLIGRAM(S): at 17:04

## 2020-06-02 RX ADMIN — Medication 81 MILLIGRAM(S): at 11:53

## 2020-06-02 RX ADMIN — Medication 1 GRAM(S): at 06:57

## 2020-06-02 RX ADMIN — PANTOPRAZOLE SODIUM 40 MILLIGRAM(S): 20 TABLET, DELAYED RELEASE ORAL at 00:19

## 2020-06-02 RX ADMIN — Medication 650 MILLIGRAM(S): at 14:34

## 2020-06-02 RX ADMIN — PANTOPRAZOLE SODIUM 40 MILLIGRAM(S): 20 TABLET, DELAYED RELEASE ORAL at 06:53

## 2020-06-02 RX ADMIN — MAGNESIUM OXIDE 400 MG ORAL TABLET 400 MILLIGRAM(S): 241.3 TABLET ORAL at 11:52

## 2020-06-02 NOTE — PROGRESS NOTE ADULT - ASSESSMENT
68 yo M with hx of CAD s/p stent x 3 on plavix, HTN, HLD, and KELSEY, bilateral knee replacement, presents for evaluation of fall, onset last night, associated with pain to the left chest, unknown LOC per pt. No fever, no chills, no headache, no n/v/d, no back pain, no abdominal pain. Pt states that he was walking last night and then fell. He cannot recall if he tripped and fell or if he fainted. Pt denies any other symptoms.    # S/P FALL mechanical vs syncope which i think is less likely   - PT eval   - fall pre-caution   - dischahrge planning for STR  - lidocaine patch for lbp     #left chest wall pain likely musculoskeletal  - ACS ruled out  - first set CE negative   PLAN  - Caridology eval apprecaited  - echo - patient refused    # cad with stents 1998  - c/w plavix/ asa/ statin     # HTN   - c/w all home meds   - monitor vitals    # gerd   - c/w carafate     Progress Note Handoff  Pending Consults: None  Pending Tests: None  Pending Results: None  Family Discussion: None  Disposition: Home_____/SNF______/Other__X___/Unknown at this time_____

## 2020-06-02 NOTE — DISCHARGE NOTE PROVIDER - NSDCCPCAREPLAN_GEN_ALL_CORE_FT
PRINCIPAL DISCHARGE DIAGNOSIS  Diagnosis: Decreased ambulation status  Assessment and Plan of Treatment:

## 2020-06-02 NOTE — DISCHARGE NOTE NURSING/CASE MANAGEMENT/SOCIAL WORK - PATIENT PORTAL LINK FT
You can access the FollowMyHealth Patient Portal offered by [facility] by registering at the following website: [email or web address]. By joining [facility]’s FollowMyHealth portal, you will also be able to view your health information using other applications (apps) compatible with our system.

## 2020-06-02 NOTE — CHART NOTE - NSCHARTNOTEFT_GEN_A_CORE
-pt prepared for discharge  -magnesium low 1. 7   -pt allergic to sulfa drugs and mg sulfate as per pt 10 yrs ago developed rash in his legs due to some medication  -pt hx of CAD will give 400 mg of Mg oxide and monitor for reaction if pt tolerates will give another dose   case discussed with the attending -pt prepared for discharge  -magnesium low 1. 7   -pt allergic to sulfa drugs and mg sulfate as per pt 10 yrs ago developed rash in his legs due to some medication  -pt hx of CAD will give 400 mg of Mg oxide and monitor for reaction if pt tolerates will give another dose   case discussed with the attending      addendum   -pt tolerated Mg oxide ,no sign of allergic reaction   no rash, tongue swelling or difficulty breathing   will give second dose of magnesium and then pt can be discharged

## 2020-06-02 NOTE — DISCHARGE NOTE PROVIDER - NSDCMRMEDTOKEN_GEN_ALL_CORE_FT
aspirin 81 mg oral tablet: 1 tab(s) orally once a day  atenolol 50 mg oral tablet: 1 tab(s) orally once a day  atenolol 50 mg oral tablet: 50 milligram(s) orally 2 times a day  atorvastatin 20 mg oral tablet: 1 tab(s) orally once a day  Plavix: 75 milligram(s) orally once a day  potassium citrate 10 mEq oral tablet, extended release: 1 tab(s) orally once a day (at bedtime)  sucralfate 1 g oral tablet: 1 tab(s) orally 2 times a day (after meals)  valsartan-hydrochlorothiazide 160mg-25mg oral tablet: 1 tab(s) orally every 12 hours

## 2020-06-02 NOTE — CHART NOTE - NSCHARTNOTESELECT_GEN_ALL_CORE
Kinjal at the start of shift had c/o nausea and pain. Was treated with ODT Zofran with good relief and Oxycodone 10mg with good relief. When rounding at the end of shift she stated that she was feeling pretty good and currently did not need anything for pain. Up walking independently around the nurses' station. Abdominal incision remains C/D with dermabond intact. Will continue to monitor and report any significant changes.   Event Note

## 2020-06-02 NOTE — CONSULT NOTE ADULT - SUBJECTIVE AND OBJECTIVE BOX
CARDIOLOGY CONSULT NOTE     CHIEF COMPLAINT/REASON FOR CONSULT:    HPI:  70 yo M with hx of CAD s/p stent x 3 on plavix, HTN, HLD, and KELSEY, bilateral knee replacement, presents for evaluation of fall, onset last night, associated with pain to the left chest, unknown LOC per pt. No fever, no chills, no headache, no n/v/d, no back pain, no abdominal pain. Pt states that he was walking last night and then fell. He cannot recall if he tripped and fell or if he fainted. Pt denies any other symptoms.    Pt states having a crowded house with his wife and two disabled grandchildren   Pt states feeling weak all the time.   Pt was receiving epidural injections from Pain Management but has not follow up since covid started. He also stopped taking his opiates due to increase falls over the past few months.     No change in medications since being discharged  Is on plavix since stents placed in 1998 (01 Jun 2020 13:23)      PAST MEDICAL & SURGICAL HISTORY:  MI (myocardial infarction)  Arthritis  Kidney stones  Sleep apnea  Hypertension  High blood cholesterol  H/O heart artery stent  History of appendectomy  H/O chronic cholecystitis: cholecystectomy      Cardiac Risks:   [x ]HTN, [ ] DM, [ ] Smoking, [x ] FH,  [ x] Lipids        MEDICATIONS:  MEDICATIONS  (STANDING):  aspirin  chewable 81 milliGRAM(s) Oral daily  ATENolol  Tablet 50 milliGRAM(s) Oral daily  atorvastatin 20 milliGRAM(s) Oral at bedtime  chlorhexidine 4% Liquid 1 Application(s) Topical <User Schedule>  clopidogrel Tablet 75 milliGRAM(s) Oral daily  enoxaparin Injectable 40 milliGRAM(s) SubCutaneous daily  hydrochlorothiazide 25 milliGRAM(s) Oral daily  lidocaine   Patch 2 Patch Transdermal daily  losartan 100 milliGRAM(s) Oral daily  magnesium sulfate  IVPB 2 Gram(s) IV Intermittent once  pantoprazole    Tablet 40 milliGRAM(s) Oral before breakfast  potassium chloride    Tablet ER 20 milliEquivalent(s) Oral daily  sodium chloride 0.9%. 1000 milliLiter(s) (60 mL/Hr) IV Continuous <Continuous>  sucralfate 1 Gram(s) Oral two times a day      FAMILY HISTORY:  CAD (coronary artery disease) (Father)      SOCIAL HISTORY:      [ ] Marital status    Allergies    fish (Unknown)  iodine (Rash)  latex (Unknown)  magnesium sulfate (Rash)  sulfa drugs (Rash)        	    REVIEW OF SYSTEMS:  CONSTITUTIONAL: No fever, weight loss, or fatigue  EYES: No eye pain, visual disturbances, or discharge  ENMT:  No difficulty hearing, tinnitus, vertigo; No sinus or throat pain  NECK: No pain or stiffness  RESPIRATORY: No cough, wheezing, chills or hemoptysis; No Shortness of Breath  CARDIOVASCULAR: No chest pain, palpitations, passing out, dizziness, or leg swelling  GASTROINTESTINAL: No abdominal or epigastric pain. No nausea, vomiting, or hematemesis; No diarrhea or constipation. No melena or hematochezia.  GENITOURINARY: No dysuria, frequency, hematuria, or incontinence  NEUROLOGICAL: No headaches, memory loss, loss of strength, numbness, or tremors  SKIN: No itching, burning, rashes, or lesions   	        PHYSICAL EXAM:  T(C): 36.4 (06-02-20 @ 05:00), Max: 36.7 (06-01-20 @ 13:59)  HR: 83 (06-02-20 @ 05:00) (81 - 85)  BP: 149/67 (06-02-20 @ 05:00) (145/87 - 169/78)  RR: 18 (06-02-20 @ 05:00) (18 - 18)  SpO2: 95% (06-01-20 @ 13:59) (95% - 95%)  Wt(kg): --  I&O's Summary    01 Jun 2020 07:01  -  02 Jun 2020 07:00  --------------------------------------------------------  IN: 0 mL / OUT: 125 mL / NET: -125 mL    02 Jun 2020 07:01  -  02 Jun 2020 10:45  --------------------------------------------------------  IN: 0 mL / OUT: 200 mL / NET: -200 mL        Appearance: Normal	  Psychiatry: A & O x 3, Mood & affect appropriate  HEENT:   Normal oral mucosa, PERRL, EOMI	  Lymphatic: No lymphadenopathy  Cardiovascular: Normal S1 S2,RRR, No JVD, No murmurs  Respiratory: Lungs clear to auscultation	  Gastrointestinal:  Soft, Non-tender, + BS	  Skin: No rashes, No ecchymoses, No cyanosis	  Neurologic: Non-focal  Extremities: Normal range of motion, No clubbing, cyanosis or edema  Vascular: Peripheral pulses palpable 2+ bilaterally      ECG:  	not available    	  LABS:	 	    CARDIAC MARKERS:                                    14.8   8.77  )-----------( 235      ( 02 Jun 2020 05:54 )             42.0     06-02    139  |  98  |  22<H>  ----------------------------<  119<H>  4.2   |  29  |  1.0    Ca    9.2      02 Jun 2020 05:54  Mg     1.7     06-02    TPro  7.2  /  Alb  4.4  /  TBili  1.2  /  DBili  x   /  AST  15  /  ALT  9   /  AlkPhos  119<H>  06-01    PT/INR - ( 01 Jun 2020 08:29 )   PT: 14.90 sec;   INR: 1.30 ratio         PTT - ( 01 Jun 2020 08:29 )  PTT:29.9 sec

## 2020-06-02 NOTE — DISCHARGE NOTE PROVIDER - HOSPITAL COURSE
68 yo M with hx of CAD s/p stent x 3 on plavix, HTN, HLD, and KELSEY, bilateral knee replacement, presents for evaluation of fall, onset last night, associated with pain to the left chest, unknown LOC per pt. No fever, no chills, no headache, no n/v/d, no back pain, no abdominal pain. Pt states that he was walking last night and then fell. He cannot recall if he tripped and fell or if he fainted. Pt denies any other symptoms. Pt was receiving epidural injections from Pain Management but has not follow up since covid started. He also stopped taking his opiates due to increase falls over the past few months.         ACS ruled out troponin negative x 3     PT seen by cardiology , echo was ordered pt refused     pt to follow up with own cardiologist         # S/P FALL mechanical vs syncope which i think is less likely     -pt seen by PT , pt ambulated 10 feet; x2, with rolling walker     - dispo to STR        pt was found to have hypomagnesemia , replenished 70 yo M with hx of CAD s/p stent x 3 on plavix, HTN, HLD, and KELSEY, bilateral knee replacement, presents for evaluation of fall, onset last night, associated with pain to the left chest, unknown LOC per pt. No fever, no chills, no headache, no n/v/d, no back pain, no abdominal pain. Pt states that he was walking last night and then fell. He cannot recall if he tripped and fell or if he fainted. Pt denies any other symptoms. Pt was receiving epidural injections from Pain Management but has not follow up since covid started. He also stopped taking his opiates due to increase falls over the past few months.         Patient was admitted for chest pain rule out ACS and for possible syncope. His ECG was unremarkable, he was monitored on telemetry. ACS was ruled out. He was evaluated by cardiology; patient refused ECHO. Physical therapy and  evaluated the patient for patient's mechanical fall. Patient was seen by PT, patient ambulated 10 feet; x2, with rolling walker. Patient's was also found to have hypomagnesemia, which was repleted. Patient is currently medically stable for a hospital discharge to [facility] [[email or web address]]

## 2020-06-02 NOTE — CONSULT NOTE ADULT - ASSESSMENT
Patient now claims tripped on pants. Not sure if any LOC. Chest pain from fall area tender palpation.  No mi. Can check ortho bp. Consider echo. Manage pain. Lidocaine patch helping

## 2020-06-02 NOTE — DISCHARGE NOTE PROVIDER - CARE PROVIDER_API CALL
EMILEE CORONADO  Internal Medicine  [address] [place] [address]  Phone: [phone]  Fax: [phone]  Follow Up Time: 1 week

## 2020-06-02 NOTE — PROGRESS NOTE ADULT - SUBJECTIVE AND OBJECTIVE BOX
YOLILIZZY  69y  Male      Patient is a 69y old  Male who presents with a chief complaint of fall (2020 11:39)      INTERVAL HPI/OVERNIGHT EVENTS:  Patient seen and examined earlier this morning. Patient denies any F/C, CP, palpitaitons, SOB, N/V, abd pain. He reports that his pain underneath left rib is improved with lidocaine patch. He reports that he was evaluated by physical therapy and rehab.       REVIEW OF SYSTEMS:  CONSTITUTIONAL: No fever, weight loss, or fatigue  EYES: No eye pain, visual disturbances, or discharge  ENMT:  No difficulty hearing, tinnitus, vertigo; No sinus or throat pain  NECK: No pain or stiffness  RESPIRATORY: No cough, wheezing, chills or hemoptysis; No shortness of breath  CARDIOVASCULAR: No chest pain, palpitations, dizziness, or leg swelling  GASTROINTESTINAL: No abdominal or epigastric pain. No nausea, vomiting, or hematemesis; No diarrhea or constipation. No melena or hematochezia.  GENITOURINARY: No dysuria, frequency, hematuria, or incontinence  NEUROLOGICAL: No headaches, memory loss, loss of strength, numbness, or tremors  MUSCULOSKELETAL: +left rib pain  PSYCHIATRIC: No depression, anxiety, mood swings, or difficulty sleeping    T(C): 36.4 (20 @ 05:00), Max: 36.7 (20 @ 13:59)  HR: 83 (20 @ 05:00) (81 - 85)  BP: 149/67 (20 @ 05:00) (145/87 - 169/78)  RR: 18 (20 @ 05:00) (18 - 18)  SpO2: 95% (20 @ 13:59) (95% - 95%)    PHYSICAL EXAM:  GENERAL: NAD, well-groomed, well-developed  HEAD:  Atraumatic, Normocephalic  EYES: EOMI, PERRLA, conjunctiva and sclera clear  ENMT: No tonsillar erythema, exudates, or enlargement; Moist mucous membranes, Good dentition, No lesions  NECK: Supple, No JVD, Normal thyroid  NERVOUS SYSTEM:  Alert & Oriented X3, Good concentration; Motor Strength 5/5 B/L upper and lower extremities; DTRs 2+ intact and symmetric  CHEST/LUNG: Clear to percussion bilaterally; No rales, rhonchi, wheezing, or rubs: tender to palpation in left rib (underneath breast)  HEART: Regular rate and rhythm; No murmurs, rubs, or gallops  ABDOMEN: Soft, Nontender, Nondistended; Bowel sounds present  EXTREMITIES:  2+ Peripheral Pulses, No clubbing, cyanosis, or edema    Consultant(s) Notes Reviewed:  [x ] YES  [ ] NO  Care Discussed with Consultants/Other Providers [ x] YES  [ ] NO    LAB:                        14.8   8.77  )-----------( 235      ( 2020 05:54 )             42.0     06-    139  |  98  |  22<H>  ----------------------------<  119<H>  4.2   |  29  |  1.0    Ca    9.2      2020 05:54  Mg     1.7     06-    TPro  7.2  /  Alb  4.4  /  TBili  1.2  /  DBili  x   /  AST  15  /  ALT  9   /  AlkPhos  119<H>  06-01    LIVER FUNCTIONS - ( 2020 08:29 )  Alb: 4.4 g/dL / Pro: 7.2 g/dL / ALK PHOS: 119 U/L / ALT: 9 U/L / AST: 15 U/L / GGT: x           CARDIAC MARKERS ( 2020 05:54 )  x     / <0.01 ng/mL / x     / x     / x      CARDIAC MARKERS ( 2020 19:00 )  x     / <0.01 ng/mL / x     / x     / x      CARDIAC MARKERS ( 2020 08:29 )  x     / <0.01 ng/mL / x     / x     / x                  Drug Dosing Weight  Height (cm): 177.8 (2020 02:30)  Weight (kg): 126.8 (2020 02:30)  BMI (kg/m2): 40.1 (2020 02:30)  BSA (m2): 2.41 (2020 02:30)  Height (cm): 177.8 (20 @ 02:30)  Weight (kg): 126.8 (20 @ 02:30)  BMI (kg/m2): 40.1 (20 @ 02:30)  BSA (m2): 2.41 (20 @ 02:30)  CAPILLARY BLOOD GLUCOSE        I&O's Summary    2020 07:01  -  2020 07:00  --------------------------------------------------------  IN: 0 mL / OUT: 125 mL / NET: -125 mL    2020 07:01  -  2020 12:35  --------------------------------------------------------  IN: 0 mL / OUT: 200 mL / NET: -200 mL      Urinalysis Basic - ( 2020 10:43 )    Color: Yellow / Appearance: Clear / S.015 / pH: x  Gluc: x / Ketone: Negative  / Bili: Negative / Urobili: 0.2 mg/dL   Blood: x / Protein: Negative mg/dL / Nitrite: Negative   Leuk Esterase: Negative / RBC: x / WBC 1-2 /HPF   Sq Epi: x / Non Sq Epi: Occasional /HPF / Bacteria: Few        RADIOLOGY & ADDITIONAL TESTS:  Imaging Personally Reviewed:  [x] YES  [ ] NO    HEALTH ISSUES - PROBLEM Dx:          MEDS:  acetaminophen   Tablet .. 650 milliGRAM(s) Oral every 6 hours PRN  aspirin  chewable 81 milliGRAM(s) Oral daily  ATENolol  Tablet 50 milliGRAM(s) Oral daily  atorvastatin 20 milliGRAM(s) Oral at bedtime  chlorhexidine 4% Liquid 1 Application(s) Topical <User Schedule>  clopidogrel Tablet 75 milliGRAM(s) Oral daily  diphenhydrAMINE   Injectable 25 milliGRAM(s) IV Push once PRN  enoxaparin Injectable 40 milliGRAM(s) SubCutaneous daily  hydrochlorothiazide 25 milliGRAM(s) Oral daily  lidocaine   Patch 2 Patch Transdermal daily  losartan 100 milliGRAM(s) Oral daily  pantoprazole    Tablet 40 milliGRAM(s) Oral before breakfast  potassium chloride    Tablet ER 20 milliEquivalent(s) Oral daily  sodium chloride 0.9%. 1000 milliLiter(s) IV Continuous <Continuous>  sucralfate 1 Gram(s) Oral two times a day

## 2020-06-16 DIAGNOSIS — M13.80 OTHER SPECIFIED ARTHRITIS, UNSPECIFIED SITE: ICD-10-CM

## 2020-06-16 DIAGNOSIS — I25.10 ATHEROSCLEROTIC HEART DISEASE OF NATIVE CORONARY ARTERY WITHOUT ANGINA PECTORIS: ICD-10-CM

## 2020-06-16 DIAGNOSIS — Z96.653 PRESENCE OF ARTIFICIAL KNEE JOINT, BILATERAL: ICD-10-CM

## 2020-06-16 DIAGNOSIS — E83.42 HYPOMAGNESEMIA: ICD-10-CM

## 2020-06-16 DIAGNOSIS — K21.9 GASTRO-ESOPHAGEAL REFLUX DISEASE WITHOUT ESOPHAGITIS: ICD-10-CM

## 2020-06-16 DIAGNOSIS — E78.00 PURE HYPERCHOLESTEROLEMIA, UNSPECIFIED: ICD-10-CM

## 2020-06-16 DIAGNOSIS — Z91.040 LATEX ALLERGY STATUS: ICD-10-CM

## 2020-06-16 DIAGNOSIS — G47.33 OBSTRUCTIVE SLEEP APNEA (ADULT) (PEDIATRIC): ICD-10-CM

## 2020-06-16 DIAGNOSIS — I10 ESSENTIAL (PRIMARY) HYPERTENSION: ICD-10-CM

## 2020-06-16 DIAGNOSIS — R26.89 OTHER ABNORMALITIES OF GAIT AND MOBILITY: ICD-10-CM

## 2020-06-16 DIAGNOSIS — R07.9 CHEST PAIN, UNSPECIFIED: ICD-10-CM

## 2020-06-16 DIAGNOSIS — Z88.2 ALLERGY STATUS TO SULFONAMIDES: ICD-10-CM

## 2020-06-16 DIAGNOSIS — Z91.013 ALLERGY TO SEAFOOD: ICD-10-CM

## 2020-06-16 DIAGNOSIS — E78.5 HYPERLIPIDEMIA, UNSPECIFIED: ICD-10-CM

## 2020-06-16 DIAGNOSIS — R07.89 OTHER CHEST PAIN: ICD-10-CM

## 2020-06-16 DIAGNOSIS — I25.2 OLD MYOCARDIAL INFARCTION: ICD-10-CM

## 2020-06-16 DIAGNOSIS — Z95.5 PRESENCE OF CORONARY ANGIOPLASTY IMPLANT AND GRAFT: ICD-10-CM

## 2020-06-16 DIAGNOSIS — Z88.8 ALLERGY STATUS TO OTHER DRUGS, MEDICAMENTS AND BIOLOGICAL SUBSTANCES STATUS: ICD-10-CM

## 2020-08-18 NOTE — PATIENT PROFILE ADULT - FUNCTIONAL SCREEN CURRENT LEVEL: DRESSING, MLM
----- Message from Zander Heredia PA-C sent at 8/18/2020  4:40 AM CDT -----  Regarding: f/u Dr Alfaro  Please schedule f/u w Dr Alfaro for February.   Thanks. PANCHO     0 = independent

## 2020-09-19 ENCOUNTER — EMERGENCY (EMERGENCY)
Facility: HOSPITAL | Age: 70
LOS: 0 days | Discharge: HOME | End: 2020-09-19
Attending: EMERGENCY MEDICINE | Admitting: EMERGENCY MEDICINE
Payer: MEDICARE

## 2020-09-19 VITALS
DIASTOLIC BLOOD PRESSURE: 84 MMHG | SYSTOLIC BLOOD PRESSURE: 165 MMHG | HEART RATE: 79 BPM | TEMPERATURE: 97 F | OXYGEN SATURATION: 97 % | RESPIRATION RATE: 18 BRPM

## 2020-09-19 VITALS
RESPIRATION RATE: 19 BRPM | DIASTOLIC BLOOD PRESSURE: 74 MMHG | TEMPERATURE: 98 F | SYSTOLIC BLOOD PRESSURE: 160 MMHG | HEART RATE: 80 BPM | OXYGEN SATURATION: 96 %

## 2020-09-19 DIAGNOSIS — R10.13 EPIGASTRIC PAIN: ICD-10-CM

## 2020-09-19 DIAGNOSIS — Z88.2 ALLERGY STATUS TO SULFONAMIDES: ICD-10-CM

## 2020-09-19 DIAGNOSIS — Z91.040 LATEX ALLERGY STATUS: ICD-10-CM

## 2020-09-19 DIAGNOSIS — R19.7 DIARRHEA, UNSPECIFIED: ICD-10-CM

## 2020-09-19 DIAGNOSIS — Z90.49 ACQUIRED ABSENCE OF OTHER SPECIFIED PARTS OF DIGESTIVE TRACT: Chronic | ICD-10-CM

## 2020-09-19 DIAGNOSIS — Z91.013 ALLERGY TO SEAFOOD: ICD-10-CM

## 2020-09-19 DIAGNOSIS — R10.9 UNSPECIFIED ABDOMINAL PAIN: ICD-10-CM

## 2020-09-19 DIAGNOSIS — Z98.84 BARIATRIC SURGERY STATUS: ICD-10-CM

## 2020-09-19 DIAGNOSIS — Z87.19 PERSONAL HISTORY OF OTHER DISEASES OF THE DIGESTIVE SYSTEM: Chronic | ICD-10-CM

## 2020-09-19 DIAGNOSIS — Z95.5 PRESENCE OF CORONARY ANGIOPLASTY IMPLANT AND GRAFT: Chronic | ICD-10-CM

## 2020-09-19 LAB
ALBUMIN SERPL ELPH-MCNC: 3.9 G/DL — SIGNIFICANT CHANGE UP (ref 3.5–5.2)
ALP SERPL-CCNC: 97 U/L — SIGNIFICANT CHANGE UP (ref 30–115)
ALT FLD-CCNC: 11 U/L — SIGNIFICANT CHANGE UP (ref 0–41)
ANION GAP SERPL CALC-SCNC: 13 MMOL/L — SIGNIFICANT CHANGE UP (ref 7–14)
APTT BLD: 28 SEC — SIGNIFICANT CHANGE UP (ref 27–39.2)
AST SERPL-CCNC: 18 U/L — SIGNIFICANT CHANGE UP (ref 0–41)
BASOPHILS # BLD AUTO: 0.02 K/UL — SIGNIFICANT CHANGE UP (ref 0–0.2)
BASOPHILS NFR BLD AUTO: 0.3 % — SIGNIFICANT CHANGE UP (ref 0–1)
BILIRUB SERPL-MCNC: 0.7 MG/DL — SIGNIFICANT CHANGE UP (ref 0.2–1.2)
BUN SERPL-MCNC: 21 MG/DL — HIGH (ref 10–20)
CALCIUM SERPL-MCNC: 9.6 MG/DL — SIGNIFICANT CHANGE UP (ref 8.5–10.1)
CHLORIDE SERPL-SCNC: 102 MMOL/L — SIGNIFICANT CHANGE UP (ref 98–110)
CO2 SERPL-SCNC: 28 MMOL/L — SIGNIFICANT CHANGE UP (ref 17–32)
CREAT SERPL-MCNC: 1 MG/DL — SIGNIFICANT CHANGE UP (ref 0.7–1.5)
EOSINOPHIL # BLD AUTO: 0.07 K/UL — SIGNIFICANT CHANGE UP (ref 0–0.7)
EOSINOPHIL NFR BLD AUTO: 1 % — SIGNIFICANT CHANGE UP (ref 0–8)
GLUCOSE SERPL-MCNC: 101 MG/DL — HIGH (ref 70–99)
HCT VFR BLD CALC: 41.1 % — LOW (ref 42–52)
HGB BLD-MCNC: 14.1 G/DL — SIGNIFICANT CHANGE UP (ref 14–18)
IMM GRANULOCYTES NFR BLD AUTO: 0.4 % — HIGH (ref 0.1–0.3)
INR BLD: 1.28 RATIO — SIGNIFICANT CHANGE UP (ref 0.65–1.3)
LACTATE SERPL-SCNC: 1.3 MMOL/L — SIGNIFICANT CHANGE UP (ref 0.7–2)
LIDOCAIN IGE QN: 21 U/L — SIGNIFICANT CHANGE UP (ref 7–60)
LYMPHOCYTES # BLD AUTO: 2.26 K/UL — SIGNIFICANT CHANGE UP (ref 1.2–3.4)
LYMPHOCYTES # BLD AUTO: 32.9 % — SIGNIFICANT CHANGE UP (ref 20.5–51.1)
MCHC RBC-ENTMCNC: 32.2 PG — HIGH (ref 27–31)
MCHC RBC-ENTMCNC: 34.3 G/DL — SIGNIFICANT CHANGE UP (ref 32–37)
MCV RBC AUTO: 93.8 FL — SIGNIFICANT CHANGE UP (ref 80–94)
MONOCYTES # BLD AUTO: 0.65 K/UL — HIGH (ref 0.1–0.6)
MONOCYTES NFR BLD AUTO: 9.5 % — HIGH (ref 1.7–9.3)
NEUTROPHILS # BLD AUTO: 3.83 K/UL — SIGNIFICANT CHANGE UP (ref 1.4–6.5)
NEUTROPHILS NFR BLD AUTO: 55.9 % — SIGNIFICANT CHANGE UP (ref 42.2–75.2)
NRBC # BLD: 0 /100 WBCS — SIGNIFICANT CHANGE UP (ref 0–0)
PLATELET # BLD AUTO: 194 K/UL — SIGNIFICANT CHANGE UP (ref 130–400)
POTASSIUM SERPL-MCNC: 3.7 MMOL/L — SIGNIFICANT CHANGE UP (ref 3.5–5)
POTASSIUM SERPL-SCNC: 3.7 MMOL/L — SIGNIFICANT CHANGE UP (ref 3.5–5)
PROT SERPL-MCNC: 6.6 G/DL — SIGNIFICANT CHANGE UP (ref 6–8)
PROTHROM AB SERPL-ACNC: 14.7 SEC — HIGH (ref 9.95–12.87)
RBC # BLD: 4.38 M/UL — LOW (ref 4.7–6.1)
RBC # FLD: 12.6 % — SIGNIFICANT CHANGE UP (ref 11.5–14.5)
SODIUM SERPL-SCNC: 143 MMOL/L — SIGNIFICANT CHANGE UP (ref 135–146)
TROPONIN T SERPL-MCNC: 0.01 NG/ML — SIGNIFICANT CHANGE UP
WBC # BLD: 6.86 K/UL — SIGNIFICANT CHANGE UP (ref 4.8–10.8)
WBC # FLD AUTO: 6.86 K/UL — SIGNIFICANT CHANGE UP (ref 4.8–10.8)

## 2020-09-19 PROCEDURE — 71045 X-RAY EXAM CHEST 1 VIEW: CPT | Mod: 26

## 2020-09-19 PROCEDURE — 99285 EMERGENCY DEPT VISIT HI MDM: CPT

## 2020-09-19 PROCEDURE — 74176 CT ABD & PELVIS W/O CONTRAST: CPT | Mod: 26

## 2020-09-19 RX ORDER — ASPIRIN/CALCIUM CARB/MAGNESIUM 324 MG
1 TABLET ORAL
Qty: 0 | Refills: 0 | DISCHARGE

## 2020-09-19 RX ORDER — ONDANSETRON 8 MG/1
4 TABLET, FILM COATED ORAL ONCE
Refills: 0 | Status: COMPLETED | OUTPATIENT
Start: 2020-09-19 | End: 2020-09-19

## 2020-09-19 RX ORDER — MORPHINE SULFATE 50 MG/1
4 CAPSULE, EXTENDED RELEASE ORAL ONCE
Refills: 0 | Status: DISCONTINUED | OUTPATIENT
Start: 2020-09-19 | End: 2020-09-19

## 2020-09-19 RX ORDER — FAMOTIDINE 10 MG/ML
20 INJECTION INTRAVENOUS ONCE
Refills: 0 | Status: COMPLETED | OUTPATIENT
Start: 2020-09-19 | End: 2020-09-19

## 2020-09-19 RX ORDER — SODIUM CHLORIDE 9 MG/ML
1000 INJECTION INTRAMUSCULAR; INTRAVENOUS; SUBCUTANEOUS ONCE
Refills: 0 | Status: COMPLETED | OUTPATIENT
Start: 2020-09-19 | End: 2020-09-19

## 2020-09-19 RX ADMIN — ONDANSETRON 4 MILLIGRAM(S): 8 TABLET, FILM COATED ORAL at 04:54

## 2020-09-19 RX ADMIN — MORPHINE SULFATE 4 MILLIGRAM(S): 50 CAPSULE, EXTENDED RELEASE ORAL at 04:54

## 2020-09-19 RX ADMIN — FAMOTIDINE 20 MILLIGRAM(S): 10 INJECTION INTRAVENOUS at 04:54

## 2020-09-19 RX ADMIN — SODIUM CHLORIDE 1000 MILLILITER(S): 9 INJECTION INTRAMUSCULAR; INTRAVENOUS; SUBCUTANEOUS at 04:53

## 2020-09-19 NOTE — ED PROVIDER NOTE - CLINICAL SUMMARY MEDICAL DECISION MAKING FREE TEXT BOX
Labs unremarkable. EKG NSR no stemi. CT abdo no acute pathology. Given IVF, Pepcid, morphine and Zofran with improvement. Will D/C to follow up with GI.

## 2020-09-19 NOTE — ED PROVIDER NOTE - CARE PROVIDER_API CALL
Scot Pacheco  GASTROENTEROLOGY  [address] [place] [address]  Phone: [phone]  Fax: [phone]  Follow Up Time:

## 2020-09-19 NOTE — ED PROVIDER NOTE - PATIENT PORTAL LINK FT
You can access the FollowMyHealth Patient Portal offered by [facility] by registering at the following website: [email or web address]. By joining [email or web address]’s FollowMyHealth portal, you will also be able to view your health information using other applications (apps) compatible with our system.

## 2020-09-19 NOTE — ED PROVIDER NOTE - ATTENDING CONTRIBUTION TO CARE
I personally evaluated the patient. I reviewed the Resident’s or Physician Assistant’s note (as assigned above), and agree with the findings and plan except as documented in my note.  Chart reviewed. H/O HTN, HLD, cardiac stent, presents with epigastric pain and diarrhea x 1 episode. Exam shows alert patient in no distress, HEENT NCAT, lungs clear, RR S1S2, abdomen soft +epigastric tenderness +BS, no rebound or guarding, no CCE.

## 2020-09-19 NOTE — ED PROVIDER NOTE - NSFOLLOWUPINSTRUCTIONS_ED_ALL_ED_FT
Abdominal Pain    Many things can cause abdominal pain. Usually, abdominal pain is not caused by a disease and will improve without treatment. Your health care provider will do a physical exam and possibly order blood tests and imaging to help determine the seriousness of your pain. However, in many cases, no cause may be found and you may need further testing as an outpatient. Monitor your abdominal pain for any changes.     SEEK IMMEDIATE MEDICAL CARE IF YOU HAVE THE FOLLOWING SYMPTOMS: worsening abdominal pain, vomiting, diarrhea, inability to have bowel movements or pass gas, black or bloody stool, fever accompanying chest pain or back pain, or dizziness/lightheadedness.     Diarrhea    Diarrhea is frequent loose and watery bowel movements that has many causes. Diarrhea can make you feel weak and cause you to become dehydrated. Diarrhea typically lasts 2–3 days. However, it can last longer if it is a sign of something more serious. Drink clear fluids to prevent dehydration. Eat bland, easy-to-digest foods as tolerated.     SEEK IMMEDIATE MEDICAL CARE IF YOU HAVE THE FOLLOWING SYMPTOMS: fever, lightheadedness/dizziness, chest pain, black or bloody stools, shortness of breath, severe abdominal or back pain, or any signs of dehydration.

## 2020-09-19 NOTE — ED PROVIDER NOTE - PHYSICAL EXAMINATION
CONSTITUTIONAL: Well-appearing; well-nourished; in no apparent distress.   EYES: PERRL; EOM intact.   CARDIOVASCULAR: Normal S1, S2; no murmurs, rubs, or gallops.   RESPIRATORY: Normal chest excursion with respiration; breath sounds clear and equal bilaterally; no wheezes, rhonchi, or rales.  GI/: + epigastric tenderness. obese round and soft abdomen. Normal bowel sounds; non-distended; no palpable organomegaly.   MS: No evidence of trauma or deformity to extremities   SKIN: Normal for age and race; warm; dry; good turgor; no apparent lesions or exudate.   NEURO/PSYCH: A & O x 4; grossly unremarkable.

## 2020-09-30 ENCOUNTER — EMERGENCY (EMERGENCY)
Facility: HOSPITAL | Age: 70
LOS: 0 days | Discharge: HOME | End: 2020-09-30
Attending: EMERGENCY MEDICINE | Admitting: EMERGENCY MEDICINE
Payer: MEDICARE

## 2020-09-30 VITALS
HEIGHT: 70 IN | DIASTOLIC BLOOD PRESSURE: 76 MMHG | SYSTOLIC BLOOD PRESSURE: 159 MMHG | OXYGEN SATURATION: 94 % | TEMPERATURE: 99 F | WEIGHT: 220.02 LBS | HEART RATE: 73 BPM | RESPIRATION RATE: 20 BRPM

## 2020-09-30 VITALS
TEMPERATURE: 96 F | OXYGEN SATURATION: 98 % | DIASTOLIC BLOOD PRESSURE: 75 MMHG | HEART RATE: 66 BPM | SYSTOLIC BLOOD PRESSURE: 149 MMHG | RESPIRATION RATE: 20 BRPM

## 2020-09-30 DIAGNOSIS — G47.30 SLEEP APNEA, UNSPECIFIED: ICD-10-CM

## 2020-09-30 DIAGNOSIS — Z95.5 PRESENCE OF CORONARY ANGIOPLASTY IMPLANT AND GRAFT: Chronic | ICD-10-CM

## 2020-09-30 DIAGNOSIS — E78.5 HYPERLIPIDEMIA, UNSPECIFIED: ICD-10-CM

## 2020-09-30 DIAGNOSIS — R06.02 SHORTNESS OF BREATH: ICD-10-CM

## 2020-09-30 DIAGNOSIS — R07.81 PLEURODYNIA: ICD-10-CM

## 2020-09-30 DIAGNOSIS — I10 ESSENTIAL (PRIMARY) HYPERTENSION: ICD-10-CM
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
## 2020-09-30 DIAGNOSIS — I25.2 OLD MYOCARDIAL INFARCTION: ICD-10-CM
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
## 2020-09-30 DIAGNOSIS — R05 COUGH: ICD-10-CM
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
## 2020-09-30 DIAGNOSIS — Z90.49 ACQUIRED ABSENCE OF OTHER SPECIFIED PARTS OF DIGESTIVE TRACT: Chronic | ICD-10-CM

## 2020-09-30 DIAGNOSIS — E78.00 PURE HYPERCHOLESTEROLEMIA, UNSPECIFIED: ICD-10-CM

## 2020-09-30 DIAGNOSIS — Z87.19 PERSONAL HISTORY OF OTHER DISEASES OF THE DIGESTIVE SYSTEM: Chronic | ICD-10-CM

## 2020-09-30 DIAGNOSIS — I25.10 ATHEROSCLEROTIC HEART DISEASE OF NATIVE CORONARY ARTERY WITHOUT ANGINA PECTORIS: ICD-10-CM

## 2020-09-30 LAB
ALBUMIN SERPL ELPH-MCNC: 4.1 G/DL — SIGNIFICANT CHANGE UP (ref 3.5–5.2)
ALP SERPL-CCNC: 97 U/L — SIGNIFICANT CHANGE UP (ref 30–115)
ALT FLD-CCNC: 10 U/L — SIGNIFICANT CHANGE UP (ref 0–41)
ANION GAP SERPL CALC-SCNC: 12 MMOL/L — SIGNIFICANT CHANGE UP (ref 7–14)
APTT BLD: 27.5 SEC — SIGNIFICANT CHANGE UP (ref 27–39.2)
AST SERPL-CCNC: 15 U/L — SIGNIFICANT CHANGE UP (ref 0–41)
BASOPHILS # BLD AUTO: 0.02 K/UL — SIGNIFICANT CHANGE UP (ref 0–0.2)
BASOPHILS NFR BLD AUTO: 0.3 % — SIGNIFICANT CHANGE UP (ref 0–1)
BILIRUB SERPL-MCNC: 1 MG/DL — SIGNIFICANT CHANGE UP (ref 0.2–1.2)
BUN SERPL-MCNC: 16 MG/DL — SIGNIFICANT CHANGE UP (ref 10–20)
CALCIUM SERPL-MCNC: 9.3 MG/DL — SIGNIFICANT CHANGE UP (ref 8.5–10.1)
CHLORIDE SERPL-SCNC: 99 MMOL/L — SIGNIFICANT CHANGE UP (ref 98–110)
CO2 SERPL-SCNC: 29 MMOL/L — SIGNIFICANT CHANGE UP (ref 17–32)
CREAT SERPL-MCNC: 0.9 MG/DL — SIGNIFICANT CHANGE UP (ref 0.7–1.5)
EOSINOPHIL # BLD AUTO: 0.05 K/UL — SIGNIFICANT CHANGE UP (ref 0–0.7)
EOSINOPHIL NFR BLD AUTO: 0.8 % — SIGNIFICANT CHANGE UP (ref 0–8)
GLUCOSE SERPL-MCNC: 103 MG/DL — HIGH (ref 70–99)
HCT VFR BLD CALC: 44 % — SIGNIFICANT CHANGE UP (ref 42–52)
HGB BLD-MCNC: 15.4 G/DL — SIGNIFICANT CHANGE UP (ref 14–18)
IMM GRANULOCYTES NFR BLD AUTO: 0.3 % — SIGNIFICANT CHANGE UP (ref 0.1–0.3)
INR BLD: 1.29 RATIO — SIGNIFICANT CHANGE UP (ref 0.65–1.3)
LYMPHOCYTES # BLD AUTO: 1.84 K/UL — SIGNIFICANT CHANGE UP (ref 1.2–3.4)
LYMPHOCYTES # BLD AUTO: 28.7 % — SIGNIFICANT CHANGE UP (ref 20.5–51.1)
MCHC RBC-ENTMCNC: 32.6 PG — HIGH (ref 27–31)
MCHC RBC-ENTMCNC: 35 G/DL — SIGNIFICANT CHANGE UP (ref 32–37)
MCV RBC AUTO: 93 FL — SIGNIFICANT CHANGE UP (ref 80–94)
MONOCYTES # BLD AUTO: 0.63 K/UL — HIGH (ref 0.1–0.6)
MONOCYTES NFR BLD AUTO: 9.8 % — HIGH (ref 1.7–9.3)
NEUTROPHILS # BLD AUTO: 3.86 K/UL — SIGNIFICANT CHANGE UP (ref 1.4–6.5)
NEUTROPHILS NFR BLD AUTO: 60.1 % — SIGNIFICANT CHANGE UP (ref 42.2–75.2)
NRBC # BLD: 0 /100 WBCS — SIGNIFICANT CHANGE UP (ref 0–0)
NT-PROBNP SERPL-SCNC: 184 PG/ML — SIGNIFICANT CHANGE UP (ref 0–300)
PLATELET # BLD AUTO: 200 K/UL — SIGNIFICANT CHANGE UP (ref 130–400)
POTASSIUM SERPL-MCNC: 3.4 MMOL/L — LOW (ref 3.5–5)
POTASSIUM SERPL-SCNC: 3.4 MMOL/L — LOW (ref 3.5–5)
PROT SERPL-MCNC: 6.7 G/DL — SIGNIFICANT CHANGE UP (ref 6–8)
PROTHROM AB SERPL-ACNC: 14.8 SEC — HIGH (ref 9.95–12.87)
RAPID RVP RESULT: SIGNIFICANT CHANGE UP
RBC # BLD: 4.73 M/UL — SIGNIFICANT CHANGE UP (ref 4.7–6.1)
RBC # FLD: 12.9 % — SIGNIFICANT CHANGE UP (ref 11.5–14.5)
SARS-COV-2 RNA SPEC QL NAA+PROBE: SIGNIFICANT CHANGE UP
SODIUM SERPL-SCNC: 140 MMOL/L — SIGNIFICANT CHANGE UP (ref 135–146)
TROPONIN T SERPL-MCNC: 0.01 NG/ML — SIGNIFICANT CHANGE UP
TROPONIN T SERPL-MCNC: <0.01 NG/ML — SIGNIFICANT CHANGE UP
WBC # BLD: 6.42 K/UL — SIGNIFICANT CHANGE UP (ref 4.8–10.8)
WBC # FLD AUTO: 6.42 K/UL — SIGNIFICANT CHANGE UP (ref 4.8–10.8)

## 2020-09-30 PROCEDURE — 71275 CT ANGIOGRAPHY CHEST: CPT | Mod: 26

## 2020-09-30 PROCEDURE — 71045 X-RAY EXAM CHEST 1 VIEW: CPT | Mod: 26

## 2020-09-30 PROCEDURE — 99220: CPT | Mod: GC

## 2020-09-30 PROCEDURE — 93010 ELECTROCARDIOGRAM REPORT: CPT

## 2020-09-30 PROCEDURE — 78452 HT MUSCLE IMAGE SPECT MULT: CPT | Mod: 26

## 2020-09-30 PROCEDURE — 93016 CV STRESS TEST SUPVJ ONLY: CPT

## 2020-09-30 PROCEDURE — 93018 CV STRESS TEST I&R ONLY: CPT

## 2020-09-30 RX ORDER — POTASSIUM CHLORIDE 20 MEQ
20 PACKET (EA) ORAL ONCE
Refills: 0 | Status: COMPLETED | OUTPATIENT
Start: 2020-09-30 | End: 2020-09-30

## 2020-09-30 RX ORDER — ASPIRIN/CALCIUM CARB/MAGNESIUM 324 MG
81 TABLET ORAL DAILY
Refills: 0 | Status: DISCONTINUED | OUTPATIENT
Start: 2020-09-30 | End: 2020-09-30

## 2020-09-30 RX ORDER — POTASSIUM CHLORIDE 20 MEQ
10 PACKET (EA) ORAL DAILY
Refills: 0 | Status: DISCONTINUED | OUTPATIENT
Start: 2020-09-30 | End: 2020-09-30

## 2020-09-30 RX ORDER — KETOROLAC TROMETHAMINE 30 MG/ML
15 SYRINGE (ML) INJECTION ONCE
Refills: 0 | Status: DISCONTINUED | OUTPATIENT
Start: 2020-09-30 | End: 2020-09-30

## 2020-09-30 RX ORDER — SUCRALFATE 1 G
1 TABLET ORAL ONCE
Refills: 0 | Status: COMPLETED | OUTPATIENT
Start: 2020-09-30 | End: 2020-09-30

## 2020-09-30 RX ORDER — ATENOLOL 25 MG/1
50 TABLET ORAL ONCE
Refills: 0 | Status: COMPLETED | OUTPATIENT
Start: 2020-09-30 | End: 2020-09-30

## 2020-09-30 RX ORDER — ADENOSINE 3 MG/ML
60 INJECTION INTRAVENOUS ONCE
Refills: 0 | Status: COMPLETED | OUTPATIENT
Start: 2020-09-30 | End: 2020-09-30

## 2020-09-30 RX ORDER — ACETAMINOPHEN 500 MG
975 TABLET ORAL ONCE
Refills: 0 | Status: COMPLETED | OUTPATIENT
Start: 2020-09-30 | End: 2020-09-30

## 2020-09-30 RX ORDER — DIPHENHYDRAMINE HCL 50 MG
50 CAPSULE ORAL ONCE
Refills: 0 | Status: COMPLETED | OUTPATIENT
Start: 2020-09-30 | End: 2020-09-30

## 2020-09-30 RX ORDER — CLOPIDOGREL BISULFATE 75 MG/1
75 TABLET, FILM COATED ORAL DAILY
Refills: 0 | Status: DISCONTINUED | OUTPATIENT
Start: 2020-09-30 | End: 2020-09-30

## 2020-09-30 RX ORDER — FAMOTIDINE 10 MG/ML
20 INJECTION INTRAVENOUS ONCE
Refills: 0 | Status: COMPLETED | OUTPATIENT
Start: 2020-09-30 | End: 2020-09-30

## 2020-09-30 RX ORDER — ATORVASTATIN CALCIUM 80 MG/1
20 TABLET, FILM COATED ORAL AT BEDTIME
Refills: 0 | Status: DISCONTINUED | OUTPATIENT
Start: 2020-09-30 | End: 2020-09-30

## 2020-09-30 RX ADMIN — Medication 975 MILLIGRAM(S): at 05:40

## 2020-09-30 RX ADMIN — Medication 975 MILLIGRAM(S): at 16:38

## 2020-09-30 RX ADMIN — Medication 20 MILLIEQUIVALENT(S): at 08:19

## 2020-09-30 RX ADMIN — Medication 50 MILLIGRAM(S): at 08:26

## 2020-09-30 RX ADMIN — Medication 15 MILLIGRAM(S): at 08:26

## 2020-09-30 RX ADMIN — FAMOTIDINE 20 MILLIGRAM(S): 10 INJECTION INTRAVENOUS at 08:27

## 2020-09-30 RX ADMIN — ADENOSINE 600 MILLIGRAM(S): 3 INJECTION INTRAVENOUS at 14:25

## 2020-09-30 NOTE — ED ADULT TRIAGE NOTE - HEIGHT IN INCHES
Regarding: Medication advise, vomiting and fever  ----- Message from Becky Couch sent at 9/2/2020  8:19 AM PDT -----  Patient is vomiting and had a fever. Mom Lisset would like advise to medication while waits for appointment with primary care this afternoon.      Mother says she lost the syringe that came with the Tylenol. She said she lives near a pharmacy. Suggested she ask to pharmacist for another 5cc syringe.  
10

## 2020-09-30 NOTE — ED CDU PROVIDER DISPOSITION NOTE - NSFOLLOWUPINSTRUCTIONS_ED_ALL_ED_FT
follow up PMD/ CARDIOLOGY     Chest Pain    Chest pain can be caused by many different conditions which may or may not be dangerous. Causes include heartburn, lung infections, heart attack, blood clot in lungs, skin infections, strain or damage to muscle, cartilage, or bones, etc. In addition to a history and physical examination, an electrocardiogram (ECG) or other lab tests may have been performed to determine the cause of your chest pain. Follow up with your primary care provider or with a cardiologist as instructed.     SEEK IMMEDIATE MEDICAL CARE IF YOU HAVE ANY OF THE FOLLOWING SYMPTOMS: worsening chest pain, coughing up blood, unexplained back/neck/jaw pain, severe abdominal pain, dizziness or lightheadedness, fainting, shortness of breath, sweaty or clammy skin, vomiting, or racing heart beat. These symptoms may represent a serious problem that is an emergency. Do not wait to see if the symptoms will go away. Get medical help right away. Call 911 and do not drive yourself to the hospital.

## 2020-09-30 NOTE — ED CDU PROVIDER INITIAL DAY NOTE - OBJECTIVE STATEMENT
pt is a 70y with pmhx, HTN, HLD, CAD s/p 3 stents by his cardiologist Dr. Brunson in 1998 comes to the ed c/p abdominal pain/ rib pain s/p falling in june. pt had rib fractures and now has different type pain associated with palpitations and SOB. pt comes t the ed cause he is worried and hasn't had cardiac work up in a long time. pt has + family hx of CAD father and is non smoker. pt denies fever, chills, nausea, vomiting, dizziness, headache, visual changes, back pain, leg swelling, recent travel.

## 2020-09-30 NOTE — ED PROVIDER NOTE - NSFOLLOWUPINSTRUCTIONS_ED_ALL_ED_FT
Chest Wall Pain    Chest wall pain is pain in or around the bones and muscles of your chest. Chest wall pain may be caused by:  •An injury.      •Coughing a lot.      •Using your chest and arm muscles too much.      Sometimes, the cause may not be known. This pain may take a few weeks or longer to get better.      Follow these instructions at home:      Managing pain, stiffness, and swelling   If told, put ice on the painful area:  •Put ice in a plastic bag.      •Place a towel between your skin and the bag.      •Leave the ice on for 20 minutes, 2–3 times a day.      Activity     •Rest as told by your doctor.      •Avoid doing things that cause pain. This includes lifting heavy items.      •Ask your doctor what activities are safe for you.        General instructions      •Take over-the-counter and prescription medicines only as told by your doctor.      • Do not use any products that contain nicotine or tobacco, such as cigarettes, e-cigarettes, and chewing tobacco. If you need help quitting, ask your doctor.      •Keep all follow-up visits as told by your doctor. This is important.        Contact a doctor if:    •You have a fever.      •Your chest pain gets worse.      •You have new symptoms.        Get help right away if:    •You feel sick to your stomach (nauseous) or you throw up (vomit).      •You feel sweaty or light-headed.      •You have a cough with mucus from your lungs (sputum) or you cough up blood.      •You are short of breath.      These symptoms may be an emergency. Do not wait to see if the symptoms will go away. Get medical help right away. Call your local emergency services (911 in the [place] Do not drive yourself to the hospital.       Summary    •Chest wall pain is pain in or around the bones and muscles of your chest.      •It may be treated with ice, rest, and medicines. Your condition may also get better if you avoid doing things that cause pain.      •Contact a doctor if you have a fever, chest pain that gets worse, or new symptoms.      •Get help right away if you feel light-headed or you get short of breath. These symptoms may be an emergency.      This information is not intended to replace advice given to you by your health care provider. Make sure you discuss any questions you have with your health care provider.

## 2020-09-30 NOTE — ED PROVIDER NOTE - PHYSICAL EXAMINATION
PHYSICAL EXAM:    GENERAL: Alert, appears stated age, well appearing, non-toxic  SKIN: Warm, pink and dry. MMM.   EYE: Normal lids/conjunctiva  ENT: Normal hearing, patent oropharynx   NECK: +supple. No meningismus, or JVD, +Trachea midline. no tenderness/step offs.   Pulm: Bilateral BS, normal resp effort, no wheezes, stridor, or retractions  CV: RRR, no M/R/G, 2+and = radial pulses. +L rib TTP. no other TTP. no skin changes.   Abd: soft, non-tender, non-distended  Mskel: no erythema, cyanosis, edema. no calf tenderness  Neuro: AAOx3, moving all extremities. PHYSICAL EXAM:    GENERAL: Alert, appears stated age, well appearing, non-toxic  SKIN: Warm, pink and dry. MMM.   EYE: Normal lids/conjunctiva  ENT: Normal hearing, patent oropharynx   NECK: +supple. No meningismus, or JVD, +Trachea midline. no tenderness/step offs.   Pulm: Bilateral BS, normal resp effort, no wheezes, stridor, or retractions. speaking in full sentences.   CV: RRR, no M/R/G, 2+and = radial pulses. +L rib TTP. no other TTP. no skin changes.   Abd: soft, non-tender, non-distended  Mskel: no erythema, cyanosis, edema. no calf tenderness  Neuro: AAOx3, moving all extremities.

## 2020-09-30 NOTE — ED ADULT TRIAGE NOTE - SPO2 (%)
94 Size Of Lesion: 1 X Size Of Lesion In Cm (Optional): 0.8 Body Location Override (Optional - Billing Will Still Be Based On Selected Body Map Location If Applicable): left dorsal hand Detail Level: Detailed Incorporate Mauc In Note: Yes Location Indication Override (Is Already Calculated Based On Selected Body Location): Area H

## 2020-09-30 NOTE — ED CDU PROVIDER DISPOSITION NOTE - PATIENT PORTAL LINK FT
You can access the FollowMyHealth Patient Portal offered by [facility] by registering at the following website: [email or web address]. By joining [email or web address]’s FollowMyHealth portal, you will also be able to view your health information using other applications (apps) compatible with our system.

## 2020-09-30 NOTE — ED PROVIDER NOTE - ATTENDING CONTRIBUTION TO CARE
I personally evaluated the patient. I reviewed the Resident’s or Physician Assistant’s note (as assigned above), and agree with the findings and plan except as documented in my note.    69y M w/ PMH of CAD s/p stent x 3, HTN, HLD, and KELSEY presents w 2 weeks of SOB and exertional dyspnea. Also reports left sided cp for 4 weeks since the fall. No abd pain. No leg swelling.    CONSTITUTIONAL: Well-developed; well-nourished; in no acute distress. Sitting up and providing appropriate history and physical examination  SKIN: skin exam is warm and dry, no acute rash.  HEAD: Normocephalic; atraumatic.  EYES: PERRL, 3 mm bilateral, no nystagmus, EOM intact; conjunctiva and sclera clear.  ENT: No nasal discharge; airway clear.  NECK: Supple; non tender.+ full passive ROM in all directions. No JVD  CARD: S1, S2 normal; no murmurs, gallops, or rubs. Regular rate and rhythm. + Symmetric Strong Pulses  RESP: No wheezes, rales or rhonchi. Good air movement bilaterally  ABD: soft; non-distended; non-tender. No Rebound, No Gaurding, No signs of peritnitis, No CVA tenderness  EXT: Normal ROM. No clubbing, cyanosis or edema. Dp and Pt Pulses intact. Cap refill less than 3 seconds  NEURO: CN 2-12 intact, normal finger to nose, normal romberg, stable gait, no sensory or motor deficits, Alert, oriented, grossly unremarkable. No Focal deficits. GCS 15. NIH 0  PSYCH: Cooperative, appropriate.    Plan- labs, ECG, CXR, CT to r/o PE, reassess

## 2020-09-30 NOTE — ED PROVIDER NOTE - PROGRESS NOTE DETAILS
PALEVY: O2 saturation baseline. Reviewed all results and necessity for follow up. Counseled on red flags and to return for them.  Patient appears well on discharge. WILI: sign out received from DORA Tomas, patient here with cough, left rib pain since fall several weeks ago. currently resting comfortably, no complaints. pending cta, disposition. will continue to monitor. WILI: CTA negative per attending Dr. Quevedo (abnl indicator on results tab), will admit to OBS for stress, DORA Bell aware.

## 2020-09-30 NOTE — ED CDU PROVIDER INITIAL DAY NOTE - MEDICAL DECISION MAKING DETAILS
Patient to remain on continuous telemetry.  For repeat cardiac enzymes and EKG.  Likely nuclear stress testing.

## 2020-09-30 NOTE — ED PROVIDER NOTE - NS ED ROS FT
Review of Systems    Constitutional: (-) fever   Eyes/ENT: (-) vision changes  Cardiovascular: (+) chest pain, (-) syncope (-) palpitations  Respiratory: (+) cough, (-) shortness of breath  Gastrointestinal: (-) vomiting, (-) diarrhea (-)black/bloody stools (-) abdominal pain  Genitourinary:  (-) dysuria   Musculoskeletal: (-) neck pain, (-) back pain, (-) leg pain/swelling  Integumentary: (-) rash, (-) edema  Neurological: (-) headache  Hematologic: (-) easy bruising   Allergic/Immunologic: (-) pruritus

## 2020-09-30 NOTE — ED ADULT NURSE REASSESSMENT NOTE - NS ED NURSE REASSESS COMMENT FT1
pt endorsed, pt A&ox3, calm, laying in stretcher, pt states he came in for left side pain, pt states history of left rib fx after a fall in june. pt denies CP at this time. pulse ox monitor in place sating 98% on room air. pt has no s/s of acute distress. awaiting CT, safety measures maintained, will continue to monitor

## 2020-09-30 NOTE — ED CDU PROVIDER DISPOSITION NOTE - CARE PROVIDER_API CALL
EMILEE CORONADO  Internal Medicine  [address] [place] [address]  Phone: [phone]  Fax: [phone]  Follow Up Time:

## 2020-09-30 NOTE — ED PROVIDER NOTE - OBJECTIVE STATEMENT
69 y/o M with PMH HTN, HLD, CAD s/p PCI x 3 (1998, Dr. Brunson) on plavix, KELSEY presents with L rib pain x mos s/p fall in june with rib fractures on this side. no change in sxs. +worse with palpation, better with rest.   +cough, chronic unchanged.   +SOB.   no leg pain/swelling.   no abdominal pain/fever.

## 2020-09-30 NOTE — ED PROVIDER NOTE - CARE PLAN
Principal Discharge DX:	Rib pain   Principal Discharge DX:	Rib pain  Secondary Diagnosis:	Shortness of breath

## 2020-09-30 NOTE — ED PROVIDER NOTE - CLINICAL SUMMARY MEDICAL DECISION MAKING FREE TEXT BOX
pt presents w/ L sided chest pain and extertional dyspnea x 2 weeks. ed w/u negative- basic labs, trop. cta chest w/o PE or acute pulm findings. will obs for acs r/o

## 2020-09-30 NOTE — ED PROVIDER NOTE - PATIENT PORTAL LINK FT
You can access the FollowMyHealth Patient Portal offered by [facility] by registering at the following website: [email or web address]. By joining [employer]’s FollowMyHealth portal, you will also be able to view your health information using other applications (apps) compatible with our system.

## 2020-09-30 NOTE — ED CDU PROVIDER DISPOSITION NOTE - CLINICAL COURSE
pt comes in c/o rib pain associated with CP, palpitations and SOB. Pt has not had recetn cardiac work up in a while, non smoker + family hx. Pt had negative ekg and tropsx2 and NORMAL NUCLEAR STRESS TEST no evidence of ischemia. will dispo home with out patient follow up with his cardiologist. plan discussed with patient bedside.

## 2020-10-24 ENCOUNTER — INPATIENT (INPATIENT)
Facility: HOSPITAL | Age: 70
LOS: 4 days | Discharge: NURSING, REHAB OR LONG-TERM CARE | End: 2020-10-29
Attending: INTERNAL MEDICINE | Admitting: INTERNAL MEDICINE
Payer: MEDICARE

## 2020-10-24 VITALS
SYSTOLIC BLOOD PRESSURE: 169 MMHG | HEIGHT: 70 IN | OXYGEN SATURATION: 97 % | HEART RATE: 116 BPM | WEIGHT: 265 LBS | RESPIRATION RATE: 18 BRPM | DIASTOLIC BLOOD PRESSURE: 96 MMHG | TEMPERATURE: 98 F

## 2020-10-24 DIAGNOSIS — I25.10 ATHEROSCLEROTIC HEART DISEASE OF NATIVE CORONARY ARTERY WITHOUT ANGINA PECTORIS: ICD-10-CM

## 2020-10-24 DIAGNOSIS — E87.6 HYPOKALEMIA: ICD-10-CM

## 2020-10-24 DIAGNOSIS — S22.42XD MULTIPLE FRACTURES OF RIBS, LEFT SIDE, SUBSEQUENT ENCOUNTER FOR FRACTURE WITH ROUTINE HEALING: ICD-10-CM

## 2020-10-24 DIAGNOSIS — R94.31 ABNORMAL ELECTROCARDIOGRAM [ECG] [EKG]: ICD-10-CM

## 2020-10-24 DIAGNOSIS — Z95.5 PRESENCE OF CORONARY ANGIOPLASTY IMPLANT AND GRAFT: Chronic | ICD-10-CM

## 2020-10-24 DIAGNOSIS — Z90.49 ACQUIRED ABSENCE OF OTHER SPECIFIED PARTS OF DIGESTIVE TRACT: Chronic | ICD-10-CM

## 2020-10-24 DIAGNOSIS — R56.9 UNSPECIFIED CONVULSIONS: ICD-10-CM

## 2020-10-24 DIAGNOSIS — I25.2 OLD MYOCARDIAL INFARCTION: ICD-10-CM

## 2020-10-24 DIAGNOSIS — R77.8 OTHER SPECIFIED ABNORMALITIES OF PLASMA PROTEINS: ICD-10-CM

## 2020-10-24 DIAGNOSIS — I25.83 CORONARY ATHEROSCLEROSIS DUE TO LIPID RICH PLAQUE: ICD-10-CM

## 2020-10-24 DIAGNOSIS — M10.9 GOUT, UNSPECIFIED: ICD-10-CM

## 2020-10-24 DIAGNOSIS — Z90.49 ACQUIRED ABSENCE OF OTHER SPECIFIED PARTS OF DIGESTIVE TRACT: ICD-10-CM

## 2020-10-24 DIAGNOSIS — Z96.653 PRESENCE OF ARTIFICIAL KNEE JOINT, BILATERAL: ICD-10-CM

## 2020-10-24 DIAGNOSIS — G47.33 OBSTRUCTIVE SLEEP APNEA (ADULT) (PEDIATRIC): ICD-10-CM

## 2020-10-24 DIAGNOSIS — R55 SYNCOPE AND COLLAPSE: ICD-10-CM

## 2020-10-24 DIAGNOSIS — Z95.5 PRESENCE OF CORONARY ANGIOPLASTY IMPLANT AND GRAFT: ICD-10-CM

## 2020-10-24 DIAGNOSIS — G89.21 CHRONIC PAIN DUE TO TRAUMA: ICD-10-CM

## 2020-10-24 DIAGNOSIS — Z87.19 PERSONAL HISTORY OF OTHER DISEASES OF THE DIGESTIVE SYSTEM: Chronic | ICD-10-CM

## 2020-10-24 DIAGNOSIS — R91.1 SOLITARY PULMONARY NODULE: ICD-10-CM

## 2020-10-24 DIAGNOSIS — R32 UNSPECIFIED URINARY INCONTINENCE: ICD-10-CM

## 2020-10-24 DIAGNOSIS — I10 ESSENTIAL (PRIMARY) HYPERTENSION: ICD-10-CM

## 2020-10-24 DIAGNOSIS — E83.42 HYPOMAGNESEMIA: ICD-10-CM

## 2020-10-24 LAB
ALBUMIN SERPL ELPH-MCNC: 4 G/DL — SIGNIFICANT CHANGE UP (ref 3.5–5.2)
ALP SERPL-CCNC: 100 U/L — SIGNIFICANT CHANGE UP (ref 30–115)
ALT FLD-CCNC: 11 U/L — SIGNIFICANT CHANGE UP (ref 0–41)
ANION GAP SERPL CALC-SCNC: 12 MMOL/L — SIGNIFICANT CHANGE UP (ref 7–14)
AST SERPL-CCNC: 16 U/L — SIGNIFICANT CHANGE UP (ref 0–41)
BASOPHILS # BLD AUTO: 0.02 K/UL — SIGNIFICANT CHANGE UP (ref 0–0.2)
BASOPHILS NFR BLD AUTO: 0.3 % — SIGNIFICANT CHANGE UP (ref 0–1)
BILIRUB SERPL-MCNC: 0.9 MG/DL — SIGNIFICANT CHANGE UP (ref 0.2–1.2)
BUN SERPL-MCNC: 22 MG/DL — HIGH (ref 10–20)
CALCIUM SERPL-MCNC: 9.6 MG/DL — SIGNIFICANT CHANGE UP (ref 8.5–10.1)
CHLORIDE SERPL-SCNC: 103 MMOL/L — SIGNIFICANT CHANGE UP (ref 98–110)
CO2 SERPL-SCNC: 27 MMOL/L — SIGNIFICANT CHANGE UP (ref 17–32)
CREAT SERPL-MCNC: 1 MG/DL — SIGNIFICANT CHANGE UP (ref 0.7–1.5)
D DIMER BLD IA.RAPID-MCNC: 400 NG/ML DDU — HIGH (ref 0–230)
EOSINOPHIL # BLD AUTO: 0.03 K/UL — SIGNIFICANT CHANGE UP (ref 0–0.7)
EOSINOPHIL NFR BLD AUTO: 0.4 % — SIGNIFICANT CHANGE UP (ref 0–8)
GLUCOSE SERPL-MCNC: 101 MG/DL — HIGH (ref 70–99)
HCT VFR BLD CALC: 42.5 % — SIGNIFICANT CHANGE UP (ref 42–52)
HGB BLD-MCNC: 14.6 G/DL — SIGNIFICANT CHANGE UP (ref 14–18)
IMM GRANULOCYTES NFR BLD AUTO: 0.3 % — SIGNIFICANT CHANGE UP (ref 0.1–0.3)
LYMPHOCYTES # BLD AUTO: 2.12 K/UL — SIGNIFICANT CHANGE UP (ref 1.2–3.4)
LYMPHOCYTES # BLD AUTO: 30.3 % — SIGNIFICANT CHANGE UP (ref 20.5–51.1)
MAGNESIUM SERPL-MCNC: 1.7 MG/DL — LOW (ref 1.8–2.4)
MCHC RBC-ENTMCNC: 32.4 PG — HIGH (ref 27–31)
MCHC RBC-ENTMCNC: 34.4 G/DL — SIGNIFICANT CHANGE UP (ref 32–37)
MCV RBC AUTO: 94.4 FL — HIGH (ref 80–94)
MONOCYTES # BLD AUTO: 0.65 K/UL — HIGH (ref 0.1–0.6)
MONOCYTES NFR BLD AUTO: 9.3 % — SIGNIFICANT CHANGE UP (ref 1.7–9.3)
NEUTROPHILS # BLD AUTO: 4.15 K/UL — SIGNIFICANT CHANGE UP (ref 1.4–6.5)
NEUTROPHILS NFR BLD AUTO: 59.4 % — SIGNIFICANT CHANGE UP (ref 42.2–75.2)
NRBC # BLD: 0 /100 WBCS — SIGNIFICANT CHANGE UP (ref 0–0)
PLATELET # BLD AUTO: 209 K/UL — SIGNIFICANT CHANGE UP (ref 130–400)
POTASSIUM SERPL-MCNC: 3.8 MMOL/L — SIGNIFICANT CHANGE UP (ref 3.5–5)
POTASSIUM SERPL-SCNC: 3.8 MMOL/L — SIGNIFICANT CHANGE UP (ref 3.5–5)
PROT SERPL-MCNC: 6.8 G/DL — SIGNIFICANT CHANGE UP (ref 6–8)
RBC # BLD: 4.5 M/UL — LOW (ref 4.7–6.1)
RBC # FLD: 12.6 % — SIGNIFICANT CHANGE UP (ref 11.5–14.5)
SODIUM SERPL-SCNC: 142 MMOL/L — SIGNIFICANT CHANGE UP (ref 135–146)
TROPONIN T SERPL-MCNC: 0.02 NG/ML — HIGH
TROPONIN T SERPL-MCNC: <0.01 NG/ML — SIGNIFICANT CHANGE UP
WBC # BLD: 6.99 K/UL — SIGNIFICANT CHANGE UP (ref 4.8–10.8)
WBC # FLD AUTO: 6.99 K/UL — SIGNIFICANT CHANGE UP (ref 4.8–10.8)

## 2020-10-24 PROCEDURE — 99285 EMERGENCY DEPT VISIT HI MDM: CPT | Mod: CS,GC

## 2020-10-24 PROCEDURE — 71045 X-RAY EXAM CHEST 1 VIEW: CPT | Mod: 26

## 2020-10-24 PROCEDURE — 71275 CT ANGIOGRAPHY CHEST: CPT | Mod: 26

## 2020-10-24 PROCEDURE — 93010 ELECTROCARDIOGRAM REPORT: CPT

## 2020-10-24 RX ORDER — ACETAMINOPHEN 500 MG
650 TABLET ORAL EVERY 6 HOURS
Refills: 0 | Status: DISCONTINUED | OUTPATIENT
Start: 2020-10-24 | End: 2020-10-24

## 2020-10-24 RX ORDER — CLOPIDOGREL BISULFATE 75 MG/1
75 TABLET, FILM COATED ORAL DAILY
Refills: 0 | Status: DISCONTINUED | OUTPATIENT
Start: 2020-10-25 | End: 2020-10-29

## 2020-10-24 RX ORDER — ACETAMINOPHEN 500 MG
650 TABLET ORAL ONCE
Refills: 0 | Status: COMPLETED | OUTPATIENT
Start: 2020-10-24 | End: 2020-10-24

## 2020-10-24 RX ORDER — ALPRAZOLAM 0.25 MG
0.5 TABLET ORAL ONCE
Refills: 0 | Status: DISCONTINUED | OUTPATIENT
Start: 2020-10-24 | End: 2020-10-24

## 2020-10-24 RX ORDER — HYDROCHLOROTHIAZIDE 25 MG
25 TABLET ORAL DAILY
Refills: 0 | Status: DISCONTINUED | OUTPATIENT
Start: 2020-10-24 | End: 2020-10-27

## 2020-10-24 RX ORDER — SUCRALFATE 1 G
1 TABLET ORAL
Refills: 0 | Status: DISCONTINUED | OUTPATIENT
Start: 2020-10-24 | End: 2020-10-29

## 2020-10-24 RX ORDER — ATORVASTATIN CALCIUM 80 MG/1
20 TABLET, FILM COATED ORAL AT BEDTIME
Refills: 0 | Status: DISCONTINUED | OUTPATIENT
Start: 2020-10-24 | End: 2020-10-29

## 2020-10-24 RX ORDER — CHLORHEXIDINE GLUCONATE 213 G/1000ML
1 SOLUTION TOPICAL
Refills: 0 | Status: DISCONTINUED | OUTPATIENT
Start: 2020-10-24 | End: 2020-10-29

## 2020-10-24 RX ORDER — ASPIRIN/CALCIUM CARB/MAGNESIUM 324 MG
325 TABLET ORAL ONCE
Refills: 0 | Status: COMPLETED | OUTPATIENT
Start: 2020-10-24 | End: 2020-10-24

## 2020-10-24 RX ORDER — ACETAMINOPHEN 500 MG
650 TABLET ORAL EVERY 6 HOURS
Refills: 0 | Status: DISCONTINUED | OUTPATIENT
Start: 2020-10-24 | End: 2020-10-29

## 2020-10-24 RX ORDER — OXYCODONE AND ACETAMINOPHEN 5; 325 MG/1; MG/1
1 TABLET ORAL EVERY 6 HOURS
Refills: 0 | Status: DISCONTINUED | OUTPATIENT
Start: 2020-10-24 | End: 2020-10-29

## 2020-10-24 RX ORDER — POTASSIUM CITRATE MONOHYDRATE 100 %
1 POWDER (GRAM) MISCELLANEOUS
Qty: 0 | Refills: 0 | DISCHARGE

## 2020-10-24 RX ORDER — SUCRALFATE 1 G
1 TABLET ORAL ONCE
Refills: 0 | Status: COMPLETED | OUTPATIENT
Start: 2020-10-24 | End: 2020-10-24

## 2020-10-24 RX ORDER — ENOXAPARIN SODIUM 100 MG/ML
40 INJECTION SUBCUTANEOUS DAILY
Refills: 0 | Status: DISCONTINUED | OUTPATIENT
Start: 2020-10-24 | End: 2020-10-29

## 2020-10-24 RX ORDER — ATENOLOL 25 MG/1
50 TABLET ORAL DAILY
Refills: 0 | Status: DISCONTINUED | OUTPATIENT
Start: 2020-10-24 | End: 2020-10-29

## 2020-10-24 RX ORDER — ASPIRIN/CALCIUM CARB/MAGNESIUM 324 MG
81 TABLET ORAL DAILY
Refills: 0 | Status: DISCONTINUED | OUTPATIENT
Start: 2020-10-24 | End: 2020-10-29

## 2020-10-24 RX ORDER — LOSARTAN POTASSIUM 100 MG/1
50 TABLET, FILM COATED ORAL DAILY
Refills: 0 | Status: DISCONTINUED | OUTPATIENT
Start: 2020-10-24 | End: 2020-10-29

## 2020-10-24 RX ORDER — ONDANSETRON 8 MG/1
4 TABLET, FILM COATED ORAL ONCE
Refills: 0 | Status: COMPLETED | OUTPATIENT
Start: 2020-10-24 | End: 2020-10-24

## 2020-10-24 RX ADMIN — Medication 650 MILLIGRAM(S): at 15:44

## 2020-10-24 RX ADMIN — Medication 1 GRAM(S): at 17:08

## 2020-10-24 RX ADMIN — Medication 325 MILLIGRAM(S): at 10:07

## 2020-10-24 RX ADMIN — Medication 650 MILLIGRAM(S): at 16:51

## 2020-10-24 RX ADMIN — ONDANSETRON 4 MILLIGRAM(S): 8 TABLET, FILM COATED ORAL at 10:07

## 2020-10-24 RX ADMIN — ATORVASTATIN CALCIUM 20 MILLIGRAM(S): 80 TABLET, FILM COATED ORAL at 21:18

## 2020-10-24 NOTE — ED PROVIDER NOTE - ATTENDING CONTRIBUTION TO CARE
70M PMH CAD stents, HTN HL p/w cp x 1 week. intermittent L sided pressure at rest radiates to back. sob assoc. no fever. +prod cough clear sputum. no body aches. no abd pain, nvdc. no le edema, le pain, immobilization, hormones, hemoptysis. no tearing back pain. no fall. cards- caracta. never smoker. last stress neg 9/2020. pleuritic pain also. reports months of bilat ankle swelling unchanged.     on exam, AFVSS, well killian nad, ncat, eomi, perrla, mmm, lctab, rrr nl s1s2 no mrg, abd soft ntnd, aaox3, no focal deficits, no le edema or calf ttp,     a/p; CP/sob, high risk for ACS, tachy cant perc out. will do labs, ekg/trop, ddimer, CXR ,re-eval

## 2020-10-24 NOTE — ED PROVIDER NOTE - CLINICAL SUMMARY MEDICAL DECISION MAKING FREE TEXT BOX
pt p/w CP. hx CAD stents. initial troponin elevated. d/w pt's cardiologist. admit to tele for further work up/treatment.

## 2020-10-24 NOTE — ED ADULT NURSE NOTE - OBJECTIVE STATEMENT
Pt S/P fall 2 month ago with left side rib fracture ,C/O worsening pain on the left side with activity and inspiration left arm pain B/L leg pain weakness unable to ambulate . Pt HX- knee surgery in the pat .

## 2020-10-24 NOTE — ED PROVIDER NOTE - PHYSICAL EXAMINATION
CONSTITUTIONAL: well-appearing, in NAD  SKIN: Warm dry, normal skin turgor  HEAD: NCAT  EYES: EOMI, PERRLA, no scleral icterus, conjunctiva pink  ENT: normal pharynx with no erythema or exudates  NECK: Supple; non tender. Full ROM.  CARD: RRR, no murmurs.  RESP: clear to ausculation b/l. No crackles or wheezing.  ABD: soft, non-tender, non-distended, no rebound or guarding.  EXT: Full ROM, no bony tenderness, no pedal edema, no calf tenderness  NEURO: normal motor. normal sensory. CN II-XII intact. Normal gait.  PSYCH: Cooperative, appropriate.

## 2020-10-24 NOTE — H&P ADULT - NSHPLABSRESULTS_GEN_ALL_CORE
14.6   6.99  )-----------( 209      ( 24 Oct 2020 09:00 )             42.5       10-24    142  |  103  |  22<H>  ----------------------------<  101<H>  3.8   |  27  |  1.0    Ca    9.6      24 Oct 2020 09:00  Mg     1.7     10-24    TPro  6.8  /  Alb  4.0  /  TBili  0.9  /  DBili  x   /  AST  16  /  ALT  11  /  AlkPhos  100  10-24      RADIOLOGY  < from: CT Angio Chest w/ IV Cont (10.24.20 @ 13:28) >      IMPRESSION:    No evidence of a pulmonaryembolism.    Elevated right hemidiaphragm.    Right middle lobe and right lower lobe nodular opacity and subcentimeter nodules up to 1.1 cm. This largest nodule was also present on the 9/30/2020 scan but appeared more linear. Recommend a three-month follow-up chest CT to reassess.      < end of copied text >    < from: Xray Chest 1 View-PORTABLE IMMEDIATE (10.24.20 @ 09:21) >      Impression:    Low lung volumes. Left basal opacity/atelectasis. No pneumothorax.        < end of copied text >

## 2020-10-24 NOTE — H&P ADULT - NSHPSOCIALHISTORY_GEN_ALL_CORE
Negative for cigarettes, EtOH, illicit drugs  Lives at home with wife  Ambulates with cane at baseline.

## 2020-10-24 NOTE — ED ADULT TRIAGE NOTE - TEMPERATURE IN FAHRENHEIT (DEGREES F)
98.4
What Type Of Note Output Would You Prefer (Optional)?: Bullet Format
Is This A New Presentation, Or A Follow-Up?: Skin Lesions
Has Your Skin Lesion Been Treated?: not been treated
Is This A New Presentation, Or A Follow-Up?: Skin Lesion

## 2020-10-24 NOTE — ED ADULT TRIAGE NOTE - CHIEF COMPLAINT QUOTE
Pt c/o left rib pain; pt had 5 rib fractures approx 2 months ago after a fall & was hospitalized here. Pt also c/o new b/l leg pain; denies injury or trauma to area. Pt reports pain when he takes deep breaths.

## 2020-10-24 NOTE — H&P ADULT - HISTORY OF PRESENT ILLNESS
68 yo M with hx of CAD s/p stent x 3 on plavix, HTN, HLD, and KELSEY, bilateral knee replacement, presents for L. chest and rib pain following fall and fracture of 5 L ribs in May 2020. Patient said that since that time he has had pain on the left side that radiates to the axilla and down to his left side. Pain is intermittent lasting 1-2 hours, sharp, 9/10, worse with movement and deep inspiration. Associated tingling of L chest that radiates down the lateral aspect of arm to the elbow. Has had associated decreased appetite and 30 lb weight loss in past 2-3 months.  Patient follows Dr. Brunson with cardiology. Had stress test done in September 2020, which was normal. EF = 48%. He denies fever/chills, sore throat, palpitations, abdominal pain, N/V/D/constipation, urinary symptoms or lower extremity swelling.     In ED /76, HR 89. Physical exam significant for tenderness to palpation over left ribs and sternum. Temp 98.1. trops 0.02, which decreased to 0 next set. Dd 400. CT angio negative for PE. 1.1 cm LLL nodule found. Given tylenol, , Zofran in the ED. To be admitted to tele for further workup and management.

## 2020-10-24 NOTE — ED PROVIDER NOTE - OBJECTIVE STATEMENT
71 yo M with PMH of HTN, HLD, GERD, CAD s/p 3 stents in 1998 presenting with L sided rib pain for 1 week. Pain is intermittent lasting 1-2 hours, sharp, 9/10, worse with movement and deep inspiration, and radiates down the left side to both ankles. Associated tingling of L chest that radiates down the lateral aspect of arm to the elbow. Endorses nonbloody productive cough of clear sputum since yesterday. Endorses shortness of breath for 2-3 months. Has had associated decreased appetite and 30lb weight loss in past 2-3 months. Denies cigarettes, alcohol, drugs. Patient follows Dr. Brunson with cardiology. Had stress test done in September, which was normal.

## 2020-10-24 NOTE — H&P ADULT - NSICDXFAMILYHX_GEN_ALL_CORE_FT
FAMILY HISTORY:  Father  Still living? No  CAD (coronary artery disease), Age at diagnosis: 41-50

## 2020-10-24 NOTE — H&P ADULT - ATTENDING COMMENTS
HPI as above.  Interval history: Pt seen and examined at bedside. No cp or sob.  Overnight had 3 runs of NSVT on the monitor    Vital Signs (24 Hrs):  T(C): 36.8 (10-25-20 @ 13:38), Max: 36.8 (10-24-20 @ 21:01)  HR: 61 (10-25-20 @ 13:38) (61 - 90)  BP: 116/57 (10-25-20 @ 13:38) (106/71 - 174/80)  RR: 18 (10-25-20 @ 13:38) (18 - 20)  SpO2: 95% (10-25-20 @ 09:16) (95% - 100%)  Wt(kg): --  Daily Height in cm: 177.8 (24 Oct 2020 17:51)    Daily     I&O's Summary    24 Oct 2020 07:01  -  25 Oct 2020 07:00  --------------------------------------------------------  IN: 120 mL / OUT: 0 mL / NET: 120 mL    25 Oct 2020 07:01  -  25 Oct 2020 15:13  --------------------------------------------------------  IN: 0 mL / OUT: 300 mL / NET: -300 mL      PHYSICAL EXAM:  GENERAL: NAD, well-developed  HEAD:  Atraumatic, Normocephalic  EYES: EOMI, PERRLA, conjunctiva and sclera clear  NECK: Supple, No JVD  CHEST/LUNG: Clear to auscultation bilaterally; No wheeze  HEART: Regular rate and rhythm; No murmurs, rubs, or gallops  ABDOMEN: Soft, Nontender, Nondistended; Bowel sounds present  EXTREMITIES:  2+ Peripheral Pulses, No clubbing, cyanosis, or edema  PSYCH: AAOx3  NEUROLOGY: non-focal  SKIN: No rashes or lesions    Labs reviewed  Imaging reviewed: < from: CT Angio Chest w/ IV Cont (10.24.20 @ 13:28) >    IMPRESSION:    No evidence of a pulmonaryembolism.    Elevated right hemidiaphragm.    Right middle lobe and right lower lobe nodular opacity and subcentimeter nodules up to 1.1 cm. This largest nodule was also present on the 9/30/2020 scan but appeared more linear. Recommend a three-month follow-up chest CT to reassess.    < end of copied text >      EKG reviewed: < from: 12 Lead ECG (10.24.20 @ 10:31) >    Diagnosis Line Sinus rhythm with PACs, period of atrial tachycardia.  Left axis deviation  Incomplete right bundle branch block  Minimal voltage criteria for LVH, may be normal variant  Prolonged QT  Abnormal ECG  Confirmed by DONNELL MALIK, [facility] ([id number]) on 10/24/2020 11:24:04 PM    < end of copied text >    Plan  #chest pain   possible cardiology related as he had 3 runs of NSVT overnight, not tender on exam  -recent stress test neg, ekg no changes has prolonged QT  -has cardiac hx   - cardio consult   - check echo  -trops neg   - cardiologist is named Dr. Aguilera [phone]    #prolonged QTC- pt has allergy to mag sulfate intravenous gets rash, will try oral replacement as mag 1.6     #suspected hypomagnesia- mag po     #rest as above    #Progress Note Handoff  Pending (specify):  cardio consult and echo  Family discussion: adri pt and agreed to plan   Disposition: Home__x_/SNF___/Other________/Unknown at this time________

## 2020-10-24 NOTE — H&P ADULT - NSHPPHYSICALEXAM_GEN_ALL_CORE
GENERAL: NAD, speaks in full sentences, no signs of respiratory distress  HEAD: Atraumatic  NECK: Supple  CHEST/LUNG: Clear to auscultation bilaterally; No wheeze or crackles  HEART: S1, S2; RRR; No murmurs, rubs, or gallops  ABDOMEN: BS+; Soft, Non-tender, Non-distended  EXTREMITIES:  2+ Peripheral Pulses, No clubbing, cyanosis, or edema  PSYCH: AAOx3  NEUROLOGY: non-focal  SKIN: No rashes or lesions

## 2020-10-24 NOTE — ED PROVIDER NOTE - NS ED ROS FT
Constitutional:  (-) fever, (-) chills, (-) lethargy  Eyes:  (-) eye pain (-) visual changes  ENMT: (-) nasal discharge, (-) sore throat. (-) neck pain or stiffness  Cardiac: (+) chest pain, L sided rib pain (-) palpitations  Respiratory:  (+) cough (-) respiratory distress.   GI:  (-) nausea (-) vomiting (-) diarrhea (-) abdominal pain.  :  (-) dysuria (-) frequency (-) burning.  MS:  (-) back pain (-) joint pain.  Neuro:  (-) headache (-) numbness (+) tingling, L arm (-) focal weakness  Skin:  (-) rash  Except as documented in the HPI,  all other systems are negative

## 2020-10-25 LAB
ALBUMIN SERPL ELPH-MCNC: 3.5 G/DL — SIGNIFICANT CHANGE UP (ref 3.5–5.2)
ALP SERPL-CCNC: 92 U/L — SIGNIFICANT CHANGE UP (ref 30–115)
ALT FLD-CCNC: 10 U/L — SIGNIFICANT CHANGE UP (ref 0–41)
ANION GAP SERPL CALC-SCNC: 9 MMOL/L — SIGNIFICANT CHANGE UP (ref 7–14)
AST SERPL-CCNC: 14 U/L — SIGNIFICANT CHANGE UP (ref 0–41)
BASOPHILS # BLD AUTO: 0.01 K/UL — SIGNIFICANT CHANGE UP (ref 0–0.2)
BASOPHILS NFR BLD AUTO: 0.2 % — SIGNIFICANT CHANGE UP (ref 0–1)
BILIRUB SERPL-MCNC: 0.8 MG/DL — SIGNIFICANT CHANGE UP (ref 0.2–1.2)
BUN SERPL-MCNC: 15 MG/DL — SIGNIFICANT CHANGE UP (ref 10–20)
CALCIUM SERPL-MCNC: 8.9 MG/DL — SIGNIFICANT CHANGE UP (ref 8.5–10.1)
CHLORIDE SERPL-SCNC: 101 MMOL/L — SIGNIFICANT CHANGE UP (ref 98–110)
CO2 SERPL-SCNC: 30 MMOL/L — SIGNIFICANT CHANGE UP (ref 17–32)
CREAT SERPL-MCNC: 0.9 MG/DL — SIGNIFICANT CHANGE UP (ref 0.7–1.5)
EOSINOPHIL # BLD AUTO: 0.11 K/UL — SIGNIFICANT CHANGE UP (ref 0–0.7)
EOSINOPHIL NFR BLD AUTO: 1.7 % — SIGNIFICANT CHANGE UP (ref 0–8)
GLUCOSE SERPL-MCNC: 99 MG/DL — SIGNIFICANT CHANGE UP (ref 70–99)
HCT VFR BLD CALC: 39.7 % — LOW (ref 42–52)
HGB BLD-MCNC: 13.7 G/DL — LOW (ref 14–18)
IMM GRANULOCYTES NFR BLD AUTO: 0.3 % — SIGNIFICANT CHANGE UP (ref 0.1–0.3)
LYMPHOCYTES # BLD AUTO: 2.06 K/UL — SIGNIFICANT CHANGE UP (ref 1.2–3.4)
LYMPHOCYTES # BLD AUTO: 32.5 % — SIGNIFICANT CHANGE UP (ref 20.5–51.1)
MCHC RBC-ENTMCNC: 32.2 PG — HIGH (ref 27–31)
MCHC RBC-ENTMCNC: 34.5 G/DL — SIGNIFICANT CHANGE UP (ref 32–37)
MCV RBC AUTO: 93.2 FL — SIGNIFICANT CHANGE UP (ref 80–94)
MONOCYTES # BLD AUTO: 0.71 K/UL — HIGH (ref 0.1–0.6)
MONOCYTES NFR BLD AUTO: 11.2 % — HIGH (ref 1.7–9.3)
NEUTROPHILS # BLD AUTO: 3.43 K/UL — SIGNIFICANT CHANGE UP (ref 1.4–6.5)
NEUTROPHILS NFR BLD AUTO: 54.1 % — SIGNIFICANT CHANGE UP (ref 42.2–75.2)
NRBC # BLD: 0 /100 WBCS — SIGNIFICANT CHANGE UP (ref 0–0)
PLATELET # BLD AUTO: 184 K/UL — SIGNIFICANT CHANGE UP (ref 130–400)
POTASSIUM SERPL-MCNC: 3.6 MMOL/L — SIGNIFICANT CHANGE UP (ref 3.5–5)
POTASSIUM SERPL-SCNC: 3.6 MMOL/L — SIGNIFICANT CHANGE UP (ref 3.5–5)
PROT SERPL-MCNC: 5.8 G/DL — LOW (ref 6–8)
RBC # BLD: 4.26 M/UL — LOW (ref 4.7–6.1)
RBC # FLD: 12.6 % — SIGNIFICANT CHANGE UP (ref 11.5–14.5)
SARS-COV-2 RNA SPEC QL NAA+PROBE: SIGNIFICANT CHANGE UP
SODIUM SERPL-SCNC: 140 MMOL/L — SIGNIFICANT CHANGE UP (ref 135–146)
TROPONIN T SERPL-MCNC: <0.01 NG/ML — SIGNIFICANT CHANGE UP
WBC # BLD: 6.34 K/UL — SIGNIFICANT CHANGE UP (ref 4.8–10.8)
WBC # FLD AUTO: 6.34 K/UL — SIGNIFICANT CHANGE UP (ref 4.8–10.8)

## 2020-10-25 PROCEDURE — 99233 SBSQ HOSP IP/OBS HIGH 50: CPT

## 2020-10-25 RX ORDER — ONDANSETRON 8 MG/1
4 TABLET, FILM COATED ORAL ONCE
Refills: 0 | Status: COMPLETED | OUTPATIENT
Start: 2020-10-25 | End: 2020-10-25

## 2020-10-25 RX ORDER — MAGNESIUM OXIDE 400 MG ORAL TABLET 241.3 MG
400 TABLET ORAL
Refills: 0 | Status: DISCONTINUED | OUTPATIENT
Start: 2020-10-25 | End: 2020-10-26

## 2020-10-25 RX ADMIN — Medication 81 MILLIGRAM(S): at 11:05

## 2020-10-25 RX ADMIN — CLOPIDOGREL BISULFATE 75 MILLIGRAM(S): 75 TABLET, FILM COATED ORAL at 11:05

## 2020-10-25 RX ADMIN — Medication 0.5 MILLIGRAM(S): at 00:19

## 2020-10-25 RX ADMIN — Medication 1 GRAM(S): at 17:00

## 2020-10-25 RX ADMIN — ATENOLOL 50 MILLIGRAM(S): 25 TABLET ORAL at 08:55

## 2020-10-25 RX ADMIN — ONDANSETRON 4 MILLIGRAM(S): 8 TABLET, FILM COATED ORAL at 17:27

## 2020-10-25 RX ADMIN — Medication 650 MILLIGRAM(S): at 01:20

## 2020-10-25 RX ADMIN — Medication 1 GRAM(S): at 05:51

## 2020-10-25 RX ADMIN — Medication 25 MILLIGRAM(S): at 10:11

## 2020-10-25 RX ADMIN — ATORVASTATIN CALCIUM 20 MILLIGRAM(S): 80 TABLET, FILM COATED ORAL at 22:30

## 2020-10-25 RX ADMIN — LOSARTAN POTASSIUM 50 MILLIGRAM(S): 100 TABLET, FILM COATED ORAL at 10:12

## 2020-10-25 RX ADMIN — MAGNESIUM OXIDE 400 MG ORAL TABLET 400 MILLIGRAM(S): 241.3 TABLET ORAL at 18:53

## 2020-10-25 RX ADMIN — Medication 650 MILLIGRAM(S): at 00:19

## 2020-10-25 RX ADMIN — ENOXAPARIN SODIUM 40 MILLIGRAM(S): 100 INJECTION SUBCUTANEOUS at 11:05

## 2020-10-25 NOTE — PHYSICAL THERAPY INITIAL EVALUATION ADULT - PERTINENT HX OF CURRENT PROBLEM, REHAB EVAL
pt is a 69 y/o male admitted for  L. chest and rib pain following fall and fracture of 5 L ribs in May 2020. Patient said that since that time he has had pain on the left side that radiates to the axilla and down to his left side. Pain is intermittent lasting 1-2 hours, sharp, 9/10, worse with movement and deep inspiration. Associated tingling of L chest that radiates down the lateral aspect of arm to the elbow.

## 2020-10-25 NOTE — PHYSICAL THERAPY INITIAL EVALUATION ADULT - OCCUPATION
pt lives with spouse in pvt home, has 6 steps to enter, 8 steps to main level -hi-ranch style home. Spouse as per pt is in [facility] .

## 2020-10-25 NOTE — PHYSICAL THERAPY INITIAL EVALUATION ADULT - GENERAL OBSERVATIONS, REHAB EVAL
9:20--9:43 Pt encountered semifowler in bed in NAD. + tele. Agreeable for PT. Pt with no c/o of chest pain at this time, reports stiffness to B knees.

## 2020-10-25 NOTE — CONSULT NOTE ADULT - ASSESSMENT
IMPRESSION:  - Atypical chest pain (reproducible upon palpation of the epigastric area)  - Negative ecg and cardiac enzymes and normal recent nuclear stress test    PLAN:  - Keep on telemetry for 24h more  - check 2d echo  - If no events, patient can follow as outpatient with his cardiologist  - Continue aspirin, plavix, statin, ARB and BB IMPRESSION:  - Atypical chest pain (reproducible upon palpation of the epigastric area)  - Negative ecg and cardiac enzymes and normal recent nuclear stress test    PLAN:  - Keep on telemetry for 24h more  - check 2d echo  - If no events, patient can follow as outpatient with his cardiologist  - Continue aspirin, plavix, statin, ARB and BB, if more frequent PVC's, increase atenolol to 75 mg.

## 2020-10-25 NOTE — CONSULT NOTE ADULT - SUBJECTIVE AND OBJECTIVE BOX
HPI:  70 yo M with hx of CAD s/p stent x 3 on plavix, HTN, HLD, and KELSEY, bilateral knee replacement, presents for L. chest and rib pain following fall and fracture of 5 L ribs in May 2020. Patient said that since that time he has had pain on the left side that radiates to the axilla and down to his left side. Pain is intermittent lasting 1-2 hours, sharp, 9/10, worse with movement and deep inspiration. Associated tingling of L chest that radiates down the lateral aspect of arm to the elbow. Has had associated decreased appetite and 30 lb weight loss in past 2-3 months.  Patient follows Dr. Brunson with cardiology. Had stress test done in September 2020, which was normal. EF = 48%. He denies fever/chills, sore throat, palpitations, abdominal pain, N/V/D/constipation, urinary symptoms or lower extremity swelling.     In ED /76, HR 89. Physical exam significant for tenderness to palpation over left ribs and sternum. Temp 98.1. trops 0.02, which decreased to 0 next set. Dd 400. CT angio negative for PE. 1.1 cm LLL nodule found. Given tylenol, , Zofran in the ED. To be admitted to [facility] for further workup and management.  (24 Oct 2020 16:43)    PAST MEDICAL & SURGICAL HISTORY  MI (myocardial infarction)    Arthritis    Kidney stones    Sleep apnea    Hypertension    High blood cholesterol    H/O heart artery stent    History of appendectomy    H/O chronic cholecystitis  cholecystectomy        FAMILY HISTORY:  FAMILY HISTORY:  CAD (coronary artery disease) (Father)        SOCIAL HISTORY:  []smoker  []Alcohol  []Drug    ALLERGIES:  fish (Unknown)  iodine (Rash)  latex (Unknown)  magnesium sulfate (Rash)  sulfa drugs (Rash)      MEDICATIONS:  MEDICATIONS  (STANDING):  aspirin enteric coated 81 milliGRAM(s) Oral daily  ATENolol  Tablet 50 milliGRAM(s) Oral daily  atorvastatin 20 milliGRAM(s) Oral at bedtime  chlorhexidine 4% Liquid 1 Application(s) Topical <User Schedule>  clopidogrel Tablet 75 milliGRAM(s) Oral daily  enoxaparin Injectable 40 milliGRAM(s) SubCutaneous daily  hydrochlorothiazide 25 milliGRAM(s) Oral daily  losartan 50 milliGRAM(s) Oral daily  magnesium oxide 400 milliGRAM(s) Oral three times a day with meals  sucralfate 1 Gram(s) Oral two times a day    MEDICATIONS  (PRN):  acetaminophen    Suspension .. 650 milliGRAM(s) Oral every 6 hours PRN Temp greater or equal to 38C (100.4F), Mild Pain (1 - 3)  oxycodone    5 mG/acetaminophen 325 mG 1 Tablet(s) Oral every 6 hours PRN Moderate Pain (4 - 6)      HOME MEDICATIONS:  Home Medications:  aspirin 81 mg oral tablet:  (24 Oct 2020 16:49)  atenolol 50 mg oral tablet: 1 tab(s) orally once a day (24 Oct 2020 16:49)  atorvastatin 20 mg oral tablet: 1 tab(s) orally once a day (24 Oct 2020 16:49)  Plavix: 75 milligram(s) orally once a day (24 Oct 2020 16:49)  sucralfate 1 g oral tablet: 1 tab(s) orally 2 times a day (after meals) (24 Oct 2020 16:49)  valsartan-hydrochlorothiazide 160mg-25mg oral tablet: 1 tab(s) orally every 12 hours (24 Oct 2020 16:49)      VITALS:   T(F): 98.2 (10-25 @ 13:38), Max: 98.4 (10-24 @ 07:26)  HR: 61 (10-25 @ 13:38) (61 - 116)  BP: 116/57 (10-25 @ 13:38) (106/71 - 174/80)  BP(mean): --  RR: 18 (10-25 @ 13:38) (18 - 20)  SpO2: 95% (10-25 @ 09:16) (95% - 100%)    I&O's Summary    24 Oct 2020 07:01  -  25 Oct 2020 07:00  --------------------------------------------------------  IN: 120 mL / OUT: 0 mL / NET: 120 mL    25 Oct 2020 07:01  -  25 Oct 2020 18:04  --------------------------------------------------------  IN: 0 mL / OUT: 450 mL / NET: -450 mL        REVIEW OF SYSTEMS:  CONSTITUTIONAL: No weakness, fevers or chills  EYES: No visual changes  ENT: No vertigo or throat pain   NECK: No pain or stiffness  RESPIRATORY: No cough, wheezing, hemoptysis; No shortness of breath  CARDIOVASCULAR: No chest pain or palpitations  GASTROINTESTINAL: No abdominal or epigastric pain. No nausea, vomiting, or hematemesis; No diarrhea or constipation. No melena or hematochezia.  GENITOURINARY: No dysuria, frequency or hematuria  NEUROLOGICAL: No numbness or weakness  SKIN: No itching, no rashes  MSK: No pain    PHYSICAL EXAM:  NEURO: patient is awake , alert and oriented  GEN: Not in acute distress  NECK: no thyroid enlargement, no JVD  LUNGS: Clear to auscultation bilaterally   CARDIOVASCULAR: S1/S2 present, RRR , no murmurs or rubs, no carotid bruits,  + PP bilaterally  ABD: Soft, reproducible pain when palpating the epigastric area  EXT: No LYNDA  SKIN: Intact    LABS:                        13.7   6.34  )-----------( 184      ( 25 Oct 2020 06:42 )             39.7     10-25    140  |  101  |  15  ----------------------------<  99  3.6   |  30  |  0.9    Ca    8.9      25 Oct 2020 06:42  Mg     1.7     10-24    TPro  5.8<L>  /  Alb  3.5  /  TBili  0.8  /  DBili  x   /  AST  14  /  ALT  10  /  AlkPhos  92  10-25      Troponin T, Serum: <0.01 ng/mL (10-25-20 @ 06:42)    CARDIAC MARKERS ( 25 Oct 2020 06:42 )  x     / <0.01 ng/mL / x     / x     / x      CARDIAC MARKERS ( 24 Oct 2020 14:48 )  x     / <0.01 ng/mL / x     / x     / x      CARDIAC MARKERS ( 24 Oct 2020 09:00 )  x     / 0.02 ng/mL / x     / x     / x          RADIOLOGY:  -CXR: < from: Xray Chest 1 View-PORTABLE IMMEDIATE (10.24.20 @ 09:21) >  Low lung volumes. Left basal opacity/atelectasis. No pneumothorax.    < end of copied text >    -TTE: pending    -CCTA:  -STRESS TEST: < from: NM Nuclear Stress Pharmacologic Multiple (09.30.20 @ 15:26) >  1. NORMAL ADENOSINE / REST MYOCARDIAL PERFUSION SPECT TOMOGRAPHY, WITH NO EVIDENCE FOR ISCHEMIA DURING ADENOSINE INFUSION.  2. THE LEFT VENTRICLE IS DILATED WITH BORDERLINE NORMAL RESTING LEFT VENTRICULAR WALL MOTION AND WALL THICKENING.  3. LEFT VENTRICULAR EJECTION FRACTION OF  48 % WHICH IS BELOW THE LOWER LIMIT OF NORMAL OF 50%. WALL MOTION ANALYSIS SUGGESTS EJECTION FRACTION OF 50%.    < end of copied text >    -CATHETERIZATION:    ECG:  sinus at 90bpm, incomplete RBBB, PACs    TELEMETRY EVENTS:  Episodes of Couplets and Triplets and PVCs HPI:  70 yo M with hx of CAD s/p stent x 3 on plavix, HTN, HLD, and KELSEY, bilateral knee replacement, presents for L. chest and rib pain following fall and fracture of 5 L ribs in May 2020. Patient said that since that time he has had pain on the left side that radiates to the axilla and down to his left side. Pain is intermittent lasting 1-2 hours, sharp, 9/10, worse with movement and deep inspiration. Associated tingling of L chest that radiates down the lateral aspect of arm to the elbow. Has had associated decreased appetite and 30 lb weight loss in past 2-3 months.  Patient follows Dr. Brunson with cardiology. Had stress test done in September 2020, which was normal. EF = 48%. He denies fever/chills, sore throat, palpitations, abdominal pain, N/V/D/constipation, urinary symptoms or lower extremity swelling.       In ED /76, HR 89. Physical exam significant for tenderness to palpation over left ribs and sternum. Temp 98.1. trops 0.02, which decreased to 0 next set. Dd 400. CT angio negative for PE. 1.1 cm LLL nodule found. Given tylenol, , Zofran in the ED. To be admitted to [facility] for further workup and management.  (24 Oct 2020 16:43)    PAST MEDICAL & SURGICAL HISTORY  MI (myocardial infarction)    Arthritis    Kidney stones    Sleep apnea    Hypertension    High blood cholesterol    H/O heart artery stent    History of appendectomy    H/O chronic cholecystitis  cholecystectomy        FAMILY HISTORY:  FAMILY HISTORY:  CAD (coronary artery disease) (Father)        SOCIAL HISTORY:  []smoker  []Alcohol  []Drug    ALLERGIES:  fish (Unknown)  iodine (Rash)  latex (Unknown)  magnesium sulfate (Rash)  sulfa drugs (Rash)      MEDICATIONS:  MEDICATIONS  (STANDING):  aspirin enteric coated 81 milliGRAM(s) Oral daily  ATENolol  Tablet 50 milliGRAM(s) Oral daily  atorvastatin 20 milliGRAM(s) Oral at bedtime  chlorhexidine 4% Liquid 1 Application(s) Topical <User Schedule>  clopidogrel Tablet 75 milliGRAM(s) Oral daily  enoxaparin Injectable 40 milliGRAM(s) SubCutaneous daily  hydrochlorothiazide 25 milliGRAM(s) Oral daily  losartan 50 milliGRAM(s) Oral daily  magnesium oxide 400 milliGRAM(s) Oral three times a day with meals  sucralfate 1 Gram(s) Oral two times a day    MEDICATIONS  (PRN):  acetaminophen    Suspension .. 650 milliGRAM(s) Oral every 6 hours PRN Temp greater or equal to 38C (100.4F), Mild Pain (1 - 3)  oxycodone    5 mG/acetaminophen 325 mG 1 Tablet(s) Oral every 6 hours PRN Moderate Pain (4 - 6)      HOME MEDICATIONS:  Home Medications:  aspirin 81 mg oral tablet:  (24 Oct 2020 16:49)  atenolol 50 mg oral tablet: 1 tab(s) orally once a day (24 Oct 2020 16:49)  atorvastatin 20 mg oral tablet: 1 tab(s) orally once a day (24 Oct 2020 16:49)  Plavix: 75 milligram(s) orally once a day (24 Oct 2020 16:49)  sucralfate 1 g oral tablet: 1 tab(s) orally 2 times a day (after meals) (24 Oct 2020 16:49)  valsartan-hydrochlorothiazide 160mg-25mg oral tablet: 1 tab(s) orally every 12 hours (24 Oct 2020 16:49)      VITALS:   T(F): 98.2 (10-25 @ 13:38), Max: 98.4 (10-24 @ 07:26)  HR: 61 (10-25 @ 13:38) (61 - 116)  BP: 116/57 (10-25 @ 13:38) (106/71 - 174/80)  BP(mean): --  RR: 18 (10-25 @ 13:38) (18 - 20)  SpO2: 95% (10-25 @ 09:16) (95% - 100%)    I&O's Summary    24 Oct 2020 07:01  -  25 Oct 2020 07:00  --------------------------------------------------------  IN: 120 mL / OUT: 0 mL / NET: 120 mL    25 Oct 2020 07:01  -  25 Oct 2020 18:04  --------------------------------------------------------  IN: 0 mL / OUT: 450 mL / NET: -450 mL        REVIEW OF SYSTEMS:  CONSTITUTIONAL: No weakness, fevers or chills  EYES: No visual changes  ENT: No vertigo or throat pain   NECK: No pain or stiffness  RESPIRATORY: No cough, wheezing, hemoptysis; No shortness of breath  CARDIOVASCULAR: No chest pain or palpitations  GASTROINTESTINAL: No abdominal or epigastric pain. No nausea, vomiting, or hematemesis; No diarrhea or constipation. No melena or hematochezia.  GENITOURINARY: No dysuria, frequency or hematuria  NEUROLOGICAL: No numbness or weakness  SKIN: No itching, no rashes  MSK: No pain    PHYSICAL EXAM:  NEURO: patient is awake , alert and oriented, obese  GEN: Not in acute distress  NECK: no thyroid enlargement, no JVD  LUNGS: Clear to auscultation bilaterally   CARDIOVASCULAR: S1/S2 present, RRR , no murmurs or rubs, no carotid bruits,  + PP bilaterally  ABD: Soft, reproducible pain when palpating the epigastric area  EXT: No LYNDA  SKIN: Intact    LABS:                        13.7   6.34  )-----------( 184      ( 25 Oct 2020 06:42 )             39.7     10-25    140  |  101  |  15  ----------------------------<  99  3.6   |  30  |  0.9    Ca    8.9      25 Oct 2020 06:42  Mg     1.7     10-24    TPro  5.8<L>  /  Alb  3.5  /  TBili  0.8  /  DBili  x   /  AST  14  /  ALT  10  /  AlkPhos  92  10-25      Troponin T, Serum: <0.01 ng/mL (10-25-20 @ 06:42)    CARDIAC MARKERS ( 25 Oct 2020 06:42 )  x     / <0.01 ng/mL / x     / x     / x      CARDIAC MARKERS ( 24 Oct 2020 14:48 )  x     / <0.01 ng/mL / x     / x     / x      CARDIAC MARKERS ( 24 Oct 2020 09:00 )  x     / 0.02 ng/mL / x     / x     / x          RADIOLOGY:  -CXR: < from: Xray Chest 1 View-PORTABLE IMMEDIATE (10.24.20 @ 09:21) >  Low lung volumes. Left basal opacity/atelectasis. No pneumothorax.    < end of copied text >    -TTE: pending    -CCTA:  -STRESS TEST: < from: NM Nuclear Stress Pharmacologic Multiple (09.30.20 @ 15:26) >  1. NORMAL ADENOSINE / REST MYOCARDIAL PERFUSION SPECT TOMOGRAPHY, WITH NO EVIDENCE FOR ISCHEMIA DURING ADENOSINE INFUSION.  2. THE LEFT VENTRICLE IS DILATED WITH BORDERLINE NORMAL RESTING LEFT VENTRICULAR WALL MOTION AND WALL THICKENING.  3. LEFT VENTRICULAR EJECTION FRACTION OF  48 % WHICH IS BELOW THE LOWER LIMIT OF NORMAL OF 50%. WALL MOTION ANALYSIS SUGGESTS EJECTION FRACTION OF 50%.    < end of copied text >    -CATHETERIZATION:    ECG:  sinus at 90bpm, incomplete RBBB, PACs    TELEMETRY EVENTS:  Episodes of Couplets and Triplets and PVCs

## 2020-10-25 NOTE — PROGRESS NOTE ADULT - SUBJECTIVE AND OBJECTIVE BOX
LIZZY KENT 70y Male  MRN#: [id number]   Hospital Day: 1d    SUBJECTIVE  Patient is a 70y old Male who presents with a chief complaint of Non-specific Chest Pain (24 Oct 2020 16:43)  Currently admitted to medicine with the primary diagnosis of Chest pain      INTERVAL HPI AND OVERNIGHT EVENTS:  Patient was examined and seen at bedside. This morning he is resting comfortably in bed and reports no issues. Patient was anxious overnight and given alprazolam 0.5mg PO with resolution of symptoms.    REVIEW OF SYMPTOMS:  CONSTITUTIONAL: No weakness, fevers or chills; No headaches  EYES: No visual changes, eye pain, or discharge  ENT: No vertigo; No ear pain or change in hearing; No sore throat or difficulty swallowing  NECK: No pain or stiffness  RESPIRATORY: No cough, wheezing, or hemoptysis; No shortness of breath  CARDIOVASCULAR: No chest pain or palpitations  GASTROINTESTINAL: No abdominal or epigastric pain; No nausea, vomiting, or hematemesis; No diarrhea or constipation; No melena or hematochezia  GENITOURINARY: No dysuria, frequency or hematuria  MUSCULOSKELETAL: No joint pain, no muscle pain, no weakness  NEUROLOGICAL: No numbness or weakness  SKIN: No itching or rashes    OBJECTIVE  PAST MEDICAL & SURGICAL HISTORY  MI (myocardial infarction)    Arthritis    Kidney stones    Sleep apnea    Hypertension    High blood cholesterol    H/O heart artery stent    History of appendectomy    H/O chronic cholecystitis  cholecystectomy      ALLERGIES:  fish (Unknown)  iodine (Rash)  latex (Unknown)  magnesium sulfate (Rash)  sulfa drugs (Rash)    MEDICATIONS:  STANDING MEDICATIONS  aspirin enteric coated 81 milliGRAM(s) Oral daily  ATENolol  Tablet 50 milliGRAM(s) Oral daily  atorvastatin 20 milliGRAM(s) Oral at bedtime  chlorhexidine 4% Liquid 1 Application(s) Topical <User Schedule>  clopidogrel Tablet 75 milliGRAM(s) Oral daily  enoxaparin Injectable 40 milliGRAM(s) SubCutaneous daily  hydrochlorothiazide 25 milliGRAM(s) Oral daily  losartan 50 milliGRAM(s) Oral daily  sucralfate 1 Gram(s) Oral two times a day    PRN MEDICATIONS  acetaminophen    Suspension .. 650 milliGRAM(s) Oral every 6 hours PRN  oxycodone    5 mG/acetaminophen 325 mG 1 Tablet(s) Oral every 6 hours PRN      VITAL SIGNS: Last 24 Hours  T(C): 36.1 (25 Oct 2020 05:00), Max: 36.8 (24 Oct 2020 21:01)  T(F): 96.9 (25 Oct 2020 05:00), Max: 98.3 (24 Oct 2020 21:01)  HR: 63 (25 Oct 2020 09:16) (61 - 90)  BP: 151/60 (25 Oct 2020 09:16) (106/71 - 174/80)  BP(mean): --  RR: 18 (25 Oct 2020 05:00) (18 - 20)  SpO2: 95% (25 Oct 2020 09:16) (95% - 100%)    LABS:                        13.7   6.34  )-----------( 184      ( 25 Oct 2020 06:42 )             39.7     10-25    140  |  101  |  15  ----------------------------<  99  3.6   |  30  |  0.9    Ca    8.9      25 Oct 2020 06:42  Mg     1.7     10-24    TPro  5.8<L>  /  Alb  3.5  /  TBili  0.8  /  DBili  x   /  AST  14  /  ALT  10  /  AlkPhos  92  10-25    Troponin T, Serum: <0.01 ng/mL (10-25-20 @ 06:42)  Troponin T, Serum: <0.01 ng/mL (10-24-20 @ 14:48)      CARDIAC MARKERS ( 25 Oct 2020 06:42 )  x     / <0.01 ng/mL / x     / x     / x      CARDIAC MARKERS ( 24 Oct 2020 14:48 )  x     / <0.01 ng/mL / x     / x     / x      CARDIAC MARKERS ( 24 Oct 2020 09:00 )  x     / 0.02 ng/mL / x     / x     / x          RADIOLOGY:      PHYSICAL EXAM:  CONSTITUTIONAL: No acute distress, well-developed, well-groomed, AAOx3  HEAD: Atraumatic, normocephalic  EYES: EOM intact, PERRLA, conjunctiva and sclera clear  ENT: Supple, no masses, no thyromegaly, no bruits, no JVD; moist mucous membranes  PULMONARY: Clear to auscultation bilaterally; no wheezes, rales, or rhonchi  CARDIOVASCULAR: Regular rate and rhythm; no murmurs, rubs, or gallops  GASTROINTESTINAL: Soft, non-tender, non-distended; bowel sounds present  MUSCULOSKELETAL: 2+ peripheral pulses; no clubbing, no cyanosis, no edema  NEUROLOGY: non-focal  SKIN: No rashes or lesions; warm and dry    ASSESSMENT & PLAN  Patient is a 70yo male with PMH of CAD s/p stent x3, hypertension, hyperlipidemia, KELSEY, bilateral knee replacement, and nephrolithiasis with recent fall in 5/2020 with 5 rib fractures who presented to the hospital complaining of left chest pain.    #Left chest pain secondary to acute coronary syndrome vs     #History of CAD s/p stents x3  - Continue with aspirin 81mg PO QD, clopidogrel 75mg PO QD    PAST MEDICAL & SURGICAL HISTORY:  MI (myocardial infarction)    Arthritis    Kidney stones    Sleep apnea    Hypertension    High blood cholesterol    H/O heart artery stent    History of appendectomy    H/O chronic cholecystitis  cholecystectomy        #Misc  - DVT Prophylaxis:  - GI Prophylaxis:  - Diet:  - Activity:  - IV Fluids:  - Code Status:    Dispo: LIZZY KENT 70y Male  MRN#: [id number]   Hospital Day: 1d    SUBJECTIVE  Patient is a 70y old Male who presents with a chief complaint of Non-specific Chest Pain (24 Oct 2020 16:43)  Currently admitted to medicine with the primary diagnosis of Chest pain      INTERVAL HPI AND OVERNIGHT EVENTS:  Patient was examined and seen at bedside. This morning he is resting comfortably in bed and reports no issues. Patient was anxious overnight and given alprazolam 0.5mg PO with resolution of symptoms. Several beats of non-sustained ventricular tachycardia noted on telemetry overnight.    REVIEW OF SYMPTOMS:  CONSTITUTIONAL: No weakness, fevers or chills; No headaches  EYES: No visual changes, eye pain, or discharge  ENT: No vertigo; No ear pain or change in hearing; No sore throat or difficulty swallowing  NECK: No pain or stiffness  RESPIRATORY: No cough, wheezing, or hemoptysis; No shortness of breath  CARDIOVASCULAR: No chest pain or palpitations  GASTROINTESTINAL: No abdominal or epigastric pain; No nausea, vomiting, or hematemesis; No diarrhea or constipation; No melena or hematochezia  GENITOURINARY: No dysuria, frequency or hematuria  MUSCULOSKELETAL: No joint pain, no muscle pain, no weakness  NEUROLOGICAL: No numbness or weakness  SKIN: No itching or rashes    OBJECTIVE  PAST MEDICAL & SURGICAL HISTORY  MI (myocardial infarction)    Arthritis    Kidney stones    Sleep apnea    Hypertension    High blood cholesterol    H/O heart artery stent    History of appendectomy    H/O chronic cholecystitis  cholecystectomy      ALLERGIES:  fish (Unknown)  iodine (Rash)  latex (Unknown)  magnesium sulfate (Rash)  sulfa drugs (Rash)    MEDICATIONS:  STANDING MEDICATIONS  aspirin enteric coated 81 milliGRAM(s) Oral daily  ATENolol  Tablet 50 milliGRAM(s) Oral daily  atorvastatin 20 milliGRAM(s) Oral at bedtime  chlorhexidine 4% Liquid 1 Application(s) Topical <User Schedule>  clopidogrel Tablet 75 milliGRAM(s) Oral daily  enoxaparin Injectable 40 milliGRAM(s) SubCutaneous daily  hydrochlorothiazide 25 milliGRAM(s) Oral daily  losartan 50 milliGRAM(s) Oral daily  sucralfate 1 Gram(s) Oral two times a day    PRN MEDICATIONS  acetaminophen    Suspension .. 650 milliGRAM(s) Oral every 6 hours PRN  oxycodone    5 mG/acetaminophen 325 mG 1 Tablet(s) Oral every 6 hours PRN      VITAL SIGNS: Last 24 Hours  T(C): 36.1 (25 Oct 2020 05:00), Max: 36.8 (24 Oct 2020 21:01)  T(F): 96.9 (25 Oct 2020 05:00), Max: 98.3 (24 Oct 2020 21:01)  HR: 63 (25 Oct 2020 09:16) (61 - 90)  BP: 151/60 (25 Oct 2020 09:16) (106/71 - 174/80)  BP(mean): --  RR: 18 (25 Oct 2020 05:00) (18 - 20)  SpO2: 95% (25 Oct 2020 09:16) (95% - 100%)    LABS:                        13.7   6.34  )-----------( 184      ( 25 Oct 2020 06:42 )             39.7     10-25    140  |  101  |  15  ----------------------------<  99  3.6   |  30  |  0.9    Ca    8.9      25 Oct 2020 06:42  Mg     1.7     10-24    TPro  5.8<L>  /  Alb  3.5  /  TBili  0.8  /  DBili  x   /  AST  14  /  ALT  10  /  AlkPhos  92  10-25    Troponin T, Serum: <0.01 ng/mL (10-25-20 @ 06:42)  Troponin T, Serum: <0.01 ng/mL (10-24-20 @ 14:48)      CARDIAC MARKERS ( 25 Oct 2020 06:42 )  x     / <0.01 ng/mL / x     / x     / x      CARDIAC MARKERS ( 24 Oct 2020 14:48 )  x     / <0.01 ng/mL / x     / x     / x      CARDIAC MARKERS ( 24 Oct 2020 09:00 )  x     / 0.02 ng/mL / x     / x     / x          RADIOLOGY:  < from: Xray Chest 1 View-PORTABLE IMMEDIATE (10.24.20 @ 09:21) >  Findings:  Support devices: None.  Cardiac/mediastinum/hilum: Within normal limits.  Lung parenchyma/Pleura: Low lung volumes. Left basal opacity/atelectasis. No pneumothorax.  Skeleton/soft tissues:  No radiographically evident acute displaced fracture within the limitations of this exam.    Impression:  Low lung volumes. Left basal opacity/atelectasis. No pneumothorax.  < end of copied text >    < from: CT Angio Chest w/ IV Cont (10.24.20 @ 13:28) >  IMPRESSION:  No evidence of a pulmonaryembolism.  Elevated right hemidiaphragm.  Right middle lobe and right lower lobe nodular opacity and subcentimeter nodules up to 1.1 cm. This largest nodule was also present on the 9/30/2020 scan but appeared more linear. Recommend a three-month follow-up chest CT to reassess.  < end of copied text >      PHYSICAL EXAM:  CONSTITUTIONAL: No acute distress, well-developed, well-groomed, AAOx3  HEAD: Atraumatic, normocephalic  EYES: EOM intact, PERRLA, conjunctiva and sclera clear  ENT: Supple, no masses, no thyromegaly, no bruits, no JVD; moist mucous membranes  PULMONARY: Clear to auscultation bilaterally; no wheezes, rales, or rhonchi  CARDIOVASCULAR: Regular rate and rhythm; no murmurs, rubs, or gallops  GASTROINTESTINAL: Soft, non-tender, non-distended; bowel sounds present  MUSCULOSKELETAL: 2+ peripheral pulses; no clubbing, no cyanosis, no edema  NEUROLOGY: non-focal  SKIN: No rashes or lesions; warm and dry    ASSESSMENT & PLAN  Patient is a 70yo male with PMH of CAD s/p stent x3, hypertension, hyperlipidemia, KELSEY, bilateral knee replacement, and nephrolithiasis with recent fall in 5/2020 with 5 rib fractures who presented to the hospital complaining of left chest pain.    #Left chest pain secondary to acute coronary syndrome vs     #History of CAD s/p stents x3  - Continue with aspirin 81mg PO QD, clopidogrel 75mg PO QD, atenolol 50mg PO QD    #Hypertension  - Continue with atenolol 50mg PO QD, hydrochlorothiazide 25mg PO QD, and losartan 50mg PO QD  - Blood pressure this morning was normal-borderline low. Held hydrochlorothiazide and losartan this AM    #1.1cm right lower lobe pulmonary nodule  - Repeat chest CT in 3 months  - Follow outpatient with primary care provider    #Misc  - DVT Prophylaxis: Lovenox 40mg SQ QD   - GI Prophylaxis: not indicated   - Diet: DASH/TLC  - Activity: increase as tolerated  - IV Fluids: not indicated   - Code Status: Full Code    Dispo: LIZZY KENT 70y Male  MRN#: [id number]   Hospital Day: 1d    SUBJECTIVE  Patient is a 70y old Male who presents with a chief complaint of Non-specific Chest Pain (24 Oct 2020 16:43)  Currently admitted to medicine with the primary diagnosis of Chest pain      INTERVAL HPI AND OVERNIGHT EVENTS:  Patient was examined and seen at bedside. This morning he is resting comfortably in bed and reports no issues. Patient was anxious and had chest pain overnight and given alprazolam 0.5mg PO with resolution of symptoms. Several beats of non-sustained ventricular tachycardia noted on telemetry overnight.    REVIEW OF SYMPTOMS:  CONSTITUTIONAL: No weakness, fevers or chills; No headaches  EYES: No visual changes, eye pain, or discharge  ENT: No vertigo; No ear pain or change in hearing; No sore throat or difficulty swallowing  NECK: No pain or stiffness  RESPIRATORY: No cough, wheezing, or hemoptysis; No shortness of breath  CARDIOVASCULAR: No chest pain or palpitations  GASTROINTESTINAL: No abdominal or epigastric pain; No nausea, vomiting, or hematemesis; No diarrhea or constipation; No melena or hematochezia  GENITOURINARY: No dysuria, frequency or hematuria  MUSCULOSKELETAL: No joint pain, no muscle pain, no weakness  NEUROLOGICAL: No numbness or weakness  SKIN: No itching or rashes    OBJECTIVE  PAST MEDICAL & SURGICAL HISTORY  MI (myocardial infarction)    Arthritis    Kidney stones    Sleep apnea    Hypertension    High blood cholesterol    H/O heart artery stent    History of appendectomy    H/O chronic cholecystitis  cholecystectomy      ALLERGIES:  fish (Unknown)  iodine (Rash)  latex (Unknown)  magnesium sulfate (Rash)  sulfa drugs (Rash)    MEDICATIONS:  STANDING MEDICATIONS  aspirin enteric coated 81 milliGRAM(s) Oral daily  ATENolol  Tablet 50 milliGRAM(s) Oral daily  atorvastatin 20 milliGRAM(s) Oral at bedtime  chlorhexidine 4% Liquid 1 Application(s) Topical <User Schedule>  clopidogrel Tablet 75 milliGRAM(s) Oral daily  enoxaparin Injectable 40 milliGRAM(s) SubCutaneous daily  hydrochlorothiazide 25 milliGRAM(s) Oral daily  losartan 50 milliGRAM(s) Oral daily  sucralfate 1 Gram(s) Oral two times a day    PRN MEDICATIONS  acetaminophen    Suspension .. 650 milliGRAM(s) Oral every 6 hours PRN  oxycodone    5 mG/acetaminophen 325 mG 1 Tablet(s) Oral every 6 hours PRN      VITAL SIGNS: Last 24 Hours  T(C): 36.1 (25 Oct 2020 05:00), Max: 36.8 (24 Oct 2020 21:01)  T(F): 96.9 (25 Oct 2020 05:00), Max: 98.3 (24 Oct 2020 21:01)  HR: 63 (25 Oct 2020 09:16) (61 - 90)  BP: 151/60 (25 Oct 2020 09:16) (106/71 - 174/80)  BP(mean): --  RR: 18 (25 Oct 2020 05:00) (18 - 20)  SpO2: 95% (25 Oct 2020 09:16) (95% - 100%)    LABS:                        13.7   6.34  )-----------( 184      ( 25 Oct 2020 06:42 )             39.7     10-25    140  |  101  |  15  ----------------------------<  99  3.6   |  30  |  0.9    Ca    8.9      25 Oct 2020 06:42  Mg     1.7     10-24    TPro  5.8<L>  /  Alb  3.5  /  TBili  0.8  /  DBili  x   /  AST  14  /  ALT  10  /  AlkPhos  92  10-25    Troponin T, Serum: <0.01 ng/mL (10-25-20 @ 06:42)  Troponin T, Serum: <0.01 ng/mL (10-24-20 @ 14:48)      CARDIAC MARKERS ( 25 Oct 2020 06:42 )  x     / <0.01 ng/mL / x     / x     / x      CARDIAC MARKERS ( 24 Oct 2020 14:48 )  x     / <0.01 ng/mL / x     / x     / x      CARDIAC MARKERS ( 24 Oct 2020 09:00 )  x     / 0.02 ng/mL / x     / x     / x          RADIOLOGY:  < from: Xray Chest 1 View-PORTABLE IMMEDIATE (10.24.20 @ 09:21) >  Findings:  Support devices: None.  Cardiac/mediastinum/hilum: Within normal limits.  Lung parenchyma/Pleura: Low lung volumes. Left basal opacity/atelectasis. No pneumothorax.  Skeleton/soft tissues:  No radiographically evident acute displaced fracture within the limitations of this exam.    Impression:  Low lung volumes. Left basal opacity/atelectasis. No pneumothorax.  < end of copied text >    < from: CT Angio Chest w/ IV Cont (10.24.20 @ 13:28) >  IMPRESSION:  No evidence of a pulmonaryembolism.  Elevated right hemidiaphragm.  Right middle lobe and right lower lobe nodular opacity and subcentimeter nodules up to 1.1 cm. This largest nodule was also present on the 9/30/2020 scan but appeared more linear. Recommend a three-month follow-up chest CT to reassess.  < end of copied text >      PHYSICAL EXAM:  CONSTITUTIONAL: No acute distress, well-developed, well-groomed, AAOx3  HEAD: Atraumatic, normocephalic  EYES: EOM intact, PERRLA, conjunctiva and sclera clear  ENT: Supple, no masses, no thyromegaly, no bruits, no JVD; moist mucous membranes  PULMONARY: Clear to auscultation bilaterally; no wheezes, rales, or rhonchi  CARDIOVASCULAR: Regular rate and rhythm; no murmurs, rubs, or gallops  GASTROINTESTINAL: Soft, non-tender, non-distended; bowel sounds present  MUSCULOSKELETAL: 2+ peripheral pulses; no clubbing, no cyanosis, no edema  NEUROLOGY: non-focal  SKIN: No rashes or lesions; warm and dry    ASSESSMENT & PLAN  Patient is a 70yo male with PMH of CAD s/p stent x3, hypertension, hyperlipidemia, KELSEY, bilateral knee replacement, and nephrolithiasis with recent fall in 5/2020 with 5 rib fractures who presented to the hospital complaining of left chest pain.    #Left chest pain secondary to acute coronary syndrome vs unstable angina vs rib fractures  - Troponin trending down: 0.02->0.01->0.01  - D-dimer 400  - CT angio chest negative for pulmonary embolism  - Three events of 2-3 beats of ventricular tachycardia noted on telemetry overnight  - Pending cardiology consult  - Follow 2D echocardiogram  - PT evaluation appreciated    #Hypomagnesemia  - Magnesium yesterday: 1.7  - Patient is allergic to magnesium sulfate  - Follow magnesium in AM     #History of CAD s/p stents x3  - Continue with aspirin 81mg PO QD, clopidogrel 75mg PO QD, atenolol 50mg PO QD, and atorvastatin 20mg PO QHS    #Hyperlipidemia  - Continue with atorvastatin 20mg PO QHS    #Hypertension  - Continue with atenolol 50mg PO QD, hydrochlorothiazide 25mg PO QD, and losartan 50mg PO QD  - Blood pressure this morning was normal-borderline low. Held hydrochlorothiazide and losartan this AM    #1.1cm right lower lobe pulmonary nodule  - Repeat chest CT in 3 months  - Follow outpatient with primary care provider    #Misc  - DVT Prophylaxis: Lovenox 40mg SQ QD   - GI Prophylaxis: not indicated   - Diet: DASH/TLC  - Activity: increase as tolerated  - IV Fluids: not indicated   - Code Status: Full Code    Dispo: pending cardiology consult

## 2020-10-26 LAB
ANION GAP SERPL CALC-SCNC: 16 MMOL/L — HIGH (ref 7–14)
BASOPHILS # BLD AUTO: 0.03 K/UL — SIGNIFICANT CHANGE UP (ref 0–0.2)
BASOPHILS NFR BLD AUTO: 0.3 % — SIGNIFICANT CHANGE UP (ref 0–1)
BUN SERPL-MCNC: 15 MG/DL — SIGNIFICANT CHANGE UP (ref 10–20)
CALCIUM SERPL-MCNC: 9.5 MG/DL — SIGNIFICANT CHANGE UP (ref 8.5–10.1)
CHLORIDE SERPL-SCNC: 100 MMOL/L — SIGNIFICANT CHANGE UP (ref 98–110)
CK SERPL-CCNC: 58 U/L — SIGNIFICANT CHANGE UP (ref 0–225)
CO2 SERPL-SCNC: 26 MMOL/L — SIGNIFICANT CHANGE UP (ref 17–32)
CREAT SERPL-MCNC: 1.1 MG/DL — SIGNIFICANT CHANGE UP (ref 0.7–1.5)
EOSINOPHIL # BLD AUTO: 0.1 K/UL — SIGNIFICANT CHANGE UP (ref 0–0.7)
EOSINOPHIL NFR BLD AUTO: 1.1 % — SIGNIFICANT CHANGE UP (ref 0–8)
GLUCOSE BLDC GLUCOMTR-MCNC: 157 MG/DL — HIGH (ref 70–99)
GLUCOSE SERPL-MCNC: 195 MG/DL — HIGH (ref 70–99)
HCT VFR BLD CALC: 43 % — SIGNIFICANT CHANGE UP (ref 42–52)
HGB BLD-MCNC: 14.9 G/DL — SIGNIFICANT CHANGE UP (ref 14–18)
IMM GRANULOCYTES NFR BLD AUTO: 0.4 % — HIGH (ref 0.1–0.3)
LYMPHOCYTES # BLD AUTO: 2.45 K/UL — SIGNIFICANT CHANGE UP (ref 1.2–3.4)
LYMPHOCYTES # BLD AUTO: 27.3 % — SIGNIFICANT CHANGE UP (ref 20.5–51.1)
MAGNESIUM SERPL-MCNC: 1.8 MG/DL — SIGNIFICANT CHANGE UP (ref 1.8–2.4)
MCHC RBC-ENTMCNC: 32.5 PG — HIGH (ref 27–31)
MCHC RBC-ENTMCNC: 34.7 G/DL — SIGNIFICANT CHANGE UP (ref 32–37)
MCV RBC AUTO: 93.7 FL — SIGNIFICANT CHANGE UP (ref 80–94)
MONOCYTES # BLD AUTO: 0.6 K/UL — SIGNIFICANT CHANGE UP (ref 0.1–0.6)
MONOCYTES NFR BLD AUTO: 6.7 % — SIGNIFICANT CHANGE UP (ref 1.7–9.3)
NEUTROPHILS # BLD AUTO: 5.76 K/UL — SIGNIFICANT CHANGE UP (ref 1.4–6.5)
NEUTROPHILS NFR BLD AUTO: 64.2 % — SIGNIFICANT CHANGE UP (ref 42.2–75.2)
NRBC # BLD: 0 /100 WBCS — SIGNIFICANT CHANGE UP (ref 0–0)
PLATELET # BLD AUTO: 226 K/UL — SIGNIFICANT CHANGE UP (ref 130–400)
POTASSIUM SERPL-MCNC: 3.7 MMOL/L — SIGNIFICANT CHANGE UP (ref 3.5–5)
POTASSIUM SERPL-SCNC: 3.7 MMOL/L — SIGNIFICANT CHANGE UP (ref 3.5–5)
RBC # BLD: 4.59 M/UL — LOW (ref 4.7–6.1)
RBC # FLD: 12.6 % — SIGNIFICANT CHANGE UP (ref 11.5–14.5)
SODIUM SERPL-SCNC: 142 MMOL/L — SIGNIFICANT CHANGE UP (ref 135–146)
WBC # BLD: 8.98 K/UL — SIGNIFICANT CHANGE UP (ref 4.8–10.8)
WBC # FLD AUTO: 8.98 K/UL — SIGNIFICANT CHANGE UP (ref 4.8–10.8)

## 2020-10-26 PROCEDURE — 99233 SBSQ HOSP IP/OBS HIGH 50: CPT

## 2020-10-26 PROCEDURE — 99222 1ST HOSP IP/OBS MODERATE 55: CPT

## 2020-10-26 RX ORDER — ONDANSETRON 8 MG/1
4 TABLET, FILM COATED ORAL ONCE
Refills: 0 | Status: DISCONTINUED | OUTPATIENT
Start: 2020-10-26 | End: 2020-10-29

## 2020-10-26 RX ADMIN — ATORVASTATIN CALCIUM 20 MILLIGRAM(S): 80 TABLET, FILM COATED ORAL at 22:05

## 2020-10-26 RX ADMIN — CHLORHEXIDINE GLUCONATE 1 APPLICATION(S): 213 SOLUTION TOPICAL at 05:32

## 2020-10-26 RX ADMIN — ENOXAPARIN SODIUM 40 MILLIGRAM(S): 100 INJECTION SUBCUTANEOUS at 12:57

## 2020-10-26 RX ADMIN — CLOPIDOGREL BISULFATE 75 MILLIGRAM(S): 75 TABLET, FILM COATED ORAL at 12:57

## 2020-10-26 RX ADMIN — Medication 81 MILLIGRAM(S): at 12:57

## 2020-10-26 RX ADMIN — Medication 1 GRAM(S): at 17:32

## 2020-10-26 NOTE — CHART NOTE - NSCHARTNOTEFT_GEN_A_CORE
Rapid response was call at 6 AM, as patient reported to be unresponsive, which lasted as per nurse approximately 5 minutes. On evaluation of patient, he was alert and oriented X3, his vital signs were within normal limits, finger stick was 157.   Pt did urinate. He remembers all his remote and recent memory, gross memory was intact. Patient complained of the dizziness prior to episode. No events on telemetry monitor were noted. STAT EKG was repeated, which showed no acute changes.   EEG was ordered, orthostatic vital signs were ordered as well( patient has history of positive orthostatic vital signs in the past). Rapid response was call at 6 AM, as patient reported to be unresponsive, which lasted as per nurse approximately 5 minutes. On evaluation of patient, he was alert and oriented X3, his vital signs were within normal limits, finger stick was 157.   Pt did urinate. He remembers all his remote and recent memory, gross memory was intact. Patient complained of the dizziness prior to episode. No events on telemetry monitor were noted. STAT EKG was repeated, which showed no acute changes.   EEG was ordered, orthostatic vital signs were ordered as well( patient has history of positive orthostatic vital signs in the past).      Addendum:  RRT called for episode of unresponsiveness  Patient seen and evaluated    S:   Pt c/o dizziness (lightheadedness), LOWRY, chest tightness, states that he recalls the entire event    O:   Hemodynamics stable; orthostats were positive; appears tired/fatigued; b/l air entry; S1/S2  Neuro exam without any acute focal findings    A:   Likely syncopal event (likely 2/2 orthostasis - nursing staff reports pt had a drop of 20 mmHg from supine to standing)  Urinary incontinence was likely functional; no reports of tonic clonic movements  PAC's and compensatory pauses on Telemetry (unchanged from before)  EKG repeated: no significant interval change    P:   Continuous rhythm monitoring  Fall precautions  Pending TTE  EEG will likely be of low yield  Would encourage PO intake  Obtain IV access (pt refused further attempts after failed attempts earlier - try again in AM)  Would administer a fluid challenge once IV access is obtained  Defer neuro-imaging for now and tx orthostasis  If dizziness persists after this has been addressed, can reconsider

## 2020-10-26 NOTE — PROGRESS NOTE ADULT - SUBJECTIVE AND OBJECTIVE BOX
LIZZY KENT 70y Male  MRN#: [id number]   Hospital Day: 2d    SUBJECTIVE  Patient is a 70y old Male who presents with a chief complaint of Non-specific Chest Pain (25 Oct 2020 18:03)  Currently admitted to medicine with the primary diagnosis of Chest pain      INTERVAL HPI AND OVERNIGHT EVENTS:  Patient was examined and seen at bedside. This morning he is resting comfortably in bed. Rapid response was called this morning for unresponsiveness. Patient states that he remembers the entire event. He reports a previous history of orthostatic hypotension. However, the event this morning occurred while the patient remained in bed and did not get up. Mental status improved without intervention.    REVIEW OF SYMPTOMS:  CONSTITUTIONAL: No weakness, fevers or chills; No headaches  EYES: No visual changes, eye pain, or discharge  ENT: No vertigo; No ear pain or change in hearing; No sore throat or difficulty swallowing  NECK: No pain or stiffness  RESPIRATORY: No cough, wheezing, or hemoptysis; No shortness of breath  CARDIOVASCULAR: No chest pain or palpitations  GASTROINTESTINAL: No abdominal or epigastric pain; No nausea, vomiting, or hematemesis; No diarrhea or constipation; No melena or hematochezia  GENITOURINARY: No dysuria, frequency or hematuria  MUSCULOSKELETAL: No joint pain, no muscle pain, no weakness  NEUROLOGICAL: No numbness or weakness  SKIN: No itching or rashes    OBJECTIVE  PAST MEDICAL & SURGICAL HISTORY  MI (myocardial infarction)    Arthritis    Kidney stones    Sleep apnea    Hypertension    High blood cholesterol    H/O heart artery stent    History of appendectomy    H/O chronic cholecystitis  cholecystectomy      ALLERGIES:  fish (Unknown)  iodine (Rash)  latex (Unknown)  magnesium sulfate (Rash)  sulfa drugs (Rash)    MEDICATIONS:  STANDING MEDICATIONS  aspirin enteric coated 81 milliGRAM(s) Oral daily  ATENolol  Tablet 50 milliGRAM(s) Oral daily  atorvastatin 20 milliGRAM(s) Oral at bedtime  chlorhexidine 4% Liquid 1 Application(s) Topical <User Schedule>  clopidogrel Tablet 75 milliGRAM(s) Oral daily  enoxaparin Injectable 40 milliGRAM(s) SubCutaneous daily  hydrochlorothiazide 25 milliGRAM(s) Oral daily  losartan 50 milliGRAM(s) Oral daily  sucralfate 1 Gram(s) Oral two times a day    PRN MEDICATIONS  acetaminophen    Suspension .. 650 milliGRAM(s) Oral every 6 hours PRN  oxycodone    5 mG/acetaminophen 325 mG 1 Tablet(s) Oral every 6 hours PRN      VITAL SIGNS: Last 24 Hours  T(C): 36.4 (26 Oct 2020 05:20), Max: 37.2 (25 Oct 2020 20:40)  T(F): 97.6 (26 Oct 2020 05:20), Max: 98.9 (25 Oct 2020 20:40)  HR: 66 (26 Oct 2020 05:20) (61 - 66)  BP: 159/73 (26 Oct 2020 05:20) (116/57 - 161/70)  BP(mean): --  RR: 18 (26 Oct 2020 05:20) (16 - 18)  SpO2: --    LABS:                        14.9   8.98  )-----------( 226      ( 26 Oct 2020 06:45 )             43.0     10-26    142  |  100  |  15  ----------------------------<  195<H>  3.7   |  26  |  1.1    Ca    9.5      26 Oct 2020 06:45  Mg     1.8     10-26    TPro  5.8<L>  /  Alb  3.5  /  TBili  0.8  /  DBili  x   /  AST  14  /  ALT  10  /  AlkPhos  92  10-25    CARDIAC MARKERS ( 25 Oct 2020 06:42 )  x     / <0.01 ng/mL / x     / x     / x      CARDIAC MARKERS ( 24 Oct 2020 14:48 )  x     / <0.01 ng/mL / x     / x     / x          RADIOLOGY:  < from: CT Angio Chest w/ IV Cont (10.24.20 @ 13:28) >  FINDINGS:    PULMONARY EMBOLUS: No filling defects to suggest a pulmonary embolus.    TUBES/LINES: None.    AIRWAYS, LUNGS, PLEURA: Central airways are patent. Elevated right hemidiaphragm with right basilar atelectasis. 1.1 cm nodular opacity seen in the posterior right middle lobe seen on series 603 image 59 also present on the 9/30/2020 but appears more linear. 2 other subcentimeter nodules are seen in the anterior right lower lobe on series 2 image 47.    Left basilar subsegmental atelectasis.    THORACIC NODES: No enlarged mediastinal, hilar or axillary lymph nodes. Subcentimeter left thyroid lobe nodule.    MEDIASTINUM/GREAT VESSELS: No pericardial effusion. Heart size unremarkable. Coronary artery and thoracic aorta atherosclerotic calcifications. Normal caliber thoracic aorta. Aortic valve and mitral annular calcifications.    VISUALIZED UPPER ABDOMEN: Postcholecystectomy.    BONES/SOFT TISSUES: Diffuse osteopenia. Degenerative changes of the spine and shoulders. No acute osseous abnormality. Left breast gynecomastia.      IMPRESSION:    No evidence of a pulmonaryembolism.    Elevated right hemidiaphragm.    Right middle lobe and right lower lobe nodular opacity and subcentimeter nodules up to 1.1 cm. This largest nodule was also present on the 9/30/2020 scan but appeared more linear. Recommend a three-month follow-up chest CT to reassess.    < end of copied text >      PHYSICAL EXAM:  CONSTITUTIONAL: No acute distress, well-developed, well-groomed, AAOx3  HEAD: Atraumatic, normocephalic  EYES: EOM intact, PERRLA, conjunctiva and sclera clear  ENT: Supple, no masses, no thyromegaly, no bruits, no JVD; moist mucous membranes  PULMONARY: Clear to auscultation bilaterally; no wheezes, rales, or rhonchi  CARDIOVASCULAR: Regular rate and rhythm; no murmurs, rubs, or gallops  GASTROINTESTINAL: Soft, non-tender, non-distended; bowel sounds present  MUSCULOSKELETAL: 2+ peripheral pulses; no clubbing, no cyanosis, no edema  NEUROLOGY: non-focal  SKIN: No rashes or lesions; warm and dry    ASSESSMENT & PLAN  Patient is a 68yo male with PMH of CAD s/p stent x3, hypertension, hyperlipidemia, KELSEY, bilateral knee replacement, and nephrolithiasis with recent fall in 5/2020 with 5 rib fractures who presented to the hospital complaining of left chest pain. Rapid response was called on 10/26/2020 in the AM for unresponsiveness.    #Syncopal episode, likely secondary to seizure vs orthostatic hypotension  - Rapid response called at 6am on 10/26/2020 for dizziness and unresponsiveness  - Episode accompanied by urinary incontinence and patient had stool incontinence later in the morning  - No report of tonic-clonic movements  - No events noted on telemetry during the event  - Orthostatic vital signs  - Routine EEG  - Based on EEG findings, will consult neurology    #Atypical left chest pain secondary to previous rib fractures, improving  - Troponin trending down: 0.02->0.01->0.01  - D-dimer 400  - CT angio chest negative for pulmonary embolism  - Telemetry shows episodes of couples, triples, and PVCs  - Follow 2D echocardiogram  - Cardiology consult appreciated  - Spoke with Dr. Bingham this morning, who stated no further workup after 2D echocardiogram  - PT evaluation appreciated    #Hypomagnesemia  - Magnesium today: 1.8  - Patient is allergic to magnesium sulfate and refused to take magnesium supplement IV or PO  - Follow magnesium in AM     #History of CAD s/p stents x3  - Continue with aspirin 81mg PO QD, clopidogrel 75mg PO QD, atenolol 50mg PO QD, and atorvastatin 20mg PO QHS    #Hyperlipidemia  - Continue with atorvastatin 20mg PO QHS    #Hypertension  - Continue with atenolol 50mg PO QD, hydrochlorothiazide 25mg PO QD, and losartan 50mg PO QD    #1.1cm right lower lobe pulmonary nodule  - Repeat chest CT in 3 months  - Follow outpatient with primary care provider    #Misc  - DVT Prophylaxis: Lovenox 40mg SQ QD   - GI Prophylaxis: not indicated   - Diet: DASH/TLC  - Activity: increase as tolerated  - IV Fluids: not indicated   - Code Status: Full Code    Dispo: pending cardiology consult LIZZY KENT 70y Male  MRN#: [id number]   Hospital Day: 2d    SUBJECTIVE  Patient is a 70y old Male who presents with a chief complaint of Non-specific Chest Pain (25 Oct 2020 18:03)  Currently admitted to medicine with the primary diagnosis of Chest pain      INTERVAL HPI AND OVERNIGHT EVENTS:  Patient was examined and seen at bedside. This morning he is resting comfortably in bed. Rapid response was called this morning for unresponsiveness. Patient states that he remembers the entire event. He reports a previous history of orthostatic hypotension. However, the event this morning occurred while the patient remained in bed and did not get up. Mental status improved without intervention.    REVIEW OF SYMPTOMS:  CONSTITUTIONAL: No weakness, fevers or chills; No headaches  EYES: No visual changes, eye pain, or discharge  ENT: No vertigo; No ear pain or change in hearing; No sore throat or difficulty swallowing  NECK: No pain or stiffness  RESPIRATORY: No cough, wheezing, or hemoptysis; No shortness of breath  CARDIOVASCULAR: No chest pain or palpitations  GASTROINTESTINAL: No abdominal or epigastric pain; No nausea, vomiting, or hematemesis; No diarrhea or constipation; No melena or hematochezia  GENITOURINARY: No dysuria, frequency or hematuria  MUSCULOSKELETAL: No joint pain, no muscle pain, no weakness  NEUROLOGICAL: No numbness or weakness  SKIN: No itching or rashes    OBJECTIVE  PAST MEDICAL & SURGICAL HISTORY  MI (myocardial infarction)    Arthritis    Kidney stones    Sleep apnea    Hypertension    High blood cholesterol    H/O heart artery stent    History of appendectomy    H/O chronic cholecystitis  cholecystectomy      ALLERGIES:  fish (Unknown)  iodine (Rash)  latex (Unknown)  magnesium sulfate (Rash)  sulfa drugs (Rash)    MEDICATIONS:  STANDING MEDICATIONS  aspirin enteric coated 81 milliGRAM(s) Oral daily  ATENolol  Tablet 50 milliGRAM(s) Oral daily  atorvastatin 20 milliGRAM(s) Oral at bedtime  chlorhexidine 4% Liquid 1 Application(s) Topical <User Schedule>  clopidogrel Tablet 75 milliGRAM(s) Oral daily  enoxaparin Injectable 40 milliGRAM(s) SubCutaneous daily  hydrochlorothiazide 25 milliGRAM(s) Oral daily  losartan 50 milliGRAM(s) Oral daily  sucralfate 1 Gram(s) Oral two times a day    PRN MEDICATIONS  acetaminophen    Suspension .. 650 milliGRAM(s) Oral every 6 hours PRN  oxycodone    5 mG/acetaminophen 325 mG 1 Tablet(s) Oral every 6 hours PRN      VITAL SIGNS: Last 24 Hours  T(C): 36.4 (26 Oct 2020 05:20), Max: 37.2 (25 Oct 2020 20:40)  T(F): 97.6 (26 Oct 2020 05:20), Max: 98.9 (25 Oct 2020 20:40)  HR: 66 (26 Oct 2020 05:20) (61 - 66)  BP: 159/73 (26 Oct 2020 05:20) (116/57 - 161/70)  BP(mean): --  RR: 18 (26 Oct 2020 05:20) (16 - 18)  SpO2: --    LABS:                        14.9   8.98  )-----------( 226      ( 26 Oct 2020 06:45 )             43.0     10-26    142  |  100  |  15  ----------------------------<  195<H>  3.7   |  26  |  1.1    Ca    9.5      26 Oct 2020 06:45  Mg     1.8     10-26    TPro  5.8<L>  /  Alb  3.5  /  TBili  0.8  /  DBili  x   /  AST  14  /  ALT  10  /  AlkPhos  92  10-25    CARDIAC MARKERS ( 25 Oct 2020 06:42 )  x     / <0.01 ng/mL / x     / x     / x      CARDIAC MARKERS ( 24 Oct 2020 14:48 )  x     / <0.01 ng/mL / x     / x     / x          RADIOLOGY:  < from: CT Angio Chest w/ IV Cont (10.24.20 @ 13:28) >  FINDINGS:    PULMONARY EMBOLUS: No filling defects to suggest a pulmonary embolus.    TUBES/LINES: None.    AIRWAYS, LUNGS, PLEURA: Central airways are patent. Elevated right hemidiaphragm with right basilar atelectasis. 1.1 cm nodular opacity seen in the posterior right middle lobe seen on series 603 image 59 also present on the 9/30/2020 but appears more linear. 2 other subcentimeter nodules are seen in the anterior right lower lobe on series 2 image 47.    Left basilar subsegmental atelectasis.    THORACIC NODES: No enlarged mediastinal, hilar or axillary lymph nodes. Subcentimeter left thyroid lobe nodule.    MEDIASTINUM/GREAT VESSELS: No pericardial effusion. Heart size unremarkable. Coronary artery and thoracic aorta atherosclerotic calcifications. Normal caliber thoracic aorta. Aortic valve and mitral annular calcifications.    VISUALIZED UPPER ABDOMEN: Postcholecystectomy.    BONES/SOFT TISSUES: Diffuse osteopenia. Degenerative changes of the spine and shoulders. No acute osseous abnormality. Left breast gynecomastia.      IMPRESSION:    No evidence of a pulmonaryembolism.    Elevated right hemidiaphragm.    Right middle lobe and right lower lobe nodular opacity and subcentimeter nodules up to 1.1 cm. This largest nodule was also present on the 9/30/2020 scan but appeared more linear. Recommend a three-month follow-up chest CT to reassess.    < end of copied text >      PHYSICAL EXAM:  CONSTITUTIONAL: No acute distress, well-developed, well-groomed, AAOx3  HEAD: Atraumatic, normocephalic  EYES: EOM intact, PERRLA, conjunctiva and sclera clear  ENT: Supple, no masses, no thyromegaly, no bruits, no JVD; moist mucous membranes  PULMONARY: Clear to auscultation bilaterally; no wheezes, rales, or rhonchi  CARDIOVASCULAR: Regular rate and rhythm; no murmurs, rubs, or gallops  GASTROINTESTINAL: Soft, non-tender, non-distended; bowel sounds present  MUSCULOSKELETAL: 2+ peripheral pulses; no clubbing, no cyanosis, no edema  NEUROLOGY: non-focal  SKIN: No rashes or lesions; warm and dry    ASSESSMENT & PLAN  Patient is a 70yo male with PMH of CAD s/p stent x3, hypertension, hyperlipidemia, KELSEY, bilateral knee replacement, and nephrolithiasis with recent fall in 5/2020 with 5 rib fractures who presented to the hospital complaining of left chest pain. Rapid response was called on 10/26/2020 in the AM for unresponsiveness.    #Syncopal episode, likely secondary to seizure vs orthostatic hypotension  - Rapid response called at 6am on 10/26/2020 for dizziness and unresponsiveness  - Episode accompanied by urinary incontinence and patient had stool incontinence later in the morning  - Elevated anion gap on this morning's BMP makes seizure more likely  - No report of tonic-clonic movements  - No events noted on telemetry during the event  - Orthostatic vital signs  - Routine EEG  - Based on EEG findings, will consult neurology    #Atypical left chest pain secondary to previous rib fractures, improving  - Troponin trending down: 0.02->0.01->0.01  - D-dimer 400  - CT angio chest negative for pulmonary embolism  - Telemetry shows episodes of couples, triples, and PVCs  - Follow 2D echocardiogram  - Cardiology consult appreciated  - Spoke with Dr. Bingham this morning, who stated no further workup after 2D echocardiogram  - PT evaluation appreciated    #Hypomagnesemia  - Magnesium today: 1.8  - Patient is allergic to magnesium sulfate and refused to take magnesium supplement IV or PO  - Follow magnesium in AM     #History of CAD s/p stents x3  - Continue with aspirin 81mg PO QD, clopidogrel 75mg PO QD, atenolol 50mg PO QD, and atorvastatin 20mg PO QHS    #Hyperlipidemia  - Continue with atorvastatin 20mg PO QHS    #Hypertension  - Continue with atenolol 50mg PO QD, hydrochlorothiazide 25mg PO QD, and losartan 50mg PO QD    #1.1cm right lower lobe pulmonary nodule  - Repeat chest CT in 3 months  - Follow outpatient with primary care provider    #Misc  - DVT Prophylaxis: Lovenox 40mg SQ QD   - GI Prophylaxis: not indicated   - Diet: DASH/TLC  - Activity: increase as tolerated  - IV Fluids: not indicated   - Code Status: Full Code    Dispo: pending cardiology consult LIZZY KENT 70y Male  MRN#: [id number]   Hospital Day: 2d    SUBJECTIVE  Patient is a 70y old Male who presents with a chief complaint of Non-specific Chest Pain (25 Oct 2020 18:03)  Currently admitted to medicine with the primary diagnosis of Chest pain      INTERVAL HPI AND OVERNIGHT EVENTS:  Patient was examined and seen at bedside. This morning he is resting comfortably in bed. Rapid response was called this morning for unresponsiveness. Patient states that he remembers the entire event. He reports a previous history of orthostatic hypotension. However, the event this morning occurred while the patient remained in bed and did not get up. Mental status improved without intervention.    Attending Note: Pt seen and examined at bedside No cp or sob. This am had presyncope episode and random BM.     REVIEW OF SYMPTOMS:  CONSTITUTIONAL: No weakness, fevers or chills; No headaches  EYES: No visual changes, eye pain, or discharge  ENT: No vertigo; No ear pain or change in hearing; No sore throat or difficulty swallowing  NECK: No pain or stiffness  RESPIRATORY: No cough, wheezing, or hemoptysis; No shortness of breath  CARDIOVASCULAR: No chest pain or palpitations  GASTROINTESTINAL: No abdominal or epigastric pain; No nausea, vomiting, or hematemesis; No diarrhea or constipation; No melena or hematochezia  GENITOURINARY: No dysuria, frequency or hematuria  MUSCULOSKELETAL: No joint pain, no muscle pain, no weakness  NEUROLOGICAL: No numbness or weakness  SKIN: No itching or rashes    OBJECTIVE  PAST MEDICAL & SURGICAL HISTORY  MI (myocardial infarction)    Arthritis    Kidney stones    Sleep apnea    Hypertension    High blood cholesterol    H/O heart artery stent    History of appendectomy    H/O chronic cholecystitis  cholecystectomy      ALLERGIES:  fish (Unknown)  iodine (Rash)  latex (Unknown)  magnesium sulfate (Rash)  sulfa drugs (Rash)    MEDICATIONS:  STANDING MEDICATIONS  aspirin enteric coated 81 milliGRAM(s) Oral daily  ATENolol  Tablet 50 milliGRAM(s) Oral daily  atorvastatin 20 milliGRAM(s) Oral at bedtime  chlorhexidine 4% Liquid 1 Application(s) Topical <User Schedule>  clopidogrel Tablet 75 milliGRAM(s) Oral daily  enoxaparin Injectable 40 milliGRAM(s) SubCutaneous daily  hydrochlorothiazide 25 milliGRAM(s) Oral daily  losartan 50 milliGRAM(s) Oral daily  sucralfate 1 Gram(s) Oral two times a day    PRN MEDICATIONS  acetaminophen    Suspension .. 650 milliGRAM(s) Oral every 6 hours PRN  oxycodone    5 mG/acetaminophen 325 mG 1 Tablet(s) Oral every 6 hours PRN      VITAL SIGNS: Last 24 Hours  T(C): 36.4 (26 Oct 2020 05:20), Max: 37.2 (25 Oct 2020 20:40)  T(F): 97.6 (26 Oct 2020 05:20), Max: 98.9 (25 Oct 2020 20:40)  HR: 66 (26 Oct 2020 05:20) (61 - 66)  BP: 159/73 (26 Oct 2020 05:20) (116/57 - 161/70)  BP(mean): --  RR: 18 (26 Oct 2020 05:20) (16 - 18)  SpO2: --    LABS:                        14.9   8.98  )-----------( 226      ( 26 Oct 2020 06:45 )             43.0     10-26    142  |  100  |  15  ----------------------------<  195<H>  3.7   |  26  |  1.1    Ca    9.5      26 Oct 2020 06:45  Mg     1.8     10-26    TPro  5.8<L>  /  Alb  3.5  /  TBili  0.8  /  DBili  x   /  AST  14  /  ALT  10  /  AlkPhos  92  10-25    CARDIAC MARKERS ( 25 Oct 2020 06:42 )  x     / <0.01 ng/mL / x     / x     / x      CARDIAC MARKERS ( 24 Oct 2020 14:48 )  x     / <0.01 ng/mL / x     / x     / x          RADIOLOGY:  < from: CT Angio Chest w/ IV Cont (10.24.20 @ 13:28) >  FINDINGS:    PULMONARY EMBOLUS: No filling defects to suggest a pulmonary embolus.    TUBES/LINES: None.    AIRWAYS, LUNGS, PLEURA: Central airways are patent. Elevated right hemidiaphragm with right basilar atelectasis. 1.1 cm nodular opacity seen in the posterior right middle lobe seen on series 603 image 59 also present on the 9/30/2020 but appears more linear. 2 other subcentimeter nodules are seen in the anterior right lower lobe on series 2 image 47.    Left basilar subsegmental atelectasis.    THORACIC NODES: No enlarged mediastinal, hilar or axillary lymph nodes. Subcentimeter left thyroid lobe nodule.    MEDIASTINUM/GREAT VESSELS: No pericardial effusion. Heart size unremarkable. Coronary artery and thoracic aorta atherosclerotic calcifications. Normal caliber thoracic aorta. Aortic valve and mitral annular calcifications.    VISUALIZED UPPER ABDOMEN: Postcholecystectomy.    BONES/SOFT TISSUES: Diffuse osteopenia. Degenerative changes of the spine and shoulders. No acute osseous abnormality. Left breast gynecomastia.      IMPRESSION:    No evidence of a pulmonaryembolism.    Elevated right hemidiaphragm.    Right middle lobe and right lower lobe nodular opacity and subcentimeter nodules up to 1.1 cm. This largest nodule was also present on the 9/30/2020 scan but appeared more linear. Recommend a three-month follow-up chest CT to reassess.    < end of copied text >      PHYSICAL EXAM:  CONSTITUTIONAL: No acute distress, well-developed, well-groomed, AAOx3  HEAD: Atraumatic, normocephalic  EYES: EOM intact, PERRLA, conjunctiva and sclera clear  ENT: Supple, no masses, no thyromegaly, no bruits, no JVD; moist mucous membranes  PULMONARY: Clear to auscultation bilaterally; no wheezes, rales, or rhonchi  CARDIOVASCULAR: Regular rate and rhythm; no murmurs, rubs, or gallops  GASTROINTESTINAL: Soft, non-tender, non-distended; bowel sounds present  MUSCULOSKELETAL: 2+ peripheral pulses; no clubbing, no cyanosis, no edema  NEUROLOGY: non-focal  SKIN: No rashes or lesions; warm and dry    ASSESSMENT & PLAN  Patient is a 68yo male with PMH of CAD s/p stent x3, hypertension, hyperlipidemia, KELSEY, bilateral knee replacement, and nephrolithiasis with recent fall in 5/2020 with 5 rib fractures who presented to the hospital complaining of left chest pain. Rapid response was called on 10/26/2020 in the AM for unresponsiveness.    #Syncopal episode, likely secondary to seizure vs orthostatic hypotension  - Rapid response called at 6am on 10/26/2020 for dizziness and unresponsiveness  - Episode accompanied by urinary incontinence and patient had stool incontinence later in the morning  - Elevated anion gap on this morning's BMP makes seizure more likely  - No report of tonic-clonic movements  - No events noted on telemetry during the event  - Orthostatic vital signs neg   - Routine EEG  - Based on EEG findings, will consult neurology  - will check CK    #Atypical left chest pain secondary to previous rib fractures, improving  - Troponin trending down: 0.02->0.01->0.01  - D-dimer 400  - CT angio chest negative for pulmonary embolism  - Telemetry shows episodes of couples, triples, and PVCs  - Follow 2D echocardiogram  - Cardiology consult appreciated  - Spoke with Dr. Bingham this morning, who stated no further workup after 2D echocardiogram  - PT evaluation appreciated    #Hypomagnesemia  - Magnesium today: 1.8  - Patient is allergic to magnesium sulfate and refused to take magnesium supplement IV or PO  - Follow magnesium in AM     #History of CAD s/p stents x3  - Continue with aspirin 81mg PO QD, clopidogrel 75mg PO QD, atenolol 50mg PO QD, and atorvastatin 20mg PO QHS    #Hyperlipidemia  - Continue with atorvastatin 20mg PO QHS    #Hypertension  - Continue with atenolol 50mg PO QD, hydrochlorothiazide 25mg PO QD, and losartan 50mg PO QD    #1.1cm right lower lobe pulmonary nodule  - Repeat chest CT in 3 months  - Follow outpatient with primary care provider    #Misc  - DVT Prophylaxis: Lovenox 40mg SQ QD   - GI Prophylaxis: not indicated   - Diet: DASH/TLC  - Activity: increase as tolerated  - IV Fluids: not indicated   - Code Status: Full Code    Dispo: pending cardiology consult

## 2020-10-27 LAB
ANION GAP SERPL CALC-SCNC: 11 MMOL/L — SIGNIFICANT CHANGE UP (ref 7–14)
BASOPHILS # BLD AUTO: 0.02 K/UL — SIGNIFICANT CHANGE UP (ref 0–0.2)
BASOPHILS NFR BLD AUTO: 0.2 % — SIGNIFICANT CHANGE UP (ref 0–1)
BUN SERPL-MCNC: 14 MG/DL — SIGNIFICANT CHANGE UP (ref 10–20)
CALCIUM SERPL-MCNC: 8.6 MG/DL — SIGNIFICANT CHANGE UP (ref 8.5–10.1)
CHLORIDE SERPL-SCNC: 103 MMOL/L — SIGNIFICANT CHANGE UP (ref 98–110)
CO2 SERPL-SCNC: 27 MMOL/L — SIGNIFICANT CHANGE UP (ref 17–32)
CREAT SERPL-MCNC: 0.8 MG/DL — SIGNIFICANT CHANGE UP (ref 0.7–1.5)
EOSINOPHIL # BLD AUTO: 0.05 K/UL — SIGNIFICANT CHANGE UP (ref 0–0.7)
EOSINOPHIL NFR BLD AUTO: 0.6 % — SIGNIFICANT CHANGE UP (ref 0–8)
GLUCOSE SERPL-MCNC: 101 MG/DL — HIGH (ref 70–99)
HCT VFR BLD CALC: 41.8 % — LOW (ref 42–52)
HGB BLD-MCNC: 14.5 G/DL — SIGNIFICANT CHANGE UP (ref 14–18)
IMM GRANULOCYTES NFR BLD AUTO: 0.2 % — SIGNIFICANT CHANGE UP (ref 0.1–0.3)
LYMPHOCYTES # BLD AUTO: 1.9 K/UL — SIGNIFICANT CHANGE UP (ref 1.2–3.4)
LYMPHOCYTES # BLD AUTO: 23.6 % — SIGNIFICANT CHANGE UP (ref 20.5–51.1)
MAGNESIUM SERPL-MCNC: 1.7 MG/DL — LOW (ref 1.8–2.4)
MCHC RBC-ENTMCNC: 32.2 PG — HIGH (ref 27–31)
MCHC RBC-ENTMCNC: 34.7 G/DL — SIGNIFICANT CHANGE UP (ref 32–37)
MCV RBC AUTO: 92.9 FL — SIGNIFICANT CHANGE UP (ref 80–94)
MONOCYTES # BLD AUTO: 0.85 K/UL — HIGH (ref 0.1–0.6)
MONOCYTES NFR BLD AUTO: 10.6 % — HIGH (ref 1.7–9.3)
NEUTROPHILS # BLD AUTO: 5.2 K/UL — SIGNIFICANT CHANGE UP (ref 1.4–6.5)
NEUTROPHILS NFR BLD AUTO: 64.8 % — SIGNIFICANT CHANGE UP (ref 42.2–75.2)
NRBC # BLD: 0 /100 WBCS — SIGNIFICANT CHANGE UP (ref 0–0)
PLATELET # BLD AUTO: 205 K/UL — SIGNIFICANT CHANGE UP (ref 130–400)
POTASSIUM SERPL-MCNC: 3.3 MMOL/L — LOW (ref 3.5–5)
POTASSIUM SERPL-SCNC: 3.3 MMOL/L — LOW (ref 3.5–5)
RBC # BLD: 4.5 M/UL — LOW (ref 4.7–6.1)
RBC # FLD: 12.7 % — SIGNIFICANT CHANGE UP (ref 11.5–14.5)
SODIUM SERPL-SCNC: 141 MMOL/L — SIGNIFICANT CHANGE UP (ref 135–146)
WBC # BLD: 8.04 K/UL — SIGNIFICANT CHANGE UP (ref 4.8–10.8)
WBC # FLD AUTO: 8.04 K/UL — SIGNIFICANT CHANGE UP (ref 4.8–10.8)

## 2020-10-27 PROCEDURE — 99223 1ST HOSP IP/OBS HIGH 75: CPT

## 2020-10-27 PROCEDURE — 93306 TTE W/DOPPLER COMPLETE: CPT | Mod: 26

## 2020-10-27 PROCEDURE — 70498 CT ANGIOGRAPHY NECK: CPT | Mod: 26

## 2020-10-27 PROCEDURE — 70496 CT ANGIOGRAPHY HEAD: CPT | Mod: 26

## 2020-10-27 PROCEDURE — 99233 SBSQ HOSP IP/OBS HIGH 50: CPT

## 2020-10-27 RX ORDER — POTASSIUM CHLORIDE 20 MEQ
40 PACKET (EA) ORAL ONCE
Refills: 0 | Status: COMPLETED | OUTPATIENT
Start: 2020-10-27 | End: 2020-10-27

## 2020-10-27 RX ORDER — ATENOLOL 25 MG/1
50 TABLET ORAL ONCE
Refills: 0 | Status: COMPLETED | OUTPATIENT
Start: 2020-10-27 | End: 2020-10-27

## 2020-10-27 RX ADMIN — Medication 81 MILLIGRAM(S): at 11:16

## 2020-10-27 RX ADMIN — ATENOLOL 50 MILLIGRAM(S): 25 TABLET ORAL at 21:05

## 2020-10-27 RX ADMIN — Medication 40 MILLIEQUIVALENT(S): at 11:16

## 2020-10-27 RX ADMIN — ATORVASTATIN CALCIUM 20 MILLIGRAM(S): 80 TABLET, FILM COATED ORAL at 21:05

## 2020-10-27 RX ADMIN — Medication 1 MILLIGRAM(S): at 16:23

## 2020-10-27 RX ADMIN — Medication 1 GRAM(S): at 17:25

## 2020-10-27 RX ADMIN — CLOPIDOGREL BISULFATE 75 MILLIGRAM(S): 75 TABLET, FILM COATED ORAL at 11:16

## 2020-10-27 RX ADMIN — ENOXAPARIN SODIUM 40 MILLIGRAM(S): 100 INJECTION SUBCUTANEOUS at 11:16

## 2020-10-27 NOTE — PROGRESS NOTE ADULT - SUBJECTIVE AND OBJECTIVE BOX
LIZZY KENT 70y Male  MRN#: [id number]   Hospital Day: 3d    SUBJECTIVE  Patient is a 70y old Male who presents with a chief complaint of Non-specific Chest Pain (26 Oct 2020 10:27)  Currently admitted to medicine with the primary diagnosis of Chest pain      INTERVAL HPI AND OVERNIGHT EVENTS:  Patient was examined and seen at bedside. This morning he is resting comfortably in bed and reports feeling a little dizzy this morning. His blood pressure medications were held.    REVIEW OF SYMPTOMS:  CONSTITUTIONAL: No weakness, fevers or chills; No headaches  EYES: No visual changes, eye pain, or discharge  ENT: (+) dizziness; No ear pain or change in hearing; No sore throat or difficulty swallowing  NECK: No pain or stiffness  RESPIRATORY: No cough, wheezing, or hemoptysis; No shortness of breath  CARDIOVASCULAR: No chest pain or palpitations  GASTROINTESTINAL: No abdominal or epigastric pain; No nausea, vomiting, or hematemesis; No diarrhea or constipation; No melena or hematochezia  GENITOURINARY: No dysuria, frequency or hematuria  MUSCULOSKELETAL: No joint pain, no muscle pain, no weakness  NEUROLOGICAL: No numbness or weakness  SKIN: No itching or rashes    OBJECTIVE  PAST MEDICAL & SURGICAL HISTORY  MI (myocardial infarction)    Arthritis    Kidney stones    Sleep apnea    Hypertension    High blood cholesterol    H/O heart artery stent    History of appendectomy    H/O chronic cholecystitis  cholecystectomy      ALLERGIES:  fish (Unknown)  iodine (Rash)  latex (Unknown)  magnesium sulfate (Rash)  sulfa drugs (Rash)    MEDICATIONS:  STANDING MEDICATIONS  aspirin enteric coated 81 milliGRAM(s) Oral daily  ATENolol  Tablet 50 milliGRAM(s) Oral daily  atorvastatin 20 milliGRAM(s) Oral at bedtime  chlorhexidine 4% Liquid 1 Application(s) Topical <User Schedule>  clopidogrel Tablet 75 milliGRAM(s) Oral daily  enoxaparin Injectable 40 milliGRAM(s) SubCutaneous daily  losartan 50 milliGRAM(s) Oral daily  ondansetron   Disintegrating Tablet 4 milliGRAM(s) Oral once  potassium chloride    Tablet ER 40 milliEquivalent(s) Oral once  sucralfate 1 Gram(s) Oral two times a day    PRN MEDICATIONS  acetaminophen    Suspension .. 650 milliGRAM(s) Oral every 6 hours PRN  oxycodone    5 mG/acetaminophen 325 mG 1 Tablet(s) Oral every 6 hours PRN      VITAL SIGNS: Last 24 Hours  T(C): 36.8 (27 Oct 2020 04:25), Max: 36.8 (27 Oct 2020 04:25)  T(F): 98.2 (27 Oct 2020 04:25), Max: 98.2 (27 Oct 2020 04:25)  HR: 89 (27 Oct 2020 04:25) (67 - 89)  BP: 141/69 (27 Oct 2020 04:25) (141/69 - 157/70)  BP(mean): --  RR: 18 (27 Oct 2020 04:25) (18 - 18)  SpO2: --    LABS:                        14.5   8.04  )-----------( 205      ( 27 Oct 2020 06:16 )             41.8     10-27    141  |  103  |  14  ----------------------------<  101<H>  3.3<L>   |  27  |  0.8    Ca    8.6      27 Oct 2020 06:16  Mg     1.7     10-27            Creatine Kinase, Serum: 58 U/L (10-26-20 @ 17:20)      CARDIAC MARKERS ( 26 Oct 2020 17:20 )  x     / x     / 58 U/L / x     / x          RADIOLOGY:  No interval imaging performed     PHYSICAL EXAM:  CONSTITUTIONAL: No acute distress, well-developed, well-groomed, AAOx3  HEAD: Atraumatic, normocephalic  EYES: EOM intact, PERRLA, conjunctiva and sclera clear  ENT: Supple, no masses, no thyromegaly, no bruits, no JVD; moist mucous membranes  PULMONARY: Clear to auscultation bilaterally; no wheezes, rales, or rhonchi  CARDIOVASCULAR: Regular rate and rhythm; no murmurs, rubs, or gallops  GASTROINTESTINAL: Soft, non-tender, non-distended; bowel sounds present  MUSCULOSKELETAL: 2+ peripheral pulses; no clubbing, no cyanosis, no edema  NEUROLOGY: non-focal  SKIN: No rashes or lesions; warm and dry    ASSESSMENT & PLAN  Patient is a 70yo male with PMH of CAD s/p stent x3, hypertension, hyperlipidemia, KELSEY, bilateral knee replacement, and nephrolithiasis with recent fall in 5/2020 with 5 rib fractures who presented to the hospital complaining of left chest pain. Rapid response was called on 10/26/2020 in the AM for unresponsiveness.    #Syncopal episode, likely secondary to seizure vs neurogenic  - Rapid response called at 6am on 10/26/2020 for dizziness and unresponsiveness  - Episode accompanied by urinary incontinence and patient had stool incontinence later in the morning  - No report of tonic-clonic movements  - No events noted on telemetry during the event  - Orthostatic vital signs negative  - Doubt cardiac etiology given no events on telemetry and negative workup thus far  - Routine EEG  - Based on EEG findings, will consult neurology  - Creatine kinase within normal limits   - 2D echocardiogram  - CT angio head and neck    #Atypical left chest pain secondary to previous rib fractures, resolved  - Troponin trending down: 0.02->0.01->0.01  - D-dimer 400  - CT angio chest negative for pulmonary embolism  - Telemetry shows episodes of couples, triples, and PVCs  - Follow 2D echocardiogram  - Cardiology consult appreciated: 2D echocardiogram  - PT evaluation appreciated    #Hypomagnesemia  - Magnesium today: 1.7  - Patient is allergic to magnesium sulfate and refused to take magnesium supplement IV or PO  - Follow magnesium in AM     #Hypokalemia  - Potassium today: 3.3  - Will replete with potassium 40mEq PO x1  - Follow potassium in AM     #History of CAD s/p stents x3  - Continue with aspirin 81mg PO QD, clopidogrel 75mg PO QD, atenolol 50mg PO QD, and atorvastatin 20mg PO QHS    #Hyperlipidemia  - Continue with atorvastatin 20mg PO QHS    #Hypertension  - Continue with atenolol 50mg PO QD and losartan 50mg PO QD  - Hold home medication given hydrochlorothiazide given dizziness this AM    #1.1cm right lower lobe pulmonary nodule  - Repeat chest CT in 3 months  - Follow outpatient with primary care provider    #Misc  - DVT Prophylaxis: Lovenox 40mg SQ QD   - GI Prophylaxis: not indicated   - Diet: DASH/TLC  - Activity: increase as tolerated  - IV Fluids: not indicated   - Code Status: Full Code    Dispo: pending echocardiogram, CTA head/neck LIZZY KENT 70y Male  MRN#: [id number]   Hospital Day: 3d    SUBJECTIVE  Patient is a 70y old Male who presents with a chief complaint of Non-specific Chest Pain (26 Oct 2020 10:27)  Currently admitted to medicine with the primary diagnosis of Chest pain      INTERVAL HPI AND OVERNIGHT EVENTS:  Patient was examined and seen at bedside. This morning he is resting comfortably in bed and reports feeling a little dizzy this morning. His blood pressure medications were held.    Attending Note: pt seen and examined at bedside. No cp or sob.  Pt felt dizzy this am. HCTZ held.     REVIEW OF SYMPTOMS:  CONSTITUTIONAL: No weakness, fevers or chills; No headaches  EYES: No visual changes, eye pain, or discharge  ENT: (+) dizziness; No ear pain or change in hearing; No sore throat or difficulty swallowing  NECK: No pain or stiffness  RESPIRATORY: No cough, wheezing, or hemoptysis; No shortness of breath  CARDIOVASCULAR: No chest pain or palpitations  GASTROINTESTINAL: No abdominal or epigastric pain; No nausea, vomiting, or hematemesis; No diarrhea or constipation; No melena or hematochezia  GENITOURINARY: No dysuria, frequency or hematuria  MUSCULOSKELETAL: No joint pain, no muscle pain, no weakness  NEUROLOGICAL: No numbness or weakness  SKIN: No itching or rashes    OBJECTIVE  PAST MEDICAL & SURGICAL HISTORY  MI (myocardial infarction)    Arthritis    Kidney stones    Sleep apnea    Hypertension    High blood cholesterol    H/O heart artery stent    History of appendectomy    H/O chronic cholecystitis  cholecystectomy      ALLERGIES:  fish (Unknown)  iodine (Rash)  latex (Unknown)  magnesium sulfate (Rash)  sulfa drugs (Rash)    MEDICATIONS:  STANDING MEDICATIONS  aspirin enteric coated 81 milliGRAM(s) Oral daily  ATENolol  Tablet 50 milliGRAM(s) Oral daily  atorvastatin 20 milliGRAM(s) Oral at bedtime  chlorhexidine 4% Liquid 1 Application(s) Topical <User Schedule>  clopidogrel Tablet 75 milliGRAM(s) Oral daily  enoxaparin Injectable 40 milliGRAM(s) SubCutaneous daily  losartan 50 milliGRAM(s) Oral daily  ondansetron   Disintegrating Tablet 4 milliGRAM(s) Oral once  potassium chloride    Tablet ER 40 milliEquivalent(s) Oral once  sucralfate 1 Gram(s) Oral two times a day    PRN MEDICATIONS  acetaminophen    Suspension .. 650 milliGRAM(s) Oral every 6 hours PRN  oxycodone    5 mG/acetaminophen 325 mG 1 Tablet(s) Oral every 6 hours PRN      VITAL SIGNS: Last 24 Hours  T(C): 36.8 (27 Oct 2020 04:25), Max: 36.8 (27 Oct 2020 04:25)  T(F): 98.2 (27 Oct 2020 04:25), Max: 98.2 (27 Oct 2020 04:25)  HR: 89 (27 Oct 2020 04:25) (67 - 89)  BP: 141/69 (27 Oct 2020 04:25) (141/69 - 157/70)  BP(mean): --  RR: 18 (27 Oct 2020 04:25) (18 - 18)  SpO2: --    LABS:                        14.5   8.04  )-----------( 205      ( 27 Oct 2020 06:16 )             41.8     10-27    141  |  103  |  14  ----------------------------<  101<H>  3.3<L>   |  27  |  0.8    Ca    8.6      27 Oct 2020 06:16  Mg     1.7     10-27            Creatine Kinase, Serum: 58 U/L (10-26-20 @ 17:20)      CARDIAC MARKERS ( 26 Oct 2020 17:20 )  x     / x     / 58 U/L / x     / x          RADIOLOGY:  < from: TTE Echo Complete w/o Contrast w/ Doppler (10.27.20 @ 08:55) >  Summary:   1. LV Ejection Fraction by Zheng's Method with a biplane EF of 62 %.   2. Normal left ventricular size and wall thicknesses, with normal systolic and diastolic function.   3. Normal left atrial size.   4. Normal right atrial size.   5. No evidence of mitral valve regurgitation.   6. Mild tricuspid regurgitation.   7. Mild aortic valve stenosis.   8. LA volume Index is 28.5 ml/m² ml/m2.   9. Peak transaortic gradient equals 28.0 mmHg, mean transaortic gradient equals 16.1 mmHg, the calculated aortic valve area equals 2.17 cm² by the continuity equation consistentwith mild aortic stenosis.  10. The aortic valve mean gradient is 16.1 mmHg consistent with mild aortic stenosis.    < end of copied text >  No interval imaging performed     PHYSICAL EXAM:  CONSTITUTIONAL: No acute distress, well-developed, well-groomed, AAOx3  HEAD: Atraumatic, normocephalic  EYES: EOM intact, PERRLA, conjunctiva and sclera clear  ENT: Supple, no masses, no thyromegaly, no bruits, no JVD; moist mucous membranes  PULMONARY: Clear to auscultation bilaterally; no wheezes, rales, or rhonchi  CARDIOVASCULAR: Regular rate and rhythm; no murmurs, rubs, or gallops  GASTROINTESTINAL: Soft, non-tender, non-distended; bowel sounds present  MUSCULOSKELETAL: 2+ peripheral pulses; no clubbing, no cyanosis, no edema  NEUROLOGY: non-focal  SKIN: No rashes or lesions; warm and dry    ASSESSMENT & PLAN  Patient is a 68yo male with PMH of CAD s/p stent x3, hypertension, hyperlipidemia, KELSEY, bilateral knee replacement, and nephrolithiasis with recent fall in 5/2020 with 5 rib fractures who presented to the hospital complaining of left chest pain. Rapid response was called on 10/26/2020 in the AM for unresponsiveness.    #Syncopal episode, likely secondary to seizure vs neurogenic  - Rapid response called at 6am on 10/26/2020 for dizziness and unresponsiveness  - Episode accompanied by urinary incontinence and patient had stool incontinence later in the morning  - No report of tonic-clonic movements  - No events noted on telemetry during the event  - Orthostatic vital signs negative  - Doubt cardiac etiology given no events on telemetry and negative workup thus far  - Routine EEG  - Based on EEG findings, will consult neurology  - Creatine kinase within normal limits   - 2D echocardiogram done as above EF 62% mild AS   - CT angio head and neck pending   Attending note: Overall cardiology work has been negative, neuro work up with CTA H/N and eeg pending. Will aslo hold HCTZ can cause dizziness and low bps.     #Atypical left chest pain secondary to previous rib fractures, resolved  - Troponin trending down: 0.02->0.01->0.01  - D-dimer 400  - CT angio chest negative for pulmonary embolism  - Telemetry shows episodes of couples, triples, and PVCs  - Follow 2D echocardiogram  - Cardiology consult appreciated: 2D echocardiogram  - PT evaluation appreciated  - cardiology cleared     #Hypomagnesemia  - Magnesium today: 1.7  - Patient is allergic to magnesium sulfate and refused to take magnesium supplement IV or PO  - Follow magnesium in AM     #Hypokalemia  - Potassium today: 3.3  - Will replete with potassium 40mEq PO x1  - Follow potassium in AM     #History of CAD s/p stents x3  - Continue with aspirin 81mg PO QD, clopidogrel 75mg PO QD, atenolol 50mg PO QD, and atorvastatin 20mg PO QHS    #Hyperlipidemia  - Continue with atorvastatin 20mg PO QHS    #Hypertension  - Continue with atenolol 50mg PO QD and losartan 50mg PO QD  - Hold home medication given hydrochlorothiazide given dizziness this AM    #1.1cm right lower lobe pulmonary nodule  - Repeat chest CT in 3 months  - Follow outpatient with primary care provider    #Misc  - DVT Prophylaxis: Lovenox 40mg SQ QD   - GI Prophylaxis: not indicated   - Diet: DASH/TLC  - Activity: increase as tolerated  - IV Fluids: not indicated   - Code Status: Full Code    Dispo: pending echocardiogram, CTA head/neck

## 2020-10-27 NOTE — CONSULT NOTE ADULT - ASSESSMENT
70 yo M with hx of CAD s/p stent x 3 on plavix, HTN, HLD, and KELSEY, bilateral knee replacement, presents for L. chest and rib pain following fall and fracture of 5 L ribs in May 2020. Neurology consulted for episode of LOC after Rapid response was called on patient on 10/26. As per nursing, pt unresponsive for 5 min. No witnessed tonic clonic movements or tongue bitting, however pt had episode of urinary incontinence. Orthostatics negative. No events on tele.   Etiology of episode remains unclear, r/o seizure, r/o vertebrobasilar insuffiencey.     Recommendations:   -rEEG  -CTA head/neck          ***attending attestation to follow*** 68 yo M with hx of CAD s/p stent x 3 on plavix, HTN, HLD, and KELSEY, bilateral knee replacement, presents for L. chest and rib pain following fall and fracture of 5 L ribs in May 2020. Neurology consulted for episode of LOC after Rapid response was called on patient on 10/26. As per nursing, pt unresponsive for 5 min. No witnessed tonic clonic movements or tongue bitting, however pt had episode of urinary incontinence. Orthostatics negative. No events on tele. Neurological exam non-focal.   Etiology of episode remains unclear, r/o seizure, r/o vertebrobasilar insuffiencey.     Recommendations:   -rEEG  -CTA head/neck          ***attending attestation to follow*** 70 yo M with hx of CAD s/p stent x 3 on plavix, HTN, HLD, and KELSEY, bilateral knee replacement, presents for L. chest and rib pain following fall and fracture of 5 L ribs in May 2020. Neurology consulted for episode of LOC after Rapid response was called on patient on 10/26. As per nursing, pt unresponsive for 5 min. No witnessed tonic clonic movements or tongue bitting, however pt had episode of urinary incontinence. Orthostatics negative. No events on tele. Neurological exam non-focal.   Etiology of episode remains unclear, r/o seizure, r/o vertebrobasilar insuffiencey.     Recommendations:   -rEEG  -CTA head/neck

## 2020-10-27 NOTE — CONSULT NOTE ADULT - ASSESSMENT
68 y/o male with known alysa hx of rib fractures, pulmonary nodule of 1.1 cm admitted with atypical chest pain had syncopal episode while in hospital      Atypical chest pain- resolved  ALYSA  Pulmonary nodule 1.1 cm largest RML  Weight Loss  Syncopal episode  Advanced directives- Full code    repeat psgn as outpt  pet scan as outpt  pfts as outpt  echo-mild as  awaiting further neurologic w/u including ct head and neck as well as rEEg  dvt/gi px  Full code

## 2020-10-27 NOTE — CONSULT NOTE ADULT - SUBJECTIVE AND OBJECTIVE BOX
Neurology Consult    Patient is a 70y old  Male who presents with a chief complaint of Non-specific Chest Pain (27 Oct 2020 10:08)      HPI:  70 yo M with hx of CAD s/p stent x 3 on plavix, HTN, HLD, and KELSEY, bilateral knee replacement, presents for L. chest and rib pain following fall and fracture of 5 L ribs in May 2020. N      PAST MEDICAL & SURGICAL HISTORY:  MI (myocardial infarction)    Arthritis    Kidney stones    Sleep apnea    Hypertension    High blood cholesterol    H/O heart artery stent    History of appendectomy    H/O chronic cholecystitis  cholecystectomy        FAMILY HISTORY:  CAD (coronary artery disease) (Father)        Social History: (-) x 3    Allergies    fish (Unknown)  iodine (Rash)  latex (Unknown)  magnesium sulfate (Rash)  sulfa drugs (Rash)    Intolerances        MEDICATIONS  (STANDING):  aspirin enteric coated 81 milliGRAM(s) Oral daily  ATENolol  Tablet 50 milliGRAM(s) Oral daily  atorvastatin 20 milliGRAM(s) Oral at bedtime  chlorhexidine 4% Liquid 1 Application(s) Topical <User Schedule>  clopidogrel Tablet 75 milliGRAM(s) Oral daily  enoxaparin Injectable 40 milliGRAM(s) SubCutaneous daily  losartan 50 milliGRAM(s) Oral daily  ondansetron   Disintegrating Tablet 4 milliGRAM(s) Oral once  sucralfate 1 Gram(s) Oral two times a day    MEDICATIONS  (PRN):  acetaminophen    Suspension .. 650 milliGRAM(s) Oral every 6 hours PRN Temp greater or equal to 38C (100.4F), Mild Pain (1 - 3)  oxycodone    5 mG/acetaminophen 325 mG 1 Tablet(s) Oral every 6 hours PRN Moderate Pain (4 - 6)      Review of systems:    Constitutional: as per HPI  Eyes: No eye pain or discharge  ENMT:  No difficulty hearing; No sinus or throat pain  Neck: No pain or stiffness  Respiratory: No cough, wheezing, chills or hemoptysis  Cardiovascular: No chest pain, palpitations, shortness of breath, dyspnea on exertion  Gastrointestinal: No abdominal pain, nausea, vomiting or hematemesis; No diarrhea or constipation.   Genitourinary: No dysuria, frequency, hematuria or incontinence  Neurological: As per HPI  Skin: No rashes or lesions   Endocrine: No heat or cold intolerance; No hair loss  Musculoskeletal: No joint pain or swelling  Psychiatric: No depression, anxiety, mood swings  Heme/Lymph: No easy bruising or bleeding gums    Vital Signs Last 24 Hrs  T(C): 36.8 (27 Oct 2020 04:25), Max: 36.8 (27 Oct 2020 04:25)  T(F): 98.2 (27 Oct 2020 04:25), Max: 98.2 (27 Oct 2020 04:25)  HR: 89 (27 Oct 2020 04:25) (67 - 89)  BP: 141/69 (27 Oct 2020 04:25) (141/69 - 157/70)  BP(mean): --  RR: 18 (27 Oct 2020 04:25) (18 - 18)  SpO2: --    Examination:  General:  Appearance is consistent with chronologic age.  No abnormal facies.  Gross skin survey within normal limits.    Cognitive/Language:  The patient is oriented to person, place, time and date.  Recent and remote memory intact.  Fund of knowledge is intact and normal.  Language with normal repetition, comprehension and naming.  Nondysarthric.    Eyes: intact VA, VFF.  EOMI w/o nystagmus, skew or reported double vision.  PERRL.  No ptosis/weakness of eyelid closure.    Face:  Facial sensation normal V1 - 3, no facial asymmetry.    Ears/Nose/Throat:  Hearing grossly intact b/l.  Palate elevates midline.  Tongue and uvula midline.   Motor examination:   Normal tone, bulk and range of motion.  No tenderness, twitching, tremors or involuntary movements.  Formal Muscle Strength Testing: (MRC grade R/L) 5/5 UE; 5/5 LE.  No observable drift.  Reflexes:   2+ b/l pectoralis, biceps, triceps, brachioradialis, patella and Achilles.  Plantar response downgoing b/l.  Jaw jerk, Lorna, clonus absent.  Sensory examination:   Intact to light touch and pinprick, pain, temperature and proprioception and vibration in all extremities.  Cerebellum:   FTN/HKS intact with normal JT in all limbs.  No dysmetria or dysdiadokinesia.  Gait narrow based and normal.    Respiratory:  no audible wheezing or inspiratory stridor.  no use of accessory muscles.   Cardiac: pulse palpable, no audible bruits  Abdomen: supple, no guarding, no TTP    Labs:   CBC Full  -  ( 27 Oct 2020 06:16 )  WBC Count : 8.04 K/uL  RBC Count : 4.50 M/uL  Hemoglobin : 14.5 g/dL  Hematocrit : 41.8 %  Platelet Count - Automated : 205 K/uL  Mean Cell Volume : 92.9 fL  Mean Cell Hemoglobin : 32.2 pg  Mean Cell Hemoglobin Concentration : 34.7 g/dL  Auto Neutrophil # : 5.20 K/uL  Auto Lymphocyte # : 1.90 K/uL  Auto Monocyte # : 0.85 K/uL  Auto Eosinophil # : 0.05 K/uL  Auto Basophil # : 0.02 K/uL  Auto Neutrophil % : 64.8 %  Auto Lymphocyte % : 23.6 %  Auto Monocyte % : 10.6 %  Auto Eosinophil % : 0.6 %  Auto Basophil % : 0.2 %    10-27    141  |  103  |  14  ----------------------------<  101<H>  3.3<L>   |  27  |  0.8    Ca    8.6      27 Oct 2020 06:16  Mg     1.7     10-27                [facility]     [facility] 11:35       Neurology Consult    Patient is a 70y old  Male who presents with a chief complaint of Non-specific Chest Pain (27 Oct 2020 10:08)      HPI:  70 yo M with hx of CAD s/p stent x 3 on plavix, HTN, HLD, and KELSEY, bilateral knee replacement, presents for L. chest and rib pain following fall and fracture of 5 L ribs in May 2020. Neurology consulted for episode of LOC after Rapid response was called on patient on 10/26. As per patient, was trying to get up in bed, started to feel lightheaded, sweaty, with some chest discomfort and SOB. Patient denies losing consciousness denies tongue bitting, denies loss of bowel/bladder control. However as per nursing, pt was unresponsive for 5 minutes and was noted to have episode urination during episode. However, no reports of convulsions or stiffness noted per nursing. No events on telemetry monitor were noted. STAT EKG was repeated, which showed no acute changes. Patient has hx of positive orthostatics in past, however orthostatics on this admission negative.         PAST MEDICAL & SURGICAL HISTORY:  MI (myocardial infarction)    Arthritis    Kidney stones    Sleep apnea    Hypertension    High blood cholesterol    H/O heart artery stent    History of appendectomy    H/O chronic cholecystitis  cholecystectomy        FAMILY HISTORY:  CAD (coronary artery disease) (Father)        Social History: (-) x 3    Allergies    fish (Unknown)  iodine (Rash)  latex (Unknown)  magnesium sulfate (Rash)  sulfa drugs (Rash)    Intolerances        MEDICATIONS  (STANDING):  aspirin enteric coated 81 milliGRAM(s) Oral daily  ATENolol  Tablet 50 milliGRAM(s) Oral daily  atorvastatin 20 milliGRAM(s) Oral at bedtime  chlorhexidine 4% Liquid 1 Application(s) Topical <User Schedule>  clopidogrel Tablet 75 milliGRAM(s) Oral daily  enoxaparin Injectable 40 milliGRAM(s) SubCutaneous daily  losartan 50 milliGRAM(s) Oral daily  ondansetron   Disintegrating Tablet 4 milliGRAM(s) Oral once  sucralfate 1 Gram(s) Oral two times a day    MEDICATIONS  (PRN):  acetaminophen    Suspension .. 650 milliGRAM(s) Oral every 6 hours PRN Temp greater or equal to 38C (100.4F), Mild Pain (1 - 3)  oxycodone    5 mG/acetaminophen 325 mG 1 Tablet(s) Oral every 6 hours PRN Moderate Pain (4 - 6)      Review of systems:    Constitutional: as per HPI  Eyes: No eye pain or discharge  ENMT:  No difficulty hearing; No sinus or throat pain  Neck: No pain or stiffness  Respiratory: No cough, wheezing, chills or hemoptysis  Cardiovascular: No chest pain, palpitations, shortness of breath, dyspnea on exertion  Gastrointestinal: No abdominal pain, nausea, vomiting or hematemesis; No diarrhea or constipation.   Genitourinary: No dysuria, frequency, hematuria or incontinence  Neurological: As per HPI  Skin: No rashes or lesions   Endocrine: No heat or cold intolerance; No hair loss  Musculoskeletal: No joint pain or swelling  Psychiatric: No depression, anxiety, mood swings  Heme/Lymph: No easy bruising or bleeding gums    Vital Signs Last 24 Hrs  T(C): 36.8 (27 Oct 2020 04:25), Max: 36.8 (27 Oct 2020 04:25)  T(F): 98.2 (27 Oct 2020 04:25), Max: 98.2 (27 Oct 2020 04:25)  HR: 89 (27 Oct 2020 04:25) (67 - 89)  BP: 141/69 (27 Oct 2020 04:25) (141/69 - 157/70)  BP(mean): --  RR: 18 (27 Oct 2020 04:25) (18 - 18)  SpO2: --    Examination:  General:  Appearance is consistent with chronologic age.  No abnormal facies.  Gross skin survey within normal limits.    Cognitive/Language:  The patient is oriented to person, place, time and date.  Recent and remote memory intact.  Fund of knowledge is intact and normal.  Language with normal repetition, comprehension and naming.  Nondysarthric.    Eyes: intact VA, VFF.  EOMI w/o nystagmus, skew or reported double vision.  PERRL.  No ptosis/weakness of eyelid closure.    Face:  Facial sensation normal V1 - 3, no facial asymmetry.    Ears/Nose/Throat:  Hearing grossly intact b/l.  Palate elevates midline.  Tongue and uvula midline.   Motor examination:   Normal tone, bulk and range of motion.  No tenderness, twitching, tremors or involuntary movements.  Formal Muscle Strength Testing: (MRC grade R/L) 5/5 UE; 5/5 LE.  No observable drift.  Reflexes:   2+ b/l pectoralis, biceps, triceps, brachioradialis, patella and Achilles.  Plantar response downgoing b/l.  Jaw jerk, Lorna, clonus absent.  Sensory examination:   Intact to light touch and pinprick, pain, temperature and proprioception and vibration in all extremities.  Cerebellum:   FTN/HKS intact with normal JT in all limbs.  No dysmetria or dysdiadokinesia.  Gait narrow based and normal.    Respiratory:  no audible wheezing or inspiratory stridor.  no use of accessory muscles.   Cardiac: pulse palpable, no audible bruits  Abdomen: supple, no guarding, no TTP    Labs:   CBC Full  -  ( 27 Oct 2020 06:16 )  WBC Count : 8.04 K/uL  RBC Count : 4.50 M/uL  Hemoglobin : 14.5 g/dL  Hematocrit : 41.8 %  Platelet Count - Automated : 205 K/uL  Mean Cell Volume : 92.9 fL  Mean Cell Hemoglobin : 32.2 pg  Mean Cell Hemoglobin Concentration : 34.7 g/dL  Auto Neutrophil # : 5.20 K/uL  Auto Lymphocyte # : 1.90 K/uL  Auto Monocyte # : 0.85 K/uL  Auto Eosinophil # : 0.05 K/uL  Auto Basophil # : 0.02 K/uL  Auto Neutrophil % : 64.8 %  Auto Lymphocyte % : 23.6 %  Auto Monocyte % : 10.6 %  Auto Eosinophil % : 0.6 %  Auto Basophil % : 0.2 %    10-27    141  |  103  |  14  ----------------------------<  101<H>  3.3<L>   |  27  |  0.8    Ca    8.6      27 Oct 2020 06:16  Mg     1.7     10-27                [facility]     [facility] 11:35       Neurology Consult    Patient is a 70y old  Male who presents with a chief complaint of Non-specific Chest Pain (27 Oct 2020 10:08)      HPI:  68 yo M with hx of CAD s/p stent x 3 on plavix, HTN, HLD, and KELSEY, bilateral knee replacement, presents for L. chest and rib pain following fall and fracture of 5 L ribs in May 2020. Neurology consulted for episode of LOC after Rapid response was called on patient on 10/26. As per patient, was trying to get up in bed, started to feel lightheaded, sweaty, with some chest discomfort and SOB. Patient denies losing consciousness denies tongue bitting, denies loss of bowel/bladder control. However as per nursing, pt was unresponsive for 5 minutes and was noted to have episode urination during episode. However, no reports of convulsions or stiffness noted per nursing. No events on telemetry monitor were noted. STAT EKG was repeated, which showed no acute changes. Patient has hx of positive orthostatics in past, however orthostatics on this admission negative.         PAST MEDICAL & SURGICAL HISTORY:  MI (myocardial infarction)    Arthritis    Kidney stones    Sleep apnea    Hypertension    High blood cholesterol    H/O heart artery stent    History of appendectomy    H/O chronic cholecystitis  cholecystectomy        FAMILY HISTORY:  CAD (coronary artery disease) (Father)        Social History: (-) x 3    Allergies    fish (Unknown)  iodine (Rash)  latex (Unknown)  magnesium sulfate (Rash)  sulfa drugs (Rash)    Intolerances        MEDICATIONS  (STANDING):  aspirin enteric coated 81 milliGRAM(s) Oral daily  ATENolol  Tablet 50 milliGRAM(s) Oral daily  atorvastatin 20 milliGRAM(s) Oral at bedtime  chlorhexidine 4% Liquid 1 Application(s) Topical <User Schedule>  clopidogrel Tablet 75 milliGRAM(s) Oral daily  enoxaparin Injectable 40 milliGRAM(s) SubCutaneous daily  losartan 50 milliGRAM(s) Oral daily  ondansetron   Disintegrating Tablet 4 milliGRAM(s) Oral once  sucralfate 1 Gram(s) Oral two times a day    MEDICATIONS  (PRN):  acetaminophen    Suspension .. 650 milliGRAM(s) Oral every 6 hours PRN Temp greater or equal to 38C (100.4F), Mild Pain (1 - 3)  oxycodone    5 mG/acetaminophen 325 mG 1 Tablet(s) Oral every 6 hours PRN Moderate Pain (4 - 6)      Review of systems:    Constitutional: as per HPI  Eyes: No eye pain or discharge  ENMT:  No difficulty hearing; No sinus or throat pain  Neck: No pain or stiffness  Respiratory: No cough, wheezing, chills or hemoptysis  Cardiovascular: No chest pain, palpitations, shortness of breath, dyspnea on exertion  Gastrointestinal: No abdominal pain, nausea, vomiting or hematemesis; No diarrhea or constipation.   Genitourinary: No dysuria, frequency, hematuria or incontinence  Neurological: As per HPI  Skin: No rashes or lesions   Endocrine: No heat or cold intolerance; No hair loss  Musculoskeletal: No joint pain or swelling  Psychiatric: No depression, anxiety, mood swings  Heme/Lymph: No easy bruising or bleeding gums    Vital Signs Last 24 Hrs  T(C): 36.8 (27 Oct 2020 04:25), Max: 36.8 (27 Oct 2020 04:25)  T(F): 98.2 (27 Oct 2020 04:25), Max: 98.2 (27 Oct 2020 04:25)  HR: 89 (27 Oct 2020 04:25) (67 - 89)  BP: 141/69 (27 Oct 2020 04:25) (141/69 - 157/70)  BP(mean): --  RR: 18 (27 Oct 2020 04:25) (18 - 18)  SpO2: --    Examination:  General:  Appearance is consistent with chronologic age.  No abnormal facies.  Gross skin survey within normal limits.    Cognitive/Language:  The patient is oriented to person, place, time and date.  Language with normal repetition, comprehension and naming.  Nondysarthric.    Eyes: intact VA, VFF.  EOMI w/o nystagmus, skew or reported double vision.  PERRL.  No ptosis/weakness of eyelid closure.    Face:  Facial sensation normal V1 - 3, no facial asymmetry.    Ears/Nose/Throat:  Hearing grossly intact b/l.  Palate elevates midline.  Tongue and uvula midline.   Motor examination:   Normal tone, bulk and range of motion.  No tenderness, twitching, tremors or involuntary movements.  Formal Muscle Strength Testing: (MRC grade R/L) 5/5 UE; 5/5 LE.  No observable drift.  Reflexes:   2+ b/l pectoralis, biceps, triceps, brachioradialis, patella and Achilles.  +babinski on left.    Sensory examination:   Intact to light touch and pinprick, pain, temperature and proprioception and vibration in all extremities.  Cerebellum:   FTN/HKS intact with normal JT in all limbs.  No dysmetria or dysdiadokinesia.    Gait unsteady         Labs:   CBC Full  -  ( 27 Oct 2020 06:16 )  WBC Count : 8.04 K/uL  RBC Count : 4.50 M/uL  Hemoglobin : 14.5 g/dL  Hematocrit : 41.8 %  Platelet Count - Automated : 205 K/uL  Mean Cell Volume : 92.9 fL  Mean Cell Hemoglobin : 32.2 pg  Mean Cell Hemoglobin Concentration : 34.7 g/dL  Auto Neutrophil # : 5.20 K/uL  Auto Lymphocyte # : 1.90 K/uL  Auto Monocyte # : 0.85 K/uL  Auto Eosinophil # : 0.05 K/uL  Auto Basophil # : 0.02 K/uL  Auto Neutrophil % : 64.8 %  Auto Lymphocyte % : 23.6 %  Auto Monocyte % : 10.6 %  Auto Eosinophil % : 0.6 %  Auto Basophil % : 0.2 %    10-27    141  |  103  |  14  ----------------------------<  101<H>  3.3<L>   |  27  |  0.8    Ca    8.6      27 Oct 2020 06:16  Mg     1.7     10-27                [facility]     [facility] 11[facility]35       Neurology Consult    Patient is a 70y old  Male who presents with a chief complaint of Non-specific Chest Pain (27 Oct 2020 10:08)    HPI:  68 yo M with hx of CAD s/p stent x 3 on plavix, HTN, HLD, and KELSEY, bilateral knee replacement, presents for L. chest and rib pain following fall and fracture of 5 L ribs in May 2020. Neurology consulted for episode of LOC after Rapid response was called on patient on 10/26. As per patient, was trying to get up in bed, started to feel lightheaded, sweaty, with some chest discomfort and SOB. Patient denies losing consciousness denies tongue bitting, denies loss of bowel/bladder control. However as per nursing, pt was unresponsive for 5 minutes and was noted to have episode urination during episode. However, no reports of convulsions or stiffness noted per nursing. No events on telemetry monitor were noted. STAT EKG was repeated, which showed no acute changes. Patient has hx of positive orthostatics in past, however orthostatics on this admission negative.     PAST MEDICAL & SURGICAL HISTORY:  MI (myocardial infarction)    Arthritis    Kidney stones    Sleep apnea    Hypertension    High blood cholesterol    H/O heart artery stent    History of appendectomy    H/O chronic cholecystitis  cholecystectomy        FAMILY HISTORY:  CAD (coronary artery disease) (Father)        Social History: (-) x 3    Allergies    fish (Unknown)  iodine (Rash)  latex (Unknown)  magnesium sulfate (Rash)  sulfa drugs (Rash)    Intolerances        MEDICATIONS  (STANDING):  aspirin enteric coated 81 milliGRAM(s) Oral daily  ATENolol  Tablet 50 milliGRAM(s) Oral daily  atorvastatin 20 milliGRAM(s) Oral at bedtime  chlorhexidine 4% Liquid 1 Application(s) Topical <User Schedule>  clopidogrel Tablet 75 milliGRAM(s) Oral daily  enoxaparin Injectable 40 milliGRAM(s) SubCutaneous daily  losartan 50 milliGRAM(s) Oral daily  ondansetron   Disintegrating Tablet 4 milliGRAM(s) Oral once  sucralfate 1 Gram(s) Oral two times a day    MEDICATIONS  (PRN):  acetaminophen    Suspension .. 650 milliGRAM(s) Oral every 6 hours PRN Temp greater or equal to 38C (100.4F), Mild Pain (1 - 3)  oxycodone    5 mG/acetaminophen 325 mG 1 Tablet(s) Oral every 6 hours PRN Moderate Pain (4 - 6)      Review of systems:    Constitutional: as per HPI  Eyes: No eye pain or discharge  ENMT:  No difficulty hearing; No sinus or throat pain  Neck: No pain or stiffness  Respiratory: No cough, wheezing, chills or hemoptysis  Cardiovascular: No chest pain, palpitations, shortness of breath, dyspnea on exertion  Gastrointestinal: No abdominal pain, nausea, vomiting or hematemesis; No diarrhea or constipation.   Genitourinary: No dysuria, frequency, hematuria or incontinence  Neurological: As per HPI  Skin: No rashes or lesions   Endocrine: No heat or cold intolerance; No hair loss  Musculoskeletal: No joint pain or swelling  Psychiatric: No depression, anxiety, mood swings  Heme/Lymph: No easy bruising or bleeding gums    Vital Signs Last 24 Hrs  T(C): 36.8 (27 Oct 2020 04:25), Max: 36.8 (27 Oct 2020 04:25)  T(F): 98.2 (27 Oct 2020 04:25), Max: 98.2 (27 Oct 2020 04:25)  HR: 89 (27 Oct 2020 04:25) (67 - 89)  BP: 141/69 (27 Oct 2020 04:25) (141/69 - 157/70)  BP(mean): --  RR: 18 (27 Oct 2020 04:25) (18 - 18)  SpO2: --    Examination:  General:  Appearance is consistent with chronologic age.  No abnormal facies.  Gross skin survey within normal limits.    Cognitive/Language:  The patient is oriented to person, place, time and date.  Language with normal repetition, comprehension and naming.  Nondysarthric.    Eyes: intact VA, VFF.  EOMI w/o nystagmus, skew or reported double vision.  PERRL.  No ptosis/weakness of eyelid closure.    Face:  Facial sensation normal V1 - 3, no facial asymmetry.    Ears/Nose/Throat:  Hearing grossly intact b/l.  Palate elevates midline.  Tongue and uvula midline.   Motor examination:   Normal tone, bulk and range of motion.  No tenderness, twitching, tremors or involuntary movements.  Formal Muscle Strength Testing: (MRC grade R/L) 5/5 UE; 5/5 LE.  No observable drift.  Reflexes:   2+ b/l pectoralis, biceps, triceps, brachioradialis, patella and Achilles.  +babinski on left.    Sensory examination:   Intact to light touch and pinprick, pain, temperature and proprioception and vibration in all extremities.  Cerebellum:   FTN/HKS intact with normal JT in all limbs.  No dysmetria or dysdiadokinesia.    Gait unsteady         Labs:   CBC Full  -  ( 27 Oct 2020 06:16 )  WBC Count : 8.04 K/uL  RBC Count : 4.50 M/uL  Hemoglobin : 14.5 g/dL  Hematocrit : 41.8 %  Platelet Count - Automated : 205 K/uL  Mean Cell Volume : 92.9 fL  Mean Cell Hemoglobin : 32.2 pg  Mean Cell Hemoglobin Concentration : 34.7 g/dL  Auto Neutrophil # : 5.20 K/uL  Auto Lymphocyte # : 1.90 K/uL  Auto Monocyte # : 0.85 K/uL  Auto Eosinophil # : 0.05 K/uL  Auto Basophil # : 0.02 K/uL  Auto Neutrophil % : 64.8 %  Auto Lymphocyte % : 23.6 %  Auto Monocyte % : 10.6 %  Auto Eosinophil % : 0.6 %  Auto Basophil % : 0.2 %    10-27    141  |  103  |  14  ----------------------------<  101<H>  3.3<L>   |  27  |  0.8    Ca    8.6      27 Oct 2020 06:16  Mg     1.7     10-27                [facility]     [facility]20 @ 11:35

## 2020-10-27 NOTE — CONSULT NOTE ADULT - SUBJECTIVE AND OBJECTIVE BOX
T H I S    I S    A N    I N C O M P L E T E     N O T E    LIZZY KENT  MRN-[id number]    HISTORY OF PRESENT ILLNESS:  HPI:  70 yo M with hx of CAD s/p stent x 3 on plavix, HTN, HLD, and KELSEY, bilateral knee replacement, presents for L. chest and rib pain following fall and fracture of 5 L ribs in May 2020. Patient said that since that time he has had pain on the left side that radiates to the axilla and down to his left side. Pain is intermittent lasting 1-2 hours, sharp, 9/10, worse with movement and deep inspiration. Associated tingling of L chest that radiates down the lateral aspect of arm to the elbow. Has had associated decreased appetite and 30 lb weight loss in past 2-3 months.  Patient follows Dr. Brunson with cardiology. Had stress test done in September 2020, which was normal. EF = 48%. He denies fever/chills, sore throat, palpitations, abdominal pain, N/V/D/constipation, urinary symptoms or lower extremity swelling.     In ED /76, HR 89. Physical exam significant for tenderness to palpation over left ribs and sternum. Temp 98.1. trops 0.02, which decreased to 0 next set. Dd 400. CT angio negative for PE. 1.1 cm LLL nodule found. Given tylenol, , Zofran in the ED. To be admitted to [facility] for further workup and management.  (24 Oct 2020 16:43)      PMH/PSH:  PAST MEDICAL & SURGICAL HISTORY:  MI (myocardial infarction)    Arthritis    Kidney stones    Sleep apnea    Hypertension    High blood cholesterol    H/O heart artery stent    History of appendectomy    H/O chronic cholecystitis  cholecystectomy      ALLERGIES:  Allergies    fish (Unknown)  iodine (Rash)  latex (Unknown)  magnesium sulfate (Rash)  sulfa drugs (Rash)    Intolerances      SOCIAL HABITS:    FAMILY HISTORY:   FAMILY HISTORY:  CAD (coronary artery disease) (Father)        REVIEW OF SYSTEM:  Elements of review of systems are negative or non-applicable except as noted above in HPI section.       HOME MEDICATIONS:  aspirin 81 mg oral tablet  atenolol 50 mg oral tablet  atorvastatin 20 mg oral tablet  Plavix  sucralfate 1 g oral tablet  valsartan-hydrochlorothiazide 160mg-25mg oral tablet    MEDICATIONS:  MEDICATIONS  (STANDING):  aspirin enteric coated 81 milliGRAM(s) Oral daily  ATENolol  Tablet 50 milliGRAM(s) Oral daily  atorvastatin 20 milliGRAM(s) Oral at bedtime  chlorhexidine 4% Liquid 1 Application(s) Topical <User Schedule>  clopidogrel Tablet 75 milliGRAM(s) Oral daily  enoxaparin Injectable 40 milliGRAM(s) SubCutaneous daily  losartan 50 milliGRAM(s) Oral daily  ondansetron   Disintegrating Tablet 4 milliGRAM(s) Oral once  sucralfate 1 Gram(s) Oral two times a day    MEDICATIONS  (PRN):  acetaminophen    Suspension .. 650 milliGRAM(s) Oral every 6 hours PRN Temp greater or equal to 38C (100.4F), Mild Pain (1 - 3)  oxycodone    5 mG/acetaminophen 325 mG 1 Tablet(s) Oral every 6 hours PRN Moderate Pain (4 - 6)        VITALS:   Vital Signs Last 24 Hrs  T(C): 37.2 (27 Oct 2020 14:50), Max: 37.3 (27 Oct 2020 14:00)  T(F): 99 (27 Oct 2020 14:50), Max: 99.2 (27 Oct 2020 14:00)  HR: 113 (27 Oct 2020 14:50) (73 - 113)  BP: 116/93 (27 Oct 2020 14:50) (116/93 - 144/78)  BP(mean): --  RR: 18 (27 Oct 2020 14:50) (18 - 18)  SpO2: --        PHYSICAL EXAM:    GENERAL: NAD  HEAD:  Atraumatic, Normocephalic  NECK: Supple, No JVD  CHEST/LUNG: Clear to auscultation bilaterally;   HEART: Regular rate and rhythm; No murmurs  ABDOMEN: Soft, Nontender, Nondistended  EXTREMITIES:  Good peripheral Pulses, No clubbing, cyanosis, or edema      LABS:                        14.5   8.04  )-----------( 205      ( 27 Oct 2020 06:16 )             41.8     10-27    141  |  103  |  14  ----------------------------<  101<H>  3.3<L>   |  27  |  0.8    Ca    8.6      27 Oct 2020 06:16  Mg     1.7     10-27        CARDIAC MARKERS ( 26 Oct 2020 17:20 )  x     / x     / 58 U/L / x     / x            COVID-19 PCR: NotDetec (10-24-20 @ 16:15)  D-Dimer Assay, Quantitative: 400 ng/mL DDU (10-24-20 @ 09:00)            ABG & VENT SETTINGS (when applicable)        DIAGNOSTIC STUDIES:           LIZZY KENT  MRN-[id number]    HISTORY OF PRESENT ILLNESS:  HPI:  68 yo M with hx of CAD s/p stent x 3 on plavix, HTN, HLD, and KELSEY, bilateral knee replacement, presents for L. chest and rib pain following fall and fracture of 5 L ribs in May 2020. Patient said that since that time he has had pain on the left side that radiates to the axilla and down to his left side. Pain is intermittent lasting 1-2 hours, sharp, 9/10, worse with movement and deep inspiration. Associated tingling of L chest that radiates down the lateral aspect of arm to the elbow. Has had associated decreased appetite and 30 lb weight loss in past 2-3 months.  Patient follows Dr. Brunson with cardiology. Had stress test done in September 2020, which was normal. EF = 48%. He denies fever/chills, sore throat, palpitations, abdominal pain, N/V/D/constipation, urinary symptoms or lower extremity swelling.     In ED /76, HR 89. Physical exam significant for tenderness to palpation over left ribs and sternum. Temp 98.1. trops 0.02, which decreased to 0 next set. Dd 400. CT angio negative for PE. 1.1 cm LLL nodule found. Given tylenol, , Zofran in the ED. To be admitted to [facility] for further workup and management.  (24 Oct 2020 16:43)    Patient chest pain now resolved, had episode of loc during hospitalization- neuro and cardio w/u in progress    PMH/PSH:  PAST MEDICAL & SURGICAL HISTORY:  MI (myocardial infarction)    Arthritis    Kidney stones    Sleep apnea    Hypertension    High blood cholesterol    H/O heart artery stent    History of appendectomy    H/O chronic cholecystitis  cholecystectomy      ALLERGIES:  Allergies    fish (Unknown)  iodine (Rash)  latex (Unknown)  magnesium sulfate (Rash)  sulfa drugs (Rash)    Intolerances      SOCIAL HABITS:  ex smoker    FAMILY HISTORY:   FAMILY HISTORY:  CAD (coronary artery disease) (Father)        REVIEW OF SYSTEM:  Elements of review of systems are negative or non-applicable except as noted above in HPI section.       HOME MEDICATIONS:  aspirin 81 mg oral tablet  atenolol 50 mg oral tablet  atorvastatin 20 mg oral tablet  Plavix  sucralfate 1 g oral tablet  valsartan-hydrochlorothiazide 160mg-25mg oral tablet    MEDICATIONS:  MEDICATIONS  (STANDING):  aspirin enteric coated 81 milliGRAM(s) Oral daily  ATENolol  Tablet 50 milliGRAM(s) Oral daily  atorvastatin 20 milliGRAM(s) Oral at bedtime  chlorhexidine 4% Liquid 1 Application(s) Topical <User Schedule>  clopidogrel Tablet 75 milliGRAM(s) Oral daily  enoxaparin Injectable 40 milliGRAM(s) SubCutaneous daily  losartan 50 milliGRAM(s) Oral daily  ondansetron   Disintegrating Tablet 4 milliGRAM(s) Oral once  sucralfate 1 Gram(s) Oral two times a day    MEDICATIONS  (PRN):  acetaminophen    Suspension .. 650 milliGRAM(s) Oral every 6 hours PRN Temp greater or equal to 38C (100.4F), Mild Pain (1 - 3)  oxycodone    5 mG/acetaminophen 325 mG 1 Tablet(s) Oral every 6 hours PRN Moderate Pain (4 - 6)        VITALS:   Vital Signs Last 24 Hrs  T(C): 37.2 (27 Oct 2020 14:50), Max: 37.3 (27 Oct 2020 14:00)  T(F): 99 (27 Oct 2020 14:50), Max: 99.2 (27 Oct 2020 14:00)  HR: 113 (27 Oct 2020 14:50) (73 - 113)  BP: 116/93 (27 Oct 2020 14:50) (116/93 - 144/78)  BP(mean): --  RR: 18 (27 Oct 2020 14:50) (18 - 18)  SpO2: --        PHYSICAL EXAM:    GENERAL: NAD  HEAD:  Atraumatic, Normocephalic  NECK: Supple, No JVD  CHEST/LUNG: Clear to auscultation bilaterally;   HEART: Regular rate and rhythm; murmurs  ABDOMEN: Soft, Nontender, Nondistended  EXTREMITIES:  Good peripheral Pulses, No clubbing, cyanosis, or edema      LABS:                        14.5   8.04  )-----------( 205      ( 27 Oct 2020 06:16 )             41.8     10-27    141  |  103  |  14  ----------------------------<  101<H>  3.3<L>   |  27  |  0.8    Ca    8.6      27 Oct 2020 06:16  Mg     1.7     10-27        CARDIAC MARKERS ( 26 Oct 2020 17:20 )  x     / x     / 58 U/L / x     / x            COVID-19 PCR: NotDetec (10-24-20 @ 16:15)  D-Dimer Assay, Quantitative: 400 ng/mL DDU (10-24-20 @ 09:00)              DIAGNOSTIC STUDIES:    < from: CT Angio Chest w/ IV Cont (10.24.20 @ 13:28) >  IMPRESSION:    No evidence of a pulmonaryembolism.    Elevated right hemidiaphragm.    Right middle lobe and right lower lobe nodular opacity and subcentimeter nodules up to 1.1 cm. This largest nodule was also present on the 9/30/2020 scan but appeared more linear. Recommend a three-month follow-up chest CT to reassess.    < end of copied text >

## 2020-10-27 NOTE — CONSULT NOTE ADULT - ATTENDING COMMENTS
I have personally seen and examined this patient.  I have fully participated in the care of this patient.  I have reviewed all pertinent clinical information, including history, physical exam, plan and note.   I have reviewed all pertinent clinical information and reviewed all relevant imaging and diagnostic studies personally.  Pt w/ recurrent LOC nonfocal without post-ictal confusion.  r/o vbi, seizure.  Recommendations as above.  Agree with above assessment except as noted.

## 2020-10-28 LAB
ANION GAP SERPL CALC-SCNC: 11 MMOL/L — SIGNIFICANT CHANGE UP (ref 7–14)
BUN SERPL-MCNC: 17 MG/DL — SIGNIFICANT CHANGE UP (ref 10–20)
CALCIUM SERPL-MCNC: 8.6 MG/DL — SIGNIFICANT CHANGE UP (ref 8.5–10.1)
CHLORIDE SERPL-SCNC: 103 MMOL/L — SIGNIFICANT CHANGE UP (ref 98–110)
CO2 SERPL-SCNC: 28 MMOL/L — SIGNIFICANT CHANGE UP (ref 17–32)
CREAT SERPL-MCNC: 0.8 MG/DL — SIGNIFICANT CHANGE UP (ref 0.7–1.5)
GLUCOSE SERPL-MCNC: 93 MG/DL — SIGNIFICANT CHANGE UP (ref 70–99)
MAGNESIUM SERPL-MCNC: 1.7 MG/DL — LOW (ref 1.8–2.4)
POTASSIUM SERPL-MCNC: 3.3 MMOL/L — LOW (ref 3.5–5)
POTASSIUM SERPL-SCNC: 3.3 MMOL/L — LOW (ref 3.5–5)
SARS-COV-2 RNA SPEC QL NAA+PROBE: SIGNIFICANT CHANGE UP
SODIUM SERPL-SCNC: 142 MMOL/L — SIGNIFICANT CHANGE UP (ref 135–146)
URATE SERPL-MCNC: 5.4 MG/DL — SIGNIFICANT CHANGE UP (ref 3.4–8.8)

## 2020-10-28 PROCEDURE — 99233 SBSQ HOSP IP/OBS HIGH 50: CPT

## 2020-10-28 RX ORDER — OXYCODONE AND ACETAMINOPHEN 5; 325 MG/1; MG/1
1 TABLET ORAL ONCE
Refills: 0 | Status: DISCONTINUED | OUTPATIENT
Start: 2020-10-28 | End: 2020-10-28

## 2020-10-28 RX ORDER — COLCHICINE 0.6 MG
0.6 TABLET ORAL ONCE
Refills: 0 | Status: COMPLETED | OUTPATIENT
Start: 2020-10-28 | End: 2020-10-28

## 2020-10-28 RX ORDER — MAGNESIUM OXIDE 400 MG ORAL TABLET 241.3 MG
400 TABLET ORAL
Refills: 0 | Status: DISCONTINUED | OUTPATIENT
Start: 2020-10-28 | End: 2020-10-29

## 2020-10-28 RX ORDER — LIDOCAINE 4 G/100G
2 CREAM TOPICAL DAILY
Refills: 0 | Status: DISCONTINUED | OUTPATIENT
Start: 2020-10-28 | End: 2020-10-29

## 2020-10-28 RX ORDER — POTASSIUM CHLORIDE 20 MEQ
40 PACKET (EA) ORAL EVERY 4 HOURS
Refills: 0 | Status: COMPLETED | OUTPATIENT
Start: 2020-10-28 | End: 2020-10-28

## 2020-10-28 RX ADMIN — Medication 40 MILLIEQUIVALENT(S): at 21:07

## 2020-10-28 RX ADMIN — LIDOCAINE 2 PATCH: 4 CREAM TOPICAL at 17:03

## 2020-10-28 RX ADMIN — OXYCODONE AND ACETAMINOPHEN 1 TABLET(S): 5; 325 TABLET ORAL at 16:19

## 2020-10-28 RX ADMIN — ATENOLOL 50 MILLIGRAM(S): 25 TABLET ORAL at 05:46

## 2020-10-28 RX ADMIN — Medication 1 GRAM(S): at 05:46

## 2020-10-28 RX ADMIN — MAGNESIUM OXIDE 400 MG ORAL TABLET 400 MILLIGRAM(S): 241.3 TABLET ORAL at 13:47

## 2020-10-28 RX ADMIN — CLOPIDOGREL BISULFATE 75 MILLIGRAM(S): 75 TABLET, FILM COATED ORAL at 11:05

## 2020-10-28 RX ADMIN — Medication 81 MILLIGRAM(S): at 11:05

## 2020-10-28 RX ADMIN — ENOXAPARIN SODIUM 40 MILLIGRAM(S): 100 INJECTION SUBCUTANEOUS at 11:05

## 2020-10-28 RX ADMIN — CHLORHEXIDINE GLUCONATE 1 APPLICATION(S): 213 SOLUTION TOPICAL at 05:46

## 2020-10-28 RX ADMIN — LIDOCAINE 2 PATCH: 4 CREAM TOPICAL at 17:02

## 2020-10-28 RX ADMIN — Medication 1 GRAM(S): at 17:02

## 2020-10-28 RX ADMIN — Medication 40 MILLIEQUIVALENT(S): at 17:02

## 2020-10-28 RX ADMIN — OXYCODONE AND ACETAMINOPHEN 1 TABLET(S): 5; 325 TABLET ORAL at 12:07

## 2020-10-28 RX ADMIN — Medication 0.6 MILLIGRAM(S): at 11:05

## 2020-10-28 RX ADMIN — OXYCODONE AND ACETAMINOPHEN 1 TABLET(S): 5; 325 TABLET ORAL at 15:42

## 2020-10-28 RX ADMIN — ATORVASTATIN CALCIUM 20 MILLIGRAM(S): 80 TABLET, FILM COATED ORAL at 21:07

## 2020-10-28 RX ADMIN — MAGNESIUM OXIDE 400 MG ORAL TABLET 400 MILLIGRAM(S): 241.3 TABLET ORAL at 17:02

## 2020-10-28 RX ADMIN — LOSARTAN POTASSIUM 50 MILLIGRAM(S): 100 TABLET, FILM COATED ORAL at 05:46

## 2020-10-28 RX ADMIN — Medication 40 MILLIGRAM(S): at 11:05

## 2020-10-28 RX ADMIN — OXYCODONE AND ACETAMINOPHEN 1 TABLET(S): 5; 325 TABLET ORAL at 11:07

## 2020-10-28 RX ADMIN — Medication 40 MILLIEQUIVALENT(S): at 12:21

## 2020-10-28 NOTE — PHARMACOTHERAPY INTERVENTION NOTE - COMMENTS
Recommended to re-evaluate Mag Oxide order, based on patient's allergy to Mag. sulfate.  As per Dr. Giles, allergic to sulfate. Wants to proceed with mag oxide

## 2020-10-28 NOTE — PROGRESS NOTE ADULT - ASSESSMENT
68 y/o male with known alysa hx of rib fractures, pulmonary nodule of 1.1 cm admitted with atypical chest pain had syncopal episode while in hospital      Atypical chest pain- resolved  ALYSA  Pulmonary nodule 1.1 cm largest RML  Weight Loss  Syncopal episode  Advanced directives- Full code    repeat psgn as outpt  pet scan as outpt  pfts as outpt  echo-mild as  awaiting further neurologic w/u including ct head and neck as well as rEEg  dvt/gi px  Full code 70 y/o male with known alysa hx of rib fractures, pulmonary nodule of 1.1 cm admitted with atypical chest pain had syncopal episode while in hospital      Atypical chest pain- resolved  ALYSA  Pulmonary nodule 1.1 cm largest RML  Weight Loss  Syncopal episode  Advanced directives- Full code    repeat psgn as outpt  pet scan as outpt  pfts as outpt  echo-mild as  neuro follow up  dvt/gi px  Full code  d/w patient at length re concern for malignant pulmonary nodule and need for repeat imaging as outpt  would discuss with outpt pcp to ensure follow up as pt does not seem reliable to do so himself

## 2020-10-28 NOTE — PROGRESS NOTE ADULT - ASSESSMENT
Patient is a 70yo man with PMH of CAD s/p stent x3, hypertension, hyperlipidemia, KELSEY, bilateral knee replacement, and nephrolithiasis with recent fall in 5/2020 with 5 rib fractures who presented to the hospital complaining of left chest pain. Rapid response was called on 10/26/2020 in the AM for unresponsiveness.    # Rapid response called at 6am on 10/26/2020 for dizziness and unresponsiveness; accompanied by urinary incontinence and patient had stool incontinence later in the morning  No report of tonic-clonic movements  No events noted on telemetry during the event  Orthostatic vital signs nl  Routine EEG: done, f/u results  neuro noted: f/u findings EEG w/ them  CTA H/N: both nl  echo: EF 62%, mild TR, mild AS   hold HCTZ    # chest pain secondary to previous rib fractures  trop neg  CT angio chest negative for pulmonary embolism  lidoderm patch top q24 to rib area  percocet prn    # rt 1st toe pain - poss gout  uric acid nl  hold HCTZ  colch 0.6mg po x1 (might rpt tevin)  pred 40mg po q24 x5 days (therapeutic trial)  consider xray foot tevin if not better    # Hypomagnesemia  pt all to sulfate  give MG Oxide 400mg po q8    # Hypokalemia  replete orally    # History of CAD s/p stents x3; HTN (controlled); DLD  c/w DAPT  c/w atenolol 50mg PO QD, losartan 50mg po q24 and atorvastatin 20mg PO QHS  hold HCTZ    # 1.1cm right lower lobe pulmonary nodule  Repeat chest CT in 3 months or outpt PET  outpt f/u pulm    # DVT Prophylaxis: Lovenox 40mg SQ QD     # GI Prophylaxis: not indicated     # Activity: can work w/ PT; amb w/ asst and walker; for SNF upon d/c    # Code Status: Full Code    Dispo: f/u neuro w/ EEG; tx poss gout; tx rib pain; fix K/Mg; anticipate d/c to SNF tevin ([facility]) -> f/u CM

## 2020-10-28 NOTE — PROGRESS NOTE ADULT - SUBJECTIVE AND OBJECTIVE BOX
MISTILIZZY FOSS  70y  Male  ***My note supersedes ALL resident notes that I sign.  My corrections for their notes are in my note.***    I can be reached directly on [email or web address]. My office number is [phone]. My personal cell number is [phone].    INTERVAL EVENTS: Here for f/u of rib pain, which is still a little tender. Pt feel outside his house near a tree and broke 5 ribs on rt in May 2020. Pain is improving w/ percocet. He also c/o rt 1st toe pain, which seems like gout, though he's never had gout before. He is expecting to go to SNF w/ wife tomorrow.    T(F): 98.6 (10-28-20 @ 13:00), Max: 98.9 (10-27-20 @ 20:08)  HR: 76 (10-28-20 @ 13:00) (75 - 106)  BP: 115/58 (10-28-20 @ 13:00) (115/58 - 141/64)  RR: 18 (10-28-20 @ 13:00) (18 - 18)  SpO2: 96% (10-28-20 @ 07:50) (94% - 96%)    Gen: NAD  HEENT: PERRL, EOMI, mouth clr, nose clr  Neck: no nodes, no JVD, thyroid nl  lungs: clr  hrt: s1 s2 rrr no murmur  abd: soft, NT/ND, no HS megaly  ext: no edema, no c/c; rt foot 1st toe w/ slight erythema of MTPJ medially; slight swelling dulce base of 1st toe on dorsum; + mod tenderness; no warmth  neuro: aa ox3, cn intact, can move all 4 ext    LABS:                      14.5    (    92.9   8.04  )-----------( ---------      205      ( 27 Oct 2020 06:16 )             41.8    (    12.7     142   (   103   (   93      10-28-20 @ 05:23  ----------------------               3.3   (   28   (   17                             -----                        0.8  Ca  8.6   Mg  1.7    P   --     CARDIAC MARKERS ( 26 Oct 2020 17:20 )  x     / x     / 58 U/L / x     / x        CAPILLARY BLOOD GLUCOSE  POCT Blood Glucose.: 157 (10-26-20 @ 05:52)    RADIOLOGY & ADDITIONAL TESTS:  < from: CT Angio Head w/ IV Cont (10.27.20 @ 16:45) >  IMPRESSION:    CTA HEAD:  No evidence of flow-limiting stenosis, occlusion or aneurysm.    CTA NECK:  No evidence of carotid or vertebral artery stenosis.    < end of copied text >    < from: CT Angio Chest w/ IV Cont (10.24.20 @ 13:28) >  IMPRESSION:    No evidence of a pulmonary embolism.    Elevated right hemidiaphragm.    Right middle lobe and right lower lobe nodular opacity and subcentimeter nodules up to 1.1 cm. This largest nodule was also present on the 9/30/2020 scan, but appeared more linear. Recommend a three-month follow-up chest CT to reassess.    < end of copied text >    < from: Xray Chest 1 View-PORTABLE IMMEDIATE (10.24.20 @ 09:21) >    Impression:    Low lung volumes. Left basal opacity/atelectasis. No pneumothorax.    < end of copied text >    < from: TTE Echo Complete w/o Contrast w/ Doppler (10.27.20 @ 08:55) >  Summary:   1. LV Ejection Fraction by Zheng's Method with a biplane EF of 62 %.   2. Normal left ventricular size and wall thicknesses, with normal systolic and diastolic function.   3. Normal left atrial size.   4. Normal right atrial size.   5. No evidence of mitral valve regurgitation.   6. Mild tricuspid regurgitation.   7. Mild aortic valve stenosis.   8. LA volume Index is 28.5 ml/m² ml/m2.   9. Peak transaortic gradient equals 28.0 mmHg, mean transaortic gradient equals 16.1 mmHg, the calculated aortic valve area equals 2.17 cm² by the continuity equation consistentwith mild aortic stenosis.  10. The aortic valve mean gradient is 16.1 mmHg consistent with mild aortic stenosis.    < end of copied text >      MEDICATIONS:    acetaminophen    Suspension .. 650 milliGRAM(s) Oral every 6 hours PRN  aspirin enteric coated 81 milliGRAM(s) Oral daily  ATENolol  Tablet 50 milliGRAM(s) Oral daily  atorvastatin 20 milliGRAM(s) Oral at bedtime  chlorhexidine 4% Liquid 1 Application(s) Topical <User Schedule>  clopidogrel Tablet 75 milliGRAM(s) Oral daily  enoxaparin Injectable 40 milliGRAM(s) SubCutaneous daily  losartan 50 milliGRAM(s) Oral daily  magnesium oxide 400 milliGRAM(s) Oral three times a day with meals  oxycodone    5 mG/acetaminophen 325 mG 1 Tablet(s) Oral every 6 hours PRN  potassium chloride    Tablet ER 40 milliEquivalent(s) Oral every 4 hours  sucralfate 1 Gram(s) Oral two times a day

## 2020-10-28 NOTE — PROGRESS NOTE ADULT - ASSESSMENT
Patient is a 68yo male with PMH of CAD s/p stent x3, hypertension, hyperlipidemia, KELSEY, bilateral knee replacement, and nephrolithiasis with recent fall in 5/2020 with 5 rib fractures who presented to the hospital complaining of left chest pain. Rapid response was called on 10/26/2020 in the AM for unresponsiveness.    #Syncopal episode, likely secondary to seizure vs neurogenic  - Rapid response called at 6am on 10/26/2020 for dizziness and unresponsiveness  - Episode accompanied by urinary incontinence and patient had stool incontinence later in the morning  - No report of tonic-clonic movements  - No events noted on telemetry during the event  - Orthostatic vital signs negative  - Doubt cardiac etiology given no events on telemetry and negative workup thus far  - Creatine kinase within normal limits   - 2D echocardiogram done as above EF 62% mild AS   - CT angio head and neck non significant   - Routine EEG - preformed, awaiting read   - f/u neurology    #right hallus pain - likely goat  - given colchicine.   - Uric acid wnl  - avoid nsaids 2/2 to AC  - given percocet 1 tab  - prenisone 40 for 5 days  - f/u xray if no improvement     #Atypical left chest pain secondary to previous rib fractures, resolved  - Troponin trending down: 0.02->0.01->0.01  - D-dimer 400  - CT angio chest negative for pulmonary embolism  - Telemetry shows episodes of couples, triples, and PVCs  - 2D echocardiogram normal  - lidoderm patch    #Hypomagnesemia  - Magnesium today: 1.7  - Patient is allergic to magnesium sulfate and refused to take magnesium supplement IV or PO  - given mag oxide, f/u for reaction and labs    #Hypokalemia  - Potassium today: 3.3  - Will replete with potassium 40mEq PO x3/day  - Follow potassium in AM     #History of CAD s/p stents x3  - Continue with aspirin 81mg PO QD, clopidogrel 75mg PO QD, atenolol 50mg PO QD, and atorvastatin 20mg PO QHS    #Hyperlipidemia  - Continue with atorvastatin 20mg PO QHS    #Hypertension  - Continue with atenolol 50mg PO QD and losartan 50mg PO QD  - Hold home medication given hydrochlorothiazide given dizziness this AM    #1.1cm right lower lobe pulmonary nodule  - Repeat chest CT in 3 months  - Follow outpatient with primary care provider    #Misc  - DVT Prophylaxis: Lovenox 40mg SQ QD   - GI Prophylaxis: not indicated   - Diet: DASH/TLC  - Activity: increase as tolerated  - IV Fluids: not indicated   - Code Status: Full Code    Dispo: to SNF, f/u with neuro regarding EEG

## 2020-10-28 NOTE — PROGRESS NOTE ADULT - SUBJECTIVE AND OBJECTIVE BOX
T H I S    I S    A N    I N C O M P L E T E     N O T E    LIZZY KENT  70y, Male  Allergy: fish (Unknown)  iodine (Rash)  latex (Unknown)  magnesium sulfate (Rash)  sulfa drugs (Rash)      LAST 24-Hr EVENTS:    VITALS:  T(F): 98.6 (10-28-20 @ 13:00), Max: 98.9 (10-27-20 @ 20:08)  HR: 76 (10-28-20 @ 13:00)  BP: 115/58 (10-28-20 @ 13:00) (115/58 - 141/64)  RR: 18 (10-28-20 @ 13:00)  SpO2: 96% (10-28-20 @ 07:50)    PHYSICAL EXAM:    GENERAL: NAD  NECK: Supple, No JVD  CHEST/LUNG: CTA b/l  HEART: Regular rate and rhythm  ABDOMEN: Soft, Nontender, Nondistended  EXTREMITIES:  No clubbing, edema absent        TESTS & MEASUREMENTS:    IN: 1520 mL / OUT: 1990 mL / NET: -470 mL    IN: 0 mL / OUT: 100 mL / NET: -100 mL                            14.5   8.04  )-----------( 205      ( 27 Oct 2020 06:16 )             41.8       10-28    142  |  103  |  17  ----------------------------<  93  3.3<L>   |  28  |  0.8    Ca    8.6      28 Oct 2020 05:23  Mg     1.7     10-28              COVID-19 PCR: NotDetec (10-24-20 @ 16:15)    D-Dimer Assay, Quantitative: 400 ng/mL DDU (10-24-20 @ 09:00)          ABG & VENT SETTINGS: (when applicable)        RADIOLOGY & ADDITIONAL TESTS:      MEDICATIONS:  MEDICATIONS  (STANDING):  aspirin enteric coated 81 milliGRAM(s) Oral daily  ATENolol  Tablet 50 milliGRAM(s) Oral daily  atorvastatin 20 milliGRAM(s) Oral at bedtime  chlorhexidine 4% Liquid 1 Application(s) Topical <User Schedule>  clopidogrel Tablet 75 milliGRAM(s) Oral daily  enoxaparin Injectable 40 milliGRAM(s) SubCutaneous daily  lidocaine   Patch 2 Patch Transdermal daily  losartan 50 milliGRAM(s) Oral daily  magnesium oxide 400 milliGRAM(s) Oral three times a day with meals  ondansetron   Disintegrating Tablet 4 milliGRAM(s) Oral once  potassium chloride    Tablet ER 40 milliEquivalent(s) Oral every 4 hours  sucralfate 1 Gram(s) Oral two times a day    MEDICATIONS  (PRN):  acetaminophen    Suspension .. 650 milliGRAM(s) Oral every 6 hours PRN Temp greater or equal to 38C (100.4F), Mild Pain (1 - 3)  oxycodone    5 mG/acetaminophen 325 mG 1 Tablet(s) Oral every 6 hours PRN Moderate Pain (4 - 6)             LIZZY KENT  70y, Male  Allergy: fish (Unknown)  iodine (Rash)  latex (Unknown)  magnesium sulfate (Rash)  sulfa drugs (Rash)      LAST 24-Hr EVENTS:  undergoing routine eeg    VITALS:  T(F): 98.6 (10-28-20 @ 13:00), Max: 98.9 (10-27-20 @ 20:08)  HR: 76 (10-28-20 @ 13:00)  BP: 115/58 (10-28-20 @ 13:00) (115/58 - 141/64)  RR: 18 (10-28-20 @ 13:00)  SpO2: 96% (10-28-20 @ 07:50)    PHYSICAL EXAM:    GENERAL: NAD  NECK: Supple, No JVD  CHEST/LUNG: CTA b/l  HEART: Regular rate and rhythm  ABDOMEN: Soft, Nontender, Nondistended  EXTREMITIES:  No clubbing, edema absent        TESTS & MEASUREMENTS:    IN: 1520 mL / OUT: 1990 mL / NET: -470 mL    IN: 0 mL / OUT: 100 mL / NET: -100 mL                            14.5   8.04  )-----------( 205      ( 27 Oct 2020 06:16 )             41.8       10-28    142  |  103  |  17  ----------------------------<  93  3.3<L>   |  28  |  0.8    Ca    8.6      28 Oct 2020 05:23  Mg     1.7     10-28              COVID-19 PCR: NotDetec (10-24-20 @ 16:15)    D-Dimer Assay, Quantitative: 400 ng/mL DDU (10-24-20 @ 09:00)        MEDICATIONS:  MEDICATIONS  (STANDING):  aspirin enteric coated 81 milliGRAM(s) Oral daily  ATENolol  Tablet 50 milliGRAM(s) Oral daily  atorvastatin 20 milliGRAM(s) Oral at bedtime  chlorhexidine 4% Liquid 1 Application(s) Topical <User Schedule>  clopidogrel Tablet 75 milliGRAM(s) Oral daily  enoxaparin Injectable 40 milliGRAM(s) SubCutaneous daily  lidocaine   Patch 2 Patch Transdermal daily  losartan 50 milliGRAM(s) Oral daily  magnesium oxide 400 milliGRAM(s) Oral three times a day with meals  ondansetron   Disintegrating Tablet 4 milliGRAM(s) Oral once  potassium chloride    Tablet ER 40 milliEquivalent(s) Oral every 4 hours  sucralfate 1 Gram(s) Oral two times a day    MEDICATIONS  (PRN):  acetaminophen    Suspension .. 650 milliGRAM(s) Oral every 6 hours PRN Temp greater or equal to 38C (100.4F), Mild Pain (1 - 3)  oxycodone    5 mG/acetaminophen 325 mG 1 Tablet(s) Oral every 6 hours PRN Moderate Pain (4 - 6)        < from: CT Angio Head w/ IV Cont (10.27.20 @ 16:45) >  MPRESSION:    CTA HEAD:  No evidence of flow-limiting stenosis, occlusion or aneurysm.    CTA NECK:  No evidence of carotid or vertebral artery stenosis.    < end of copied text >

## 2020-10-28 NOTE — PROGRESS NOTE ADULT - SUBJECTIVE AND OBJECTIVE BOX
SUBJECTIVE:    Patient is a 70y old Male who presents with a chief complaint of Non-specific Chest Pain (28 Oct 2020 16:34)    Currently admitted to medicine with the primary diagnosis of Chest pain      Today is hospital day 4d. This morning he is resting comfortably in bed and reports no new issues or overnight events.     PAST MEDICAL & SURGICAL HISTORY  MI (myocardial infarction)    Arthritis    Kidney stones    Sleep apnea    Hypertension    High blood cholesterol    H/O heart artery stent    History of appendectomy    H/O chronic cholecystitis  cholecystectomy      SOCIAL HISTORY:  Negative for smoking/alcohol/drug use.     ALLERGIES:  fish (Unknown)  iodine (Rash)  latex (Unknown)  magnesium sulfate (Rash)  sulfa drugs (Rash)    MEDICATIONS:  STANDING MEDICATIONS  aspirin enteric coated 81 milliGRAM(s) Oral daily  ATENolol  Tablet 50 milliGRAM(s) Oral daily  atorvastatin 20 milliGRAM(s) Oral at bedtime  chlorhexidine 4% Liquid 1 Application(s) Topical <User Schedule>  clopidogrel Tablet 75 milliGRAM(s) Oral daily  enoxaparin Injectable 40 milliGRAM(s) SubCutaneous daily  lidocaine   Patch 2 Patch Transdermal daily  losartan 50 milliGRAM(s) Oral daily  magnesium oxide 400 milliGRAM(s) Oral three times a day with meals  ondansetron   Disintegrating Tablet 4 milliGRAM(s) Oral once  potassium chloride    Tablet ER 40 milliEquivalent(s) Oral every 4 hours  sucralfate 1 Gram(s) Oral two times a day    PRN MEDICATIONS  acetaminophen    Suspension .. 650 milliGRAM(s) Oral every 6 hours PRN  oxycodone    5 mG/acetaminophen 325 mG 1 Tablet(s) Oral every 6 hours PRN      LABS:                        14.5   8.04  )-----------( 205      ( 27 Oct 2020 06:16 )             41.8     10-28    142  |  103  |  17  ----------------------------<  93  3.3<L>   |  28  |  0.8    Ca    8.6      28 Oct 2020 05:23  Mg     1.7     10-28                    RADIOLOGY:    VITALS:   T(F): 98.6, Max: 98.9 (10-27-20 @ 20:08)  HR: 76  BP: 115/58  RR: 18  SpO2: 96%    PHYSICAL EXAM:  GEN: No acute distress  LUNGS: Clear to auscultation bilaterally, no wheezes rhonchi or rales  HEART: Regular rate and rhythm, S1, S2 auscultated,   ABD: Soft, non-tender, non-distended.  EXT: No edema, rt hallux Warm to touch, tender, nodules felt at the joint  NEURO: AAOX3    Intravenous access:   NG tube:   Hilario Catheter:

## 2020-10-29 ENCOUNTER — TRANSCRIPTION ENCOUNTER (OUTPATIENT)
Age: 70
End: 2020-10-29

## 2020-10-29 VITALS — OXYGEN SATURATION: 97 %

## 2020-10-29 LAB
ALBUMIN SERPL ELPH-MCNC: 3.5 G/DL — SIGNIFICANT CHANGE UP (ref 3.5–5.2)
ALP SERPL-CCNC: 78 U/L — SIGNIFICANT CHANGE UP (ref 30–115)
ALT FLD-CCNC: 8 U/L — SIGNIFICANT CHANGE UP (ref 0–41)
ANION GAP SERPL CALC-SCNC: 11 MMOL/L — SIGNIFICANT CHANGE UP (ref 7–14)
AST SERPL-CCNC: 10 U/L — SIGNIFICANT CHANGE UP (ref 0–41)
BILIRUB SERPL-MCNC: 0.7 MG/DL — SIGNIFICANT CHANGE UP (ref 0.2–1.2)
BUN SERPL-MCNC: 23 MG/DL — HIGH (ref 10–20)
CALCIUM SERPL-MCNC: 8.8 MG/DL — SIGNIFICANT CHANGE UP (ref 8.5–10.1)
CHLORIDE SERPL-SCNC: 108 MMOL/L — SIGNIFICANT CHANGE UP (ref 98–110)
CO2 SERPL-SCNC: 26 MMOL/L — SIGNIFICANT CHANGE UP (ref 17–32)
CREAT SERPL-MCNC: 0.8 MG/DL — SIGNIFICANT CHANGE UP (ref 0.7–1.5)
GLUCOSE SERPL-MCNC: 121 MG/DL — HIGH (ref 70–99)
HCT VFR BLD CALC: 40.5 % — LOW (ref 42–52)
HGB BLD-MCNC: 13.8 G/DL — LOW (ref 14–18)
MAGNESIUM SERPL-MCNC: 2 MG/DL — SIGNIFICANT CHANGE UP (ref 1.8–2.4)
MCHC RBC-ENTMCNC: 32.2 PG — HIGH (ref 27–31)
MCHC RBC-ENTMCNC: 34.1 G/DL — SIGNIFICANT CHANGE UP (ref 32–37)
MCV RBC AUTO: 94.6 FL — HIGH (ref 80–94)
NRBC # BLD: 0 /100 WBCS — SIGNIFICANT CHANGE UP (ref 0–0)
PLATELET # BLD AUTO: 202 K/UL — SIGNIFICANT CHANGE UP (ref 130–400)
POTASSIUM SERPL-MCNC: 4.4 MMOL/L — SIGNIFICANT CHANGE UP (ref 3.5–5)
POTASSIUM SERPL-SCNC: 4.4 MMOL/L — SIGNIFICANT CHANGE UP (ref 3.5–5)
PROT SERPL-MCNC: 5.9 G/DL — LOW (ref 6–8)
RBC # BLD: 4.28 M/UL — LOW (ref 4.7–6.1)
RBC # FLD: 12.8 % — SIGNIFICANT CHANGE UP (ref 11.5–14.5)
SODIUM SERPL-SCNC: 145 MMOL/L — SIGNIFICANT CHANGE UP (ref 135–146)
WBC # BLD: 8.44 K/UL — SIGNIFICANT CHANGE UP (ref 4.8–10.8)
WBC # FLD AUTO: 8.44 K/UL — SIGNIFICANT CHANGE UP (ref 4.8–10.8)

## 2020-10-29 PROCEDURE — 99231 SBSQ HOSP IP/OBS SF/LOW 25: CPT

## 2020-10-29 PROCEDURE — 99239 HOSP IP/OBS DSCHRG MGMT >30: CPT

## 2020-10-29 RX ORDER — LOSARTAN POTASSIUM 100 MG/1
2 TABLET, FILM COATED ORAL
Qty: 0 | Refills: 0 | DISCHARGE
Start: 2020-10-29

## 2020-10-29 RX ORDER — LOSARTAN POTASSIUM 100 MG/1
1 TABLET, FILM COATED ORAL
Qty: 0 | Refills: 0 | DISCHARGE
Start: 2020-10-29

## 2020-10-29 RX ORDER — LIDOCAINE 4 G/100G
1 CREAM TOPICAL
Qty: 0 | Refills: 0 | DISCHARGE
Start: 2020-10-29

## 2020-10-29 RX ADMIN — CHLORHEXIDINE GLUCONATE 1 APPLICATION(S): 213 SOLUTION TOPICAL at 05:33

## 2020-10-29 RX ADMIN — LOSARTAN POTASSIUM 50 MILLIGRAM(S): 100 TABLET, FILM COATED ORAL at 05:33

## 2020-10-29 RX ADMIN — CLOPIDOGREL BISULFATE 75 MILLIGRAM(S): 75 TABLET, FILM COATED ORAL at 11:09

## 2020-10-29 RX ADMIN — MAGNESIUM OXIDE 400 MG ORAL TABLET 400 MILLIGRAM(S): 241.3 TABLET ORAL at 08:29

## 2020-10-29 RX ADMIN — MAGNESIUM OXIDE 400 MG ORAL TABLET 400 MILLIGRAM(S): 241.3 TABLET ORAL at 11:10

## 2020-10-29 RX ADMIN — ATENOLOL 50 MILLIGRAM(S): 25 TABLET ORAL at 05:33

## 2020-10-29 RX ADMIN — LIDOCAINE 2 PATCH: 4 CREAM TOPICAL at 05:35

## 2020-10-29 RX ADMIN — Medication 1 GRAM(S): at 05:33

## 2020-10-29 RX ADMIN — LIDOCAINE 2 PATCH: 4 CREAM TOPICAL at 11:09

## 2020-10-29 RX ADMIN — Medication 81 MILLIGRAM(S): at 11:09

## 2020-10-29 RX ADMIN — Medication 40 MILLIGRAM(S): at 05:33

## 2020-10-29 RX ADMIN — ENOXAPARIN SODIUM 40 MILLIGRAM(S): 100 INJECTION SUBCUTANEOUS at 11:09

## 2020-10-29 NOTE — PROGRESS NOTE ADULT - REASON FOR ADMISSION
Non-specific Chest Pain

## 2020-10-29 NOTE — DISCHARGE NOTE PROVIDER - CARE PROVIDERS DIRECT ADDRESSES
,DirectAddress_Unknown,[email or web address]

## 2020-10-29 NOTE — PROGRESS NOTE ADULT - ATTENDING COMMENTS
#Progress Note Handoff  Pending (specify):  AUTUMN h/N and reeg  Family discussion: adri pt and agreed to plan   Disposition: Home_x__/SNF___/Other___/Unknown at this time___  Pt seen during COVID 19 Pandemic.
#Progress Note Handoff  Pending (specify): follow up cardiology, echo, eeg, if eeg abnormal neuro consult  Family discussion: adri pt and agreed to plan   Disposition: Home_x__/SNF___/Other___/Unknown at this time___  Pt seen during COVID 19 Pandemic.
I have personally seen and examined this patient.  I have fully participated in the care of this patient.  I have reviewed all pertinent clinical information, including history, physical exam, plan and note.   I have reviewed all pertinent clinical information and reviewed all relevant imaging and diagnostic studies personally.  Recommendations as above.  Agree with above assessment except as noted.

## 2020-10-29 NOTE — DISCHARGE NOTE NURSING/CASE MANAGEMENT/SOCIAL WORK - PATIENT PORTAL LINK FT
You can access the FollowMyHealth Patient Portal offered by [facility] by registering at the following website: [email or web address]. By joining [facility]’s FollowMyHealth portal, you will also be able to view your health information using other applications (apps) compatible with our system.

## 2020-10-29 NOTE — DISCHARGE NOTE PROVIDER - PROVIDER TOKENS
PROVIDER:[TOKEN:[[id number]],FOLLOWUP:[2 weeks]],PROVIDER:[TOKEN:[[id number]],FOLLOWUP:[2 weeks]]

## 2020-10-29 NOTE — DISCHARGE NOTE PROVIDER - CARE PROVIDER_API CALL
EMILEE CORONADO  Internal Medicine  [address] [place] [address]  Phone: [phone]  Fax: [phone]  Follow Up Time: 2 weeks    Chandu Valverde  NEUROMUSCULAR MEDICINE  [address] [place] [address], [address]  [place], [place] [id number]  Phone: [phone]  Fax: [phone]  Follow Up Time: 2 weeks

## 2020-10-29 NOTE — DISCHARGE NOTE PROVIDER - NSDCFUADDINST_GEN_ALL_CORE_FT
Please follow up with your regular doctor for further care and health maintenance.  If you continue passing out, you can follow up with your neurologist, Dr. Valverde.

## 2020-10-29 NOTE — DISCHARGE NOTE PROVIDER - NSDCMRMEDTOKEN_GEN_ALL_CORE_FT
aspirin 81 mg oral tablet:   atenolol 50 mg oral tablet: 1 tab(s) orally once a day  atorvastatin 20 mg oral tablet: 1 tab(s) orally once a day  Plavix: 75 milligram(s) orally once a day  sucralfate 1 g oral tablet: 1 tab(s) orally 2 times a day (after meals)  valsartan-hydrochlorothiazide 160mg-25mg oral tablet: 1 tab(s) orally every 12 hours   aspirin 81 mg oral tablet:   atenolol 50 mg oral tablet: 1 tab(s) orally once a day  atorvastatin 20 mg oral tablet: 1 tab(s) orally once a day  lidocaine 5% topical film: Apply to affected area once a day  losartan 50 mg oral tablet: 1 tab(s) orally once a day  oxycodone-acetaminophen 5 mg-325 mg oral tablet: 1 tab(s) orally every 6 hours, As needed, Moderate Pain (4 - 6)  Plavix: 75 milligram(s) orally once a day  predniSONE 20 mg oral tablet: 2 tab(s) orally once a day, stop taking medication 11/01.  sucralfate 1 g oral tablet: 1 tab(s) orally 2 times a day (after meals)

## 2020-10-29 NOTE — DISCHARGE NOTE PROVIDER - NSDCCPCAREPLAN_GEN_ALL_CORE_FT
PRINCIPAL DISCHARGE DIAGNOSIS  Diagnosis: Chest pain  Assessment and Plan of Treatment: Chest Pain  Chest pain can be caused by many different conditions which may or may not be dangerous. Causes include heartburn, lung infections, heart attack, blood clot in lungs, skin infections, strain or damage to muscle, cartilage, or bones, etc. In addition to a history and physical examination, an electrocardiogram (ECG) or other lab tests may have been performed to determine the cause of your chest pain. Follow up with your primary care provider or with a cardiologist as instructed.   SEEK IMMEDIATE MEDICAL CARE IF YOU HAVE ANY OF THE FOLLOWING SYMPTOMS: worsening chest pain, coughing up blood, unexplained back/neck/jaw pain, severe abdominal pain, dizziness or lightheadedness, fainting, shortness of breath, sweaty or clammy skin, vomiting, or racing heart beat. These symptoms may represent a serious problem that is an emergency. Do not wait to see if the symptoms will go away. Get medical help right away. Call 911 and do not drive yourself to the hospital.        SECONDARY DISCHARGE DIAGNOSES  Diagnosis: Atypical syncope  Assessment and Plan of Treatment: Syncope  Syncope is when you temporarily lose consciousness, also called fainting or passing out. It is caused by a sudden decrease in blood flow to the brain. Even though most causes of syncope are not dangerous, syncope can possibly be a sign of a serious medical problem. Signs that you may be about to faint include feeling dizzy, lightheaded, nausea, visual changes, or cold/clammy skin. Do not drive, operate heavy machinery, or play sports until your health care provider says it is okay.  SEEK IMMEDIATE MEDICAL CARE IF YOU HAVE ANY OF THE FOLLOWING SYMPTOMS: severe headache, pain in your chest/abdomen/back, bleeding from your mouth or rectum, palpitations, shortness of breath, pain with breathing, seizure, confusion, or trouble walking.      Diagnosis: Elevated troponin  Assessment and Plan of Treatment:      PRINCIPAL DISCHARGE DIAGNOSIS  Diagnosis: Chest pain  Assessment and Plan of Treatment: Chest Pain  Chest pain can be caused by many different conditions which may or may not be dangerous. Causes include heartburn, lung infections, heart attack, blood clot in lungs, skin infections, strain or damage to muscle, cartilage, or bones, etc. In addition to a history and physical examination, an electrocardiogram (ECG) or other lab tests may have been performed to determine the cause of your chest pain. Follow up with your primary care provider or with a cardiologist as instructed.   SEEK IMMEDIATE MEDICAL CARE IF YOU HAVE ANY OF THE FOLLOWING SYMPTOMS: worsening chest pain, coughing up blood, unexplained back/neck/jaw pain, severe abdominal pain, dizziness or lightheadedness, fainting, shortness of breath, sweaty or clammy skin, vomiting, or racing heart beat. These symptoms may represent a serious problem that is an emergency. Do not wait to see if the symptoms will go away. Get medical help right away. Call 911 and do not drive yourself to the hospital.        SECONDARY DISCHARGE DIAGNOSES  Diagnosis: Gout  Assessment and Plan of Treatment: While in the hospital you had severe toe pain which was likely from a conditition called gout. This is non life threatening and can be followed by your regular doctor. in the meantime, try to avoid alcohol and meat which can cause another flare of gout.    Diagnosis: Atypical syncope  Assessment and Plan of Treatment: Syncope  Syncope is when you temporarily lose consciousness, also called fainting or passing out. It is caused by a sudden decrease in blood flow to the brain. Even though most causes of syncope are not dangerous, syncope can possibly be a sign of a serious medical problem. Signs that you may be about to faint include feeling dizzy, lightheaded, nausea, visual changes, or cold/clammy skin. Do not drive, operate heavy machinery, or play sports until your health care provider says it is okay.  SEEK IMMEDIATE MEDICAL CARE IF YOU HAVE ANY OF THE FOLLOWING SYMPTOMS: severe headache, pain in your chest/abdomen/back, bleeding from your mouth or rectum, palpitations, shortness of breath, pain with breathing, seizure, confusion, or trouble walking.      Diagnosis: Elevated troponin  Assessment and Plan of Treatment:

## 2020-10-29 NOTE — PROGRESS NOTE ADULT - ASSESSMENT
68 yo M with hx of CAD s/p stent x 3 on plavix, HTN, HLD, and KELSEY, bilateral knee replacement, presents for L. chest and rib pain following fall and fracture of 5 L ribs in May 2020. Neurology consulted for episode of LOC after Rapid response was called on patient on 10/26.  Cardiac etiology r/o as ECHO wnl, and no events reported on tele. EEG, CTA unremarkable. Orthostatics were wnl.  Neurological exam remains non-focal, without any pre-syncopal, syncopal episodes since last evaluation.     Ddx include neurogenic etiology     Recommendations:      70 yo M with hx of CAD s/p stent x 3 on plavix, HTN, HLD, and KELSEY, bilateral knee replacement, presents for L. chest and rib pain following fall and fracture of 5 L ribs in May 2020. Neurology consulted for episode of LOC after Rapid response was called on patient on 10/26.  ECHO unreamarkable, no events reported on tele. EEG without epileptiform activity, CTA unremarkable.  Neurological exam remains non-focal, without any pre-syncopal, syncopal episodes since last evaluation. Suspect orthostatic etiology.         Recommendations:   -recall PRN       **attending attestation to follow***

## 2020-10-29 NOTE — PROGRESS NOTE ADULT - ASSESSMENT
Patient is a 68yo man with PMH of CAD s/p stent x3, hypertension, hyperlipidemia, KELSEY, bilateral knee replacement, and nephrolithiasis with recent fall in 5/2020 with 5 rib fractures who presented to the hospital complaining of left chest pain. Rapid response was called on 10/26/2020 in the AM for unresponsiveness.    # Rapid response called at 6am on 10/26/2020 for dizziness and unresponsiveness; accompanied by urinary incontinence and patient had stool incontinence later in the morning  No report of tonic-clonic movements  No events noted on telemetry during the event  Orthostatic vital signs nl  Routine EEG: nl  CTA H/N: both nl  echo: EF 62%, mild TR, mild AS   hold HCTZ  if recurs, call neuro for f/u, otherwise, no further w/u needed    # chest pain secondary to previous rib fractures - better tolerated  trop neg  CT angio chest negative for pulmonary embolism  lidoderm patch top q24 to rib area  can use cool compresses as well q6 prn  percocet prn pain    # rt 1st toe pain - likely acute gout flare (mild)  uric acid nl  d/c HCTZ  s/p colch 0.6mg po x1   pred 40mg po q24 x5 days, then stop  xray not needed    # Hypomagnesemia - resolved (was likely 2/2 HCTZ)  pt all to sulfate, not Mg  can d/c MG Oxide now (off HCTZ)    # Hypokalemia - repleted    # History of CAD s/p stents x3; HTN (controlled); DLD  c/w DAPT  c/w atenolol 50mg PO QD, losartan 50mg po q24 and atorvastatin 20mg PO QHS  d/c HCTZ    # 1.1cm RML and 2 subcm RLL pulmonary nodules  Repeat chest CT in 3 months or outpt PET  outpt f/u pulm    # DVT Prophylaxis: Lovenox 40mg SQ QD     # GI Prophylaxis: not indicated     # Activity: can work w/ PT; amb w/ asst and walker; for SNF upon d/c    # Code Status: Full Code    Dispo: tx gout; tx rib pain; d/c to SNF today ([facility]) -> f/u CM

## 2020-10-29 NOTE — PROGRESS NOTE ADULT - SUBJECTIVE AND OBJECTIVE BOX
YOLILIZZY  70y  Male  ***My note supersedes ALL resident notes that I sign.  My corrections for their notes are in my note.***    I can be reached directly on [employer]. My office number is [phone]. My personal cell number is [phone].    INTERVAL EVENTS: Here for f/u of rib pain. Pain is better today. Toe pain is also better. Pt is ready for d/c to NH.    T(F): 98.2 (10-29-20 @ 05:15), Max: 99 (10-28-20 @ 20:03)  HR: 73 (10-29-20 @ 05:15) (73 - 76)  BP: 130/69 (10-29-20 @ 05:15) (112/56 - 130/69)  RR: 18 (10-29-20 @ 05:15) (18 - 18)  SpO2: 97% (10-29-20 @ 07:20) (97% - 97%)    Gen: NAD  HEENT: PERRL, EOMI, mouth clr, nose clr  Neck: no nodes, no JVD, thyroid nl  lungs: clr  hrt: s1 s2 rrr no murmur  abd: soft, NT/ND, no HS megaly  ext: no edema, no c/c; rt foot 1st toe w/ less erythema over MTPJ medially; less swelling dulce base of 1st toe on dorsum; much less tenderness; no warmth; good ROM w/ toe (improved)  neuro: aa ox3, cn intact, can move all 4 ext    LABS:                      13.8    (    94.6   8.44  )-----------( ---------      202      ( 29 Oct 2020 07:12 )             40.5    (    12.8     145   (   108   (   121      10-29-20 @ 07:12  ----------------------               4.4   (   26   (   23                             -----                        0.8  Ca  8.8   Mg  2.0    P   --     LFT  5.9  (  0.7  (  10       10-29-20 @ 07:12  -------------------------  3.5  (  78  (  8    RADIOLOGY & ADDITIONAL TESTS:      MEDICATIONS:    acetaminophen    Suspension .. 650 milliGRAM(s) Oral every 6 hours PRN  aspirin enteric coated 81 milliGRAM(s) Oral daily  ATENolol  Tablet 50 milliGRAM(s) Oral daily  atorvastatin 20 milliGRAM(s) Oral at bedtime  chlorhexidine 4% Liquid 1 Application(s) Topical <User Schedule>  clopidogrel Tablet 75 milliGRAM(s) Oral daily  enoxaparin Injectable 40 milliGRAM(s) SubCutaneous daily  lidocaine   Patch 2 Patch Transdermal daily  losartan 50 milliGRAM(s) Oral daily  magnesium oxide 400 milliGRAM(s) Oral three times a day with meals  ondansetron   Disintegrating Tablet 4 milliGRAM(s) Oral once  oxycodone    5 mG/acetaminophen 325 mG 1 Tablet(s) Oral every 6 hours PRN  predniSONE   Tablet 40 milliGRAM(s) Oral daily  sucralfate 1 Gram(s) Oral two times a day

## 2020-10-29 NOTE — DISCHARGE NOTE PROVIDER - HOSPITAL COURSE
HPI:  70 yo M with hx of CAD s/p stent x 3 on plavix, HTN, HLD, and KELSEY, bilateral knee replacement, presents for L. chest and rib pain following fall and fracture of 5 L ribs in May 2020. Patient said that since that time he has had pain on the left side that radiates to the axilla and down to his left side. Pain is intermittent lasting 1-2 hours, sharp, 9/10, worse with movement and deep inspiration. Associated tingling of L chest that radiates down the lateral aspect of arm to the elbow. Has had associated decreased appetite and 30 lb weight loss in past 2-3 months.  Patient follows Dr. Brunson with cardiology. Had stress test done in September 2020, which was normal. EF = 48%. He denies fever/chills, sore throat, palpitations, abdominal pain, N/V/D/constipation, urinary symptoms or lower extremity swelling.     In ED:  /76, HR 89. Physical exam significant for tenderness to palpation over left ribs and sternum. Temp 98.1. trops 0.02, which decreased to 0 next set. Dd 400. CT angio negative for PE. 1.1 cm LLL nodule found. Given tylenol, , Zofran in the ED. To be admitted to tele for further workup and management.     Hospital Course:  Patient is a 70yo male with PMH of CAD s/p stent x3, hypertension, hyperlipidemia, KELSEY, bilateral knee replacement, and nephrolithiasis with recent fall in 5/2020 with 5 rib fractures who presented to the hospital complaining of left chest pain. Rapid response was called on 10/26/2020 in the AM for unresponsiveness. Vitals were stable. no tonic clonic events wre noted. and no events were seeen on tele. Pt had urinary and later fecal incontinence during his syncopal episdoe. cardio and neuro workup was (-). EEG (-), no events on tele, echo mild as ef 62% otherwise non sig, CTA H/N non sig, pt had mild hypomag and hypokalemia but both were repleted. Pt is stable for d/c and to f/u with his pmd.    Pt had an episode of severe rt hallux pain which was found to be  gout. treated with prednisone 40mg for 5x day and colchicine. pain improved, uric acid wnl. nsaids were avoided because of risk of bleed.     Chest pain was likely skelatol       #Atypical left chest pain secondary to previous rib fractures, resolved  - Troponin trending down: 0.02->0.01->0.01  - D-dimer 400  - CT angio chest negative for pulmonary embolism  - Telemetry shows episodes of couples, triples, and PVCs  - 2D echocardiogram normal  - lidoderm patch   HPI:  70 yo M with hx of CAD s/p stent x 3 on plavix, HTN, HLD, and KELSEY, bilateral knee replacement, presents for L. chest and rib pain following fall and fracture of 5 L ribs in May 2020. Patient said that since that time he has had pain on the left side that radiates to the axilla and down to his left side. Pain is intermittent lasting 1-2 hours, sharp, 9/10, worse with movement and deep inspiration. Associated tingling of L chest that radiates down the lateral aspect of arm to the elbow. Has had associated decreased appetite and 30 lb weight loss in past 2-3 months.  Patient follows Dr. Brunson with cardiology. Had stress test done in September 2020, which was normal. EF = 48%. He denies fever/chills, sore throat, palpitations, abdominal pain, N/V/D/constipation, urinary symptoms or lower extremity swelling.     In ED:  /76, HR 89. Physical exam significant for tenderness to palpation over left ribs and sternum. Temp 98.1. trops 0.02, which decreased to 0 next set. Dd 400. CT angio negative for PE. 1.1 cm LLL nodule found. Given tylenol, , Zofran in the ED. To be admitted to tele for further workup and management.     Hospital Course:  Patient is a 68yo male with PMH of CAD s/p stent x3, hypertension, hyperlipidemia, KELSEY, bilateral knee replacement, and nephrolithiasis with recent fall in 5/2020 with 5 rib fractures who presented to the hospital complaining of left chest pain. Rapid response was called on 10/26/2020 in the AM for unresponsiveness. Vitals were stable. no tonic clonic events wre noted. and no events were seeen on tele. Pt had urinary and later fecal incontinence during his syncopal episdoe. cardio and neuro workup was (-). EEG (-), no events on tele, echo mild as ef 62% otherwise non sig, CTA H/N non sig, pt had mild hypomag and hypokalemia but both were repleted. Pt is stable for d/c and to f/u with his pmd.    Pt had an episode of severe rt hallux pain which was found to be  gout. treated with prednisone 40mg for 5x day and colchicine. pain improved, uric acid wnl. nsaids were avoided because of risk of bleed.     Chest pain was likely skeletal, reproducalbe, cardio workup (-), cardio cleared. Given lidoderm patch that helped the pain. Stable for d/c to SNF

## 2020-10-29 NOTE — PROGRESS NOTE ADULT - SUBJECTIVE AND OBJECTIVE BOX
Neurology Progress Note    Interval History:   Patient doing well, resting comfortable, offers no new complaints. No syncopal or pre-syncopal episodes since last evaluation. No lightheadedness or dizziness reported. EEG and CTA head/neck unremarkable.    HPI:  68 yo M with hx of CAD s/p stent x 3 on plavix, HTN, HLD, and KELSEY, bilateral knee replacement, presents for L. chest and rib pain following fall and fracture of 5 L ribs in May 2020. Neurology consulted for episode of LOC after Rapid response was called on patient on 10/26. As per patient, was trying to get up in bed, started to feel lightheaded, sweaty, with some chest discomfort and SOB. Patient denies losing consciousness denies tongue bitting, denies loss of bowel/bladder control. However as per nursing, pt was unresponsive for 5 minutes and was noted to have episode urination during episode. However, no reports of convulsions or stiffness noted per nursing. No events on telemetry monitor were noted. STAT EKG was repeated, which showed no acute changes. Patient has hx of positive orthostatics in past, however orthostatics on this admission negative.         PAST MEDICAL & SURGICAL HISTORY:  MI (myocardial infarction)    Arthritis    Kidney stones    Sleep apnea    Hypertension    High blood cholesterol    H/O heart artery stent    History of appendectomy    H/O chronic cholecystitis  cholecystectomy            Medications:  acetaminophen    Suspension .. 650 milliGRAM(s) Oral every 6 hours PRN  aspirin enteric coated 81 milliGRAM(s) Oral daily  ATENolol  Tablet 50 milliGRAM(s) Oral daily  atorvastatin 20 milliGRAM(s) Oral at bedtime  chlorhexidine 4% Liquid 1 Application(s) Topical <User Schedule>  clopidogrel Tablet 75 milliGRAM(s) Oral daily  enoxaparin Injectable 40 milliGRAM(s) SubCutaneous daily  lidocaine   Patch 2 Patch Transdermal daily  losartan 50 milliGRAM(s) Oral daily  magnesium oxide 400 milliGRAM(s) Oral three times a day with meals  ondansetron   Disintegrating Tablet 4 milliGRAM(s) Oral once  oxycodone    5 mG/acetaminophen 325 mG 1 Tablet(s) Oral every 6 hours PRN  predniSONE   Tablet 40 milliGRAM(s) Oral daily  sucralfate 1 Gram(s) Oral two times a day      Vital Signs Last 24 Hrs  T(C): 36.8 (29 Oct 2020 05:15), Max: 37.2 (28 Oct 2020 20:03)  T(F): 98.2 (29 Oct 2020 05:15), Max: 99 (28 Oct 2020 20:03)  HR: 73 (29 Oct 2020 05:15) (73 - 76)  BP: 130/69 (29 Oct 2020 05:15) (112/56 - 130/69)  BP(mean): --  RR: 18 (29 Oct 2020 05:15) (18 - 18)  SpO2: 97% (29 Oct 2020 07:20) (97% - 97%)    Neurological Exam:   Mental status: Awake, alert and oriented x3.  Recent and remote memory intact.  Naming, repetition and comprehension intact.  Attention/concentration intact.  No dysarthria, no aphasia.  Fund of knowledge appropriate.     Pupils equally round and reactive to light, visual fields full, no nystagmus, extraocular muscles intact, V1 through V3 intact bilaterally and symmetric, face symmetric, hearing intact to finger rub, palate elevation symmetric, tongue was midline.  Motor:  MRC grading 5/5 b/l UE/LE.   strength 5/5.  Normal tone and bulk.  No abnormal movements.    Sensation: Intact to light touch b/l  Coordination: No dysmetria on finger-to-nose and heel-to-shin.         Labs:  CBC Full  -  ( 29 Oct 2020 07:12 )  WBC Count : 8.44 K/uL  RBC Count : 4.28 M/uL  Hemoglobin : 13.8 g/dL  Hematocrit : 40.5 %  Platelet Count - Automated : 202 K/uL  Mean Cell Volume : 94.6 fL  Mean Cell Hemoglobin : 32.2 pg  Mean Cell Hemoglobin Concentration : 34.1 g/dL  Auto Neutrophil # : x  Auto Lymphocyte # : x  Auto Monocyte # : x  Auto Eosinophil # : x  Auto Basophil # : x  Auto Neutrophil % : x  Auto Lymphocyte % : x  Auto Monocyte % : x  Auto Eosinophil % : x  Auto Basophil % : x    10-29    145  |  108  |  23<H>  ----------------------------<  121<H>  4.4   |  26  |  0.8    Ca    8.8      29 Oct 2020 07:12  Mg     2.0     10-29    TPro  5.9<L>  /  Alb  3.5  /  TBili  0.7  /  DBili  x   /  AST  10  /  ALT  8   /  AlkPhos  78  10-29    LIVER FUNCTIONS - ( 29 Oct 2020 07:12 )  Alb: 3.5 g/dL / Pro: 5.9 g/dL / ALK PHOS: 78 U/L / ALT: 8 U/L / AST: 10 U/L / GGT: x                 Assessment:  This is a 70y Male w/ h/o     Plan: Neurology Progress Note    Interval History:   Patient doing well, resting comfortable, offers no new complaints. No syncopal or pre-syncopal episodes since last evaluation. No lightheadedness or dizziness reported. EEG and CTA head/neck unremarkable.    HPI:  68 yo M with hx of CAD s/p stent x 3 on plavix, HTN, HLD, and KELSEY, bilateral knee replacement, presents for L. chest and rib pain following fall and fracture of 5 L ribs in May 2020. Neurology consulted for episode of LOC after Rapid response was called on patient on 10/26. As per patient, was trying to get up in bed, started to feel lightheaded, sweaty, with some chest discomfort and SOB. Patient denies losing consciousness denies tongue bitting, denies loss of bowel/bladder control. However as per nursing, pt was unresponsive for 5 minutes and was noted to have episode urination during episode. However, no reports of convulsions or stiffness noted per nursing. No events on telemetry monitor were noted. STAT EKG was repeated, which showed no acute changes. Patient has hx of positive orthostatics in past, however orthostatics on this admission negative.         PAST MEDICAL & SURGICAL HISTORY:  MI (myocardial infarction)    Arthritis    Kidney stones    Sleep apnea    Hypertension    High blood cholesterol    H/O heart artery stent    History of appendectomy    H/O chronic cholecystitis  cholecystectomy            Medications:  acetaminophen    Suspension .. 650 milliGRAM(s) Oral every 6 hours PRN  aspirin enteric coated 81 milliGRAM(s) Oral daily  ATENolol  Tablet 50 milliGRAM(s) Oral daily  atorvastatin 20 milliGRAM(s) Oral at bedtime  chlorhexidine 4% Liquid 1 Application(s) Topical <User Schedule>  clopidogrel Tablet 75 milliGRAM(s) Oral daily  enoxaparin Injectable 40 milliGRAM(s) SubCutaneous daily  lidocaine   Patch 2 Patch Transdermal daily  losartan 50 milliGRAM(s) Oral daily  magnesium oxide 400 milliGRAM(s) Oral three times a day with meals  ondansetron   Disintegrating Tablet 4 milliGRAM(s) Oral once  oxycodone    5 mG/acetaminophen 325 mG 1 Tablet(s) Oral every 6 hours PRN  predniSONE   Tablet 40 milliGRAM(s) Oral daily  sucralfate 1 Gram(s) Oral two times a day      Vital Signs Last 24 Hrs  T(C): 36.8 (29 Oct 2020 05:15), Max: 37.2 (28 Oct 2020 20:03)  T(F): 98.2 (29 Oct 2020 05:15), Max: 99 (28 Oct 2020 20:03)  HR: 73 (29 Oct 2020 05:15) (73 - 76)  BP: 130/69 (29 Oct 2020 05:15) (112/56 - 130/69)  BP(mean): --  RR: 18 (29 Oct 2020 05:15) (18 - 18)  SpO2: 97% (29 Oct 2020 07:20) (97% - 97%)    Neurological Exam:   Mental status: Awake, alert and oriented x3.  Recent and remote memory intact.  Naming, repetition and comprehension intact.  Attention/concentration intact.  No dysarthria, no aphasia.  Fund of knowledge appropriate.     Pupils equally round and reactive to light, visual fields full, no nystagmus, extraocular muscles intact, V1 through V3 intact bilaterally and symmetric, face symmetric, hearing intact to finger rub, palate elevation symmetric, tongue was midline.  Motor:  MRC grading 5/5 b/l UE/LE.   strength 5/5.  Normal tone and bulk.  No abnormal movements.    Sensation: Intact to light touch b/l  Coordination: No dysmetria on finger-to-nose and heel-to-shin.         Labs:  CBC Full  -  ( 29 Oct 2020 07:12 )  WBC Count : 8.44 K/uL  RBC Count : 4.28 M/uL  Hemoglobin : 13.8 g/dL  Hematocrit : 40.5 %  Platelet Count - Automated : 202 K/uL  Mean Cell Volume : 94.6 fL  Mean Cell Hemoglobin : 32.2 pg  Mean Cell Hemoglobin Concentration : 34.1 g/dL  Auto Neutrophil # : x  Auto Lymphocyte # : x  Auto Monocyte # : x  Auto Eosinophil # : x  Auto Basophil # : x  Auto Neutrophil % : x  Auto Lymphocyte % : x  Auto Monocyte % : x  Auto Eosinophil % : x  Auto Basophil % : x    10-29    145  |  108  |  23<H>  ----------------------------<  121<H>  4.4   |  26  |  0.8    Ca    8.8      29 Oct 2020 07:12  Mg     2.0     10-29    TPro  5.9<L>  /  Alb  3.5  /  TBili  0.7  /  DBili  x   /  AST  10  /  ALT  8   /  AlkPhos  78  10-29    LIVER FUNCTIONS - ( 29 Oct 2020 07:12 )  Alb: 3.5 g/dL / Pro: 5.9 g/dL / ALK PHOS: 78 U/L / ALT: 8 U/L / AST: 10 U/L / GGT: x             < from: TTE Echo Complete w/o Contrast w/ Doppler (10.27.20 @ 08:55) >  Summary:   1. LV Ejection Fraction by Zheng's Method with a biplane EF of 62 %.   2. Normal left ventricular size and wall thicknesses, with normal systolic and diastolic function.   3. Normal left atrial size.   4. Normal right atrial size.   5. No evidence of mitral valve regurgitation.   6. Mild tricuspid regurgitation.   7. Mild aortic valve stenosis.   8. LA volume Index is 28.5 ml/m² ml/m2.   9. Peak transaortic gradient equals 28.0 mmHg, mean transaortic gradient equals 16.1 mmHg, the calculated aortic valve area equals 2.17 cm² by the continuity equation consistentwith mild aortic stenosis.  10. The aortic valve mean gradient is 16.1 mmHg consistent with mild aortic stenosis.    < end of copied text >      < from: CT Angio Head w/ IV Cont (10.27.20 @ 16:45) >    EXAM:  CT ANGIO NECK (W)AW IC        EXAM:  CT ANGIO BRAIN (W)AW IC            PROCEDURE DATE:  10/27/2020            INTERPRETATION:  CLINICAL INDICATION: Syncope. .    TECHNIQUE: CT angiography of the intracranial (head) and extracranial (neck) circulation was performed after contrast administration    Maximal intensity projection images were additionally included and reviewed.    100 mls of Omnipaque 350 was administered intravenously without complication and 0 mls were discarded.    Using [employer] workstation, 3-D shaded surface reformations were made of vasculature. 3-D reformations were reviewed and included in interpretation of the official report.    CAROTID STENOSIS REFERENCE: (NASCET = 100x1-(N/D)). N=greatest narrowing. D=normal distal diameter - MILD = <50% stenosis. - MODERATE = 50-69% stenosis. - SEVERE = 70-89% stenosis. - HAIRLINE/CRITICAL = 90-99% stenosis. - OCCLUDED = 100% stenosis.    COMPARISON: None.    FINDINGS:      HEAD CTA:    The internal carotid arteries demonstrate normal enhancement to the intracranial circulation and [place] of [place]. There is atherosclerotic calcification of the cavernous, clinoid and supraclinoid internal carotid arteries bilaterally without significant stenosis.    Anterior and middle cerebral arteries demonstrate normal enhancement and symmetric arborization without intraluminal filling defect, stenosis, occlusion, aneurysm or vascular malformation.    The intradural vertebral arteries demonstrate normal enhancement to the vertebrobasilar confluence. Branch vasculature of the posterior circulation are within normal limits. The posterior cerebral arteries demonstrate symmetric enhancement and arborization without evidence for aneurysm, stenosis, occlusion or vascular malformation.    Visualized dural venous sinuses and deep cerebral venous structures demonstrate normal enhancement without evidence for filling defect.    NECK CTA:    The aortic arch and origins of the great vessels are within normal limits.    Right:  The origin of the right common carotid artery is normal. The right common carotid artery is normal in course and caliber to the carotid bifurcation. There is atherosclerotic calcification at the carotid bulb. Origins of the internal and external carotid arteries are normal by NASCET criteria. The right internal carotid artery has normal course and caliber to the intracranial circulation.    The origin of the right vertebral artery is normal. The right vertebral artery is normal in course and caliber to the intracranial circulation.    Left: The origin of the left common carotid artery is normal. The left common carotid artery is normal in course and caliber to the carotid bifurcation. There is vascular calcification at the carotid bulb. Originsof the internal and external carotid arteries are normal by NASCET criteria. The left internal carotid artery has normal course and caliber to the intracranial circulation.    There is atherosclerotic calcification at the origin of the left vertebralartery resulting in mild stenosis. The left vertebral artery is normal in course and caliber to the intracranial circulation.    IMPRESSION:    CTA HEAD:  No evidence of flow-limiting stenosis, occlusion or aneurysm.    CTA NECK:  No evidence of carotid or vertebral artery stenosis.                LEAH GARCIA M.D., ATTENDING RADIOLOGIST  This document has been electronically signed. Oct 28 2020  8:54AM    < end of copied text >       Neurology Progress Note    Interval History:   Patient doing well, resting comfortable, offers no new complaints. No syncopal or pre-syncopal episodes since last evaluation. No lightheadedness or dizziness reported. EEG and CTA head/neck unremarkable.    HPI:  70 yo M with hx of CAD s/p stent x 3 on plavix, HTN, HLD, and KELSEY, bilateral knee replacement, presents for L. chest and rib pain following fall and fracture of 5 L ribs in May 2020. Neurology consulted for episode of LOC after Rapid response was called on patient on 10/26. As per patient, was trying to get up in bed, started to feel lightheaded, sweaty, with some chest discomfort and SOB. Patient denies losing consciousness denies tongue bitting, denies loss of bowel/bladder control. However as per nursing, pt was unresponsive for 5 minutes and was noted to have episode urination during episode. However, no reports of convulsions or stiffness noted per nursing. No events on telemetry monitor were noted. STAT EKG was repeated, which showed no acute changes. Patient has hx of positive orthostatics in past, however orthostatics on this admission negative.         PAST MEDICAL & SURGICAL HISTORY:  MI (myocardial infarction)    Arthritis    Kidney stones    Sleep apnea    Hypertension    High blood cholesterol    H/O heart artery stent    History of appendectomy    H/O chronic cholecystitis  cholecystectomy            Medications:  acetaminophen    Suspension .. 650 milliGRAM(s) Oral every 6 hours PRN  aspirin enteric coated 81 milliGRAM(s) Oral daily  ATENolol  Tablet 50 milliGRAM(s) Oral daily  atorvastatin 20 milliGRAM(s) Oral at bedtime  chlorhexidine 4% Liquid 1 Application(s) Topical <User Schedule>  clopidogrel Tablet 75 milliGRAM(s) Oral daily  enoxaparin Injectable 40 milliGRAM(s) SubCutaneous daily  lidocaine   Patch 2 Patch Transdermal daily  losartan 50 milliGRAM(s) Oral daily  magnesium oxide 400 milliGRAM(s) Oral three times a day with meals  ondansetron   Disintegrating Tablet 4 milliGRAM(s) Oral once  oxycodone    5 mG/acetaminophen 325 mG 1 Tablet(s) Oral every 6 hours PRN  predniSONE   Tablet 40 milliGRAM(s) Oral daily  sucralfate 1 Gram(s) Oral two times a day      Vital Signs Last 24 Hrs  T(C): 36.8 (29 Oct 2020 05:15), Max: 37.2 (28 Oct 2020 20:03)  T(F): 98.2 (29 Oct 2020 05:15), Max: 99 (28 Oct 2020 20:03)  HR: 73 (29 Oct 2020 05:15) (73 - 76)  BP: 130/69 (29 Oct 2020 05:15) (112/56 - 130/69)  BP(mean): --  RR: 18 (29 Oct 2020 05:15) (18 - 18)  SpO2: 97% (29 Oct 2020 07:20) (97% - 97%)    Neurological Exam:   Mental status: Awake, alert and oriented x3.  Recent and remote memory intact.  Naming, repetition and comprehension intact.  Attention/concentration intact.  No dysarthria, no aphasia.  Fund of knowledge appropriate.     Pupils equally round and reactive to light, visual fields full, no nystagmus, extraocular muscles intact, V1 through V3 intact bilaterally and symmetric, face symmetric, hearing intact to finger rub, palate elevation symmetric, tongue was midline.  Motor:  MRC grading 5/5 b/l UE/LE.   strength 5/5.  Normal tone and bulk.  No abnormal movements.    Sensation: Intact to light touch b/l  Coordination: No dysmetria on finger-to-nose and heel-to-shin.         Labs:  CBC Full  -  ( 29 Oct 2020 07:12 )  WBC Count : 8.44 K/uL  RBC Count : 4.28 M/uL  Hemoglobin : 13.8 g/dL  Hematocrit : 40.5 %  Platelet Count - Automated : 202 K/uL  Mean Cell Volume : 94.6 fL  Mean Cell Hemoglobin : 32.2 pg  Mean Cell Hemoglobin Concentration : 34.1 g/dL  Auto Neutrophil # : x  Auto Lymphocyte # : x  Auto Monocyte # : x  Auto Eosinophil # : x  Auto Basophil # : x  Auto Neutrophil % : x  Auto Lymphocyte % : x  Auto Monocyte % : x  Auto Eosinophil % : x  Auto Basophil % : x    10-29    145  |  108  |  23<H>  ----------------------------<  121<H>  4.4   |  26  |  0.8    Ca    8.8      29 Oct 2020 07:12  Mg     2.0     10-29    TPro  5.9<L>  /  Alb  3.5  /  TBili  0.7  /  DBili  x   /  AST  10  /  ALT  8   /  AlkPhos  78  10-29    LIVER FUNCTIONS - ( 29 Oct 2020 07:12 )  Alb: 3.5 g/dL / Pro: 5.9 g/dL / ALK PHOS: 78 U/L / ALT: 8 U/L / AST: 10 U/L / GGT: x             < from: TTE Echo Complete w/o Contrast w/ Doppler (10.27.20 @ 08:55) >  Summary:   1. LV Ejection Fraction by Zheng's Method with a biplane EF of 62 %.   2. Normal left ventricular size and wall thicknesses, with normal systolic and diastolic function.   3. Normal left atrial size.   4. Normal right atrial size.   5. No evidence of mitral valve regurgitation.   6. Mild tricuspid regurgitation.   7. Mild aortic valve stenosis.   8. LA volume Index is 28.5 ml/m² ml/m2.   9. Peak transaortic gradient equals 28.0 mmHg, mean transaortic gradient equals 16.1 mmHg, the calculated aortic valve area equals 2.17 cm² by the continuity equation consistentwith mild aortic stenosis.  10. The aortic valve mean gradient is 16.1 mmHg consistent with mild aortic stenosis.    < end of copied text >      < from: CT Angio Head w/ IV Cont (10.27.20 @ 16:45) >    EXAM:  CT ANGIO NECK (W)AW IC        EXAM:  CT ANGIO BRAIN (W)AW IC            PROCEDURE DATE:  10/27/2020            INTERPRETATION:  CLINICAL INDICATION: Syncope. .    TECHNIQUE: CT angiography of the intracranial (head) and extracranial (neck) circulation was performed after contrast administration    Maximal intensity projection images were additionally included and reviewed.    100 mls of Omnipaque 350 was administered intravenously without complication and 0 mls were discarded.    Using [employer] workstation, 3-D shaded surface reformations were made of vasculature. 3-D reformations were reviewed and included in interpretation of the official report.    CAROTID STENOSIS REFERENCE: (NASCET = 100x1-(N/D)). N=greatest narrowing. D=normal distal diameter - MILD = <50% stenosis. - MODERATE = 50-69% stenosis. - SEVERE = 70-89% stenosis. - HAIRLINE/CRITICAL = 90-99% stenosis. - OCCLUDED = 100% stenosis.    COMPARISON: None.    FINDINGS:      HEAD CTA:    The internal carotid arteries demonstrate normal enhancement to the intracranial circulation and [place] of [place]. There is atherosclerotic calcification of the cavernous, clinoid and supraclinoid internal carotid arteries bilaterally without significant stenosis.    Anterior and middle cerebral arteries demonstrate normal enhancement and symmetric arborization without intraluminal filling defect, stenosis, occlusion, aneurysm or vascular malformation.    The intradural vertebral arteries demonstrate normal enhancement to the vertebrobasilar confluence. Branch vasculature of the posterior circulation are within normal limits. The posterior cerebral arteries demonstrate symmetric enhancement and arborization without evidence for aneurysm, stenosis, occlusion or vascular malformation.    Visualized dural venous sinuses and deep cerebral venous structures demonstrate normal enhancement without evidence for filling defect.    NECK CTA:    The aortic arch and origins of the great vessels are within normal limits.    Right:  The origin of the right common carotid artery is normal. The right common carotid artery is normal in course and caliber to the carotid bifurcation. There is atherosclerotic calcification at the carotid bulb. Origins of the internal and external carotid arteries are normal by NASCET criteria. The right internal carotid artery has normal course and caliber to the intracranial circulation.    The origin of the right vertebral artery is normal. The right vertebral artery is normal in course and caliber to the intracranial circulation.    Left: The origin of the left common carotid artery is normal. The left common carotid artery is normal in course and caliber to the carotid bifurcation. There is vascular calcification at the carotid bulb. Originsof the internal and external carotid arteries are normal by NASCET criteria. The left internal carotid artery has normal course and caliber to the intracranial circulation.    There is atherosclerotic calcification at the origin of the left vertebralartery resulting in mild stenosis. The left vertebral artery is normal in course and caliber to the intracranial circulation.    IMPRESSION:    CTA HEAD:  No evidence of flow-limiting stenosis, occlusion or aneurysm.    CTA NECK:  No evidence of carotid or vertebral artery stenosis.                LEAH GARCIA M.D., ATTENDING RADIOLOGIST  This document has been electronically signed. Oct 28 2020  8:54AM    < end of copied text >        < from: EEG (10.28.20 @ 12:00) >  State  Awake, asleep, and drowsy    Symmetry  Symmetric  -    Organization  -    PDR  -  Background: 9 hz    Generalized Slowing  No  -    Focal Slowing  No  -  -  -  -  -  -    Breach Artifact: -  -      Activation Procedure  Hyper Ventilation  No  -  -    Photic Stimulation  No  -  -    Epileptiform Activity  No    Frequency:  -  -    Side:  -    Type:  -  -    Area of Activity:  -    Events:  -  -  -    Impression  Normal  -  -    Clinical Correlation & Recommendations  -  -  -  -    Reviewed by:  Vik Acevedo    Signed by:  Vik ACEVEDO MD; Attending Neurologist  This document has been electronically signed. Oct 28 2020  8:20PM    < end of copied text >       Neurology Progress Note    Interval History:   Patient doing well, resting comfortable, offers no new complaints. No syncopal or pre-syncopal episodes since last evaluation. No lightheadedness or dizziness reported. EEG and CTA head/neck unremarkable.    HPI:  70 yo M with hx of CAD s/p stent x 3 on plavix, HTN, HLD, and KELSEY, bilateral knee replacement, presents for L. chest and rib pain following fall and fracture of 5 L ribs in May 2020. Neurology consulted for episode of LOC after Rapid response was called on patient on 10/26. As per patient, was trying to get up in bed, started to feel lightheaded, sweaty, with some chest discomfort and SOB. Patient denies losing consciousness denies tongue bitting, denies loss of bowel/bladder control. However as per nursing, pt was unresponsive for 5 minutes and was noted to have episode urination during episode. However, no reports of convulsions or stiffness noted per nursing. No events on telemetry monitor were noted. STAT EKG was repeated, which showed no acute changes. Patient has hx of positive orthostatics in past, however orthostatics on this admission negative.     PAST MEDICAL & SURGICAL HISTORY:  MI (myocardial infarction)    Arthritis    Kidney stones    Sleep apnea    Hypertension    High blood cholesterol    H/O heart artery stent    History of appendectomy    H/O chronic cholecystitis  cholecystectomy    Medications:  acetaminophen    Suspension .. 650 milliGRAM(s) Oral every 6 hours PRN  aspirin enteric coated 81 milliGRAM(s) Oral daily  ATENolol  Tablet 50 milliGRAM(s) Oral daily  atorvastatin 20 milliGRAM(s) Oral at bedtime  chlorhexidine 4% Liquid 1 Application(s) Topical <User Schedule>  clopidogrel Tablet 75 milliGRAM(s) Oral daily  enoxaparin Injectable 40 milliGRAM(s) SubCutaneous daily  lidocaine   Patch 2 Patch Transdermal daily  losartan 50 milliGRAM(s) Oral daily  magnesium oxide 400 milliGRAM(s) Oral three times a day with meals  ondansetron   Disintegrating Tablet 4 milliGRAM(s) Oral once  oxycodone    5 mG/acetaminophen 325 mG 1 Tablet(s) Oral every 6 hours PRN  predniSONE   Tablet 40 milliGRAM(s) Oral daily  sucralfate 1 Gram(s) Oral two times a day      Vital Signs Last 24 Hrs  T(C): 36.8 (29 Oct 2020 05:15), Max: 37.2 (28 Oct 2020 20:03)  T(F): 98.2 (29 Oct 2020 05:15), Max: 99 (28 Oct 2020 20:03)  HR: 73 (29 Oct 2020 05:15) (73 - 76)  BP: 130/69 (29 Oct 2020 05:15) (112/56 - 130/69)  BP(mean): --  RR: 18 (29 Oct 2020 05:15) (18 - 18)  SpO2: 97% (29 Oct 2020 07:20) (97% - 97%)    Neurological Exam:   Mental status: Awake, alert and oriented x3.  Recent and remote memory intact.  Naming, repetition and comprehension intact.  Attention/concentration intact.  No dysarthria, no aphasia.  Fund of knowledge appropriate.     Pupils equally round and reactive to light, visual fields full, no nystagmus, extraocular muscles intact, V1 through V3 intact bilaterally and symmetric, face symmetric, hearing intact to finger rub, palate elevation symmetric, tongue was midline.  Motor:  MRC grading 5/5 b/l UE/LE.   strength 5/5.  Normal tone and bulk.  No abnormal movements.    Sensation: Intact to light touch b/l  Coordination: No dysmetria on finger-to-nose and heel-to-shin.         Labs:  CBC Full  -  ( 29 Oct 2020 07:12 )  WBC Count : 8.44 K/uL  RBC Count : 4.28 M/uL  Hemoglobin : 13.8 g/dL  Hematocrit : 40.5 %  Platelet Count - Automated : 202 K/uL  Mean Cell Volume : 94.6 fL  Mean Cell Hemoglobin : 32.2 pg  Mean Cell Hemoglobin Concentration : 34.1 g/dL    10-29    145  |  108  |  23<H>  ----------------------------<  121<H>  4.4   |  26  |  0.8    Ca    8.8      29 Oct 2020 07:12  Mg     2.0     10-29    TPro  5.9<L>  /  Alb  3.5  /  TBili  0.7  /  DBili  x   /  AST  10  /  ALT  8   /  AlkPhos  78  10-29    LIVER FUNCTIONS - ( 29 Oct 2020 07:12 )  Alb: 3.5 g/dL / Pro: 5.9 g/dL / ALK PHOS: 78 U/L / ALT: 8 U/L / AST: 10 U/L / GGT: x           < from: TTE Echo Complete w/o Contrast w/ Doppler (10.27.20 @ 08:55) >  Summary:   1. LV Ejection Fraction by Zheng's Method with a biplane EF of 62 %.   2. Normal left ventricular size and wall thicknesses, with normal systolic and diastolic function.   3. Normal left atrial size.   4. Normal right atrial size.   5. No evidence of mitral valve regurgitation.   6. Mild tricuspid regurgitation.   7. Mild aortic valve stenosis.   8. LA volume Index is 28.5 ml/m² ml/m2.   9. Peak transaortic gradient equals 28.0 mmHg, mean transaortic gradient equals 16.1 mmHg, the calculated aortic valve area equals 2.17 cm² by the continuity equation consistentwith mild aortic stenosis.  10. The aortic valve mean gradient is 16.1 mmHg consistent with mild aortic stenosis.    < end of copied text >    < from: CT Angio Head w/ IV Cont (10.27.20 @ 16:45) >    EXAM:  CT ANGIO NECK (W)AW IC        EXAM:  CT ANGIO BRAIN (W)AW IC          PROCEDURE DATE:  10/27/2020      INTERPRETATION:  CLINICAL INDICATION: Syncope. .    TECHNIQUE: CT angiography of the intracranial (head) and extracranial (neck) circulation was performed after contrast administration    Maximal intensity projection images were additionally included and reviewed.    100 mls of Omnipaque 350 was administered intravenously without complication and 0 mls were discarded.    Using [employer] workstation, 3-D shaded surface reformations were made of vasculature. 3-D reformations were reviewed and included in interpretation of the official report.    CAROTID STENOSIS REFERENCE: (NASCET = 100x1-(N/D)). N=greatest narrowing. D=normal distal diameter - MILD = <50% stenosis. - MODERATE = 50-69% stenosis. - SEVERE = 70-89% stenosis. - HAIRLINE/CRITICAL = 90-99% stenosis. - OCCLUDED = 100% stenosis.    COMPARISON: None.    FINDINGS:      HEAD CTA:    The internal carotid arteries demonstrate normal enhancement to the intracranial circulation and [place] of [place]. There is atherosclerotic calcification of the cavernous, clinoid and supraclinoid internal carotid arteries bilaterally without significant stenosis.    Anterior and middle cerebral arteries demonstrate normal enhancement and symmetric arborization without intraluminal filling defect, stenosis, occlusion, aneurysm or vascular malformation.    The intradural vertebral arteries demonstrate normal enhancement to the vertebrobasilar confluence. Branch vasculature of the posterior circulation are within normal limits. The posterior cerebral arteries demonstrate symmetric enhancement and arborization without evidence for aneurysm, stenosis, occlusion or vascular malformation.    Visualized dural venous sinuses and deep cerebral venous structures demonstrate normal enhancement without evidence for filling defect.    NECK CTA:    The aortic arch and origins of the great vessels are within normal limits.    Right:  The origin of the right common carotid artery is normal. The right common carotid artery is normal in course and caliber to the carotid bifurcation. There is atherosclerotic calcification at the carotid bulb. Origins of the internal and external carotid arteries are normal by NASCET criteria. The right internal carotid artery has normal course and caliber to the intracranial circulation.    The origin of the right vertebral artery is normal. The right vertebral artery is normal in course and caliber to the intracranial circulation.    Left: The origin of the left common carotid artery is normal. The left common carotid artery is normal in course and caliber to the carotid bifurcation. There is vascular calcification at the carotid bulb. Originsof the internal and external carotid arteries are normal by NASCET criteria. The left internal carotid artery has normal course and caliber to the intracranial circulation.    There is atherosclerotic calcification at the origin of the left vertebralartery resulting in mild stenosis. The left vertebral artery is normal in course and caliber to the intracranial circulation.    IMPRESSION:    CTA HEAD:  No evidence of flow-limiting stenosis, occlusion or aneurysm.    CTA NECK:  No evidence of carotid or vertebral artery stenosis.                LEAH GARCIA M.D., ATTENDING RADIOLOGIST  This document has been electronically signed. Oct 28 2020  8:54AM    < end of copied text >        < from: EEG (10.28.20 @ 12:00) >  State  Awake, asleep, and drowsy    Symmetry  Symmetric  -    Organization  -    PDR  -  Background: 9 hz    Generalized Slowing  No  -    Focal Slowing  No  -  -  -  -  -  -    Breach Artifact: -  -      Activation Procedure  Hyper Ventilation  No  -  -    Photic Stimulation  No  -  -    Epileptiform Activity  No    Frequency:  -  -    Side:  -    Type:  -  -    Area of Activity:  -    Events:  -  -  -    Impression  Normal  -  -    Clinical Correlation & Recommendations  -  -  -  -    Reviewed by:  Vik Acevedo    Signed by:  Vik ACEVEDO MD; Attending Neurologist  This document has been electronically signed. Oct 28 2020  8:20PM    < end of copied text >

## 2020-11-21 ENCOUNTER — INPATIENT (INPATIENT)
Facility: HOSPITAL | Age: 70
LOS: 3 days | Discharge: NURSING, REHAB OR LONG-TERM CARE | End: 2020-11-25
Attending: HOSPITALIST | Admitting: HOSPITALIST
Payer: MEDICARE

## 2020-11-21 VITALS — HEIGHT: 70 IN

## 2020-11-21 DIAGNOSIS — Z95.5 PRESENCE OF CORONARY ANGIOPLASTY IMPLANT AND GRAFT: Chronic | ICD-10-CM

## 2020-11-21 DIAGNOSIS — Z90.49 ACQUIRED ABSENCE OF OTHER SPECIFIED PARTS OF DIGESTIVE TRACT: Chronic | ICD-10-CM

## 2020-11-21 DIAGNOSIS — Z87.19 PERSONAL HISTORY OF OTHER DISEASES OF THE DIGESTIVE SYSTEM: Chronic | ICD-10-CM

## 2020-11-21 LAB
ALBUMIN SERPL ELPH-MCNC: 3.8 G/DL — SIGNIFICANT CHANGE UP (ref 3.5–5.2)
ALP SERPL-CCNC: 85 U/L — SIGNIFICANT CHANGE UP (ref 30–115)
ALT FLD-CCNC: 9 U/L — SIGNIFICANT CHANGE UP (ref 0–41)
ANION GAP SERPL CALC-SCNC: 12 MMOL/L — SIGNIFICANT CHANGE UP (ref 7–14)
APPEARANCE UR: ABNORMAL
AST SERPL-CCNC: 21 U/L — SIGNIFICANT CHANGE UP (ref 0–41)
BACTERIA # UR AUTO: ABNORMAL
BASE EXCESS BLDV CALC-SCNC: 2.8 MMOL/L — HIGH (ref -2–2)
BASOPHILS # BLD AUTO: 0.01 K/UL — SIGNIFICANT CHANGE UP (ref 0–0.2)
BASOPHILS NFR BLD AUTO: 0.1 % — SIGNIFICANT CHANGE UP (ref 0–1)
BILIRUB SERPL-MCNC: 1.8 MG/DL — HIGH (ref 0.2–1.2)
BILIRUB UR-MCNC: NEGATIVE — SIGNIFICANT CHANGE UP
BUN SERPL-MCNC: 17 MG/DL — SIGNIFICANT CHANGE UP (ref 10–20)
CA-I SERPL-SCNC: 1.16 MMOL/L — SIGNIFICANT CHANGE UP (ref 1.12–1.3)
CALCIUM SERPL-MCNC: 8.8 MG/DL — SIGNIFICANT CHANGE UP (ref 8.5–10.1)
CHLORIDE SERPL-SCNC: 101 MMOL/L — SIGNIFICANT CHANGE UP (ref 98–110)
CO2 SERPL-SCNC: 22 MMOL/L — SIGNIFICANT CHANGE UP (ref 17–32)
COD CRY URNS QL: NEGATIVE — SIGNIFICANT CHANGE UP
COLOR SPEC: YELLOW — SIGNIFICANT CHANGE UP
CREAT SERPL-MCNC: 0.9 MG/DL — SIGNIFICANT CHANGE UP (ref 0.7–1.5)
DIFF PNL FLD: ABNORMAL
EOSINOPHIL # BLD AUTO: 0 K/UL — SIGNIFICANT CHANGE UP (ref 0–0.7)
EOSINOPHIL NFR BLD AUTO: 0 % — SIGNIFICANT CHANGE UP (ref 0–8)
EPI CELLS # UR: ABNORMAL /HPF
GAS PNL BLDV: 138 MMOL/L — SIGNIFICANT CHANGE UP (ref 136–145)
GAS PNL BLDV: SIGNIFICANT CHANGE UP
GLUCOSE SERPL-MCNC: 104 MG/DL — HIGH (ref 70–99)
GLUCOSE UR QL: NEGATIVE MG/DL — SIGNIFICANT CHANGE UP
GRAN CASTS # UR COMP ASSIST: NEGATIVE — SIGNIFICANT CHANGE UP
HCO3 BLDV-SCNC: 28 MMOL/L — SIGNIFICANT CHANGE UP (ref 22–29)
HCT VFR BLD CALC: 40 % — LOW (ref 42–52)
HCT VFR BLDA CALC: 43.5 % — SIGNIFICANT CHANGE UP (ref 34–44)
HGB BLD CALC-MCNC: 14.2 G/DL — SIGNIFICANT CHANGE UP (ref 14–18)
HGB BLD-MCNC: 13.6 G/DL — LOW (ref 14–18)
HYALINE CASTS # UR AUTO: NEGATIVE — SIGNIFICANT CHANGE UP
IMM GRANULOCYTES NFR BLD AUTO: 0.6 % — HIGH (ref 0.1–0.3)
KETONES UR-MCNC: 15
LACTATE BLDV-MCNC: 1.6 MMOL/L — SIGNIFICANT CHANGE UP (ref 0.5–1.6)
LACTATE SERPL-SCNC: 1.6 MMOL/L — SIGNIFICANT CHANGE UP (ref 0.7–2)
LEUKOCYTE ESTERASE UR-ACNC: ABNORMAL
LIDOCAIN IGE QN: 7 U/L — SIGNIFICANT CHANGE UP (ref 7–60)
LYMPHOCYTES # BLD AUTO: 1.12 K/UL — LOW (ref 1.2–3.4)
LYMPHOCYTES # BLD AUTO: 9 % — LOW (ref 20.5–51.1)
MCHC RBC-ENTMCNC: 32.2 PG — HIGH (ref 27–31)
MCHC RBC-ENTMCNC: 34 G/DL — SIGNIFICANT CHANGE UP (ref 32–37)
MCV RBC AUTO: 94.8 FL — HIGH (ref 80–94)
MONOCYTES # BLD AUTO: 1.28 K/UL — HIGH (ref 0.1–0.6)
MONOCYTES NFR BLD AUTO: 10.3 % — HIGH (ref 1.7–9.3)
NEUTROPHILS # BLD AUTO: 9.98 K/UL — HIGH (ref 1.4–6.5)
NEUTROPHILS NFR BLD AUTO: 80 % — HIGH (ref 42.2–75.2)
NITRITE UR-MCNC: POSITIVE
NRBC # BLD: 0 /100 WBCS — SIGNIFICANT CHANGE UP (ref 0–0)
PCO2 BLDV: 43 MMHG — SIGNIFICANT CHANGE UP (ref 41–51)
PH BLDV: 7.41 — SIGNIFICANT CHANGE UP (ref 7.26–7.43)
PH UR: 7 — SIGNIFICANT CHANGE UP (ref 5–8)
PLATELET # BLD AUTO: 135 K/UL — SIGNIFICANT CHANGE UP (ref 130–400)
PO2 BLDV: 45 MMHG — HIGH (ref 20–40)
POTASSIUM BLDV-SCNC: 3.5 MMOL/L — SIGNIFICANT CHANGE UP (ref 3.3–5.6)
POTASSIUM SERPL-MCNC: 4.1 MMOL/L — SIGNIFICANT CHANGE UP (ref 3.5–5)
POTASSIUM SERPL-SCNC: 4.1 MMOL/L — SIGNIFICANT CHANGE UP (ref 3.5–5)
PROT SERPL-MCNC: 6.4 G/DL — SIGNIFICANT CHANGE UP (ref 6–8)
PROT UR-MCNC: 30 MG/DL
RAPID RVP RESULT: SIGNIFICANT CHANGE UP
RBC # BLD: 4.22 M/UL — LOW (ref 4.7–6.1)
RBC # FLD: 13.3 % — SIGNIFICANT CHANGE UP (ref 11.5–14.5)
RBC CASTS # UR COMP ASSIST: ABNORMAL /HPF
SAO2 % BLDV: 83 % — SIGNIFICANT CHANGE UP
SARS-COV-2 RNA SPEC QL NAA+PROBE: SIGNIFICANT CHANGE UP
SODIUM SERPL-SCNC: 135 MMOL/L — SIGNIFICANT CHANGE UP (ref 135–146)
SP GR SPEC: 1.01 — SIGNIFICANT CHANGE UP (ref 1.01–1.03)
TRI-PHOS CRY UR QL COMP ASSIST: NEGATIVE — SIGNIFICANT CHANGE UP
TROPONIN T SERPL-MCNC: 0.01 NG/ML — SIGNIFICANT CHANGE UP
URATE CRY FLD QL MICRO: NEGATIVE — SIGNIFICANT CHANGE UP
UROBILINOGEN FLD QL: 0.2 MG/DL — SIGNIFICANT CHANGE UP (ref 0.2–0.2)
WBC # BLD: 12.46 K/UL — HIGH (ref 4.8–10.8)
WBC # FLD AUTO: 12.46 K/UL — HIGH (ref 4.8–10.8)
WBC UR QL: ABNORMAL /HPF

## 2020-11-21 PROCEDURE — 71045 X-RAY EXAM CHEST 1 VIEW: CPT | Mod: 26

## 2020-11-21 PROCEDURE — 99285 EMERGENCY DEPT VISIT HI MDM: CPT | Mod: CS

## 2020-11-21 PROCEDURE — 72125 CT NECK SPINE W/O DYE: CPT | Mod: 26

## 2020-11-21 PROCEDURE — 71260 CT THORAX DX C+: CPT | Mod: 26

## 2020-11-21 PROCEDURE — 73562 X-RAY EXAM OF KNEE 3: CPT | Mod: 26,LT

## 2020-11-21 PROCEDURE — 73610 X-RAY EXAM OF ANKLE: CPT

## 2020-11-21 PROCEDURE — 70450 CT HEAD/BRAIN W/O DYE: CPT | Mod: 26

## 2020-11-21 PROCEDURE — 74177 CT ABD & PELVIS W/CONTRAST: CPT | Mod: 26

## 2020-11-21 PROCEDURE — 73600 X-RAY EXAM OF ANKLE: CPT | Mod: 26,LT

## 2020-11-21 PROCEDURE — 73590 X-RAY EXAM OF LOWER LEG: CPT | Mod: 26,LT

## 2020-11-21 PROCEDURE — 99223 1ST HOSP IP/OBS HIGH 75: CPT

## 2020-11-21 RX ORDER — SUCRALFATE 1 G
1 TABLET ORAL
Qty: 0 | Refills: 0 | DISCHARGE

## 2020-11-21 RX ORDER — CLOPIDOGREL BISULFATE 75 MG/1
75 TABLET, FILM COATED ORAL
Qty: 0 | Refills: 0 | DISCHARGE

## 2020-11-21 RX ORDER — ATORVASTATIN CALCIUM 80 MG/1
20 TABLET, FILM COATED ORAL AT BEDTIME
Refills: 0 | Status: DISCONTINUED | OUTPATIENT
Start: 2020-11-21 | End: 2020-11-25

## 2020-11-21 RX ORDER — OXYCODONE AND ACETAMINOPHEN 5; 325 MG/1; MG/1
1 TABLET ORAL EVERY 6 HOURS
Refills: 0 | Status: DISCONTINUED | OUTPATIENT
Start: 2020-11-21 | End: 2020-11-25

## 2020-11-21 RX ORDER — MORPHINE SULFATE 50 MG/1
2 CAPSULE, EXTENDED RELEASE ORAL ONCE
Refills: 0 | Status: DISCONTINUED | OUTPATIENT
Start: 2020-11-21 | End: 2020-11-21

## 2020-11-21 RX ORDER — ATENOLOL 25 MG/1
50 TABLET ORAL DAILY
Refills: 0 | Status: DISCONTINUED | OUTPATIENT
Start: 2020-11-21 | End: 2020-11-21

## 2020-11-21 RX ORDER — ENOXAPARIN SODIUM 100 MG/ML
40 INJECTION SUBCUTANEOUS DAILY
Refills: 0 | Status: DISCONTINUED | OUTPATIENT
Start: 2020-11-21 | End: 2020-11-25

## 2020-11-21 RX ORDER — ASPIRIN/CALCIUM CARB/MAGNESIUM 324 MG
81 TABLET ORAL DAILY
Refills: 0 | Status: DISCONTINUED | OUTPATIENT
Start: 2020-11-21 | End: 2020-11-25

## 2020-11-21 RX ORDER — CHLORHEXIDINE GLUCONATE 213 G/1000ML
1 SOLUTION TOPICAL
Refills: 0 | Status: DISCONTINUED | OUTPATIENT
Start: 2020-11-21 | End: 2020-11-25

## 2020-11-21 RX ORDER — METOPROLOL TARTRATE 50 MG
50 TABLET ORAL
Refills: 0 | Status: DISCONTINUED | OUTPATIENT
Start: 2020-11-21 | End: 2020-11-23

## 2020-11-21 RX ORDER — ACETAMINOPHEN 500 MG
975 TABLET ORAL ONCE
Refills: 0 | Status: COMPLETED | OUTPATIENT
Start: 2020-11-21 | End: 2020-11-21

## 2020-11-21 RX ORDER — MORPHINE SULFATE 50 MG/1
2 CAPSULE, EXTENDED RELEASE ORAL EVERY 6 HOURS
Refills: 0 | Status: DISCONTINUED | OUTPATIENT
Start: 2020-11-21 | End: 2020-11-22

## 2020-11-21 RX ORDER — CEFTRIAXONE 500 MG/1
1000 INJECTION, POWDER, FOR SOLUTION INTRAMUSCULAR; INTRAVENOUS ONCE
Refills: 0 | Status: COMPLETED | OUTPATIENT
Start: 2020-11-21 | End: 2020-11-21

## 2020-11-21 RX ORDER — SODIUM CHLORIDE 9 MG/ML
1000 INJECTION, SOLUTION INTRAVENOUS ONCE
Refills: 0 | Status: COMPLETED | OUTPATIENT
Start: 2020-11-21 | End: 2020-11-21

## 2020-11-21 RX ORDER — CEFTRIAXONE 500 MG/1
1000 INJECTION, POWDER, FOR SOLUTION INTRAMUSCULAR; INTRAVENOUS EVERY 24 HOURS
Refills: 0 | Status: DISCONTINUED | OUTPATIENT
Start: 2020-11-22 | End: 2020-11-25

## 2020-11-21 RX ORDER — CLOPIDOGREL BISULFATE 75 MG/1
75 TABLET, FILM COATED ORAL DAILY
Refills: 0 | Status: DISCONTINUED | OUTPATIENT
Start: 2020-11-21 | End: 2020-11-25

## 2020-11-21 RX ORDER — SUCRALFATE 1 G
1 TABLET ORAL
Refills: 0 | Status: DISCONTINUED | OUTPATIENT
Start: 2020-11-21 | End: 2020-11-25

## 2020-11-21 RX ORDER — ATENOLOL 25 MG/1
1 TABLET ORAL
Qty: 0 | Refills: 0 | DISCHARGE

## 2020-11-21 RX ORDER — DIPHENHYDRAMINE HCL 50 MG
25 CAPSULE ORAL ONCE
Refills: 0 | Status: DISCONTINUED | OUTPATIENT
Start: 2020-11-21 | End: 2020-11-23

## 2020-11-21 RX ORDER — ATORVASTATIN CALCIUM 80 MG/1
1 TABLET, FILM COATED ORAL
Qty: 0 | Refills: 0 | DISCHARGE

## 2020-11-21 RX ORDER — LOSARTAN POTASSIUM 100 MG/1
50 TABLET, FILM COATED ORAL DAILY
Refills: 0 | Status: DISCONTINUED | OUTPATIENT
Start: 2020-11-21 | End: 2020-11-22

## 2020-11-21 RX ADMIN — Medication 50 MILLIGRAM(S): at 22:48

## 2020-11-21 RX ADMIN — CEFTRIAXONE 100 MILLIGRAM(S): 500 INJECTION, POWDER, FOR SOLUTION INTRAMUSCULAR; INTRAVENOUS at 22:32

## 2020-11-21 RX ADMIN — MORPHINE SULFATE 2 MILLIGRAM(S): 50 CAPSULE, EXTENDED RELEASE ORAL at 22:32

## 2020-11-21 RX ADMIN — Medication 975 MILLIGRAM(S): at 18:38

## 2020-11-21 RX ADMIN — SODIUM CHLORIDE 1000 MILLILITER(S): 9 INJECTION, SOLUTION INTRAVENOUS at 21:49

## 2020-11-21 RX ADMIN — MORPHINE SULFATE 2 MILLIGRAM(S): 50 CAPSULE, EXTENDED RELEASE ORAL at 23:45

## 2020-11-21 RX ADMIN — SODIUM CHLORIDE 1000 MILLILITER(S): 9 INJECTION, SOLUTION INTRAVENOUS at 20:30

## 2020-11-21 NOTE — ED PROVIDER NOTE - CARE PLAN
Principal Discharge DX:	UTI (urinary tract infection)  Secondary Diagnosis:	Dizziness  Secondary Diagnosis:	Fall   Principal Discharge DX:	Ventricular tachycardia  Secondary Diagnosis:	Dizziness  Secondary Diagnosis:	Fall  Secondary Diagnosis:	UTI (urinary tract infection)

## 2020-11-21 NOTE — ED ADULT TRIAGE NOTE - CHIEF COMPLAINT QUOTE
BIBA from [facility] as per EMS pt had an unwitnessed fall, denies LOC takes plavix and aspirin

## 2020-11-21 NOTE — H&P ADULT - ASSESSMENT
IMPRESSION:    s/p Mechanical fall  2 episodes of non sustained V.tach  H/o CAD s/p PCI   h/o HTN  h/o KELSEY IMPRESSION:    s/p Mechanical fall  Septic on adm: Acute cystitis  2 episodes of non sustained V.tach  H/o CAD s/p PCI   h/o HTN  h/o KELSEY        PLAN:    CNS: no sedation     HEENT: oral care     PULMONARY: head end elevated 45                  CARDIOVASCULAR: cw atenolol, aspirin and plavix, cardio eval, monitor and replete lytes    GI:     RENAL:  f/u with lytes,    INFECTIOUS DISEASE: cw rocephin, fu urine and blood Cx.    HEMATOLOGICAL:  DVT prophylaxis.    ENDOCRINE:  Follow up FS.  Insulin protocol if needed.    MUSCULOSKELETAL: bed rest    icu monitoring      IMPRESSION:    s/p Mechanical fall  Septic on adm: Acute cystitis  2 episodes of non sustained V.tach  H/o CAD s/p PCI   h/o HTN  h/o KELSEY        PLAN:    CNS: no sedation     HEENT: oral care     PULMONARY: head end elevated 45, aspiration precautions                  CARDIOVASCULAR: cw atenolol, aspirin and plavix, cardio eval, monitor and replete lytes    GI:     RENAL:  f/u with lytes,    INFECTIOUS DISEASE: cw rocephin, fu urine and blood Cx.    HEMATOLOGICAL:  DVT prophylaxis.    ENDOCRINE:  Follow up FS.  Insulin protocol if needed.    MUSCULOSKELETAL: bed rest, PT eval, fall precautions    icu monitoring      IMPRESSION:    s/p Mechanical fall  Septic on adm: Urosepsis  2 episodes of non sustained V.tach  H/o CAD s/p PCI   h/o HTN  h/o KELSEY        PLAN:    CNS: no sedation     HEENT: oral care     PULMONARY: head end elevated 45, aspiration precautions                  CARDIOVASCULAR: cw atenolol, aspirin and plavix, cardio eval, monitor and replete lytes, trend cardiac enzymes    GI: GI ppx, oral feeds    RENAL:  f/u with lytes,    INFECTIOUS DISEASE: cw rocephin, fu urine and blood Cx.    HEMATOLOGICAL:  DVT prophylaxis.    ENDOCRINE:  Follow up FS.  Insulin protocol if needed.    MUSCULOSKELETAL: bed rest, PT eval, fall precautions    icu monitoring

## 2020-11-21 NOTE — ED PROVIDER NOTE - CLINICAL SUMMARY MEDICAL DECISION MAKING FREE TEXT BOX
Pt s/p fall, found febrile in ED.  Urine and CT findings noted.  Pt given abx, cultures sent.  IVFs given.  While on monitor pt with frequent PVCs and 2 separate incidents of non sustained v tachy, Longest was 10 beats.  Pt remained normotensive and awake and alert.  Case discussed with intensivist and pt upgraded to CCU setting,

## 2020-11-21 NOTE — ED PROVIDER NOTE - PHYSICAL EXAMINATION
VITAL SIGNS: I have reviewed nursing notes and confirm.  CONSTITUTIONAL: Well-developed; well-nourished; in no acute distress.  SKIN: Skin exam is warm and dry, no acute rash.  HEAD: Normocephalic; atraumatic.  EYES: PERRL, EOM intact; conjunctiva and sclera clear.  ENT: No nasal discharge; airway clear.   NECK: Supple; non tender.  CARD: S1, S2 normal; no murmurs, gallops, or rubs. Regular rate and rhythm.  RESP: Clear to auscultation bilaterally. No wheezes, rales or rhonchi.  ABD: Normal bowel sounds; soft; non-distended; +LUQ tenderness. No rebound tenderness or guarding.   EXT/MSK: Normal ROM. No edema. +Tenderness to midline of thoracolumbar region.   LYMPH: No acute cervical adenopathy.  NEURO: Alert, oriented. Grossly unremarkable. No focal deficits.  PSYCH: Cooperative, appropriate.

## 2020-11-21 NOTE — ED PROVIDER NOTE - OBJECTIVE STATEMENT
71 yo M with pmhx of CAD, HTN, HLD, KELSEY, b/l knee replacement presenting from [facility] s/p fall today. Patient states he was sitting down felt "queezy" and fell onto the floor on his left side. States he hit his head. No anticoagulation. Reports left sided abdominal pain, back pain, and LLE pain. Symptoms are moderate. Worse with movement and palpation. No alleviating factors. Reports having a cough. No cp, sob, fever, chills, anausea, vomiting, diarrhea, urinary symptoms, headache, paresthesias, or weakness. 69 yo M with pmhx of CAD, HTN, HLD, KELSEY, b/l knee replacement presenting from [facility] s/p fall today. Patient states he was sitting down felt "queezy" and fell onto the floor on his left side. States he hit his head. No anticoagulation. Reports left sided abdominal pain, back pain, and LLE pain. Symptoms are moderate. Worse with movement and palpation. No alleviating factors. Reports having a cough. Also reports some dysuria at nursing home. No cp, sob, fever, chills, anausea, vomiting, diarrhea, headache, paresthesias, or weakness.

## 2020-11-21 NOTE — H&P ADULT - HISTORY OF PRESENT ILLNESS
70 yo M with hx of CAD s/p stent x 3 on plavix, HTN, HLD, and KELSEY, 1.1cm RML and 2 subcm RLL pulmonary nodules,   bilateral knee replacement, fall and fracture of 5 L ribs in May 2020,       In the ED, the pt initially met the SIRS criteria (Tmax 102, tachy 114), lab workup showed leukocytosis. Trauma workup negative but CT abdomen showed possible cystitis/Prostatitis. UA, urine and blood Cx sent by the ED team. The pt started on rocephin. Pt received 1 L LR in the ED. 68 yo M with hx of CAD s/p stent x 3 on plavix, HTN, HLD, and KELSEY, bilateral knee replacement, fall and fracture of 5 L ribs in May 2020, was brought to the hospital after a kendra.   As per pt, he was trying to get sit on the chair from his bed and fell while trying to get up, hit his head and fell on left side of the body. Pt denies any loss of consciousness, seizures, nausea, lightheadedness, vertigo, tinnitus, chest pain, shortness of breath, cough. Pt at baseline ambulates with the help of a cane/walker.  Pt was initially admitted for fall workup of fall, was noted to have 10 beats of non sustained V.tach and another event of 4 beats of non sustained v.tach with multiple PVCs on telemetry. Pt hence is being admitted to the CCU for further management. On encounter, pt is endorsing left sided chest pain, reproducible on palpation. ekg stat showed sinus rhythm    In the ED, the pt initially met the SIRS criteria (Tmax 102, tachy 114), lab workup showed leukocytosis. Trauma workup negative but CT abdomen showed possible cystitis/Prostatitis. UA, urine and blood Cx sent by the ED team. The pt started on rocephin. Pt received 1 L LR in the ED. 70 yo M with hx of CAD s/p stent x 3 on plavix, HTN, HLD, and KELSEY, bilateral knee replacement, fall and fracture of 5 L ribs in May 2020, was brought to the hospital after a kendra.   As per pt, he was trying to get sit on the chair from his bed and fell while trying to get up, hit his head and fell on left side of the body. Pt denies any loss of consciousness, seizures, nausea, lightheadedness, vertigo, tinnitus, chest pain, shortness of breath, cough. Pt at baseline ambulates with the help of a cane/walker.  Pt was initially admitted for fall workup of fall, was noted to have 10 beats of non sustained V.tach and another event of 4 beats of non sustained v.tach with multiple PVCs on telemetry. Pt hence is being admitted to the CCU for further management. On encounter, pt is endorsing left sided chest pain, reproducible on palpation. ekg stat showed sinus rhythm. Recent Echo Oct 2020 showed EF 62% and mild AS.    In the ED, the pt initially met the SIRS criteria (Tmax 102, tachy 114), lab workup showed leukocytosis. Trauma workup negative but CT abdomen showed possible cystitis/Prostatitis. UA, urine and blood Cx sent by the ED team. The pt started on rocephin. Pt received 1 L LR in the ED.

## 2020-11-21 NOTE — PATIENT PROFILE ADULT - NURSING HOMES
[facility] and [facility], formerly [facility], [facility]

## 2020-11-21 NOTE — ED PROVIDER NOTE - ATTENDING CONTRIBUTION TO CARE
69 yo M PMHx noted presents from NH for evaluation of fall today onto left side.  Pt states he felt "queasy" and fell.  Has h/o falls in the past, c/o left sided rib pain.  Had dysuria yesterday per NH record.  no n/v, no SOB. On exam pt in NAD AAO x 3, Op clear, neck supple, no midline vertebral tenderness, Lungs decreased AE b/l, equal BS, abd soft nd, + tender LUQ, + tender left chest wall, no crepitus, no bruising, + tender knees, good ROM, + tender thoracolumbar area, no step off,

## 2020-11-21 NOTE — H&P ADULT - ATTENDING COMMENTS
patient seen and examined agree above note   tele monitoring   follow echo   cardiology consult   follow CE   rocephine   follow cx

## 2020-11-21 NOTE — ED PROVIDER NOTE - NS ED ROS FT
Review of Systems:  	•	CONSTITUTIONAL - no fever, no diaphoresis, no chills  	•	SKIN - no rash  	•	HEMATOLOGIC - no bleeding, no bruising  	•	EYES - no eye pain, no blurry vision  	•	ENT - no congestion  	•	RESPIRATORY - no shortness of breath, no cough  	•	CARDIAC - no chest pain, no palpitations  	•	GI - + abd pain, no nausea, no vomiting, no diarrhea, no constipation  	•	GENITO-URINARY - no dysuria; no hematuria, no increased urinary frequency  	•	MUSCULOSKELETAL - +back pain, no swelling, no redness  	•	NEUROLOGIC - +dizziness, no weakness, no headache, no paresthesias, no LOC  	•	PSYCH - no anxiety,no depression  	All other ROS are negative except as documented in HPI. Review of Systems:  	•	CONSTITUTIONAL - no fever, no diaphoresis, no chills  	•	SKIN - no rash  	•	HEMATOLOGIC - no bleeding, no bruising  	•	EYES - no eye pain, no blurry vision  	•	ENT - no congestion  	•	RESPIRATORY - no shortness of breath, no cough  	•	CARDIAC - no chest pain, no palpitations  	•	GI - + abd pain, no nausea, no vomiting, no diarrhea, no constipation  	•	GENITO-URINARY - + dysuria; no hematuria, no increased urinary frequency  	•	MUSCULOSKELETAL - +back pain, no swelling, no redness  	•	NEUROLOGIC - +dizziness, no weakness, no headache, no paresthesias, no LOC  	•	PSYCH - no anxiety, no depression  	All other ROS are negative except as documented in HPI.

## 2020-11-22 LAB
ALBUMIN SERPL ELPH-MCNC: 3.1 G/DL — LOW (ref 3.5–5.2)
ALBUMIN SERPL ELPH-MCNC: 3.2 G/DL — LOW (ref 3.5–5.2)
ALP SERPL-CCNC: 74 U/L — SIGNIFICANT CHANGE UP (ref 30–115)
ALP SERPL-CCNC: 78 U/L — SIGNIFICANT CHANGE UP (ref 30–115)
ALT FLD-CCNC: 7 U/L — SIGNIFICANT CHANGE UP (ref 0–41)
ALT FLD-CCNC: 7 U/L — SIGNIFICANT CHANGE UP (ref 0–41)
ANION GAP SERPL CALC-SCNC: 12 MMOL/L — SIGNIFICANT CHANGE UP (ref 7–14)
ANION GAP SERPL CALC-SCNC: 12 MMOL/L — SIGNIFICANT CHANGE UP (ref 7–14)
AST SERPL-CCNC: 13 U/L — SIGNIFICANT CHANGE UP (ref 0–41)
AST SERPL-CCNC: 13 U/L — SIGNIFICANT CHANGE UP (ref 0–41)
BASOPHILS # BLD AUTO: 0.02 K/UL — SIGNIFICANT CHANGE UP (ref 0–0.2)
BASOPHILS NFR BLD AUTO: 0.2 % — SIGNIFICANT CHANGE UP (ref 0–1)
BILIRUB SERPL-MCNC: 1 MG/DL — SIGNIFICANT CHANGE UP (ref 0.2–1.2)
BILIRUB SERPL-MCNC: 1.6 MG/DL — HIGH (ref 0.2–1.2)
BLD GP AB SCN SERPL QL: SIGNIFICANT CHANGE UP
BUN SERPL-MCNC: 14 MG/DL — SIGNIFICANT CHANGE UP (ref 10–20)
BUN SERPL-MCNC: 17 MG/DL — SIGNIFICANT CHANGE UP (ref 10–20)
CALCIUM SERPL-MCNC: 8.2 MG/DL — LOW (ref 8.5–10.1)
CALCIUM SERPL-MCNC: 8.3 MG/DL — LOW (ref 8.5–10.1)
CHLORIDE SERPL-SCNC: 101 MMOL/L — SIGNIFICANT CHANGE UP (ref 98–110)
CHLORIDE SERPL-SCNC: 101 MMOL/L — SIGNIFICANT CHANGE UP (ref 98–110)
CK SERPL-CCNC: 65 U/L — SIGNIFICANT CHANGE UP (ref 0–225)
CO2 SERPL-SCNC: 23 MMOL/L — SIGNIFICANT CHANGE UP (ref 17–32)
CO2 SERPL-SCNC: 24 MMOL/L — SIGNIFICANT CHANGE UP (ref 17–32)
CREAT SERPL-MCNC: 0.8 MG/DL — SIGNIFICANT CHANGE UP (ref 0.7–1.5)
CREAT SERPL-MCNC: 1 MG/DL — SIGNIFICANT CHANGE UP (ref 0.7–1.5)
EOSINOPHIL # BLD AUTO: 0 K/UL — SIGNIFICANT CHANGE UP (ref 0–0.7)
EOSINOPHIL NFR BLD AUTO: 0 % — SIGNIFICANT CHANGE UP (ref 0–8)
GLUCOSE SERPL-MCNC: 132 MG/DL — HIGH (ref 70–99)
GLUCOSE SERPL-MCNC: 98 MG/DL — SIGNIFICANT CHANGE UP (ref 70–99)
HCT VFR BLD CALC: 36.7 % — LOW (ref 42–52)
HGB BLD-MCNC: 12.7 G/DL — LOW (ref 14–18)
IMM GRANULOCYTES NFR BLD AUTO: 1 % — HIGH (ref 0.1–0.3)
LACTATE SERPL-SCNC: 1.7 MMOL/L — SIGNIFICANT CHANGE UP (ref 0.7–2)
LYMPHOCYTES # BLD AUTO: 1.06 K/UL — LOW (ref 1.2–3.4)
LYMPHOCYTES # BLD AUTO: 8.5 % — LOW (ref 20.5–51.1)
MAGNESIUM SERPL-MCNC: 1.5 MG/DL — LOW (ref 1.8–2.4)
MAGNESIUM SERPL-MCNC: 1.6 MG/DL — LOW (ref 1.8–2.4)
MCHC RBC-ENTMCNC: 32.9 PG — HIGH (ref 27–31)
MCHC RBC-ENTMCNC: 34.6 G/DL — SIGNIFICANT CHANGE UP (ref 32–37)
MCV RBC AUTO: 95.1 FL — HIGH (ref 80–94)
MONOCYTES # BLD AUTO: 1.14 K/UL — HIGH (ref 0.1–0.6)
MONOCYTES NFR BLD AUTO: 9.2 % — SIGNIFICANT CHANGE UP (ref 1.7–9.3)
NEUTROPHILS # BLD AUTO: 10.09 K/UL — HIGH (ref 1.4–6.5)
NEUTROPHILS NFR BLD AUTO: 81.1 % — HIGH (ref 42.2–75.2)
NRBC # BLD: 0 /100 WBCS — SIGNIFICANT CHANGE UP (ref 0–0)
PLATELET # BLD AUTO: 141 K/UL — SIGNIFICANT CHANGE UP (ref 130–400)
POTASSIUM SERPL-MCNC: 3.3 MMOL/L — LOW (ref 3.5–5)
POTASSIUM SERPL-MCNC: 3.7 MMOL/L — SIGNIFICANT CHANGE UP (ref 3.5–5)
POTASSIUM SERPL-SCNC: 3.3 MMOL/L — LOW (ref 3.5–5)
POTASSIUM SERPL-SCNC: 3.7 MMOL/L — SIGNIFICANT CHANGE UP (ref 3.5–5)
PROT SERPL-MCNC: 5.5 G/DL — LOW (ref 6–8)
PROT SERPL-MCNC: 5.5 G/DL — LOW (ref 6–8)
RBC # BLD: 3.86 M/UL — LOW (ref 4.7–6.1)
RBC # FLD: 13.2 % — SIGNIFICANT CHANGE UP (ref 11.5–14.5)
SARS-COV-2 IGG SERPL QL IA: NEGATIVE — SIGNIFICANT CHANGE UP
SARS-COV-2 IGM SERPL IA-ACNC: 0.08 INDEX — SIGNIFICANT CHANGE UP
SODIUM SERPL-SCNC: 136 MMOL/L — SIGNIFICANT CHANGE UP (ref 135–146)
SODIUM SERPL-SCNC: 137 MMOL/L — SIGNIFICANT CHANGE UP (ref 135–146)
TROPONIN T SERPL-MCNC: <0.01 NG/ML — SIGNIFICANT CHANGE UP
TSH SERPL-MCNC: 0.7 UIU/ML — SIGNIFICANT CHANGE UP (ref 0.27–4.2)
WBC # BLD: 12.44 K/UL — HIGH (ref 4.8–10.8)
WBC # FLD AUTO: 12.44 K/UL — HIGH (ref 4.8–10.8)

## 2020-11-22 PROCEDURE — 99233 SBSQ HOSP IP/OBS HIGH 50: CPT

## 2020-11-22 RX ORDER — POLYETHYLENE GLYCOL 3350 17 G/17G
17 POWDER, FOR SOLUTION ORAL DAILY
Refills: 0 | Status: DISCONTINUED | OUTPATIENT
Start: 2020-11-22 | End: 2020-11-23

## 2020-11-22 RX ORDER — MAGNESIUM OXIDE 400 MG ORAL TABLET 241.3 MG
400 TABLET ORAL
Refills: 0 | Status: COMPLETED | OUTPATIENT
Start: 2020-11-22 | End: 2020-11-23

## 2020-11-22 RX ORDER — SENNA PLUS 8.6 MG/1
1 TABLET ORAL ONCE
Refills: 0 | Status: COMPLETED | OUTPATIENT
Start: 2020-11-22 | End: 2020-11-22

## 2020-11-22 RX ORDER — PANTOPRAZOLE SODIUM 20 MG/1
40 TABLET, DELAYED RELEASE ORAL ONCE
Refills: 0 | Status: COMPLETED | OUTPATIENT
Start: 2020-11-22 | End: 2020-11-22

## 2020-11-22 RX ORDER — MAGNESIUM SULFATE 500 MG/ML
2 VIAL (ML) INJECTION EVERY 4 HOURS
Refills: 0 | Status: DISCONTINUED | OUTPATIENT
Start: 2020-11-22 | End: 2020-11-22

## 2020-11-22 RX ORDER — MAGNESIUM OXIDE 400 MG ORAL TABLET 241.3 MG
400 TABLET ORAL ONCE
Refills: 0 | Status: COMPLETED | OUTPATIENT
Start: 2020-11-22 | End: 2020-11-22

## 2020-11-22 RX ORDER — LOSARTAN POTASSIUM 100 MG/1
100 TABLET, FILM COATED ORAL DAILY
Refills: 0 | Status: DISCONTINUED | OUTPATIENT
Start: 2020-11-22 | End: 2020-11-25

## 2020-11-22 RX ORDER — SENNA PLUS 8.6 MG/1
2 TABLET ORAL AT BEDTIME
Refills: 0 | Status: DISCONTINUED | OUTPATIENT
Start: 2020-11-22 | End: 2020-11-25

## 2020-11-22 RX ORDER — ACETAMINOPHEN 500 MG
650 TABLET ORAL EVERY 6 HOURS
Refills: 0 | Status: DISCONTINUED | OUTPATIENT
Start: 2020-11-22 | End: 2020-11-25

## 2020-11-22 RX ORDER — POTASSIUM CHLORIDE 20 MEQ
40 PACKET (EA) ORAL ONCE
Refills: 0 | Status: COMPLETED | OUTPATIENT
Start: 2020-11-22 | End: 2020-11-22

## 2020-11-22 RX ORDER — INFLUENZA VIRUS VACCINE 15; 15; 15; 15 UG/.5ML; UG/.5ML; UG/.5ML; UG/.5ML
0.5 SUSPENSION INTRAMUSCULAR ONCE
Refills: 0 | Status: COMPLETED | OUTPATIENT
Start: 2020-11-22 | End: 2020-11-22

## 2020-11-22 RX ORDER — ONDANSETRON 8 MG/1
4 TABLET, FILM COATED ORAL ONCE
Refills: 0 | Status: COMPLETED | OUTPATIENT
Start: 2020-11-22 | End: 2020-11-22

## 2020-11-22 RX ADMIN — Medication 1 GRAM(S): at 17:29

## 2020-11-22 RX ADMIN — Medication 40 MILLIEQUIVALENT(S): at 22:40

## 2020-11-22 RX ADMIN — Medication 650 MILLIGRAM(S): at 17:35

## 2020-11-22 RX ADMIN — ENOXAPARIN SODIUM 40 MILLIGRAM(S): 100 INJECTION SUBCUTANEOUS at 12:13

## 2020-11-22 RX ADMIN — SENNA PLUS 2 TABLET(S): 8.6 TABLET ORAL at 21:43

## 2020-11-22 RX ADMIN — ATORVASTATIN CALCIUM 20 MILLIGRAM(S): 80 TABLET, FILM COATED ORAL at 21:43

## 2020-11-22 RX ADMIN — ONDANSETRON 4 MILLIGRAM(S): 8 TABLET, FILM COATED ORAL at 10:34

## 2020-11-22 RX ADMIN — LOSARTAN POTASSIUM 100 MILLIGRAM(S): 100 TABLET, FILM COATED ORAL at 05:39

## 2020-11-22 RX ADMIN — Medication 50 MILLIGRAM(S): at 17:29

## 2020-11-22 RX ADMIN — OXYCODONE AND ACETAMINOPHEN 1 TABLET(S): 5; 325 TABLET ORAL at 21:43

## 2020-11-22 RX ADMIN — Medication 1 GRAM(S): at 05:29

## 2020-11-22 RX ADMIN — MAGNESIUM OXIDE 400 MG ORAL TABLET 400 MILLIGRAM(S): 241.3 TABLET ORAL at 10:35

## 2020-11-22 RX ADMIN — PANTOPRAZOLE SODIUM 40 MILLIGRAM(S): 20 TABLET, DELAYED RELEASE ORAL at 10:34

## 2020-11-22 RX ADMIN — CEFTRIAXONE 100 MILLIGRAM(S): 500 INJECTION, POWDER, FOR SOLUTION INTRAMUSCULAR; INTRAVENOUS at 12:12

## 2020-11-22 RX ADMIN — MAGNESIUM OXIDE 400 MG ORAL TABLET 400 MILLIGRAM(S): 241.3 TABLET ORAL at 12:15

## 2020-11-22 RX ADMIN — CHLORHEXIDINE GLUCONATE 1 APPLICATION(S): 213 SOLUTION TOPICAL at 05:39

## 2020-11-22 RX ADMIN — MAGNESIUM OXIDE 400 MG ORAL TABLET 400 MILLIGRAM(S): 241.3 TABLET ORAL at 17:29

## 2020-11-22 RX ADMIN — CLOPIDOGREL BISULFATE 75 MILLIGRAM(S): 75 TABLET, FILM COATED ORAL at 12:12

## 2020-11-22 RX ADMIN — ATORVASTATIN CALCIUM 20 MILLIGRAM(S): 80 TABLET, FILM COATED ORAL at 05:29

## 2020-11-22 RX ADMIN — Medication 50 MILLIGRAM(S): at 05:39

## 2020-11-22 RX ADMIN — OXYCODONE AND ACETAMINOPHEN 1 TABLET(S): 5; 325 TABLET ORAL at 22:36

## 2020-11-22 RX ADMIN — Medication 81 MILLIGRAM(S): at 12:12

## 2020-11-22 RX ADMIN — SENNA PLUS 1 TABLET(S): 8.6 TABLET ORAL at 10:35

## 2020-11-22 RX ADMIN — POLYETHYLENE GLYCOL 3350 17 GRAM(S): 17 POWDER, FOR SOLUTION ORAL at 10:35

## 2020-11-22 NOTE — CONSULT NOTE ADULT - SUBJECTIVE AND OBJECTIVE BOX
CARDIOLOGY CONSULT NOTE     CHIEF COMPLAINT/REASON FOR CONSULT:    HPI:  68 yo M with hx of CAD s/p stent x 3 on plavix, HTN, HLD, and KELSEY, bilateral knee replacement, fall and fracture of 5 L ribs in May 2020, was brought to the hospital after a kendra.   As per pt, he was trying to get sit on the chair from his bed and fell while trying to get up, hit his head and fell on left side of the body. Pt denies any loss of consciousness, seizures, nausea, lightheadedness, vertigo, tinnitus, chest pain, shortness of breath, cough. Pt at baseline ambulates with the help of a cane/walker.  Pt was initially admitted for fall workup of fall, was noted to have 10 beats of non sustained V.tach and another event of 4 beats of non sustained v.tach with multiple PVCs on telemetry. Pt hence is being admitted to the CCU for further management. On encounter, pt is endorsing left sided chest pain, reproducible on palpation. ekg stat showed sinus rhythm. Recent Echo Oct 2020 showed EF 62% and mild AS.    In the ED, the pt initially met the SIRS criteria (Tmax 102, tachy 114), lab workup showed leukocytosis. Trauma workup negative but CT abdomen showed possible cystitis/Prostatitis. UA, urine and blood Cx sent by the ED team. The pt started on rocephin. Pt received 1 L LR in the ED. (21 Nov 2020 21:37)      PAST MEDICAL & SURGICAL HISTORY:  MI (myocardial infarction)    Arthritis    Kidney stones    Sleep apnea    Hypertension    High blood cholesterol    H/O heart artery stent    History of appendectomy    H/O chronic cholecystitis  cholecystectomy        Cardiac Risks:   [xPatient ]HTN, [ ] DM, [ ] Smoking, [x ] FH,  [ x] Lipids        MEDICATIONS:  MEDICATIONS  (STANDING):  aspirin  chewable 81 milliGRAM(s) Oral daily  atorvastatin 20 milliGRAM(s) Oral at bedtime  cefTRIAXone   IVPB 1000 milliGRAM(s) IV Intermittent every 24 hours  chlorhexidine 4% Liquid 1 Application(s) Topical <User Schedule>  clopidogrel Tablet 75 milliGRAM(s) Oral daily  enoxaparin Injectable 40 milliGRAM(s) SubCutaneous daily  influenza   Vaccine 0.5 milliLiter(s) IntraMuscular once  losartan 100 milliGRAM(s) Oral daily  metoprolol tartrate 50 milliGRAM(s) Oral two times a day  sucralfate 1 Gram(s) Oral two times a day      FAMILY HISTORY:  CAD (coronary artery disease) (Father)        SOCIAL HISTORY:      [ ] Marital status    Allergies    fish (Unknown)  iodine (Rash)  latex (Unknown)  magnesium sulfate (Rash)  sulfa drugs (Rash)      	    REVIEW OF SYSTEMS:  CONSTITUTIONAL: No fever, weight loss, or fatigue  EYES: No eye pain, visual disturbances, or discharge  ENMT:  No difficulty hearing, tinnitus, vertigo; No sinus or throat pain  NECK: No pain or stiffness  RESPIRATORY: No cough, wheezing, chills or hemoptysis; No Shortness of Breath  CARDIOVASCULAR: No chest pain, palpitations, passing out, dizziness, or leg swelling  GASTROINTESTINAL: No abdominal or epigastric pain. No nausea, vomiting, or hematemesis; No diarrhea or constipation. No melena or hematochezia.  GENITOURINARY: No dysuria, frequency, hematuria, or incontinence  NEUROLOGICAL: No headaches, memory loss, loss of strength, numbness, or tremors  SKIN: No itching, burning, rashes, or lesions   	      PHYSICAL EXAM:  T(C): 37.6 (11-21-20 @ 23:42), Max: 38.9 (11-21-20 @ 17:54)  HR: 92 (11-22-20 @ 05:27) (84 - 130)  BP: 152/67 (11-22-20 @ 05:27) (148/77 - 191/86)  RR: 22 (11-22-20 @ 03:24) (16 - 22)  SpO2: 93% (11-22-20 @ 05:27) (91% - 96%)  Wt(kg): --  I&O's Summary    21 Nov 2020 07:01  -  22 Nov 2020 07:00  --------------------------------------------------------  IN: 0 mL / OUT: 125 mL / NET: -125 mL        Appearance: Normal	  Psychiatry: A & O x 3, Mood & affect appropriate  HEENT:   Normal oral mucosa, PERRL, EOMI	  Lymphatic: No lymphadenopathy  Cardiovascular: Normal S1 S2,RRR, No JVD, No murmurs  Respiratory: Lungs clear to auscultation	  Gastrointestinal:  Soft, Non-tender, + BS	  Skin: No rashes, No ecchymoses, No cyanosis	  Neurologic: Non-focal  Extremities: Normal range of motion, No clubbing, cyanosis or edema  Vascular: Peripheral pulses palpable 2+ bilaterally      ECG:  	not available    	  LABS:	 	    CARDIAC MARKERS:                                    13.6   12.46 )-----------( 135      ( 21 Nov 2020 17:52 )             40.0     11-21    135  |  101  |  17  ----------------------------<  104<H>  4.1   |  22  |  0.9    Ca    8.8      21 Nov 2020 17:52    TPro  6.4  /  Alb  3.8  /  TBili  1.8<H>  /  DBili  x   /  AST  21  /  ALT  9   /  AlkPhos  85  11-21

## 2020-11-22 NOTE — PHYSICAL THERAPY INITIAL EVALUATION ADULT - GAIT DEVIATIONS NOTED, PT EVAL
decreased weight-shifting ability/stooped posture, dec heel strike/pushoff/decreased ping/decreased step length

## 2020-11-22 NOTE — PHYSICAL THERAPY INITIAL EVALUATION ADULT - ADDITIONAL COMMENTS
Per patient has 4 steps/landing/another 4 steps , with bilateral handrails to enter home; another 4 steps to second floor with rail but patient unable which side; admitted from [facility] where patient has been undergoing Rehab

## 2020-11-22 NOTE — CONSULT NOTE ADULT - ASSESSMENT
Patient with above hx. MI past claims neg stress recently. Reported brief v- tach. No strips available now. Rx uti. Check k mg and correct. He has KELSEY. Use c-pap. Not using now. Beta

## 2020-11-22 NOTE — PHYSICAL THERAPY INITIAL EVALUATION ADULT - PERTINENT HX OF CURRENT PROBLEM, REHAB EVAL
Admitted for uti, s/p fall at Nursing Home, underwent fall work-up with no acute fracture or dislocation per imaging results

## 2020-11-22 NOTE — PHYSICAL THERAPY INITIAL EVALUATION ADULT - GENERAL OBSERVATIONS, REHAB EVAL
10:50-11:15 Chart reviewed. Pt encountered semireclined in bed, may be seen by Physical Therapist as confirmed with Nurse. Patient denied pain at rest and willing to try to walk; +tele; +urine collection device

## 2020-11-23 LAB
ALBUMIN SERPL ELPH-MCNC: 3.1 G/DL — LOW (ref 3.5–5.2)
ALP SERPL-CCNC: 85 U/L — SIGNIFICANT CHANGE UP (ref 30–115)
ALT FLD-CCNC: 7 U/L — SIGNIFICANT CHANGE UP (ref 0–41)
ANION GAP SERPL CALC-SCNC: 11 MMOL/L — SIGNIFICANT CHANGE UP (ref 7–14)
ANION GAP SERPL CALC-SCNC: 6 MMOL/L — LOW (ref 7–14)
ANION GAP SERPL CALC-SCNC: 8 MMOL/L — SIGNIFICANT CHANGE UP (ref 7–14)
AST SERPL-CCNC: 11 U/L — SIGNIFICANT CHANGE UP (ref 0–41)
BASOPHILS # BLD AUTO: 0.01 K/UL — SIGNIFICANT CHANGE UP (ref 0–0.2)
BASOPHILS NFR BLD AUTO: 0.1 % — SIGNIFICANT CHANGE UP (ref 0–1)
BILIRUB SERPL-MCNC: 1.1 MG/DL — SIGNIFICANT CHANGE UP (ref 0.2–1.2)
BUN SERPL-MCNC: 17 MG/DL — SIGNIFICANT CHANGE UP (ref 10–20)
BUN SERPL-MCNC: 21 MG/DL — HIGH (ref 10–20)
BUN SERPL-MCNC: 22 MG/DL — HIGH (ref 10–20)
CALCIUM SERPL-MCNC: 8.3 MG/DL — LOW (ref 8.5–10.1)
CALCIUM SERPL-MCNC: 8.4 MG/DL — LOW (ref 8.5–10.1)
CALCIUM SERPL-MCNC: 8.4 MG/DL — LOW (ref 8.5–10.1)
CHLORIDE SERPL-SCNC: 103 MMOL/L — SIGNIFICANT CHANGE UP (ref 98–110)
CHLORIDE SERPL-SCNC: 103 MMOL/L — SIGNIFICANT CHANGE UP (ref 98–110)
CHLORIDE SERPL-SCNC: 104 MMOL/L — SIGNIFICANT CHANGE UP (ref 98–110)
CK MB CFR SERPL CALC: 1.5 NG/ML — SIGNIFICANT CHANGE UP (ref 0.6–6.3)
CO2 SERPL-SCNC: 25 MMOL/L — SIGNIFICANT CHANGE UP (ref 17–32)
CO2 SERPL-SCNC: 26 MMOL/L — SIGNIFICANT CHANGE UP (ref 17–32)
CO2 SERPL-SCNC: 27 MMOL/L — SIGNIFICANT CHANGE UP (ref 17–32)
CREAT SERPL-MCNC: 0.8 MG/DL — SIGNIFICANT CHANGE UP (ref 0.7–1.5)
CREAT SERPL-MCNC: 1.2 MG/DL — SIGNIFICANT CHANGE UP (ref 0.7–1.5)
CREAT SERPL-MCNC: 1.3 MG/DL — SIGNIFICANT CHANGE UP (ref 0.7–1.5)
EOSINOPHIL # BLD AUTO: 0.02 K/UL — SIGNIFICANT CHANGE UP (ref 0–0.7)
EOSINOPHIL NFR BLD AUTO: 0.2 % — SIGNIFICANT CHANGE UP (ref 0–8)
GLUCOSE SERPL-MCNC: 110 MG/DL — HIGH (ref 70–99)
GLUCOSE SERPL-MCNC: 113 MG/DL — HIGH (ref 70–99)
GLUCOSE SERPL-MCNC: 134 MG/DL — HIGH (ref 70–99)
HCT VFR BLD CALC: 35.9 % — LOW (ref 42–52)
HGB BLD-MCNC: 12 G/DL — LOW (ref 14–18)
IMM GRANULOCYTES NFR BLD AUTO: 1.4 % — HIGH (ref 0.1–0.3)
LYMPHOCYTES # BLD AUTO: 0.82 K/UL — LOW (ref 1.2–3.4)
LYMPHOCYTES # BLD AUTO: 8.3 % — LOW (ref 20.5–51.1)
MAGNESIUM SERPL-MCNC: 1.6 MG/DL — LOW (ref 1.8–2.4)
MAGNESIUM SERPL-MCNC: 1.8 MG/DL — SIGNIFICANT CHANGE UP (ref 1.8–2.4)
MCHC RBC-ENTMCNC: 32 PG — HIGH (ref 27–31)
MCHC RBC-ENTMCNC: 33.4 G/DL — SIGNIFICANT CHANGE UP (ref 32–37)
MCV RBC AUTO: 95.7 FL — HIGH (ref 80–94)
MONOCYTES # BLD AUTO: 0.95 K/UL — HIGH (ref 0.1–0.6)
MONOCYTES NFR BLD AUTO: 9.6 % — HIGH (ref 1.7–9.3)
NEUTROPHILS # BLD AUTO: 7.96 K/UL — HIGH (ref 1.4–6.5)
NEUTROPHILS NFR BLD AUTO: 80.4 % — HIGH (ref 42.2–75.2)
NRBC # BLD: 0 /100 WBCS — SIGNIFICANT CHANGE UP (ref 0–0)
PLATELET # BLD AUTO: 141 K/UL — SIGNIFICANT CHANGE UP (ref 130–400)
POTASSIUM SERPL-MCNC: 3.3 MMOL/L — LOW (ref 3.5–5)
POTASSIUM SERPL-MCNC: 3.9 MMOL/L — SIGNIFICANT CHANGE UP (ref 3.5–5)
POTASSIUM SERPL-MCNC: 4 MMOL/L — SIGNIFICANT CHANGE UP (ref 3.5–5)
POTASSIUM SERPL-SCNC: 3.3 MMOL/L — LOW (ref 3.5–5)
POTASSIUM SERPL-SCNC: 3.9 MMOL/L — SIGNIFICANT CHANGE UP (ref 3.5–5)
POTASSIUM SERPL-SCNC: 4 MMOL/L — SIGNIFICANT CHANGE UP (ref 3.5–5)
PROT SERPL-MCNC: 5.5 G/DL — LOW (ref 6–8)
RBC # BLD: 3.75 M/UL — LOW (ref 4.7–6.1)
RBC # FLD: 13.3 % — SIGNIFICANT CHANGE UP (ref 11.5–14.5)
SODIUM SERPL-SCNC: 137 MMOL/L — SIGNIFICANT CHANGE UP (ref 135–146)
SODIUM SERPL-SCNC: 137 MMOL/L — SIGNIFICANT CHANGE UP (ref 135–146)
SODIUM SERPL-SCNC: 139 MMOL/L — SIGNIFICANT CHANGE UP (ref 135–146)
TROPONIN T SERPL-MCNC: 0.01 NG/ML — SIGNIFICANT CHANGE UP
TROPONIN T SERPL-MCNC: 0.01 NG/ML — SIGNIFICANT CHANGE UP
TROPONIN T SERPL-MCNC: 0.02 NG/ML — HIGH
WBC # BLD: 9.9 K/UL — SIGNIFICANT CHANGE UP (ref 4.8–10.8)
WBC # FLD AUTO: 9.9 K/UL — SIGNIFICANT CHANGE UP (ref 4.8–10.8)

## 2020-11-23 PROCEDURE — 99233 SBSQ HOSP IP/OBS HIGH 50: CPT

## 2020-11-23 RX ORDER — NITROGLYCERIN 6.5 MG
0.4 CAPSULE, EXTENDED RELEASE ORAL
Refills: 0 | Status: COMPLETED | OUTPATIENT
Start: 2020-11-23 | End: 2020-11-23

## 2020-11-23 RX ORDER — MORPHINE SULFATE 50 MG/1
4 CAPSULE, EXTENDED RELEASE ORAL ONCE
Refills: 0 | Status: DISCONTINUED | OUTPATIENT
Start: 2020-11-23 | End: 2020-11-24

## 2020-11-23 RX ORDER — POTASSIUM CHLORIDE 20 MEQ
40 PACKET (EA) ORAL ONCE
Refills: 0 | Status: COMPLETED | OUTPATIENT
Start: 2020-11-23 | End: 2020-11-23

## 2020-11-23 RX ORDER — METOPROLOL TARTRATE 50 MG
75 TABLET ORAL
Refills: 0 | Status: DISCONTINUED | OUTPATIENT
Start: 2020-11-23 | End: 2020-11-24

## 2020-11-23 RX ADMIN — Medication 50 MILLIGRAM(S): at 05:04

## 2020-11-23 RX ADMIN — CHLORHEXIDINE GLUCONATE 1 APPLICATION(S): 213 SOLUTION TOPICAL at 05:05

## 2020-11-23 RX ADMIN — ENOXAPARIN SODIUM 40 MILLIGRAM(S): 100 INJECTION SUBCUTANEOUS at 12:14

## 2020-11-23 RX ADMIN — OXYCODONE AND ACETAMINOPHEN 1 TABLET(S): 5; 325 TABLET ORAL at 05:02

## 2020-11-23 RX ADMIN — Medication 81 MILLIGRAM(S): at 12:14

## 2020-11-23 RX ADMIN — Medication 0.4 MILLIGRAM(S): at 20:30

## 2020-11-23 RX ADMIN — MAGNESIUM OXIDE 400 MG ORAL TABLET 400 MILLIGRAM(S): 241.3 TABLET ORAL at 08:16

## 2020-11-23 RX ADMIN — Medication 1 GRAM(S): at 17:50

## 2020-11-23 RX ADMIN — OXYCODONE AND ACETAMINOPHEN 1 TABLET(S): 5; 325 TABLET ORAL at 19:55

## 2020-11-23 RX ADMIN — Medication 0.4 MILLIGRAM(S): at 21:07

## 2020-11-23 RX ADMIN — OXYCODONE AND ACETAMINOPHEN 1 TABLET(S): 5; 325 TABLET ORAL at 20:30

## 2020-11-23 RX ADMIN — Medication 0.4 MILLIGRAM(S): at 20:13

## 2020-11-23 RX ADMIN — Medication 1 GRAM(S): at 05:03

## 2020-11-23 RX ADMIN — LOSARTAN POTASSIUM 100 MILLIGRAM(S): 100 TABLET, FILM COATED ORAL at 05:03

## 2020-11-23 RX ADMIN — CLOPIDOGREL BISULFATE 75 MILLIGRAM(S): 75 TABLET, FILM COATED ORAL at 12:15

## 2020-11-23 RX ADMIN — ATORVASTATIN CALCIUM 20 MILLIGRAM(S): 80 TABLET, FILM COATED ORAL at 21:34

## 2020-11-23 RX ADMIN — Medication 75 MILLIGRAM(S): at 17:49

## 2020-11-23 RX ADMIN — Medication 40 MILLIEQUIVALENT(S): at 15:18

## 2020-11-23 RX ADMIN — CEFTRIAXONE 100 MILLIGRAM(S): 500 INJECTION, POWDER, FOR SOLUTION INTRAMUSCULAR; INTRAVENOUS at 12:14

## 2020-11-23 NOTE — CHART NOTE - NSCHARTNOTEFT_GEN_A_CORE
Called by RN because patient complaining of epigastric abdominal pain, associated with nausea. Denies any chest pain, shortness of breath, diaphoresis, arm pain. Has been having regular bowel movements (up to 3 daily) and passing gas. Reports a similar episode this AM, but could not elaborate further.  Called daughter Radha at [phone].  She reports that patient has haa Called by RN because patient complaining of epigastric abdominal pain, associated with nausea. Denies any chest pain, shortness of breath, diaphoresis, arm pain. Has been having regular bowel movements (up to 3 daily) and passing gas. Reports a similar episode this AM, but could not elaborate further.  Called daughter Radha at [phone].  She reports that patient has had this abdominal pain for months.   On chart review patient has had 2 CT A/P, the most recent was 2 days ago.  On abdominal exam, mild epigastric and LUQ tenderness.    Considering recent CT A/P will hold further imaging, continue to monitor.

## 2020-11-23 NOTE — PROGRESS NOTE ADULT - ATTENDING COMMENTS
Attending Statement: I have personally performed a face to face diagnostic evaluation on this patient. The patient is suffering from sepsis.   I have reviewed the above note and agree with the history, exam and suggestions for care, except as I have noted in the text.

## 2020-11-23 NOTE — CHART NOTE - NSCHARTNOTEFT_GEN_A_CORE
pt c/o cp @ level of xyphoid radiating to left arm, repeated ecg show no changes from prior. will give sl nitro, morphine prn repeat troponin

## 2020-11-24 ENCOUNTER — TRANSCRIPTION ENCOUNTER (OUTPATIENT)
Age: 70
End: 2020-11-24

## 2020-11-24 LAB
-  AMIKACIN: SIGNIFICANT CHANGE UP
-  AMOXICILLIN/CLAVULANIC ACID: SIGNIFICANT CHANGE UP
-  AMPICILLIN/SULBACTAM: SIGNIFICANT CHANGE UP
-  AMPICILLIN: SIGNIFICANT CHANGE UP
-  AZTREONAM: SIGNIFICANT CHANGE UP
-  CEFAZOLIN: SIGNIFICANT CHANGE UP
-  CEFEPIME: SIGNIFICANT CHANGE UP
-  CEFOXITIN: SIGNIFICANT CHANGE UP
-  CEFTRIAXONE: SIGNIFICANT CHANGE UP
-  CIPROFLOXACIN: SIGNIFICANT CHANGE UP
-  ERTAPENEM: SIGNIFICANT CHANGE UP
-  GENTAMICIN: SIGNIFICANT CHANGE UP
-  IMIPENEM: SIGNIFICANT CHANGE UP
-  LEVOFLOXACIN: SIGNIFICANT CHANGE UP
-  MEROPENEM: SIGNIFICANT CHANGE UP
-  NITROFURANTOIN: SIGNIFICANT CHANGE UP
-  PIPERACILLIN/TAZOBACTAM: SIGNIFICANT CHANGE UP
-  TIGECYCLINE: SIGNIFICANT CHANGE UP
-  TOBRAMYCIN: SIGNIFICANT CHANGE UP
-  TRIMETHOPRIM/SULFAMETHOXAZOLE: SIGNIFICANT CHANGE UP
ALBUMIN SERPL ELPH-MCNC: 3 G/DL — LOW (ref 3.5–5.2)
ALP SERPL-CCNC: 95 U/L — SIGNIFICANT CHANGE UP (ref 30–115)
ALT FLD-CCNC: 9 U/L — SIGNIFICANT CHANGE UP (ref 0–41)
ANION GAP SERPL CALC-SCNC: 14 MMOL/L — SIGNIFICANT CHANGE UP (ref 7–14)
AST SERPL-CCNC: 18 U/L — SIGNIFICANT CHANGE UP (ref 0–41)
BASOPHILS # BLD AUTO: 0.01 K/UL — SIGNIFICANT CHANGE UP (ref 0–0.2)
BASOPHILS NFR BLD AUTO: 0.1 % — SIGNIFICANT CHANGE UP (ref 0–1)
BILIRUB SERPL-MCNC: 0.6 MG/DL — SIGNIFICANT CHANGE UP (ref 0.2–1.2)
BUN SERPL-MCNC: 22 MG/DL — HIGH (ref 10–20)
CALCIUM SERPL-MCNC: 8.6 MG/DL — SIGNIFICANT CHANGE UP (ref 8.5–10.1)
CHLORIDE SERPL-SCNC: 101 MMOL/L — SIGNIFICANT CHANGE UP (ref 98–110)
CO2 SERPL-SCNC: 24 MMOL/L — SIGNIFICANT CHANGE UP (ref 17–32)
CREAT SERPL-MCNC: 1.2 MG/DL — SIGNIFICANT CHANGE UP (ref 0.7–1.5)
CULTURE RESULTS: SIGNIFICANT CHANGE UP
EOSINOPHIL # BLD AUTO: 0.03 K/UL — SIGNIFICANT CHANGE UP (ref 0–0.7)
EOSINOPHIL NFR BLD AUTO: 0.4 % — SIGNIFICANT CHANGE UP (ref 0–8)
GLUCOSE SERPL-MCNC: 98 MG/DL — SIGNIFICANT CHANGE UP (ref 70–99)
HCT VFR BLD CALC: 37.3 % — LOW (ref 42–52)
HGB BLD-MCNC: 12.3 G/DL — LOW (ref 14–18)
IMM GRANULOCYTES NFR BLD AUTO: 0.7 % — HIGH (ref 0.1–0.3)
LYMPHOCYTES # BLD AUTO: 0.72 K/UL — LOW (ref 1.2–3.4)
LYMPHOCYTES # BLD AUTO: 10 % — LOW (ref 20.5–51.1)
MAGNESIUM SERPL-MCNC: 1.9 MG/DL — SIGNIFICANT CHANGE UP (ref 1.8–2.4)
MCHC RBC-ENTMCNC: 31.7 PG — HIGH (ref 27–31)
MCHC RBC-ENTMCNC: 33 G/DL — SIGNIFICANT CHANGE UP (ref 32–37)
MCV RBC AUTO: 96.1 FL — HIGH (ref 80–94)
METHOD TYPE: SIGNIFICANT CHANGE UP
MONOCYTES # BLD AUTO: 0.9 K/UL — HIGH (ref 0.1–0.6)
MONOCYTES NFR BLD AUTO: 12.5 % — HIGH (ref 1.7–9.3)
NEUTROPHILS # BLD AUTO: 5.5 K/UL — SIGNIFICANT CHANGE UP (ref 1.4–6.5)
NEUTROPHILS NFR BLD AUTO: 76.3 % — HIGH (ref 42.2–75.2)
NRBC # BLD: 0 /100 WBCS — SIGNIFICANT CHANGE UP (ref 0–0)
ORGANISM # SPEC MICROSCOPIC CNT: SIGNIFICANT CHANGE UP
ORGANISM # SPEC MICROSCOPIC CNT: SIGNIFICANT CHANGE UP
PLATELET # BLD AUTO: 179 K/UL — SIGNIFICANT CHANGE UP (ref 130–400)
POTASSIUM SERPL-MCNC: 4 MMOL/L — SIGNIFICANT CHANGE UP (ref 3.5–5)
POTASSIUM SERPL-SCNC: 4 MMOL/L — SIGNIFICANT CHANGE UP (ref 3.5–5)
PROT SERPL-MCNC: 5.5 G/DL — LOW (ref 6–8)
PSA FLD-MCNC: 26 NG/ML — HIGH (ref 0–4)
RBC # BLD: 3.88 M/UL — LOW (ref 4.7–6.1)
RBC # FLD: 13.3 % — SIGNIFICANT CHANGE UP (ref 11.5–14.5)
SODIUM SERPL-SCNC: 139 MMOL/L — SIGNIFICANT CHANGE UP (ref 135–146)
SPECIMEN SOURCE: SIGNIFICANT CHANGE UP
TROPONIN T SERPL-MCNC: 0.01 NG/ML — SIGNIFICANT CHANGE UP
WBC # BLD: 7.21 K/UL — SIGNIFICANT CHANGE UP (ref 4.8–10.8)
WBC # FLD AUTO: 7.21 K/UL — SIGNIFICANT CHANGE UP (ref 4.8–10.8)

## 2020-11-24 PROCEDURE — 99233 SBSQ HOSP IP/OBS HIGH 50: CPT

## 2020-11-24 RX ORDER — ACETAMINOPHEN 500 MG
2 TABLET ORAL
Qty: 0 | Refills: 0 | DISCHARGE
Start: 2020-11-24

## 2020-11-24 RX ORDER — ATENOLOL 25 MG/1
50 TABLET ORAL DAILY
Refills: 0 | Status: DISCONTINUED | OUTPATIENT
Start: 2020-11-24 | End: 2020-11-25

## 2020-11-24 RX ORDER — ONDANSETRON 8 MG/1
4 TABLET, FILM COATED ORAL ONCE
Refills: 0 | Status: COMPLETED | OUTPATIENT
Start: 2020-11-24 | End: 2020-11-24

## 2020-11-24 RX ADMIN — LOSARTAN POTASSIUM 100 MILLIGRAM(S): 100 TABLET, FILM COATED ORAL at 05:32

## 2020-11-24 RX ADMIN — Medication 1 GRAM(S): at 17:02

## 2020-11-24 RX ADMIN — CLOPIDOGREL BISULFATE 75 MILLIGRAM(S): 75 TABLET, FILM COATED ORAL at 11:13

## 2020-11-24 RX ADMIN — MORPHINE SULFATE 4 MILLIGRAM(S): 50 CAPSULE, EXTENDED RELEASE ORAL at 03:24

## 2020-11-24 RX ADMIN — Medication 1 DROP(S): at 23:53

## 2020-11-24 RX ADMIN — CHLORHEXIDINE GLUCONATE 1 APPLICATION(S): 213 SOLUTION TOPICAL at 05:32

## 2020-11-24 RX ADMIN — CEFTRIAXONE 100 MILLIGRAM(S): 500 INJECTION, POWDER, FOR SOLUTION INTRAMUSCULAR; INTRAVENOUS at 11:13

## 2020-11-24 RX ADMIN — Medication 81 MILLIGRAM(S): at 11:12

## 2020-11-24 RX ADMIN — MORPHINE SULFATE 4 MILLIGRAM(S): 50 CAPSULE, EXTENDED RELEASE ORAL at 02:54

## 2020-11-24 RX ADMIN — Medication 75 MILLIGRAM(S): at 06:10

## 2020-11-24 RX ADMIN — ATENOLOL 50 MILLIGRAM(S): 25 TABLET ORAL at 15:17

## 2020-11-24 RX ADMIN — Medication 1 GRAM(S): at 05:32

## 2020-11-24 RX ADMIN — ENOXAPARIN SODIUM 40 MILLIGRAM(S): 100 INJECTION SUBCUTANEOUS at 11:13

## 2020-11-24 RX ADMIN — ONDANSETRON 4 MILLIGRAM(S): 8 TABLET, FILM COATED ORAL at 02:54

## 2020-11-24 NOTE — PROGRESS NOTE ADULT - REASON FOR ADMISSION
Called Jay and left message that letter will be available this afternoon at  for .   
Letter created and printed. Placed on Dr Tucker's desk for signature.    
Ok for note stating he was admitted for medical reasons from 3/6 to 3/7   
Patient needs a return to work note from his overnight hospitalization from 3-6-18.  Patient was discharged the next day.  His employer is requesting this note. Patient will  the note.  
Reviewed chart note.  Admit 3/6/18 to [facility]  Discharged 3/7/18  Discharge diagnosis: A fib with RVR    Please advise if okay to create return to work note.  LOV:5/29/18  NOV:8/27/18    
s/p fall

## 2020-11-24 NOTE — DISCHARGE NOTE PROVIDER - CARE PROVIDER_API CALL
EMILEE CORONADO  Internal Medicine  [address] [place] [address]  Phone: [phone]  Fax: [phone]  Follow Up Time: 1 week

## 2020-11-24 NOTE — DISCHARGE NOTE PROVIDER - NSDCCPCAREPLAN_GEN_ALL_CORE_FT
PRINCIPAL DISCHARGE DIAGNOSIS  Diagnosis: Ventricular tachycardia  Assessment and Plan of Treatment: We suspected that you had an abnormal heart rhythm on the tele monitor, however repeat testing did not show any evidence of that. We recommend you follow-up with your cardiolgist in 1 week, notify your provider of any chest pain, shortness of breath, palpitations, weakness      SECONDARY DISCHARGE DIAGNOSES  Diagnosis: UTI (urinary tract infection)  Assessment and Plan of Treatment: Continue antibiotics as perscribed    Diagnosis: Fall  Assessment and Plan of Treatment: Continue taking pain medications as perscribed    Diagnosis: Dizziness  Assessment and Plan of Treatment:      PRINCIPAL DISCHARGE DIAGNOSIS  Diagnosis: Ventricular tachycardia  Assessment and Plan of Treatment: We suspected that you had an abnormal heart rhythm on the tele monitor, however repeat testing did not show any evidence of that. We recommend you follow-up with your cardiolgist in 1 week, notify your provider of any chest pain, shortness of breath, palpitations, weakness      SECONDARY DISCHARGE DIAGNOSES  Diagnosis: UTI (urinary tract infection)  Assessment and Plan of Treatment: Continue antibiotics as perscribed    Diagnosis: Fall  Assessment and Plan of Treatment: Rehab therapy

## 2020-11-24 NOTE — DISCHARGE NOTE PROVIDER - NSDCMRMEDTOKEN_GEN_ALL_CORE_FT
acetaminophen 325 mg oral tablet: 2 tab(s) orally every 6 hours, As needed, mild pain  aspirin 81 mg oral tablet:   atenolol 50 mg oral tablet: 1 tab(s) orally once a day  atorvastatin 20 mg oral tablet: 1 tab(s) orally once a day  losartan 50 mg oral tablet: 1 tab(s) orally once a day  oxycodone-acetaminophen 5 mg-325 mg oral tablet: 1 tab(s) orally every 6 hours, As needed, Moderate Pain (4 - 6)  Plavix: 75 milligram(s) orally once a day   acetaminophen 325 mg oral tablet: 2 tab(s) orally every 6 hours, As needed, mild pain  aspirin 81 mg oral tablet:   atenolol 50 mg oral tablet: 1 tab(s) orally once a day  atorvastatin 20 mg oral tablet: 1 tab(s) orally once a day  cefTRIAXone 1 g intravenous injection: 1 gram(s) intravenous every 24 hours  losartan 50 mg oral tablet: 1 tab(s) orally once a day  oxycodone-acetaminophen 5 mg-325 mg oral tablet: 1 tab(s) orally every 6 hours, As needed, Moderate Pain (4 - 6)  Plavix: 75 milligram(s) orally once a day   acetaminophen 325 mg oral tablet: 2 tab(s) orally every 6 hours, As needed, mild pain  aspirin 81 mg oral tablet:   atenolol 50 mg oral tablet: 1 tab(s) orally once a day  atorvastatin 20 mg oral tablet: 1 tab(s) orally once a day  ciprofloxacin 500 mg oral tablet: 1 tab(s) orally every 12 hours for 3 days  losartan 50 mg oral tablet: 1 tab(s) orally once a day  oxycodone-acetaminophen 5 mg-325 mg oral tablet: 1 tab(s) orally every 6 hours, As needed, Moderate Pain (4 - 6)  Plavix: 75 milligram(s) orally once a day

## 2020-11-24 NOTE — CONSULT NOTE ADULT - SUBJECTIVE AND OBJECTIVE BOX
HPI:  70 yo M with hx of CAD s/p stent x 3 on plavix, HTN, HLD, and KELSEY, bilateral knee replacement, fall and fracture of 5 L ribs in May 2020, was brought to the hospital after a kendra.   As per pt, he was trying to get sit on the chair from his bed and fell while trying to get up, hit his head and fell on left side of the body. Pt denies any loss of consciousness, seizures, nausea, lightheadedness, vertigo, tinnitus, chest pain, shortness of breath, cough. Pt at baseline ambulates with the help of a cane/walker.  Pt was initially admitted for fall workup of fall, was noted to have 10 beats of non sustained V.tach and another event of 4 beats of non sustained v.tach with multiple PVCs on telemetry. Pt hence is being admitted to the CCU for further management. On encounter, pt is endorsing left sided chest pain, reproducible on palpation. ekg stat showed sinus rhythm. Recent Echo Oct 2020 showed EF 62% and mild AS.    In the ED, the pt initially met the SIRS criteria (Tmax 102, tachy 114), lab workup showed leukocytosis. Trauma workup negative but CT abdomen showed possible cystitis/Prostatitis. UA, urine and blood Cx sent by the ED team. The pt started on rocephin. Pt received 1 L LR in the ED. (21 Nov 2020 21:37)      PAST MEDICAL & SURGICAL HISTORY:  MI (myocardial infarction)    Arthritis    Kidney stones    Sleep apnea    Hypertension    High blood cholesterol    H/O heart artery stent    History of appendectomy    H/O chronic cholecystitis  cholecystectomy        Hospital Course:  He is deconditioned and weak. treated for UTI/sepsis. He is deconditioned . Amb 5 ft RW min assist.  TODAY'S SUBJECTIVE & REVIEW OF SYMPTOMS:     Constitutional WNL   Cardio WNL   Resp WNL   GI WNL  Heme WNL  Endo WNL  Skin WNL  MSK WNL  Neuro WNL  Cognitive WNL  Psych WNL      MEDICATIONS  (STANDING):  aspirin  chewable 81 milliGRAM(s) Oral daily  atorvastatin 20 milliGRAM(s) Oral at bedtime  cefTRIAXone   IVPB 1000 milliGRAM(s) IV Intermittent every 24 hours  chlorhexidine 4% Liquid 1 Application(s) Topical <User Schedule>  clopidogrel Tablet 75 milliGRAM(s) Oral daily  enoxaparin Injectable 40 milliGRAM(s) SubCutaneous daily  influenza   Vaccine 0.5 milliLiter(s) IntraMuscular once  losartan 100 milliGRAM(s) Oral daily  metoprolol tartrate 75 milliGRAM(s) Oral two times a day  senna 2 Tablet(s) Oral at bedtime  sucralfate 1 Gram(s) Oral two times a day    MEDICATIONS  (PRN):  acetaminophen   Tablet .. 650 milliGRAM(s) Oral every 6 hours PRN Temp greater or equal to 38C (100.4F)  oxycodone    5 mG/acetaminophen 325 mG 1 Tablet(s) Oral every 6 hours PRN Moderate Pain (4 - 6)      FAMILY HISTORY:  CAD (coronary artery disease) (Father)        Allergies    fish (Unknown)  iodine (Rash)  latex (Unknown)  magnesium sulfate (Rash)  sulfa drugs (Rash)    Intolerances        SOCIAL HISTORY:    [  ] Etoh  [  ] Smoking  [  ] Substance abuse     Home Environment:  [  ] Home Alone  [x  ] Lives with Family-wife, dtr  [  ] Home Health Aid    Dwelling:  [  ] Apartment  [  ] Private House  [  ] Adult Home  [  ] Skilled Nursing Facility      [  ] Short Term  [  ] Long Term  [  ] Stairs       Elevator [  ]    FUNCTIONAL STATUS PTA: (Check all that apply)  Ambulation: [ x  ]Independent    [  ] Dependent     [  ] Non-Ambulatory  Assistive Device: [  ] SA Cane  [  ]  Q Cane  [x  ] Walker  [  ]  Wheelchair  ADL : [ x ] Independent  [  ]  Dependent       Vital Signs Last 24 Hrs  T(C): 36.2 (24 Nov 2020 07:01), Max: 37.2 (23 Nov 2020 15:00)  T(F): 97.2 (24 Nov 2020 07:01), Max: 98.9 (23 Nov 2020 15:00)  HR: 81 (24 Nov 2020 07:51) (74 - 97)  BP: 152/95 (24 Nov 2020 07:51) (98/55 - 174/75)  BP(mean): 119 (24 Nov 2020 07:51) (71 - 122)  RR: 18 (24 Nov 2020 07:51) (18 - 19)  SpO2: 93% (24 Nov 2020 06:13) (93% - 97%)      PHYSICAL EXAM: Alert & Oriented X3  GENERAL: NAD, well-groomed, well-developed  HEAD:  Atraumatic, Normocephalic  EYES: EOMI, PERRLA, conjunctiva and sclera clear  NECK: Supple, No JVD, Normal thyroid  CHEST/LUNG: Clear bilaterally; No rales, rhonchi, wheezing, or rubs  HEART: Regular rate and rhythm; No murmurs, rubs, or gallops  ABDOMEN: Soft, Nontender, Nondistended; Bowel sounds present  EXTREMITIES:  2+ Peripheral Pulses, No clubbing, cyanosis, or edema    NERVOUS SYSTEM:  Cranial Nerves 2-12 intact [  ] Abnormal  [  ]  ROM: WFL all extremities [  ]  Abnormal [  ]  Motor Strength: WFL all extremities  [  ]  Abnormal [x  ] 4+/5 LE's  Sensation: intact to light touch [ x ] Abnormal [  ]  Reflexes: Symmetric [  ]  Abnormal [  ]    FUNCTIONAL STATUS:  Bed Mobility: Independent [  ]  Supervision [  ]  Needs Assistance [  ]  N/A [  ]  Transfers: Independent [  ]  Supervision [  ]  Needs Assistance [  ]  N/A [  ]   Ambulation: Independent [  ]  Supervision [  ]  Needs Assistance [  ]  N/A [  ]  ADL: Independent [  ] Requires Assistance [  ] N/A [  ]      LABS:                        12.3   7.21  )-----------( 179      ( 24 Nov 2020 05:20 )             37.3     11-24    139  |  101  |  22<H>  ----------------------------<  98  4.0   |  24  |  1.2    Ca    8.6      24 Nov 2020 05:20  Mg     1.9     11-24    TPro  5.5<L>  /  Alb  3.0<L>  /  TBili  0.6  /  DBili  x   /  AST  18  /  ALT  9   /  AlkPhos  95  11-24          RADIOLOGY & ADDITIONAL STUDIES:    Assesment:

## 2020-11-24 NOTE — PROGRESS NOTE ADULT - ASSESSMENT
IMPRESSION:  sepsis present on admission  Vtach seen      SUGGEST:  iv abx  fu cultures and adjust abx   cardiac management per Dr. Henriquez  electrolyte replacement re-check K in PM  F/u echo,  F/u with cardiologist  OOB as tolerated  
68 yo M with hx of CAD s/p stent x 3 on plavix, HTN, HLD, and KELSEY, bilateral knee replacement, fall and fracture of 5 L ribs in May 2020, was brought to the hospital after a fall.     #Mechanical fall  PT f/u  Patient admitted from STR    #Sepsis secondary to cystitis/prostatitis  Continue Rocephin  F/u cultures    #Non-sustained V-tach  Cardiology input appreciated, no documented evidence of VT, so possibly artifactual  Keep Mg>2, K>4  Cardio f/u    #Chest pain secondary to previous rib fractures - better tolerated  ACS ruled out  Stress test negative 9/2020  percocet prn pain    #Hypomagnesemia   pt allergic to sulfate, not Mg  Continue Mg Oxide, f/u levels    # Hypokalemia   Replete, f/u level    #History of CAD s/p stents x3; HTN (controlled); DLD  c/w DAPT  c/w atenolol 50mg PO QD, losartan 50mg po q24 and atorvastatin 20mg PO QHS    #1.1cm RML and 2 subcm RLL pulmonary nodules  Outpt f/u pulm    #DVT Prophylaxis: Lovenox 40mg SQ QD   #GI Prophylaxis: not indicated   #Activity: IAT, PT f/u  #Code Status: Full Code
71 yo M with pmhx of CAD, HTN, HLD, KELSEY, b/l knee replacement presenting from [facility] s/p fall     #Mechanical fall  PT input appreciated,   Will benefit from STR, anticipate for tomorrow     #Sepsis secondary to cystitis/prostatitis  Patient was complaining of dysuria, now resolved  Continue Rocephin  Cultures with E.Coli, pending sensitives     #Non-sustained V-tach  Cardiology input appreciated, no documented evidence of VT, so possibly artifactual  Keep Mg>2, K>4  restart home Atenolol 50mg qd (was on Lopressor for a day here)  cleared by cardio for dc    #Chest pain secondary to previous rib fractures - better tolerated  ACS ruled out  Stress test negative 9/2020  percocet prn pain    #Hypomagnesemia / Hypokalemia   resolved s/p supplementation    #History of CAD s/p stents x3; HTN (controlled); DLD  c/w DAPT  c/w atenolol 50mg PO QD, losartan 50mg po q24 and atorvastatin 20mg PO QHS    #1.1cm RML and 2 subcm RLL pulmonary nodules  Outpt f/u pulm#DVT Prophylaxis: Lovenox 40mg SQ QD     #Dispo: Anticipate for discharge tomorrow - covid swab sent today, pending urine culture sensitivies, with outpatient PMD and Cardio f/u  #Code Status: Full Code.      
IMPRESSION:    s/p Mechanical fall  Sepsis on admission secondary to UTI  2 episodes of non sustained V.tach  H/o CAD s/p PCI   h/o HTN  h/o KELSEY        PLAN:      PULMONARY: head end elevated 45, aspiration precautions                  CARDIOVASCULAR: cw atenolol, aspirin and plavix, cardio eval, supplement hypomagnesemia keep >2,, trend cardiac enzymes    GI: GI ppx, oral feeds    RENAL:  f/u with lytes,    INFECTIOUS DISEASE: cw rocephin, fu urine and blood Cx.    HEMATOLOGICAL:  DVT prophylaxis.    ENDOCRINE:  Follow up FS.  Insulin protocol if needed.    MUSCULOSKELETAL: bed rest, PT eval, fall precautions    tele monitoring     
IMPRESSION:    s/p Mechanical fall  Sepsis on admission secondary to UTI  2 episodes of non sustained V.tach  H/o CAD s/p PCI   h/o HTN  h/o KELSEY        PLAN:    CNS: no sedation     HEENT: oral care     PULMONARY: head end elevated 45, aspiration precautions                  CARDIOVASCULAR: cw atenolol, aspirin and plavix, cardio eval, supplement hypomagnesemia keep >2,, trend cardiac enzymes    GI: GI ppx, oral feeds    RENAL:  f/u with lytes,    INFECTIOUS DISEASE: cw rocephin, fu urine and blood Cx.    HEMATOLOGICAL:  DVT prophylaxis.    ENDOCRINE:  Follow up FS.  Insulin protocol if needed.    MUSCULOSKELETAL: bed rest, PT eval, fall precautions    icu monitoring     
IMPRESSION:  sepsis UTI -  improving  ?Arrhythmia      SUGGEST:  Continue Rocephin for now f/u sensitivites  Cleared from cardiac standpoint  Continue metoprolol 75mg BID  DVT ppx  OOB with PT  Downgrade to medical floor
Patient had chest pain. Not relieved with ntg. No pain now. MI r/o. Check am  ekg. Ambulate if stable
IMPRESSION:  sepsis present on admission  Vtach seen      SUGGEST:  full cultures  iv abx  fu cultures and adjust abx   cardiac management  electrolyte replacement  check PSA

## 2020-11-24 NOTE — CHART NOTE - NSCHARTNOTEFT_GEN_A_CORE
70 yo M with hx of CAD s/p stent x 3 on plavix, HTN, HLD, and KELSEY, bilateral knee replacement, fall and fracture of 5 L ribs in May 2020, was brought to the hospital after a fall.  Patient was admitted fir the      #Mechanical fall  PT f/u  Patient admitted from STR    #Sepsis secondary to cystitis/prostatitis  Continue Rocephin  F/u cultures    #Non-sustained V-tach  Cardiology input appreciated, no documented evidence of VT, so possibly artifactual  Keep Mg>2, K>4  Cardio f/u    #Chest pain secondary to previous rib fractures - better tolerated  ACS ruled out  Stress test negative 9/2020  percocet prn pain    #Hypomagnesemia   pt allergic to sulfate, not Mg  Continue Mg Oxide, f/u levels    # Hypokalemia   Replete, f/u level    #History of CAD s/p stents x3; HTN (controlled); DLD  c/w DAPT  c/w atenolol 50mg PO QD, losartan 50mg po q24 and atorvastatin 20mg PO QHS    #1.1cm RML and 2 subcm RLL pulmonary nodules  Outpt f/u pulm    #DVT Prophylaxis: Lovenox 40mg SQ QD   #GI Prophylaxis: not indicated   #Activity: IAT, PT f/u  #Code Status: Full Code 68 yo M with hx of CAD s/p stent x 3 on plavix, HTN, HLD, and KELSEY, bilateral knee replacement, fall and fracture of 5 Left ribs in May 2020, was brought to the hospital after a mechanical fall.  Patient was admitted for trauma/fall workup, while in the ED telemtry showed evidence of possible Non-sustained V-tach, so patient was upgraded to CCU. While in the CCU there were no events on telemetry, patient was seen by Dr. Henriquez, who suspected that initial events were artifacts. Patient with recent stress test 9/2020 that was negative.   On the night of 11/23 patient started complaining of left sided chest pain, epigastric abdominal pain,. Non-radiating, not relieved by nitro, relieved by Morphine. On physical exam pain reproducible by touch, ECG done was unchanged, troponin wnl. Patient poor historian, per daughter has been having this pain since rib fracture.      PHYSICAL EXAM:T(C): 36.2 (11-24-20 @ 07:01), Max: 37.2 (11-23-20 @ 15:00)  HR: 81 (11-24-20 @ 07:51) (74 - 97)  BP: 152/95 (11-24-20 @ 07:51) (98/55 - 174/75)  RR: 18 (11-24-20 @ 07:51) (18 - 19)  SpO2: 93% (11-24-20 @ 06:13) (93% - 96%)  GENERAL: NAD, well-developed  HEAD:  Atraumatic, Normocephalic  EYES: EOMI, PERRLA, conjunctiva and sclera clear  NECK: Supple, No JVD  CHEST/LUNG: Clear to auscultation bilaterally; No wheeze. Left anterio chest wall tender to touch  HEART: Regular rate and rhythm; No murmurs, rubs, or gallops  ABDOMEN: Soft, Nontender, Nondistended; Bowel sounds present  EXTREMITIES:  2+ Peripheral Pulses, No clubbing, cyanosis, or edema  PSYCH: AAOx3  NEUROLOGY: non-focal  SKIN: No rashes or lesions      Assessment/Plan:     #Mechanical fall  PT input appreciated,   Will benefit from STR, anticipate for tomorrow     #Sepsis secondary to cystitis/prostatitis  Patient was complaining of dysuria, now resolved  Continue Rocephin  Cultures with E.Coli, pending sensitives     #Non-sustained V-tach  Cardiology input appreciated, no documented evidence of VT, so possibly artifactual  Keep Mg>2, K>4    #Chest pain secondary to previous rib fractures - better tolerated  ACS ruled out  Stress test negative 9/2020  percocet prn pain    #Hypomagnesemia   pt allergic to sulfate, not Mg  Continue Mg Oxide, f/u levels    # Hypokalemia   Replete, f/u level    #History of CAD s/p stents x3; HTN (controlled); DLD  c/w DAPT  c/w atenolol 50mg PO QD, losartan 50mg po q24 and atorvastatin 20mg PO QHS    #1.1cm RML and 2 subcm RLL pulmonary nodules  Outpt f/u pulm    #DVT Prophylaxis: Lovenox 40mg SQ QD   #GI Prophylaxis: not indicated   #Activity: IAT  #Dispo: Anticipate for discharge tomorrow, pending urine culture sensitivies, with outpatient PMD and Cardio f/u  #Code Status: Full Code 70 yo M with hx of CAD s/p stent x 3 on plavix, HTN, HLD, and KELSEY, bilateral knee replacement, fall and fracture of 5 Left ribs in May 2020, was brought to the hospital after a mechanical fall.  Patient was admitted for trauma/fall workup, while in the ED telemtry showed evidence of possible Non-sustained V-tach, so patient was upgraded to CCU. While in the CCU there were no events on telemetry, patient was seen by Dr. Henriqeuz, who suspected that initial events were artifacts. Patient with recent stress test 9/2020 that was negative.   On the night of 11/23 patient started complaining of left sided chest pain, epigastric abdominal pain,. Non-radiating, not relieved by nitro, relieved by Morphine. On physical exam pain reproducible by touch, ECG done was unchanged, troponin wnl. Patient poor historian, per daughter has been having this pain since rib fracture.      PHYSICAL EXAM:T(C): 36.2 (11-24-20 @ 07:01), Max: 37.2 (11-23-20 @ 15:00)  HR: 81 (11-24-20 @ 07:51) (74 - 97)  BP: 152/95 (11-24-20 @ 07:51) (98/55 - 174/75)  RR: 18 (11-24-20 @ 07:51) (18 - 19)  SpO2: 93% (11-24-20 @ 06:13) (93% - 96%)  GENERAL: NAD, well-developed  HEAD:  Atraumatic, Normocephalic  EYES: EOMI, PERRLA, conjunctiva and sclera clear  NECK: Supple, No JVD  CHEST/LUNG: Clear to auscultation bilaterally; No wheeze. Left anterio chest wall tender to touch  HEART: Regular rate and rhythm; No murmurs, rubs, or gallops  ABDOMEN: Soft, Nontender, Nondistended; Bowel sounds present  EXTREMITIES:  2+ Peripheral Pulses, No clubbing, cyanosis, or edema  PSYCH: AAOx3  NEUROLOGY: non-focal  SKIN: No rashes or lesions      Assessment/Plan:     #Mechanical fall  PT input appreciated,   Will benefit from STR, anticipate for tomorrow     #Sepsis secondary to cystitis/prostatitis  Patient was complaining of dysuria, now resolved  Continue Rocephin  Cultures with E.Coli, pending sensitives     #Non-sustained V-tach  Cardiology input appreciated, no documented evidence of VT, so possibly artifactual  Keep Mg>2, K>4  restart home Atenolol 50mg qd (was on Lopressor for a day here)    #Chest pain secondary to previous rib fractures - better tolerated  ACS ruled out  Stress test negative 9/2020  percocet prn pain    #Hypomagnesemia   pt allergic to sulfate, not Mg  Continue Mg Oxide, f/u levels    # Hypokalemia   Replete, f/u level    #History of CAD s/p stents x3; HTN (controlled); DLD  c/w DAPT  c/w atenolol 50mg PO QD, losartan 50mg po q24 and atorvastatin 20mg PO QHS    #1.1cm RML and 2 subcm RLL pulmonary nodules  Outpt f/u pulm    #DVT Prophylaxis: Lovenox 40mg SQ QD   #GI Prophylaxis: not indicated   #Activity: IAT  #Dispo: Anticipate for discharge tomorrow, pending urine culture sensitivies, with outpatient PMD and Cardio f/u  #Code Status: Full Code 70 yo M with hx of CAD s/p stent x 3 on plavix, HTN, HLD, and KELSEY, bilateral knee replacement, fall and fracture of 5 Left ribs in May 2020, was brought to the hospital after a mechanical fall.  Patient was admitted for trauma/fall workup, while in the ED telemtry showed evidence of possible Non-sustained V-tach, so patient was upgraded to CCU. While in the CCU there were no events on telemetry, patient was seen by Dr. Henriquez, who suspected that initial events were artifacts. Patient with recent stress test 9/2020 that was negative.   On the night of 11/23 patient started complaining of left sided chest pain, epigastric abdominal pain,. Non-radiating, not relieved by nitro, relieved by Morphine. On physical exam pain reproducible by touch, ECG done was unchanged, troponin wnl. Patient poor historian, per daughter has been having this pain since rib fracture.      PHYSICAL EXAM:T(C): 36.2 (11-24-20 @ 07:01), Max: 37.2 (11-23-20 @ 15:00)  HR: 81 (11-24-20 @ 07:51) (74 - 97)  BP: 152/95 (11-24-20 @ 07:51) (98/55 - 174/75)  RR: 18 (11-24-20 @ 07:51) (18 - 19)  SpO2: 93% (11-24-20 @ 06:13) (93% - 96%)  GENERAL: NAD, well-developed  HEAD:  Atraumatic, Normocephalic  EYES: EOMI, PERRLA, conjunctiva and sclera clear  NECK: Supple, No JVD  CHEST/LUNG: Clear to auscultation bilaterally; No wheeze. Left anterior chest wall tender to touch  HEART: Regular rate and rhythm; No murmurs, rubs, or gallops  ABDOMEN: Soft, Nontender, Nondistended; Bowel sounds present  EXTREMITIES:  2+ Peripheral Pulses, No clubbing, cyanosis, or edema  PSYCH: AAOx3  NEUROLOGY: non-focal  SKIN: No rashes or lesions      Assessment/Plan:     #Mechanical fall  PT input appreciated,   Will benefit from STR, anticipate for tomorrow     #Sepsis secondary to cystitis/prostatitis  Patient was complaining of dysuria, now resolved  Continue Rocephin  Cultures with E.Coli, pending sensitives     #Non-sustained V-tach  Cardiology input appreciated, no documented evidence of VT, so possibly artifactual  Keep Mg>2, K>4  restart home Atenolol 50mg qd (was on Lopressor for a day here)    #Chest pain secondary to previous rib fractures - better tolerated  ACS ruled out  Stress test negative 9/2020  percocet prn pain    #Hypomagnesemia   pt allergic to sulfate, not Mg  Continue Mg Oxide, f/u levels    # Hypokalemia   Replete, f/u level    #History of CAD s/p stents x3; HTN (controlled); DLD  c/w DAPT  c/w atenolol 50mg PO QD, losartan 50mg po q24 and atorvastatin 20mg PO QHS    #1.1cm RML and 2 subcm RLL pulmonary nodules  Outpt f/u pulm    #DVT Prophylaxis: Lovenox 40mg SQ QD   #GI Prophylaxis: not indicated   #Activity: IAT  #Dispo: Anticipate for discharge tomorrow, pending urine culture sensitives, with outpatient PMD and Cardio f/u  #Code Status: Full Code  #Daughter Radha updated about patient's condition at [phone] 70 yo M with hx of CAD s/p stent x 3 on plavix, HTN, HLD, and KELSEY, bilateral knee replacement, fall and fracture of 5 Left ribs in May 2020, was brought to the hospital after a mechanical fall.  Patient was admitted for trauma/fall workup, while in the ED telemtry showed evidence of possible Non-sustained V-tach, so patient was upgraded to CCU. While in the CCU there were no events on telemetry, patient was seen by Dr. Henriquez, who suspected that initial events were artifacts. Patient with recent stress test 9/2020 that was negative.   On the night of 11/23 patient started complaining of left sided chest pain, epigastric abdominal pain,. Non-radiating, not relieved by nitro, relieved by Morphine. On physical exam pain reproducible by touch, ECG done was unchanged, troponin wnl. Patient poor historian, per daughter has been having this pain since rib fracture.      PHYSICAL EXAM:T(C): 36.2 (11-24-20 @ 07:01), Max: 37.2 (11-23-20 @ 15:00)  HR: 81 (11-24-20 @ 07:51) (74 - 97)  BP: 152/95 (11-24-20 @ 07:51) (98/55 - 174/75)  RR: 18 (11-24-20 @ 07:51) (18 - 19)  SpO2: 93% (11-24-20 @ 06:13) (93% - 96%)  GENERAL: NAD, well-developed  HEAD:  Atraumatic, Normocephalic  EYES: EOMI, PERRLA, conjunctiva and sclera clear  NECK: Supple, No JVD  CHEST/LUNG: Clear to auscultation bilaterally; No wheeze. Left anterior chest wall tender to touch  HEART: Regular rate and rhythm; No murmurs, rubs, or gallops  ABDOMEN: Soft, Nontender, Nondistended; Bowel sounds present  EXTREMITIES:  2+ Peripheral Pulses, No clubbing, cyanosis, or edema  PSYCH: AAOx3  NEUROLOGY: non-focal  SKIN: No rashes or lesions      Assessment/Plan:     #Mechanical fall  PT input appreciated,   Will benefit from STR, anticipate for tomorrow   COVID swab sent    #Sepsis secondary to cystitis/prostatitis  Patient was complaining of dysuria, now resolved  Continue Rocephin  Cultures with E.Coli, pending sensitives     #Non-sustained V-tach  Cardiology input appreciated, no documented evidence of VT, so possibly artifactual  Keep Mg>2, K>4  restart home Atenolol 50mg qd (was on Lopressor for a day here)    #Chest pain secondary to previous rib fractures - better tolerated  ACS ruled out  Stress test negative 9/2020  percocet prn pain    #Hypomagnesemia   pt allergic to sulfate, not Mg  Continue Mg Oxide, f/u levels    # Hypokalemia   Replete, f/u level    #History of CAD s/p stents x3; HTN (controlled); DLD  c/w DAPT  c/w atenolol 50mg PO QD, losartan 50mg po q24 and atorvastatin 20mg PO QHS    #1.1cm RML and 2 subcm RLL pulmonary nodules  Outpt f/u pulm    #DVT Prophylaxis: Lovenox 40mg SQ QD   #GI Prophylaxis: not indicated   #Activity: IAT  #Dispo: Anticipate for discharge tomorrow, pending urine culture sensitives, with outpatient PMD and Cardio f/u  #Code Status: Full Code  #Daughter Radha updated about patient's condition at [phone] 68 yo M with hx of CAD s/p stent x 3 on plavix, HTN, HLD, and KELSEY, bilateral knee replacement, fall and fracture of 5 Left ribs in May 2020, was brought to the hospital after a mechanical fall.  Patient was admitted for trauma/fall workup, while in the ED telemtry showed evidence of possible Non-sustained V-tach, so patient was upgraded to CCU. While in the CCU there were no events on telemetry, patient was seen by Dr. Hneriquez, who suspected that initial events were artifacts. Patient with recent stress test 9/2020 that was negative.   On the night of 11/23 patient started complaining of left sided chest pain, epigastric abdominal pain,. Non-radiating, not relieved by nitro, relieved by Morphine. On physical exam pain reproducible by touch, ECG done was unchanged, troponin wnl. Patient poor historian, per daughter has been having this pain since rib fracture.      PHYSICAL EXAM:T(C): 36.2 (11-24-20 @ 07:01), Max: 37.2 (11-23-20 @ 15:00)  HR: 81 (11-24-20 @ 07:51) (74 - 97)  BP: 152/95 (11-24-20 @ 07:51) (98/55 - 174/75)  RR: 18 (11-24-20 @ 07:51) (18 - 19)  SpO2: 93% (11-24-20 @ 06:13) (93% - 96%)  GENERAL: NAD, well-developed  HEAD:  Atraumatic, Normocephalic  EYES: EOMI, PERRLA, conjunctiva and sclera clear  NECK: Supple, No JVD  CHEST/LUNG: Clear to auscultation bilaterally; No wheeze. Left anterior chest wall tender to touch  HEART: Regular rate and rhythm; No murmurs, rubs, or gallops  ABDOMEN: Soft, Nontender, Nondistended; Bowel sounds present  EXTREMITIES:  2+ Peripheral Pulses, No clubbing, cyanosis, or edema  PSYCH: AAOx3  NEUROLOGY: non-focal  SKIN: No rashes or lesions      Assessment/Plan:     #Mechanical fall  PT input appreciated,   Will benefit from STR, anticipate for tomorrow   COVID swab sent    #Sepsis secondary to cystitis/prostatitis  Patient was complaining of dysuria, now resolved  Continue Rocephin  Cultures with E.Coli, pending sensitives     #Non-sustained V-tach  Cardiology input appreciated, no documented evidence of VT, so possibly artifactual  Keep Mg>2, K>4  restart home Atenolol 50mg qd (was on Lopressor for a day here)    #Chest pain secondary to previous rib fractures - better tolerated  ACS ruled out  Stress test negative 9/2020  percocet prn pain    #Confusion, altered mental status from baseline, improving  Per daughter patient sounds confused on the phone  Today patient seems more alert, AO*4  likely a component of metabolic encephalopathy secondary to sepsis, plus ICU delirium  Will benefit from continued PT and frequent orientation    #Hypomagnesemia   pt allergic to sulfate, not Mg  Continue Mg Oxide, f/u levels    # Hypokalemia   Replete, f/u level    #History of CAD s/p stents x3; HTN (controlled); DLD  c/w DAPT  c/w atenolol 50mg PO QD, losartan 50mg po q24 and atorvastatin 20mg PO QHS    #1.1cm RML and 2 subcm RLL pulmonary nodules  Outpt f/u pulm    #DVT Prophylaxis: Lovenox 40mg SQ QD   #GI Prophylaxis: not indicated   #Activity: IAT  #Dispo: Anticipate for discharge tomorrow, pending urine culture sensitives, with outpatient PMD and Cardio f/u  #Code Status: Full Code  #Daughter Radha updated about patient's condition at [phone]

## 2020-11-24 NOTE — DISCHARGE NOTE PROVIDER - HOSPITAL COURSE
70 yo M with hx of CAD s/p stent x 3 on plavix, HTN, HLD, and KELSEY, bilateral knee replacement, fall and fracture of 5 L ribs in May 2020, was brought to the hospital after a kendra.   As per pt, he was trying to get sit on the chair from his bed and fell while trying to get up, hit his head and fell on left side of the body. Pt denies any loss of consciousness, seizures, nausea, lightheadedness, vertigo, tinnitus, chest pain, shortness of breath, cough. Pt at baseline ambulates with the help of a cane/walker.  Pt was initially admitted for fall workup of fall, was noted to have 10 beats of non sustained V.tach and another event of 4 beats of non sustained v.tach with multiple PVCs on telemetry. Pt hence is being admitted to the CCU for further management. On encounter, pt is endorsing left sided chest pain, reproducible on palpation. ekg stat showed sinus rhythm. Recent Echo Oct 2020 showed EF 62% and mild AS.    In the ED, the pt initially met the SIRS criteria (Tmax 102, tachy 114), lab workup showed leukocytosis. Trauma workup negative but CT abdomen showed possible cystitis/Prostatitis. UA, urine and blood Cx sent by the ED team. The pt started on rocephin. Pt received 1 L LR in the ED.     70 yo M with hx of CAD s/p stent x 3 on plavix, HTN, HLD, and KELSEY, bilateral knee replacement, fall and fracture of 5 L ribs in May 2020, was brought to the hospital after a fall.   As per pt, he was trying to get sit on the chair from his bed and fell while trying to get up, hit his head and fell on left side of the body. Pt denies any loss of consciousness, seizures, nausea, lightheadedness, vertigo, tinnitus, chest pain, shortness of breath, cough. Pt at baseline ambulates with the help of a cane/walker.  Pt was initially admitted for fall workup of fall, was noted to have 10 beats of non sustained V.tach and another event of 4 beats of non sustained v.tach with multiple PVCs on telemetry. Pt hence is being admitted to the CCU for further management. On encounter, pt is endorsing left sided chest pain, reproducible on palpation. ekg stat showed sinus rhythm. Recent Echo Oct 2020 showed EF 62% and mild AS.    In the ED, the pt initially met the SIRS criteria (Tmax 102, tachy 114), lab workup showed leukocytosis. Trauma workup negative but CT abdomen showed possible cystitis/Prostatitis. UA, urine and blood Cx sent by the ED team. The pt started on rocephin. Pt received 1 L LR in the ED.     70 yo M with hx of CAD s/p stent x 3 on plavix, HTN, HLD, and KELSEY, bilateral knee replacement, fall and fracture of 5 L ribs in May 2020, was brought to the hospital after a fall.   As per pt, he was trying to get sit on the chair from his bed and fell while trying to get up, hit his head and fell on left side of the body. Pt denies any loss of consciousness, seizures, nausea, lightheadedness, vertigo, tinnitus, chest pain, shortness of breath, cough. Pt at baseline ambulates with the help of a cane/walker.  Pt was initially admitted for fall workup of fall, was noted to have 10 beats of non sustained V.tach and another event of 4 beats of non sustained v.tach with multiple PVCs on telemetry. for further management. On encounter, pt is endorsing left sided chest pain, reproducible on palpation. ekg stat showed sinus rhythm. Recent Echo Oct 2020 showed EF 62% and mild AS.    In the ED, the pt initially met the SIRS criteria (Tmax 102, tachy 114), lab workup showed leukocytosis. Trauma workup negative but CT abdomen showed possible cystitis/Prostatitis. UA, urine and blood Cx sent by the ED team. The pt started on rocephin. Pt received 1 L LR in the ED.     Pt admitted to the CCU.    #Mechanical fall  PT input appreciated,   Will benefit from STR, anticipate for tomorrow     #Sepsis secondary to cystitis/prostatitis  Patient was complaining of dysuria, now resolved  Continue Rocephin  Cultures with E.Coli, pending sensitives     #Non-sustained V-tach  Cardiology input appreciated, no documented evidence of VT, so possibly artifactual  Keep Mg>2, K>4  restart home Atenolol 50mg qd (was on Lopressor for a day here)  cleared by cardio for dc    #Chest pain secondary to previous rib fractures - better tolerated  ACS ruled out  Stress test negative 9/2020  percocet prn pain    #Hypomagnesemia / Hypokalemia   resolved s/p supplementation    #History of CAD s/p stents x3; HTN (controlled); DLD  c/w DAPT  c/w atenolol 50mg PO QD, losartan 50mg po q24 and atorvastatin 20mg PO QHS    #1.1cm RML and 2 subcm RLL pulmonary nodules  Outpt f/u pulm#DVT Prophylaxis: Lovenox 40mg SQ QD    68 yo M with hx of CAD s/p stent x 3 on plavix, HTN, HLD, and KELSEY, bilateral knee replacement, fall and fracture of 5 L ribs in May 2020, was brought to the hospital after a fall.   As per pt, he was trying to get sit on the chair from his bed and fell while trying to get up, hit his head and fell on left side of the body. Pt denies any loss of consciousness, seizures, nausea, lightheadedness, vertigo, tinnitus, chest pain, shortness of breath, cough. Pt at baseline ambulates with the help of a cane/walker.  Pt was initially admitted for fall workup of fall, was noted to have 10 beats of non sustained V.tach and another event of 4 beats of non sustained v.tach with multiple PVCs on telemetry. for further management. On encounter, pt is endorsing left sided chest pain, reproducible on palpation. ekg stat showed sinus rhythm. Recent Echo Oct 2020 showed EF 62% and mild AS.    In the ED, the pt initially met the SIRS criteria (Tmax 102, tachy 114), lab workup showed leukocytosis. Trauma workup negative but CT abdomen showed possible cystitis/Prostatitis. UA, urine and blood Cx sent by the ED team. The pt started on rocephin. Pt received 1 L LR in the ED.     Pt admitted to the CCU and then transferred   to the medical floor      #Mechanical fall  PT input appreciated,   Will benefit from STR    #Sirs secondary to cystitis/prostatitis  Patient was complaining of dysuria, now resolved  Continue Rocephin  Cultures with E.Coli, pansensitive     #Non-sustained V-tach  Cardiology input appreciated, no documented evidence of VT, so possibly artifactual  Keep Mg>2, K>4  restart home Atenolol 50mg qd (was on Lopressor for a day here)  cleared by cardio for dc    #Chest pain secondary to previous rib fractures - better tolerated  ACS ruled out  Stress test negative 9/2020  percocet prn pain    #Hypomagnesemia / Hypokalemia   resolved s/p supplementation    #History of CAD s/p stents x3; HTN (controlled); DLD  c/w DAPT  c/w atenolol 50mg PO QD, losartan 50mg po q24 and atorvastatin 20mg PO QHS    #1.1cm RML and 2 subcm RLL pulmonary nodules  Outpt f/u pulm#DVT Prophylaxis: Lovenox 40mg SQ QD     Patient seenand examined this morning  Lying comfortably in bed. Denies any complaints    Vital Signs Last 24 Hrs  T(C): 37.2 (25 Nov 2020 05:00), Max: 37.4 (24 Nov 2020 17:30)  T(F): 99 (25 Nov 2020 05:00), Max: 99.3 (24 Nov 2020 17:30)  HR: 70 (25 Nov 2020 05:00) (70 - 84)  BP: 159/86 (25 Nov 2020 05:00) (150/70 - 182/85)  BP(mean): --  RR: 18 (25 Nov 2020 05:00) (18 - 18)  SpO2: --    PHYSICAL EXAM:  GENERAL: NAD, well-groomed, well-developed  HEAD:  Atraumatic, Normocephalic  EYES: EOMI, PERRLA, conjunctiva and sclera clear  NERVOUS SYSTEM:  Alert & Oriented X 4, Good concentration; Motor Strength 5/5 B/L upper and lower extremities; DTRs 2+ intact and symmetric  CHEST/LUNG: good air entry   HEART: Regular rate and rhythm; No murmurs, rubs, or gallops  ABDOMEN: Soft, Nontender, Nondistended; Bowel sounds present, obese  EXTREMITIES:  2+ Peripheral Pulses, No clubbing, cyanosis, or edema  SKIN: No rashes or lesions    d/c to [facility] today  d/c planning took over 50 min  d/c papers done by me

## 2020-11-24 NOTE — CONSULT NOTE ADULT - ASSESSMENT
IMPRESSION: Rehab of debility, urosepsis    PRECAUTIONS: [x  ] Cardiac  [  ] Respiratory  [  ] Seizures [  ] Contact Isolation  [  ] Droplet Isolation  [  ] Other    Weight Bearing Status:     RECOMMENDATION:    Out of Bed to Chair     DVT/Decubiti Prophylaxis    REHAB PLAN:     [ x  ] Bedside P/T 3-5 times a week   [   ]   Bedside O/T  2-3 times a week             [   ] No Rehab Therapy Indicated                   [   ]  Speech Therapy   Conditioning/ROM                                    ADL  Bed Mobility                                               Conditioning/ROM  Transfers                                                     Bed Mobility  Sitting /Standing Balance                         Transfers                                        Gait Training                                               Sitting/Standing Balance  Stair Training [   ]Applicable                    Home equipment Eval                                                                        Splinting  [   ] Only      GOALS:   ADL   [ x  ]   Independent                    Transfers  [x   ] Independent                          Ambulation  [  x ] Independent     [x    ] With device                            [   ]  CG                                                         [   ]  CG                                                                  [   ] CG                            [    ] Min A                                                   [   ] Min A                                                              [   ] Min  A          DISCHARGE PLAN:   [   ]  Good candidate for Intensive Rehabilitation/Hospital based-[facility]A [facility]                                             Will tolerate 3hrs Intensive Rehab Daily                                       [  xx  ]  Return toShort Term Rehab in Skilled Nursing Facility                                       [    ]  Home with Outpatient or  services                                         [    ]  Possible Candidate for Intensive Hospital based Rehab

## 2020-11-24 NOTE — PROGRESS NOTE ADULT - SUBJECTIVE AND OBJECTIVE BOX
Patient feels good, no abdominal pain today       T(F): 97.2 (11-24-20 @ 07:01), Max: 98.9 (11-23-20 @ 15:00)  HR: 81 (11-24-20 @ 07:51)  BP: 152/95 (11-24-20 @ 07:51)  RR: 18 (11-24-20 @ 07:51)  SpO2: 93% (11-24-20 @ 06:13) (93% - 96%)    PHYSICAL EXAM:  GENERAL: NAD  HEAD:  Atraumatic, Normocephalic  NERVOUS SYSTEM:  no focal deficits   CHEST/LUNG: Clear to percussion bilaterally; No rales, rhonchi, wheezing, or rubs  HEART: Regular rate and rhythm; No murmurs, rubs, or gallops  ABDOMEN: Soft, Nontender, Nondistended; Bowel sounds present  EXTREMITIES:  2+ Peripheral Pulses, No clubbing, cyanosis, or edema  LYMPH: No lymphadenopathy noted  SKIN: No rashes or lesions    LABS  11-24    139  |  101  |  22<H>  ----------------------------<  98  4.0   |  24  |  1.2    Ca    8.6      24 Nov 2020 05:20  Mg     1.9     11-24    TPro  5.5<L>  /  Alb  3.0<L>  /  TBili  0.6  /  DBili  x   /  AST  18  /  ALT  9   /  AlkPhos  95  11-24                          12.3   7.21  )-----------( 179      ( 24 Nov 2020 05:20 )             37.3         CARDIAC ENZYMES  Creatine Kinase, Serum: 65 (11-22 @ 05:56)    CKMB Units: 1.5 (11-22 @ 05:56)    Troponin T, Serum: 0.01 ng/mL (11-24-20 @ 05:20)  Troponin T, Serum: 0.01 ng/mL (11-23-20 @ 21:57)  Troponin T, Serum: 0.01 ng/mL (11-23-20 @ 05:43)  Troponin T, Serum: <0.01 ng/mL (11-22-20 @ 15:00)  Troponin T, Serum: 0.02 ng/mL (11-22-20 @ 05:56)  Troponin T, Serum: 0.01 ng/mL (11-21-20 @ 17:52)      Culture Results:   >100,000 CFU/ml Escherichia coli (11-21-20)  Culture Results:   No growth to date. (11-21-20)  Culture Results:   No growth to date. (11-21-20)    RADIOLOGY  < from: CT Chest w/ IV Cont (11.21.20 @ 20:12) >    **Circumferential wall thickening of the urinary bladder, with fat stranding in the pelvis around the bladder and prostate. Correlate with urinalysis for cystitis and prostatitis.    < end of copied text >    MEDICATIONS  (STANDING):  aspirin  chewable 81 milliGRAM(s) Oral daily  ATENolol  Tablet 50 milliGRAM(s) Oral daily  atorvastatin 20 milliGRAM(s) Oral at bedtime  cefTRIAXone   IVPB 1000 milliGRAM(s) IV Intermittent every 24 hours  chlorhexidine 4% Liquid 1 Application(s) Topical <User Schedule>  clopidogrel Tablet 75 milliGRAM(s) Oral daily  enoxaparin Injectable 40 milliGRAM(s) SubCutaneous daily  influenza   Vaccine 0.5 milliLiter(s) IntraMuscular once  losartan 100 milliGRAM(s) Oral daily  senna 2 Tablet(s) Oral at bedtime  sucralfate 1 Gram(s) Oral two times a day    MEDICATIONS  (PRN):  acetaminophen   Tablet .. 650 milliGRAM(s) Oral every 6 hours PRN Temp greater or equal to 38C (100.4F)  oxycodone    5 mG/acetaminophen 325 mG 1 Tablet(s) Oral every 6 hours PRN Moderate Pain (4 - 6)    
Patient is a 70y old  Male who presents with a chief complaint of s/p fall (2020 07:02)        HPI:  70 yo M with hx of CAD s/p stent x 3 on plavix, HTN, HLD, and KELSEY, bilateral knee replacement, fall and fracture of 5 L ribs in May 2020, was brought to the hospital after a fall.   As per pt, he was trying to get sit on the chair from his bed and fell while trying to get up, hit his head and fell on left side of the body. Pt denies any loss of consciousness, seizures, nausea, lightheadedness, vertigo, tinnitus, chest pain, shortness of breath, cough. Pt at baseline ambulates with the help of a cane/walker.  Pt was initially admitted for fall workup of fall, was noted to have 10 beats of non sustained V.tach and another event of 4 beats of non sustained v.tach with multiple PVCs on telemetry. Pt hence is being admitted to the CCU for further management. On encounter, pt is endorsing left sided chest pain, reproducible on palpation. ekg stat showed sinus rhythm. Recent Echo Oct 2020 showed EF 62% and mild AS.    In the ED, the pt initially met the SIRS criteria (Tmax 102, tachy 114), lab workup showed leukocytosis. Trauma workup negative but CT abdomen showed possible cystitis/Prostatitis. UA, urine and blood Cx sent by the ED team. The pt started on rocephin. Pt received 1 L LR in the ED. (2020 21:37)      Interval Events: No overnight events.    REVIEW OF SYSTEMS:   see HPI      OBJECTIVE:  ICU Vital Signs Last 24 Hrs  T(C): 37.6 (2020 23:42), Max: 38.9 (2020 17:54)  T(F): 99.7 (2020 23:42), Max: 102 (2020 17:54)  HR: 92 (2020 05:27) (84 - 130)  BP: 152/67 (2020 05:27) (148/77 - 191/86)  BP(mean): 97 (2020 05:27) (93 - 132)  RR: 22 (2020 03:24) (16 - 22)  SpO2: 93% (2020 05:27) (91% - 96%)         @ 07: @ 07:00  --------------------------------------------------------  IN: 0 mL / OUT: 125 mL / NET: -125 mL      CAPILLARY BLOOD GLUCOSE            PHYSICAL EXAM:     · CONSTITUTIONAL:   not septic appearing,   well nourished,   NAD    · ENMT:   Airway patent,   Nasal mucosa clear.  Mouth with normal mucosa.   No thrush    · EYES:   Clear bilaterally,   pupils equal,   round and reactive to light.    · CARDIAC:   Normal rate,   regular rhythm.    Heart sounds S1, S2.   No murmurs, no rubs or gallops on auscultation  no edema        CAROTID:   normal systolic impulse  no bruits    · RESPIRATORY:   no w/r/r/,   normal chest expansion  no tachypnea,  no retractions or use of accessory muscles  palpation of chest is normal with no fremitus  percussion of chest demonstrates no hyperresonance or dullness    · GASTROINTESTINAL:  Abdomen soft,   non-tender,   + BS  liver/spleen not palpable    · MUSCULOSKELETAL:   no clubbing, cyanosis      · NEUROLOGICAL:   Alert and oriented   no obvious focal deficits in cranial nerve areas  no motor or sensory deficits.      · SKIN:   Skin normal color for race,   warm, dry   No evidence of rash.    · PSYCHIATRIC:   Alert and oriented to person,   place, time/situation.       · HEME LYMPH:   no splenomegaly.  No cervical  lymphadenopathy.  no inguinal lymphadenopathy    HOSPITAL MEDICATIONS:  MEDICATIONS  (STANDING):  aspirin  chewable 81 milliGRAM(s) Oral daily  atorvastatin 20 milliGRAM(s) Oral at bedtime  cefTRIAXone   IVPB 1000 milliGRAM(s) IV Intermittent every 24 hours  chlorhexidine 4% Liquid 1 Application(s) Topical <User Schedule>  clopidogrel Tablet 75 milliGRAM(s) Oral daily  enoxaparin Injectable 40 milliGRAM(s) SubCutaneous daily  influenza   Vaccine 0.5 milliLiter(s) IntraMuscular once  losartan 100 milliGRAM(s) Oral daily  metoprolol tartrate 50 milliGRAM(s) Oral two times a day  sucralfate 1 Gram(s) Oral two times a day    MEDICATIONS  (PRN):  diphenhydrAMINE 25 milliGRAM(s) Oral once PRN Rash and/or Itching  morphine  - Injectable 2 milliGRAM(s) IV Push every 6 hours PRN Moderate Pain (4 - 6)  oxycodone    5 mG/acetaminophen 325 mG 1 Tablet(s) Oral every 6 hours PRN Moderate Pain (4 - 6)    lactated ringers Bolus:   1000 milliLiter(s), IV Bolus, once, infuse over 60 Minute(s), Stop After 1 Doses  Provider's Contact #: [phone]  lactated ringers Bolus:   1000 milliLiter(s), IV Bolus, once, infuse over 60 Minute(s), Stop After 1 Doses  Provider's Contact #: [phone]      LABS:                        12.7   12.44 )-----------( 141      ( 2020 05:56 )             36.7     11-21    135  |  101  |  17  ----------------------------<  104<H>  4.1   |  22  |  0.9    Ca    8.8      2020 17:52    TPro  6.4  /  Alb  3.8  /  TBili  1.8<H>  /  DBili  x   /  AST  21  /  ALT  9   /  AlkPhos  85  11-21      Urinalysis Basic - ( 2020 21:00 )    Color: Yellow / Appearance: Slightly Cloudy / S.010 / pH: x  Gluc: x / Ketone: 15  / Bili: Negative / Urobili: 0.2 mg/dL   Blood: x / Protein: 30 mg/dL / Nitrite: Positive   Leuk Esterase: Small / RBC: 11-25 /HPF / WBC 10-25 /HPF   Sq Epi: x / Non Sq Epi: Occasional /HPF / Bacteria: Moderate        Venous Blood Gas:   @ 18:00  7.41/43/45/28/83  VBG Lactate: 1.6    CARDIAC MARKERS ( 2020 17:52 )  x     / 0.01 ng/mL / x     / x     / x                    RADIOLOGY: I personally reviewed latest CXR and other pertinent films.          
Patient is a 70y old  Male who presents with a chief complaint of s/p fall (23 Nov 2020 18:05)      T(F): 98.4 (11-23-20 @ 23:00), Max: 98.9 (11-23-20 @ 15:00)  HR: 77 (11-24-20 @ 06:13)  BP: 120/56 (11-24-20 @ 06:13)  RR: 18 (11-23-20 @ 23:00)  SpO2: 93% (11-24-20 @ 06:13) (93% - 97%)    PHYSICAL EXAM:  GENERAL: NAD, well-groomed, well-developed  HEAD:  Atraumatic, Normocephalic  EYES: EOMI, PERRLA, conjunctiva and sclera clear  ENMT: No tonsillar erythema, exudates, or enlargement; Moist mucous membranes, Good dentition, No lesions  NECK: Supple, No JVD, Normal thyroid  NERVOUS SYSTEM:  Alert & Oriented X3,  Motor Strength 5/5 B/L upper and lower extremities  CHEST/LUNG: Clear to percussion bilaterally; No rales, rhonchi, wheezing, or rubs  HEART: Regular rate and rhythm; No murmurs, rubs, or gallops  ABDOMEN: Soft, Nontender, Nondistended; Bowel sounds present  EXTREMITIES:   No clubbing, cyanosis, or edema  LYMPH: No lymphadenopathy noted  SKIN: No rashes or lesions    labs  11-24    139  |  101  |  22<H>  ----------------------------<  98  4.0   |  24  |  1.2    Ca    8.6      24 Nov 2020 05:20  Mg     1.9     11-24    TPro  5.5<L>  /  Alb  3.0<L>  /  TBili  0.6  /  DBili  x   /  AST  18  /  ALT  9   /  AlkPhos  95  11-24                          12.3   7.21  )-----------( 179      ( 24 Nov 2020 05:20 )             37.3       Culture - Urine (collected 21 Nov 2020 21:00)  Source: .Urine Clean Catch (Midstream)  Preliminary Report (23 Nov 2020 23:19):    >100,000 CFU/ml Escherichia coli    Culture - Blood (collected 21 Nov 2020 17:54)  Source: .Blood Blood  Preliminary Report (23 Nov 2020 01:01):    No growth to date.    Culture - Blood (collected 21 Nov 2020 17:54)  Source: .Blood Blood  Preliminary Report (23 Nov 2020 01:01):    No growth to date.          Troponin T, Serum: 0.01 ng/mL (11-24-20 @ 05:20)  Troponin T, Serum: 0.01 ng/mL (11-23-20 @ 21:57)      acetaminophen   Tablet .. 650 milliGRAM(s) Oral every 6 hours PRN  aspirin  chewable 81 milliGRAM(s) Oral daily  atorvastatin 20 milliGRAM(s) Oral at bedtime  cefTRIAXone   IVPB 1000 milliGRAM(s) IV Intermittent every 24 hours  chlorhexidine 4% Liquid 1 Application(s) Topical <User Schedule>  clopidogrel Tablet 75 milliGRAM(s) Oral daily  enoxaparin Injectable 40 milliGRAM(s) SubCutaneous daily  influenza   Vaccine 0.5 milliLiter(s) IntraMuscular once  losartan 100 milliGRAM(s) Oral daily  metoprolol tartrate 75 milliGRAM(s) Oral two times a day  oxycodone    5 mG/acetaminophen 325 mG 1 Tablet(s) Oral every 6 hours PRN  senna 2 Tablet(s) Oral at bedtime  sucralfate 1 Gram(s) Oral two times a day  
Patient is a 70y old  Male who presents with a chief complaint of s/p fall (24 Nov 2020 07:30)        Over Night Events: No events overnight, complains of chest pain that is reproducible with palpation        ROS:  See HPI    PHYSICAL EXAM    ICU Vital Signs Last 24 Hrs  T(C): 36.2 (24 Nov 2020 07:01), Max: 37.2 (23 Nov 2020 15:00)  T(F): 97.2 (24 Nov 2020 07:01), Max: 98.9 (23 Nov 2020 15:00)  HR: 81 (24 Nov 2020 07:51) (74 - 97)  BP: 152/95 (24 Nov 2020 07:51) (98/55 - 174/75)  BP(mean): 119 (24 Nov 2020 07:51) (71 - 122)  RR: 18 (24 Nov 2020 07:51) (18 - 19)  SpO2: 93% (24 Nov 2020 06:13) (93% - 97%)      General: NAD  HEENT: KEN             Lymph Nodes: No cervical LN   Lungs: Bilateral BS  Cardiovascular: Regular   Abdomen: Soft, Positive BS  Extremities: No clubbing   Skin: Warm  Neurological: Non focal       11-23-20 @ 07:01  -  11-24-20 @ 07:00  --------------------------------------------------------  IN:    IV PiggyBack: 50 mL    Oral Fluid: 120 mL  Total IN: 170 mL    OUT:    Voided (mL): 300 mL  Total OUT: 300 mL    Total NET: -130 mL          LABS:                            12.3   7.21  )-----------( 179      ( 24 Nov 2020 05:20 )             37.3                                               11-24    139  |  101  |  22<H>  ----------------------------<  98  4.0   |  24  |  1.2    Ca    8.6      24 Nov 2020 05:20  Mg     1.9     11-24    TPro  5.5<L>  /  Alb  3.0<L>  /  TBili  0.6  /  DBili  x   /  AST  18  /  ALT  9   /  AlkPhos  95  11-24                                                 CARDIAC MARKERS ( 24 Nov 2020 05:20 )  x     / 0.01 ng/mL / x     / x     / x      CARDIAC MARKERS ( 23 Nov 2020 21:57 )  x     / 0.01 ng/mL / x     / x     / x      CARDIAC MARKERS ( 23 Nov 2020 05:43 )  x     / 0.01 ng/mL / x     / x     / x      CARDIAC MARKERS ( 22 Nov 2020 15:00 )  x     / <0.01 ng/mL / x     / x     / x                                                LIVER FUNCTIONS - ( 24 Nov 2020 05:20 )  Alb: 3.0 g/dL / Pro: 5.5 g/dL / ALK PHOS: 95 U/L / ALT: 9 U/L / AST: 18 U/L / GGT: x                                                  Culture - Urine (collected 21 Nov 2020 21:00)  Source: .Urine Clean Catch (Midstream)  Preliminary Report (23 Nov 2020 23:19):    >100,000 CFU/ml Escherichia coli    Culture - Blood (collected 21 Nov 2020 17:54)  Source: .Blood Blood  Preliminary Report (23 Nov 2020 01:01):    No growth to date.    Culture - Blood (collected 21 Nov 2020 17:54)  Source: .Blood Blood  Preliminary Report (23 Nov 2020 01:01):    No growth to date.                                                                                           MEDICATIONS  (STANDING):  aspirin  chewable 81 milliGRAM(s) Oral daily  atorvastatin 20 milliGRAM(s) Oral at bedtime  cefTRIAXone   IVPB 1000 milliGRAM(s) IV Intermittent every 24 hours  chlorhexidine 4% Liquid 1 Application(s) Topical <User Schedule>  clopidogrel Tablet 75 milliGRAM(s) Oral daily  enoxaparin Injectable 40 milliGRAM(s) SubCutaneous daily  influenza   Vaccine 0.5 milliLiter(s) IntraMuscular once  losartan 100 milliGRAM(s) Oral daily  metoprolol tartrate 75 milliGRAM(s) Oral two times a day  senna 2 Tablet(s) Oral at bedtime  sucralfate 1 Gram(s) Oral two times a day    MEDICATIONS  (PRN):  acetaminophen   Tablet .. 650 milliGRAM(s) Oral every 6 hours PRN Temp greater or equal to 38C (100.4F)  oxycodone    5 mG/acetaminophen 325 mG 1 Tablet(s) Oral every 6 hours PRN Moderate Pain (4 - 6)      Xrays:                                                                                     ECHO    
Patient is comfortable in bed, no complaints       T(F): 99.4 (11-22-20 @ 07:10), Max: 102 (11-21-20 @ 17:54)  HR: 81 (11-22-20 @ 07:12)  BP: 136/60 (11-22-20 @ 07:12)  RR: 20  SpO2: 94% (11-22-20 @ 07:12) (91% - 96%)    PHYSICAL EXAM:  GENERAL: NAD  HEAD:  Atraumatic, Normocephalic  NERVOUS SYSTEM:  no focal deficits   CHEST/LUNG: Clear to percussion bilaterally; No rales, rhonchi, wheezing, or rubs  HEART: Regular rate and rhythm; No murmurs, rubs, or gallops  ABDOMEN: Soft, Nontender, Nondistended; Bowel sounds present  EXTREMITIES:  b/l  edema  LYMPH: No lymphadenopathy noted  SKIN: No rashes or lesions    LABS  11-22    136  |  101  |  14  ----------------------------<  98  3.7   |  23  |  0.8    Ca    8.3<L>      22 Nov 2020 05:56  Mg     1.5     11-22    TPro  5.5<L>  /  Alb  3.2<L>  /  TBili  1.6<H>  /  DBili  x   /  AST  13  /  ALT  7   /  AlkPhos  78  11-22                          12.7   12.44 )-----------( 141      ( 22 Nov 2020 05:56 )             36.7         CARDIAC ENZYMES  Creatine Kinase, Serum: 65 (11-22 @ 05:56)    Troponin T, Serum: 0.01 ng/mL (11-21-20 @ 17:52)    RADIOLOGY  < from: CT Chest w/ IV Cont (11.21.20 @ 20:12) >  **Circumferential wall thickening of the urinary bladder, with fat stranding in the pelvis around the bladder and prostate. Correlate with urinalysis for cystitis and prostatitis.    < end of copied text >    MEDICATIONS  (STANDING):  aspirin  chewable 81 milliGRAM(s) Oral daily  atorvastatin 20 milliGRAM(s) Oral at bedtime  cefTRIAXone   IVPB 1000 milliGRAM(s) IV Intermittent every 24 hours  chlorhexidine 4% Liquid 1 Application(s) Topical <User Schedule>  clopidogrel Tablet 75 milliGRAM(s) Oral daily  enoxaparin Injectable 40 milliGRAM(s) SubCutaneous daily  influenza   Vaccine 0.5 milliLiter(s) IntraMuscular once  losartan 100 milliGRAM(s) Oral daily  magnesium oxide 400 milliGRAM(s) Oral three times a day with meals  magnesium oxide 400 milliGRAM(s) Oral once  metoprolol tartrate 50 milliGRAM(s) Oral two times a day  ondansetron Injectable 4 milliGRAM(s) IV Push once  pantoprazole  Injectable 40 milliGRAM(s) IV Push once  polyethylene glycol 3350 17 Gram(s) Oral daily  senna 2 Tablet(s) Oral at bedtime  senna 1 Tablet(s) Oral once  sucralfate 1 Gram(s) Oral two times a day    MEDICATIONS  (PRN):  diphenhydrAMINE 25 milliGRAM(s) Oral once PRN Rash and/or Itching  oxycodone    5 mG/acetaminophen 325 mG 1 Tablet(s) Oral every 6 hours PRN Moderate Pain (4 - 6)    
Patient seen and evaluated in bed this am, no complaints       T(F): 98.9 (11-23-20 @ 15:00), Max: 99.4 (11-22-20 @ 23:34)  HR: 97 (11-23-20 @ 15:00)  BP: 152/111 (11-23-20 @ 15:00)  RR: 18 (11-23-20 @ 15:00)  SpO2: 97% (11-23-20 @ 10:32) (97% - 97%)    PHYSICAL EXAM:  GENERAL: NAD  HEAD:  Atraumatic, Normocephalic  NERVOUS SYSTEM:  no focal deficits   CHEST/LUNG: Clear to percussion bilaterally; No rales, rhonchi, wheezing, or rubs  HEART: Regular rate and rhythm; No murmurs, rubs, or gallops  ABDOMEN: Soft, Nontender, Nondistended; Bowel sounds present  EXTREMITIES:  2+ Peripheral Pulses, No clubbing, cyanosis, or edema  LYMPH: No lymphadenopathy noted  SKIN: No rashes or lesions    LABS  11-23    137  |  104  |  21<H>  ----------------------------<  134<H>  3.9   |  27  |  1.2    Ca    8.4<L>      23 Nov 2020 15:31  Mg     1.8     11-23    TPro  5.5<L>  /  Alb  3.1<L>  /  TBili  1.1  /  DBili  x   /  AST  11  /  ALT  7   /  AlkPhos  85  11-23                          12.0   9.90  )-----------( 141      ( 23 Nov 2020 05:43 )             35.9         CARDIAC ENZYMES  Creatine Kinase, Serum: 65 (11-22 @ 05:56)    CKMB Units: 1.5 (11-22 @ 05:56)    Troponin T, Serum: 0.01 ng/mL (11-23-20 @ 05:43)  Troponin T, Serum: <0.01 ng/mL (11-22-20 @ 15:00)  Troponin T, Serum: 0.02 ng/mL (11-22-20 @ 05:56)  Troponin T, Serum: 0.01 ng/mL (11-21-20 @ 17:52)      Culture Results:   No growth to date. (11-21-20)  Culture Results:   No growth to date. (11-21-20)    RADIOLOGY  < from: CT Abdomen and Pelvis w/ IV Cont (11.21.20 @ 20:10) >    **Circumferential wall thickening of the urinary bladder, with fat stranding in the pelvis around the bladder and prostate. Correlate with urinalysis for cystitis and prostatitis.    < end of copied text >    MEDICATIONS  (STANDING):  aspirin  chewable 81 milliGRAM(s) Oral daily  atorvastatin 20 milliGRAM(s) Oral at bedtime  cefTRIAXone   IVPB 1000 milliGRAM(s) IV Intermittent every 24 hours  chlorhexidine 4% Liquid 1 Application(s) Topical <User Schedule>  clopidogrel Tablet 75 milliGRAM(s) Oral daily  enoxaparin Injectable 40 milliGRAM(s) SubCutaneous daily  influenza   Vaccine 0.5 milliLiter(s) IntraMuscular once  losartan 100 milliGRAM(s) Oral daily  metoprolol tartrate 75 milliGRAM(s) Oral two times a day  senna 2 Tablet(s) Oral at bedtime  sucralfate 1 Gram(s) Oral two times a day    MEDICATIONS  (PRN):  acetaminophen   Tablet .. 650 milliGRAM(s) Oral every 6 hours PRN Temp greater or equal to 38C (100.4F)  oxycodone    5 mG/acetaminophen 325 mG 1 Tablet(s) Oral every 6 hours PRN Moderate Pain (4 - 6)    
SUBJECTIVE:    Patient is a 70y old Male who presents with a chief complaint of s/p fall (2020 10:22)    Currently admitted to medicine with the primary diagnosis of Ventricular tachycardia       Today is hospital day 2d. This morning he is resting comfortably in bed and reports feeling weak, no chest pain, shortness of breath    PAST MEDICAL & SURGICAL HISTORY  MI (myocardial infarction)    Arthritis    Kidney stones    Sleep apnea    Hypertension    High blood cholesterol    H/O heart artery stent    History of appendectomy    H/O chronic cholecystitis  cholecystectomy      SOCIAL HISTORY:  Negative for smoking/alcohol/drug use.     ALLERGIES:  fish (Unknown)  iodine (Rash)  latex (Unknown)  magnesium sulfate (Rash)  sulfa drugs (Rash)    MEDICATIONS:  STANDING MEDICATIONS  aspirin  chewable 81 milliGRAM(s) Oral daily  atorvastatin 20 milliGRAM(s) Oral at bedtime  cefTRIAXone   IVPB 1000 milliGRAM(s) IV Intermittent every 24 hours  chlorhexidine 4% Liquid 1 Application(s) Topical <User Schedule>  clopidogrel Tablet 75 milliGRAM(s) Oral daily  enoxaparin Injectable 40 milliGRAM(s) SubCutaneous daily  influenza   Vaccine 0.5 milliLiter(s) IntraMuscular once  losartan 100 milliGRAM(s) Oral daily  metoprolol tartrate 75 milliGRAM(s) Oral two times a day  senna 2 Tablet(s) Oral at bedtime  sucralfate 1 Gram(s) Oral two times a day    PRN MEDICATIONS  acetaminophen   Tablet .. 650 milliGRAM(s) Oral every 6 hours PRN  oxycodone    5 mG/acetaminophen 325 mG 1 Tablet(s) Oral every 6 hours PRN    VITALS:   T(F): 99.4  HR: 82  BP: 139/61  RR: 19  SpO2: 97%    LABS:                        12.0   9.90  )-----------( 141      ( 2020 05:43 )             35.9     11-    139  |  103  |  17  ----------------------------<  110<H>  3.3<L>   |  25  |  0.8    Ca    8.3<L>      2020 05:43  Mg     1.6     -    TPro  5.5<L>  /  Alb  3.1<L>  /  TBili  1.1  /  DBili  x   /  AST  11  /  ALT  7   /  AlkPhos  85  11      Urinalysis Basic - ( 2020 21:00 )    Color: Yellow / Appearance: Slightly Cloudy / S.010 / pH: x  Gluc: x / Ketone: 15  / Bili: Negative / Urobili: 0.2 mg/dL   Blood: x / Protein: 30 mg/dL / Nitrite: Positive   Leuk Esterase: Small / RBC: 11-25 /HPF / WBC 10-25 /HPF   Sq Epi: x / Non Sq Epi: Occasional /HPF / Bacteria: Moderate        Troponin T, Serum: 0.01 ng/mL (20 @ 05:43)  Troponin T, Serum: <0.01 ng/mL (20 @ 15:00)  Lactate, Blood: 1.7 mmol/L (20 @ 15:00)      Culture - Blood (collected 2020 17:54)  Source: .Blood Blood  Preliminary Report (:):    No growth to date.    Culture - Blood (collected 2020 17:54)  Source: .Blood Blood  Preliminary Report (:):    No growth to date.      CARDIAC MARKERS ( 2020 05:43 )  x     / 0.01 ng/mL / x     / x     / x      CARDIAC MARKERS ( 2020 15:00 )  x     / <0.01 ng/mL / x     / x     / x      CARDIAC MARKERS ( 2020 05:56 )  x     / 0.02 ng/mL / 65 U/L / x     / 1.5 ng/mL  CARDIAC MARKERS ( 2020 17:52 )  x     / 0.01 ng/mL / x     / x     / x          RADIOLOGY:    PHYSICAL EXAM:  GEN: No acute distress  LUNGS: Clear to auscultation bilaterally   HEART: S1/S2 present. RRR.   ABD: Soft, non-tender, non-distended. Bowel sounds present  EXT: NC/NC/NE/2+PP/PEREZ  NEURO: AAOX3    
Patient is a 70y old  Male who presents with a chief complaint of s/p fall (2020 10:21)        Over Night Events: No events overnight        ROS:  See HPI    PHYSICAL EXAM    ICU Vital Signs Last 24 Hrs  T(C): 37.4 (2020 07:01), Max: 38 (2020 15:05)  T(F): 99.4 (2020 07:01), Max: 100.4 (2020 15:05)  HR: 81 (2020 07:13) (78 - 93)  BP: 127/74 (2020 07:13) (100/50 - 156/70)  BP(mean): 91 (2020 07:13) (70 - 101)  RR: 19 (2020 07:13) (18 - 21)        General: NAD  HEENT: KEN             Lymph Nodes: No cervical LN   Lungs: Bilateral BS  Cardiovascular: Regular   Abdomen: Soft, Positive BS  Extremities: No clubbing   Skin: Warm  Neurological: Non focal       20 @ 07:01  -  20 @ 07:00  --------------------------------------------------------  IN:  Total IN: 0 mL    OUT:    Voided (mL): 500 mL  Total OUT: 500 mL    Total NET: -500 mL          LABS:                            12.0   9.90  )-----------( 141      ( 2020 05:43 )             35.9                                               23    139  |  103  |  17  ----------------------------<  110<H>  3.3<L>   |  25  |  0.8    Ca    8.3<L>      2020 05:43  Mg     1.6         TPro  5.5<L>  /  Alb  3.1<L>  /  TBili  1.1  /  DBili  x   /  AST  11  /  ALT  7   /  AlkPhos  85                                               Urinalysis Basic - ( 2020 21:00 )    Color: Yellow / Appearance: Slightly Cloudy / S.010 / pH: x  Gluc: x / Ketone: 15  / Bili: Negative / Urobili: 0.2 mg/dL   Blood: x / Protein: 30 mg/dL / Nitrite: Positive   Leuk Esterase: Small / RBC: 11-25 /HPF / WBC 10-25 /HPF   Sq Epi: x / Non Sq Epi: Occasional /HPF / Bacteria: Moderate        CARDIAC MARKERS ( 2020 05:43 )  x     / 0.01 ng/mL / x     / x     / x      CARDIAC MARKERS ( 2020 15:00 )  x     / <0.01 ng/mL / x     / x     / x      CARDIAC MARKERS ( 2020 05:56 )  x     / x     / 65 U/L / x     / x      CARDIAC MARKERS ( 2020 17:52 )  x     / 0.01 ng/mL / x     / x     / x      LIVER FUNCTIONS - ( 2020 05:43 )  Alb: 3.1 g/dL / Pro: 5.5 g/dL / ALK PHOS: 85 U/L / ALT: 7 U/L / AST: 11 U/L / GGT: x                                                  Culture - Blood (collected 2020 17:54)  Source: .Blood Blood  Preliminary Report (2020 01:01):    No growth to date.    Culture - Blood (collected 2020 17:54)  Source: .Blood Blood  Preliminary Report (2020 01:01):    No growth to date.                                                                                           MEDICATIONS  (STANDING):  aspirin  chewable 81 milliGRAM(s) Oral daily  atorvastatin 20 milliGRAM(s) Oral at bedtime  cefTRIAXone   IVPB 1000 milliGRAM(s) IV Intermittent every 24 hours  chlorhexidine 4% Liquid 1 Application(s) Topical <User Schedule>  clopidogrel Tablet 75 milliGRAM(s) Oral daily  enoxaparin Injectable 40 milliGRAM(s) SubCutaneous daily  influenza   Vaccine 0.5 milliLiter(s) IntraMuscular once  losartan 100 milliGRAM(s) Oral daily  metoprolol tartrate 50 milliGRAM(s) Oral two times a day  polyethylene glycol 3350 17 Gram(s) Oral daily  senna 2 Tablet(s) Oral at bedtime  sucralfate 1 Gram(s) Oral two times a day    MEDICATIONS  (PRN):  acetaminophen   Tablet .. 650 milliGRAM(s) Oral every 6 hours PRN Temp greater or equal to 38C (100.4F)  oxycodone    5 mG/acetaminophen 325 mG 1 Tablet(s) Oral every 6 hours PRN Moderate Pain (4 - 6)      Xrays:                                                                                     ECHO

## 2020-11-25 ENCOUNTER — TRANSCRIPTION ENCOUNTER (OUTPATIENT)
Age: 70
End: 2020-11-25

## 2020-11-25 VITALS
RESPIRATION RATE: 18 BRPM | SYSTOLIC BLOOD PRESSURE: 159 MMHG | DIASTOLIC BLOOD PRESSURE: 86 MMHG | HEART RATE: 70 BPM | TEMPERATURE: 99 F

## 2020-11-25 LAB
ANION GAP SERPL CALC-SCNC: 11 MMOL/L — SIGNIFICANT CHANGE UP (ref 7–14)
BASOPHILS # BLD AUTO: 0.02 K/UL — SIGNIFICANT CHANGE UP (ref 0–0.2)
BASOPHILS NFR BLD AUTO: 0.4 % — SIGNIFICANT CHANGE UP (ref 0–1)
BUN SERPL-MCNC: 22 MG/DL — HIGH (ref 10–20)
CALCIUM SERPL-MCNC: 8.6 MG/DL — SIGNIFICANT CHANGE UP (ref 8.5–10.1)
CHLORIDE SERPL-SCNC: 103 MMOL/L — SIGNIFICANT CHANGE UP (ref 98–110)
CO2 SERPL-SCNC: 27 MMOL/L — SIGNIFICANT CHANGE UP (ref 17–32)
CREAT SERPL-MCNC: 0.9 MG/DL — SIGNIFICANT CHANGE UP (ref 0.7–1.5)
EOSINOPHIL # BLD AUTO: 0.04 K/UL — SIGNIFICANT CHANGE UP (ref 0–0.7)
EOSINOPHIL NFR BLD AUTO: 0.8 % — SIGNIFICANT CHANGE UP (ref 0–8)
GLUCOSE SERPL-MCNC: 105 MG/DL — HIGH (ref 70–99)
HCT VFR BLD CALC: 34.9 % — LOW (ref 42–52)
HGB BLD-MCNC: 11.8 G/DL — LOW (ref 14–18)
IMM GRANULOCYTES NFR BLD AUTO: 1 % — HIGH (ref 0.1–0.3)
LYMPHOCYTES # BLD AUTO: 0.97 K/UL — LOW (ref 1.2–3.4)
LYMPHOCYTES # BLD AUTO: 18.8 % — LOW (ref 20.5–51.1)
MAGNESIUM SERPL-MCNC: 1.9 MG/DL — SIGNIFICANT CHANGE UP (ref 1.8–2.4)
MCHC RBC-ENTMCNC: 32.2 PG — HIGH (ref 27–31)
MCHC RBC-ENTMCNC: 33.8 G/DL — SIGNIFICANT CHANGE UP (ref 32–37)
MCV RBC AUTO: 95.4 FL — HIGH (ref 80–94)
MONOCYTES # BLD AUTO: 0.86 K/UL — HIGH (ref 0.1–0.6)
MONOCYTES NFR BLD AUTO: 16.7 % — HIGH (ref 1.7–9.3)
NEUTROPHILS # BLD AUTO: 3.21 K/UL — SIGNIFICANT CHANGE UP (ref 1.4–6.5)
NEUTROPHILS NFR BLD AUTO: 62.3 % — SIGNIFICANT CHANGE UP (ref 42.2–75.2)
NRBC # BLD: 0 /100 WBCS — SIGNIFICANT CHANGE UP (ref 0–0)
PLATELET # BLD AUTO: 174 K/UL — SIGNIFICANT CHANGE UP (ref 130–400)
POTASSIUM SERPL-MCNC: 3.3 MMOL/L — LOW (ref 3.5–5)
POTASSIUM SERPL-SCNC: 3.3 MMOL/L — LOW (ref 3.5–5)
RBC # BLD: 3.66 M/UL — LOW (ref 4.7–6.1)
RBC # FLD: 13.1 % — SIGNIFICANT CHANGE UP (ref 11.5–14.5)
SARS-COV-2 RNA SPEC QL NAA+PROBE: SIGNIFICANT CHANGE UP
SODIUM SERPL-SCNC: 141 MMOL/L — SIGNIFICANT CHANGE UP (ref 135–146)
WBC # BLD: 5.15 K/UL — SIGNIFICANT CHANGE UP (ref 4.8–10.8)
WBC # FLD AUTO: 5.15 K/UL — SIGNIFICANT CHANGE UP (ref 4.8–10.8)

## 2020-11-25 PROCEDURE — 99239 HOSP IP/OBS DSCHRG MGMT >30: CPT

## 2020-11-25 RX ORDER — POTASSIUM CHLORIDE 20 MEQ
40 PACKET (EA) ORAL ONCE
Refills: 0 | Status: COMPLETED | OUTPATIENT
Start: 2020-11-25 | End: 2020-11-25

## 2020-11-25 RX ORDER — CEFTRIAXONE 500 MG/1
1 INJECTION, POWDER, FOR SOLUTION INTRAMUSCULAR; INTRAVENOUS
Qty: 0 | Refills: 0 | DISCHARGE
Start: 2020-11-25

## 2020-11-25 RX ADMIN — Medication 1 GRAM(S): at 05:41

## 2020-11-25 RX ADMIN — ATENOLOL 50 MILLIGRAM(S): 25 TABLET ORAL at 05:41

## 2020-11-25 RX ADMIN — Medication 40 MILLIEQUIVALENT(S): at 11:31

## 2020-11-25 RX ADMIN — LOSARTAN POTASSIUM 100 MILLIGRAM(S): 100 TABLET, FILM COATED ORAL at 05:41

## 2020-11-25 NOTE — DISCHARGE NOTE NURSING/CASE MANAGEMENT/SOCIAL WORK - PATIENT PORTAL LINK FT
You can access the FollowMyHealth Patient Portal offered by [facility] by registering at the following website: [email or web address]. By joining [employer]’s FollowMyHealth portal, you will also be able to view your health information using other applications (apps) compatible with our system.

## 2020-11-27 LAB
CULTURE RESULTS: SIGNIFICANT CHANGE UP
CULTURE RESULTS: SIGNIFICANT CHANGE UP
SPECIMEN SOURCE: SIGNIFICANT CHANGE UP
SPECIMEN SOURCE: SIGNIFICANT CHANGE UP

## 2020-11-30 DIAGNOSIS — R91.8 OTHER NONSPECIFIC ABNORMAL FINDING OF LUNG FIELD: ICD-10-CM

## 2020-11-30 DIAGNOSIS — E66.9 OBESITY, UNSPECIFIED: ICD-10-CM

## 2020-11-30 DIAGNOSIS — N41.3 PROSTATOCYSTITIS: ICD-10-CM
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
## 2020-11-30 DIAGNOSIS — N39.0 URINARY TRACT INFECTION, SITE NOT SPECIFIED: ICD-10-CM

## 2020-11-30 DIAGNOSIS — E87.6 HYPOKALEMIA: ICD-10-CM

## 2020-11-30 DIAGNOSIS — I25.2 OLD MYOCARDIAL INFARCTION: ICD-10-CM

## 2020-11-30 DIAGNOSIS — Z91.040 LATEX ALLERGY STATUS: ICD-10-CM

## 2020-11-30 DIAGNOSIS — R07.9 CHEST PAIN, UNSPECIFIED: ICD-10-CM

## 2020-11-30 DIAGNOSIS — R42 DIZZINESS AND GIDDINESS: ICD-10-CM

## 2020-11-30 DIAGNOSIS — I25.10 ATHEROSCLEROTIC HEART DISEASE OF NATIVE CORONARY ARTERY WITHOUT ANGINA PECTORIS: ICD-10-CM

## 2020-11-30 DIAGNOSIS — Z91.013 ALLERGY TO SEAFOOD: ICD-10-CM

## 2020-11-30 DIAGNOSIS — Z79.82 LONG TERM (CURRENT) USE OF ASPIRIN: ICD-10-CM
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
## 2020-11-30 DIAGNOSIS — I10 ESSENTIAL (PRIMARY) HYPERTENSION: ICD-10-CM

## 2020-11-30 DIAGNOSIS — I47.2 VENTRICULAR TACHYCARDIA: ICD-10-CM

## 2020-11-30 DIAGNOSIS — Z95.5 PRESENCE OF CORONARY ANGIOPLASTY IMPLANT AND GRAFT: ICD-10-CM

## 2020-11-30 DIAGNOSIS — Z88.2 ALLERGY STATUS TO SULFONAMIDES: ICD-10-CM

## 2020-11-30 DIAGNOSIS — A41.9 SEPSIS, UNSPECIFIED ORGANISM: ICD-10-CM

## 2020-11-30 DIAGNOSIS — E83.42 HYPOMAGNESEMIA: ICD-10-CM

## 2020-12-11 NOTE — ED ADULT TRIAGE NOTE - CCCP TRG CHIEF CMPLNT
Reviewed discharge information. Patient verbalized understanding of paperwork, medication, and care instructions. Patient denied any questions.      Eloisa Nava RN  12/11/20 1978 flank pain

## 2020-12-14 ENCOUNTER — APPOINTMENT (OUTPATIENT)
Dept: NEUROLOGY | Facility: CLINIC | Age: 70
End: 2020-12-14

## 2021-01-08 ENCOUNTER — INPATIENT (INPATIENT)
Facility: HOSPITAL | Age: 71
LOS: 3 days | Discharge: NURSING, REHAB OR LONG-TERM CARE | End: 2021-01-12
Attending: INTERNAL MEDICINE | Admitting: INTERNAL MEDICINE
Payer: MEDICARE

## 2021-01-08 VITALS
HEART RATE: 56 BPM | DIASTOLIC BLOOD PRESSURE: 106 MMHG | SYSTOLIC BLOOD PRESSURE: 228 MMHG | RESPIRATION RATE: 20 BRPM | OXYGEN SATURATION: 97 % | HEIGHT: 70 IN | TEMPERATURE: 100 F

## 2021-01-08 DIAGNOSIS — Z87.19 PERSONAL HISTORY OF OTHER DISEASES OF THE DIGESTIVE SYSTEM: Chronic | ICD-10-CM

## 2021-01-08 DIAGNOSIS — Z95.5 PRESENCE OF CORONARY ANGIOPLASTY IMPLANT AND GRAFT: Chronic | ICD-10-CM

## 2021-01-08 DIAGNOSIS — Z90.49 ACQUIRED ABSENCE OF OTHER SPECIFIED PARTS OF DIGESTIVE TRACT: Chronic | ICD-10-CM

## 2021-01-08 LAB
ALBUMIN SERPL ELPH-MCNC: 4.4 G/DL — SIGNIFICANT CHANGE UP (ref 3.5–5.2)
ALP SERPL-CCNC: 97 U/L — SIGNIFICANT CHANGE UP (ref 30–115)
ALT FLD-CCNC: 10 U/L — SIGNIFICANT CHANGE UP (ref 0–41)
ANION GAP SERPL CALC-SCNC: 12 MMOL/L — SIGNIFICANT CHANGE UP (ref 7–14)
APTT BLD: 29.3 SEC — SIGNIFICANT CHANGE UP (ref 27–39.2)
AST SERPL-CCNC: 14 U/L — SIGNIFICANT CHANGE UP (ref 0–41)
BASOPHILS # BLD AUTO: 0.02 K/UL — SIGNIFICANT CHANGE UP (ref 0–0.2)
BASOPHILS NFR BLD AUTO: 0.2 % — SIGNIFICANT CHANGE UP (ref 0–1)
BILIRUB SERPL-MCNC: 2 MG/DL — HIGH (ref 0.2–1.2)
BLD GP AB SCN SERPL QL: SIGNIFICANT CHANGE UP
BUN SERPL-MCNC: 13 MG/DL — SIGNIFICANT CHANGE UP (ref 10–20)
CALCIUM SERPL-MCNC: 9.3 MG/DL — SIGNIFICANT CHANGE UP (ref 8.5–10.1)
CHLORIDE SERPL-SCNC: 99 MMOL/L — SIGNIFICANT CHANGE UP (ref 98–110)
CK SERPL-CCNC: 82 U/L — SIGNIFICANT CHANGE UP (ref 0–225)
CO2 SERPL-SCNC: 30 MMOL/L — SIGNIFICANT CHANGE UP (ref 17–32)
CREAT SERPL-MCNC: 0.8 MG/DL — SIGNIFICANT CHANGE UP (ref 0.7–1.5)
EOSINOPHIL # BLD AUTO: 0.12 K/UL — SIGNIFICANT CHANGE UP (ref 0–0.7)
EOSINOPHIL NFR BLD AUTO: 1.3 % — SIGNIFICANT CHANGE UP (ref 0–8)
ETHANOL SERPL-MCNC: <10 MG/DL — SIGNIFICANT CHANGE UP
GLUCOSE SERPL-MCNC: 103 MG/DL — HIGH (ref 70–99)
HCT VFR BLD CALC: 41.5 % — LOW (ref 42–52)
HGB BLD-MCNC: 14.5 G/DL — SIGNIFICANT CHANGE UP (ref 14–18)
IMM GRANULOCYTES NFR BLD AUTO: 1 % — HIGH (ref 0.1–0.3)
INR BLD: 1.41 RATIO — HIGH (ref 0.65–1.3)
LACTATE SERPL-SCNC: 1.3 MMOL/L — SIGNIFICANT CHANGE UP (ref 0.7–2)
LIDOCAIN IGE QN: 13 U/L — SIGNIFICANT CHANGE UP (ref 7–60)
LYMPHOCYTES # BLD AUTO: 2.23 K/UL — SIGNIFICANT CHANGE UP (ref 1.2–3.4)
LYMPHOCYTES # BLD AUTO: 24.9 % — SIGNIFICANT CHANGE UP (ref 20.5–51.1)
MCHC RBC-ENTMCNC: 32.2 PG — HIGH (ref 27–31)
MCHC RBC-ENTMCNC: 34.9 G/DL — SIGNIFICANT CHANGE UP (ref 32–37)
MCV RBC AUTO: 92 FL — SIGNIFICANT CHANGE UP (ref 80–94)
MONOCYTES # BLD AUTO: 0.86 K/UL — HIGH (ref 0.1–0.6)
MONOCYTES NFR BLD AUTO: 9.6 % — HIGH (ref 1.7–9.3)
NEUTROPHILS # BLD AUTO: 5.62 K/UL — SIGNIFICANT CHANGE UP (ref 1.4–6.5)
NEUTROPHILS NFR BLD AUTO: 63 % — SIGNIFICANT CHANGE UP (ref 42.2–75.2)
NRBC # BLD: 0 /100 WBCS — SIGNIFICANT CHANGE UP (ref 0–0)
PLATELET # BLD AUTO: 206 K/UL — SIGNIFICANT CHANGE UP (ref 130–400)
POTASSIUM SERPL-MCNC: 3.2 MMOL/L — LOW (ref 3.5–5)
POTASSIUM SERPL-SCNC: 3.2 MMOL/L — LOW (ref 3.5–5)
PROT SERPL-MCNC: 7 G/DL — SIGNIFICANT CHANGE UP (ref 6–8)
PROTHROM AB SERPL-ACNC: 16.2 SEC — HIGH (ref 9.95–12.87)
RAPID RVP RESULT: SIGNIFICANT CHANGE UP
RBC # BLD: 4.51 M/UL — LOW (ref 4.7–6.1)
RBC # FLD: 13.4 % — SIGNIFICANT CHANGE UP (ref 11.5–14.5)
SARS-COV-2 RNA SPEC QL NAA+PROBE: SIGNIFICANT CHANGE UP
SODIUM SERPL-SCNC: 141 MMOL/L — SIGNIFICANT CHANGE UP (ref 135–146)
TROPONIN T SERPL-MCNC: 0.02 NG/ML — HIGH
WBC # BLD: 8.94 K/UL — SIGNIFICANT CHANGE UP (ref 4.8–10.8)
WBC # FLD AUTO: 8.94 K/UL — SIGNIFICANT CHANGE UP (ref 4.8–10.8)

## 2021-01-08 PROCEDURE — 70450 CT HEAD/BRAIN W/O DYE: CPT | Mod: 26

## 2021-01-08 PROCEDURE — 99223 1ST HOSP IP/OBS HIGH 75: CPT | Mod: AI

## 2021-01-08 PROCEDURE — 71260 CT THORAX DX C+: CPT | Mod: 26

## 2021-01-08 PROCEDURE — 99285 EMERGENCY DEPT VISIT HI MDM: CPT

## 2021-01-08 PROCEDURE — 74177 CT ABD & PELVIS W/CONTRAST: CPT | Mod: 26

## 2021-01-08 PROCEDURE — 72125 CT NECK SPINE W/O DYE: CPT | Mod: 26

## 2021-01-08 PROCEDURE — 71045 X-RAY EXAM CHEST 1 VIEW: CPT | Mod: 26

## 2021-01-08 PROCEDURE — 72170 X-RAY EXAM OF PELVIS: CPT | Mod: 26

## 2021-01-08 RX ORDER — DIPHENHYDRAMINE HCL 50 MG
25 CAPSULE ORAL ONCE
Refills: 0 | Status: COMPLETED | OUTPATIENT
Start: 2021-01-08 | End: 2021-01-08

## 2021-01-08 RX ORDER — POTASSIUM CHLORIDE 20 MEQ
40 PACKET (EA) ORAL ONCE
Refills: 0 | Status: COMPLETED | OUTPATIENT
Start: 2021-01-08 | End: 2021-01-08

## 2021-01-08 RX ADMIN — Medication 40 MILLIEQUIVALENT(S): at 22:06

## 2021-01-08 RX ADMIN — Medication 25 MILLIGRAM(S): at 19:30

## 2021-01-08 NOTE — ED PROVIDER NOTE - CARE PLAN
Principal Discharge DX:	Impaired ambulation  Secondary Diagnosis:	Fall  Secondary Diagnosis:	Inadequate social support

## 2021-01-08 NOTE — ED PROVIDER NOTE - CLINICAL SUMMARY MEDICAL DECISION MAKING FREE TEXT BOX
68yo M presents to ED s/p fall 2 days ago. Recently discharged from NH for rehab but states still unable to ambulate steadily or perform ADLs. No acute traumatic injuries on imaging. Will admit for , PT/rehab, placement.

## 2021-01-08 NOTE — ED PROVIDER NOTE - PHYSICAL EXAMINATION
Vital signs reviewed  GENERAL: Patient nontoxic appearing, NAD  HEAD: NCAT. No sign of basilar skull fracture  EYES: Anicteric. PERRL, EOMI.   ENT: MMM  NECK: No midline TTP  RESPIRATORY: Normal respiratory effort. CTA B/L. No wheezing, rales, rhonchi  CARDIOVASCULAR: Regular rate and rhythm  ABDOMEN: Soft. Nondistended. Moderate RUQ TTP. No guarding or rebound. No CVA tenderness.  MUSCULOSKELETAL/EXTREMITIES: Brisk cap refill. Equal radial pulses. Able to range extremities without pain x4. No midline back TTP  SKIN:  Right chest wall ecchymosis, approx 3x5cm.   NEURO: AAOx3. No gross FND

## 2021-01-08 NOTE — ED ADULT NURSE NOTE - PMH
Arthritis    High blood cholesterol    Hypertension    Kidney stones    MI (myocardial infarction)    Sleep apnea    
declines

## 2021-01-08 NOTE — H&P ADULT - HISTORY OF PRESENT ILLNESS
70yo  71yo male with extensive PMH including recent 3 week  admission to a NH for rehab, presents to the ER after falling 2 days ago apparently due to unsteady gait. Patient relates having pains  71yo male with extensive PMH including recent 3 week  admission to a NH for rehab, presents to the ER after falling 2 days ago apparently due to unsteady gait. Patient relates having pains to back, neck, and right side of chest 69yo male with extensive PMH including  coronary stent placement, sleep apnea, HTN, high cholesterol and recent 3 week admission to a NH for rehab, presents to the ER after falling 2 days ago apparently due to unsteady gait. Patient relates having pains to back, neck, and right side of chest

## 2021-01-08 NOTE — ED PROVIDER NOTE - OBJECTIVE STATEMENT
68yo M with PMHx CAD/stents on plavix, HTN, HLD, KELSEY, B/L knee replacements, presents to ED s/p fall 2 days ago. Pt states he is weak and unsteady, was recently in a NH for rehab but feels like it was not successful, uses cane or walker. Was trying to walk 2 days ago when he fell, reports occipital head trauma, no LOC, not on AC. C/o occipital headache, right chest wall pain/bruising, right sided abdominal pain. Pt states unable to get up afterwards, family was not able to help him move from couch, was soiling himself. Of note, pt had fall 5/2020 with multiple left rib fx. 68yo M with PMHx CAD/stents on plavix, HTN, HLD, KELSEY, B/L knee replacements, presents to ED s/p fall 2 days ago. Pt states he is weak and unsteady, was recently in a NH for rehab but feels like it was not successful, uses cane or walker at baseline, reports has had a few falls. Most recently was trying to walk 2 days ago when he fell, reports occipital head trauma, no LOC, not on AC. C/o occipital headache, right chest wall pain/bruising, right sided abdominal pain. Pt states unable to get up afterwards, family was not able to help him move from couch, was soiling himself. Denies fever, lightheadedness, SOB, cough, nausea, vomiting, diarrhea, leg swelling.

## 2021-01-08 NOTE — H&P ADULT - NSHPLABSRESULTS_GEN_ALL_CORE
14.5   8.94  )-----------( 206      ( 08 Jan 2021 17:59 )             41.5     01-08    141  |  99  |  13  ----------------------------<  103<H>  3.2<L>   |  30  |  0.8    Ca    9.3      08 Jan 2021 17:59    TPro  7.0  /  Alb  4.4  /  TBili  2.0<H>  /  DBili  x   /  AST  14  /  ALT  10  /  AlkPhos  97  01-08            PT/INR - ( 08 Jan 2021 17:59 )   PT: 16.20 sec;   INR: 1.41 ratio         PTT - ( 08 Jan 2021 17:59 )  PTT:29.3 sec  Lactate Trend  01-08 @ 17:59 Lactate:1.3     CARDIAC MARKERS ( 08 Jan 2021 17:59 )  x     / 0.02 ng/mL / 82 U/L / x     / x          CAPILLARY BLOOD GLUCOSE      POCT Blood Glucose.: 113 mg/dL (08 Jan 2021 17:53) 14.5   8.94  )-----------( 206      ( 08 Jan 2021 17:59 )             41.5     01-08    141  |  99  |  13  ----------------------------<  103<H>  3.2<L>   |  30  |  0.8    Ca    9.3      08 Jan 2021 17:59    TPro  7.0  /  Alb  4.4  /  TBili  2.0<H>  /  DBili  x   /  AST  14  /  ALT  10  /  AlkPhos  97  01-08            PT/INR - ( 08 Jan 2021 17:59 )   PT: 16.20 sec;   INR: 1.41 ratio         PTT - ( 08 Jan 2021 17:59 )  PTT:29.3 sec  Lactate Trend  01-08 @ 17:59 Lactate:1.3     CARDIAC MARKERS ( 08 Jan 2021 17:59 )  x     / 0.02 ng/mL / 82 U/L / x     / x          CAPILLARY BLOOD GLUCOSE      POCT Blood Glucose.: 113 mg/dL (08 Jan 2021 17:53)    < from: CT Abdomen and Pelvis w/ IV Cont (01.08.21 @ 20:24) >    EXAM:  CT ABDOMEN AND PELVIS IC        EXAM:  CT CHEST IC          PROCEDURE DATE:  01/08/2021      < from: CT Abdomen and Pelvis w/ IV Cont (01.08.21 @ 20:24) >    IMPRESSION:    Limited study. The intravenous infiltrated during the injection for the examination therefore there is no significant vascular enhancement.    No evidence of intra-thoracic, abdominal or pelvic visceral laceration or hematoma.    No acute fractures are identified.    SUNITHA PARTIDA MD; Attending Interventional Radiologist    < end of copied text >

## 2021-01-08 NOTE — ED ADULT TRIAGE NOTE - CHIEF COMPLAINT QUOTE
EMS states "patient fell on wed as per family history of dementia, hit his head and ems told them not to come to hospital, today he has right flank bruising headache, increased confusion as per family and he is on plavix, family is unable to care for him found in feces on couch for a few days"

## 2021-01-08 NOTE — ED PROVIDER NOTE - PROGRESS NOTE DETAILS
drew - iodine allergy noted. review of chart shows pt has had CT with IV contrast in the past. Most recently in November, pt had 25mg IV benadryl pretreatment, will give today prior to scans. Pt may go without labs. drew - pt states doesn't feel safe going home, unsteady, unable to care for self. States lives with several family members but wife is also sick and daughter takes care of 2 handicapped kids. Will admit. d/w hospitalist dr. pickard

## 2021-01-08 NOTE — ED PROVIDER NOTE - NS ED ROS FT
Constitutional: No fever, chills. +generalized weakness  Eyes:  No visual changes  ENMT:  No neck pain  Cardiac:  No chest pain  Respiratory:  No cough, SOB  GI:  No nausea, vomiting, diarrhea. +abdominal pain  :  No dysuria, hematuria  MS:  No back pain. +chest wall pain  Neuro:  +headache. No dizziness  Skin:  +ecchymosis  Endocrine: No history of thyroid disease or diabetes  Except as documented in the HPI,  all other systems are negative

## 2021-01-09 DIAGNOSIS — E87.6 HYPOKALEMIA: ICD-10-CM

## 2021-01-09 DIAGNOSIS — I10 ESSENTIAL (PRIMARY) HYPERTENSION: ICD-10-CM

## 2021-01-09 DIAGNOSIS — R26.2 DIFFICULTY IN WALKING, NOT ELSEWHERE CLASSIFIED: ICD-10-CM

## 2021-01-09 DIAGNOSIS — Z95.5 PRESENCE OF CORONARY ANGIOPLASTY IMPLANT AND GRAFT: ICD-10-CM

## 2021-01-09 DIAGNOSIS — E78.00 PURE HYPERCHOLESTEROLEMIA, UNSPECIFIED: ICD-10-CM
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
## 2021-01-09 LAB
ALBUMIN SERPL ELPH-MCNC: 3.9 G/DL — SIGNIFICANT CHANGE UP (ref 3.5–5.2)
ALP SERPL-CCNC: 95 U/L — SIGNIFICANT CHANGE UP (ref 30–115)
ALT FLD-CCNC: 9 U/L — SIGNIFICANT CHANGE UP (ref 0–41)
ANION GAP SERPL CALC-SCNC: 14 MMOL/L — SIGNIFICANT CHANGE UP (ref 7–14)
AST SERPL-CCNC: 13 U/L — SIGNIFICANT CHANGE UP (ref 0–41)
BILIRUB SERPL-MCNC: 1.8 MG/DL — HIGH (ref 0.2–1.2)
BUN SERPL-MCNC: 11 MG/DL — SIGNIFICANT CHANGE UP (ref 10–20)
CALCIUM SERPL-MCNC: 8.9 MG/DL — SIGNIFICANT CHANGE UP (ref 8.5–10.1)
CHLORIDE SERPL-SCNC: 102 MMOL/L — SIGNIFICANT CHANGE UP (ref 98–110)
CO2 SERPL-SCNC: 28 MMOL/L — SIGNIFICANT CHANGE UP (ref 17–32)
CREAT SERPL-MCNC: 0.7 MG/DL — SIGNIFICANT CHANGE UP (ref 0.7–1.5)
GLUCOSE SERPL-MCNC: 83 MG/DL — SIGNIFICANT CHANGE UP (ref 70–99)
HCT VFR BLD CALC: 41.2 % — LOW (ref 42–52)
HGB BLD-MCNC: 14.2 G/DL — SIGNIFICANT CHANGE UP (ref 14–18)
MAGNESIUM SERPL-MCNC: 1.6 MG/DL — LOW (ref 1.8–2.4)
MCHC RBC-ENTMCNC: 31.7 PG — HIGH (ref 27–31)
MCHC RBC-ENTMCNC: 34.5 G/DL — SIGNIFICANT CHANGE UP (ref 32–37)
MCV RBC AUTO: 92 FL — SIGNIFICANT CHANGE UP (ref 80–94)
NRBC # BLD: 0 /100 WBCS — SIGNIFICANT CHANGE UP (ref 0–0)
PLATELET # BLD AUTO: 204 K/UL — SIGNIFICANT CHANGE UP (ref 130–400)
POTASSIUM SERPL-MCNC: 3.5 MMOL/L — SIGNIFICANT CHANGE UP (ref 3.5–5)
POTASSIUM SERPL-SCNC: 3.5 MMOL/L — SIGNIFICANT CHANGE UP (ref 3.5–5)
PROT SERPL-MCNC: 6.4 G/DL — SIGNIFICANT CHANGE UP (ref 6–8)
RBC # BLD: 4.48 M/UL — LOW (ref 4.7–6.1)
RBC # FLD: 13.3 % — SIGNIFICANT CHANGE UP (ref 11.5–14.5)
SODIUM SERPL-SCNC: 144 MMOL/L — SIGNIFICANT CHANGE UP (ref 135–146)
WBC # BLD: 8.94 K/UL — SIGNIFICANT CHANGE UP (ref 4.8–10.8)
WBC # FLD AUTO: 8.94 K/UL — SIGNIFICANT CHANGE UP (ref 4.8–10.8)

## 2021-01-09 PROCEDURE — 99233 SBSQ HOSP IP/OBS HIGH 50: CPT

## 2021-01-09 RX ORDER — ACETAMINOPHEN 500 MG
650 TABLET ORAL EVERY 6 HOURS
Refills: 0 | Status: DISCONTINUED | OUTPATIENT
Start: 2021-01-09 | End: 2021-01-12

## 2021-01-09 RX ORDER — ATORVASTATIN CALCIUM 80 MG/1
20 TABLET, FILM COATED ORAL AT BEDTIME
Refills: 0 | Status: DISCONTINUED | OUTPATIENT
Start: 2021-01-09 | End: 2021-01-12

## 2021-01-09 RX ORDER — CLOPIDOGREL BISULFATE 75 MG/1
75 TABLET, FILM COATED ORAL DAILY
Refills: 0 | Status: DISCONTINUED | OUTPATIENT
Start: 2021-01-09 | End: 2021-01-12

## 2021-01-09 RX ORDER — OXYCODONE AND ACETAMINOPHEN 5; 325 MG/1; MG/1
1 TABLET ORAL EVERY 6 HOURS
Refills: 0 | Status: DISCONTINUED | OUTPATIENT
Start: 2021-01-09 | End: 2021-01-12

## 2021-01-09 RX ORDER — LACTULOSE 10 G/15ML
5 SOLUTION ORAL ONCE
Refills: 0 | Status: COMPLETED | OUTPATIENT
Start: 2021-01-09 | End: 2021-01-09

## 2021-01-09 RX ORDER — HEPARIN SODIUM 5000 [USP'U]/ML
5000 INJECTION INTRAVENOUS; SUBCUTANEOUS
Refills: 0 | Status: DISCONTINUED | OUTPATIENT
Start: 2021-01-09 | End: 2021-01-12

## 2021-01-09 RX ORDER — ASPIRIN/CALCIUM CARB/MAGNESIUM 324 MG
81 TABLET ORAL DAILY
Refills: 0 | Status: DISCONTINUED | OUTPATIENT
Start: 2021-01-09 | End: 2021-01-12

## 2021-01-09 RX ORDER — INFLUENZA VIRUS VACCINE 15; 15; 15; 15 UG/.5ML; UG/.5ML; UG/.5ML; UG/.5ML
0.5 SUSPENSION INTRAMUSCULAR ONCE
Refills: 0 | Status: COMPLETED | OUTPATIENT
Start: 2021-01-09 | End: 2021-01-11

## 2021-01-09 RX ORDER — LOSARTAN POTASSIUM 100 MG/1
50 TABLET, FILM COATED ORAL ONCE
Refills: 0 | Status: COMPLETED | OUTPATIENT
Start: 2021-01-09 | End: 2021-01-09

## 2021-01-09 RX ORDER — LOSARTAN POTASSIUM 100 MG/1
50 TABLET, FILM COATED ORAL DAILY
Refills: 0 | Status: DISCONTINUED | OUTPATIENT
Start: 2021-01-09 | End: 2021-01-12

## 2021-01-09 RX ORDER — ATENOLOL 25 MG/1
50 TABLET ORAL DAILY
Refills: 0 | Status: DISCONTINUED | OUTPATIENT
Start: 2021-01-09 | End: 2021-01-12

## 2021-01-09 RX ORDER — MAGNESIUM OXIDE 400 MG ORAL TABLET 241.3 MG
400 TABLET ORAL
Refills: 0 | Status: DISCONTINUED | OUTPATIENT
Start: 2021-01-09 | End: 2021-01-09

## 2021-01-09 RX ADMIN — OXYCODONE AND ACETAMINOPHEN 1 TABLET(S): 5; 325 TABLET ORAL at 13:52

## 2021-01-09 RX ADMIN — LOSARTAN POTASSIUM 50 MILLIGRAM(S): 100 TABLET, FILM COATED ORAL at 01:33

## 2021-01-09 RX ADMIN — LOSARTAN POTASSIUM 50 MILLIGRAM(S): 100 TABLET, FILM COATED ORAL at 05:57

## 2021-01-09 RX ADMIN — Medication 5 MILLIGRAM(S): at 17:12

## 2021-01-09 RX ADMIN — ATORVASTATIN CALCIUM 20 MILLIGRAM(S): 80 TABLET, FILM COATED ORAL at 21:22

## 2021-01-09 RX ADMIN — OXYCODONE AND ACETAMINOPHEN 1 TABLET(S): 5; 325 TABLET ORAL at 21:22

## 2021-01-09 RX ADMIN — Medication 81 MILLIGRAM(S): at 11:42

## 2021-01-09 RX ADMIN — CLOPIDOGREL BISULFATE 75 MILLIGRAM(S): 75 TABLET, FILM COATED ORAL at 11:42

## 2021-01-09 RX ADMIN — ATENOLOL 50 MILLIGRAM(S): 25 TABLET ORAL at 05:57

## 2021-01-09 RX ADMIN — HEPARIN SODIUM 5000 UNIT(S): 5000 INJECTION INTRAVENOUS; SUBCUTANEOUS at 05:57

## 2021-01-09 RX ADMIN — HEPARIN SODIUM 5000 UNIT(S): 5000 INJECTION INTRAVENOUS; SUBCUTANEOUS at 17:12

## 2021-01-09 RX ADMIN — Medication 650 MILLIGRAM(S): at 17:12

## 2021-01-09 RX ADMIN — LACTULOSE 5 GRAM(S): 10 SOLUTION ORAL at 18:53

## 2021-01-09 NOTE — CONSULT NOTE ADULT - ASSESSMENT
IMPRESSION: Rehab of 69 y/o  m  rehab  for  gd  sp  fall  oa      PRECAUTIONS: [  ] Cardiac  [  ] Respiratory  [  ] Seizures [  ] Contact Isolation  [  ] Droplet Isolation  [ FALL ] Other    Weight Bearing Status:     RECOMMENDATION:    Out of Bed to Chair     DVT/Decubiti Prophylaxis    REHAB PLAN:     [ x  ] Bedside P/T 3-5 times a week   [   ]   Bedside O/T  2-3 times a week             [   ] No Rehab Therapy Indicated                   [   ]  Speech Therapy   Conditioning/ROM                                    ADL  Bed Mobility                                               Conditioning/ROM  Transfers                                                     Bed Mobility  Sitting /Standing Balance                         Transfers                                        Gait Training                                               Sitting/Standing Balance  Stair Training [   ]Applicable                    Home equipment Eval                                                                        Splinting  [   ] Only      GOALS:   ADL   [  x ]   Independent                    Transfers  [x   ] Independent                          Ambulation  [   x] Independent     [  x  ] With device                            [  x ]  CG                                                         [ x  ]  CG                                                                  [x   ] CG                            [    ] Min A                                                   [   ] Min A                                                              [   ] Min  A          DISCHARGE PLAN:   [   ]  Good candidate for Intensive Rehabilitation/Hospital based-[facility]A [facility]                                             Will tolerate 3hrs Intensive Rehab Daily                                       [ xx   ]  Short Term Rehab in Skilled Nursing Facility and  cont current care                                        [    ]  Home with Outpatient or VN services                                         [    ]  Possible Candidate for Intensive Hospital based Rehab

## 2021-01-09 NOTE — PROGRESS NOTE ADULT - SUBJECTIVE AND OBJECTIVE BOX
pt was seen in chair alert, comfortable, NAD.  Patient complains of mild generalised abdominal and b/l legs pain.      MEDICATIONS  (STANDING):  aspirin  chewable 81 milliGRAM(s) Oral daily  ATENolol  Tablet 50 milliGRAM(s) Oral daily  atorvastatin 20 milliGRAM(s) Oral at bedtime  clopidogrel Tablet 75 milliGRAM(s) Oral daily  heparin   Injectable 5000 Unit(s) SubCutaneous two times a day  influenza   Vaccine 0.5 milliLiter(s) IntraMuscular once  losartan 50 milliGRAM(s) Oral daily    MEDICATIONS  (PRN):  acetaminophen   Tablet .. 650 milliGRAM(s) Oral every 6 hours PRN Mild Pain (1 - 3)  oxycodone    5 mG/acetaminophen 325 mG 1 Tablet(s) Oral every 6 hours PRN Moderate Pain (4 - 6)      Allergies    fish (Unknown)  iodine (Rash)  latex (Unknown)  magnesium sulfate (Rash)  sulfa drugs (Rash)      Vital Signs Last 24 Hrs  T(C): 35.7 (09 Jan 2021 04:45), Max: 37.6 (08 Jan 2021 17:36)  T(F): 96.3 (09 Jan 2021 04:45), Max: 99.7 (08 Jan 2021 17:36)  HR: 65 (09 Jan 2021 08:28) (56 - 68)  BP: 150/68 (09 Jan 2021 04:45) (140/89 - 228/106)  BP(mean): --  RR: 16 (09 Jan 2021 04:45) (16 - 20)  SpO2: 95% (09 Jan 2021 08:28) (95% - 98%)      PHYSICAL EXAM:  GENERAL: alert, comfortable, NAD  HEAD:  Atraumatic, Normocephalic  EYES: EOMI, PERRLA, conjunctiva and sclera clear  CHEST/LUNG: bilateral air entry  HEART: Regular rate and rhythm; s1,s2  ABDOMEN: +BS, Soft, mild generalised tenderness, no guarding, no rebound, nondistended  EXTREMITIES:  motor and sensory intact, 2+ Peripheral Pulses, No clubbing, cyanosis, b/l ankles mild non-pitting edema  NERVOUS SYSTEM: axox3, motor and sensory intact, motor strength 5/5 b/l upper and lower extremities  SKIN: No rashes or lesions                            14.2   8.94  )-----------( 204      ( 09 Jan 2021 08:06 )             41.2       01-09    144  |  102  |  11  ----------------------------<  83  3.5   |  28  |  0.7    Ca    8.9      09 Jan 2021 08:06  Mg     1.6     01-09    TPro  6.4  /  Alb  3.9  /  TBili  1.8<H>  /  DBili  x   /  AST  13  /  ALT  9   /  AlkPhos  95  01-09          Magnesium, Serum: 1.6 mg/dL (01-09-21 @ 08:06)              PT/INR - ( 08 Jan 2021 17:59 )   PT: 16.20 sec;   INR: 1.41 ratio         PTT - ( 08 Jan 2021 17:59 )  PTT:29.3 sec    Lactate Trend  01-08 @ 17:59 Lactate:1.3       CARDIAC MARKERS ( 08 Jan 2021 17:59 )  x     / 0.02 ng/mL / 82 U/L / x     / x                     LIZZY KENT  70y  Male      Patient is a 70y old Male who presents with a fall      INTERVAL HPI/OVERNIGHT EVENTS:  Patient seen and examined earlier this morning  Lying comfortably in bed.  No complaints  ambulated with PT this morning       REVIEW OF SYSTEMS:  confused  unable to assess due to mental status    T(C): 35.7 (01-09-21 @ 04:45), Max: 37.6 (01-08-21 @ 17:36)  HR: 65 (01-09-21 @ 08:28) (56 - 68)  BP: 150/68 (01-09-21 @ 04:45) (140/89 - 228/106)  RR: 16 (01-09-21 @ 04:45) (16 - 20)  SpO2: 95% (01-09-21 @ 08:28) (95% - 98%)    PHYSICAL EXAM:  GENERAL: NAD, well-groomed, well-developed  HEAD:  Atraumatic, Normocephalic  EYES: EOMI, PERRLA, conjunctiva and sclera clear  ENMT: No tonsillar erythema, exudates, or enlargement; Moist mucous membranes, Good dentition, No lesions  NECK: Supple, No JVD, Normal thyroid  NERVOUS SYSTEM:  awake, confused, moves all extremities   CHEST/LUNG: Clear to percussion bilaterally; No rales, rhonchi, wheezing, or rubs  HEART: Regular rate and rhythm; No murmurs, rubs, or gallops  ABDOMEN: Soft, Nontender, Nondistended; Bowel sounds present  EXTREMITIES:  2+ Peripheral Pulses, No clubbing, cyanosis, or edema  LYMPH: No lymphadenopathy noted  SKIN: No rashes or lesions    Consultant(s) Notes Reviewed:  [x ] YES  [ ] NO  Care Discussed with Consultants/Other Providers [ x] YES  [ ] NO    LAB:                        14.2   8.94  )-----------( 204      ( 09 Jan 2021 08:06 )             41.2     01-09    144  |  102  |  11  ----------------------------<  83  3.5   |  28  |  0.7    Ca    8.9      09 Jan 2021 08:06  Mg     1.6     01-09    TPro  6.4  /  Alb  3.9  /  TBili  1.8<H>  /  DBili  x   /  AST  13  /  ALT  9   /  AlkPhos  95  01-09    LIVER FUNCTIONS - ( 09 Jan 2021 08:06 )  Alb: 3.9 g/dL / Pro: 6.4 g/dL / ALK PHOS: 95 U/L / ALT: 9 U/L / AST: 13 U/L / GGT: x           CARDIAC MARKERS ( 08 Jan 2021 17:59 )  x     / 0.02 ng/mL / 82 U/L / x     / x            Drug Dosing Weight  Height (cm): 180.3 (08 Jan 2021 23:58)  Weight (kg): 113 (08 Jan 2021 23:58)  BMI (kg/m2): 34.8 (08 Jan 2021 23:58)  BSA (m2): 2.31 (08 Jan 2021 23:58)        CAPILLARY BLOOD GLUCOSE  POCT Blood Glucose.: 113 mg/dL (08 Jan 2021 17:53)          RADIOLOGY & ADDITIONAL TESTS:  Imaging Personally Reviewed:  [x] YES  [ ] NO    HEALTH ISSUES - PROBLEM Dx:  Hypokalemia  Hypokalemia    High blood cholesterol  High blood cholesterol    Hypertension  Hypertension    Stented coronary artery  Stented coronary artery    Impaired ambulation  Impaired ambulation    Fall  Fall        MEDS:  acetaminophen   Tablet .. 650 milliGRAM(s) Oral every 6 hours PRN  aspirin  chewable 81 milliGRAM(s) Oral daily  ATENolol  Tablet 50 milliGRAM(s) Oral daily  atorvastatin 20 milliGRAM(s) Oral at bedtime  clopidogrel Tablet 75 milliGRAM(s) Oral daily  heparin   Injectable 5000 Unit(s) SubCutaneous two times a day  influenza   Vaccine 0.5 milliLiter(s) IntraMuscular once  losartan 50 milliGRAM(s) Oral daily  oxycodone    5 mG/acetaminophen 325 mG 1 Tablet(s) Oral every 6 hours PRN

## 2021-01-09 NOTE — PHYSICAL THERAPY INITIAL EVALUATION ADULT - GENERAL OBSERVATIONS, REHAB EVAL
9:30-10:00 Chart reviewed. Patient available to be seen for physical therapy, denies pain, confirmed with RN. Pt rec'd in bed with C/O weakness, in NAD.

## 2021-01-09 NOTE — PHYSICAL THERAPY INITIAL EVALUATION ADULT - CRITERIA FOR SKILLED THERAPEUTIC INTERVENTIONS
impairments found/functional limitations in following categories/rehab potential/therapy frequency/predicted duration of therapy intervention

## 2021-01-09 NOTE — CONSULT NOTE ADULT - SUBJECTIVE AND OBJECTIVE BOX
HPI:  69yo male with extensive PMH including  coronary stent placement, sleep apnea, HTN, high cholesterol and recent 3 week admission to a NH for rehab, presents to the ER after falling 2 days ago apparently due to unsteady gait. Patient relates having pains to back, neck, and right side of chest ptn  seen and  examed  at  bed  side  nad  fu  command  c/o  bl  le  weakness     PTN  REFERRED TO ACUTE  REHAB  FOR  EVAL AND  TX   PAST MEDICAL & SURGICAL HISTORY:  MI (myocardial infarction)    Arthritis    Kidney stones    Sleep apnea    Hypertension    High blood cholesterol    H/O heart artery stent    History of appendectomy    H/O chronic cholecystitis  cholecystectomy        Hospital Course:    TODAY'S SUBJECTIVE & REVIEW OF SYMPTOMS:     Constitutional WNL   Cardio WNL   Resp WNL   GI WNL  Heme WNL  Endo WNL  Skin WNL  MSK WNL  Neuro WNL  Cognitive WNL  Psych WNL      MEDICATIONS  (STANDING):  aspirin  chewable 81 milliGRAM(s) Oral daily  ATENolol  Tablet 50 milliGRAM(s) Oral daily  atorvastatin 20 milliGRAM(s) Oral at bedtime  clopidogrel Tablet 75 milliGRAM(s) Oral daily  heparin   Injectable 5000 Unit(s) SubCutaneous two times a day  influenza   Vaccine 0.5 milliLiter(s) IntraMuscular once  losartan 50 milliGRAM(s) Oral daily    MEDICATIONS  (PRN):  acetaminophen   Tablet .. 650 milliGRAM(s) Oral every 6 hours PRN Mild Pain (1 - 3)  oxycodone    5 mG/acetaminophen 325 mG 1 Tablet(s) Oral every 6 hours PRN Moderate Pain (4 - 6)      FAMILY HISTORY:  CAD (coronary artery disease) (Father)        Allergies    fish (Unknown)  iodine (Rash)  latex (Unknown)  magnesium sulfate (Rash)  sulfa drugs (Rash)    Intolerances        SOCIAL HISTORY:    [  ] Etoh  [  ] Smoking  [  ] Substance abuse     Home Environment:  [  ] Home Alone  [ x ] Lives with Family  [  ] Home Health Aid    Dwelling:  [  ] Apartment  [  x] Private House  [  ] Adult Home  [  ] Skilled Nursing Facility      [  ] Short Term  [  ] Long Term  [ x ] Stairs       Elevator [  ]    FUNCTIONAL STATUS PTA: (Check all that apply)  Ambulation: [ x  ]Independent    [  ] Dependent     [  ] Non-Ambulatory  Assistive Device: [ x ] SA Cane  [  ]  Q Cane  [x  ] Walker  [  ]  Wheelchair  ADL : [ x ] Independent  [  ]  Dependent       Vital Signs Last 24 Hrs  T(C): 35.7 (09 Jan 2021 04:45), Max: 37.6 (08 Jan 2021 17:36)  T(F): 96.3 (09 Jan 2021 04:45), Max: 99.7 (08 Jan 2021 17:36)  HR: 65 (09 Jan 2021 08:28) (56 - 68)  BP: 150/68 (09 Jan 2021 04:45) (140/89 - 228/106)  BP(mean): --  RR: 16 (09 Jan 2021 04:45) (16 - 20)  SpO2: 95% (09 Jan 2021 08:28) (95% - 98%)      PHYSICAL EXAM: Alert & Oriented X3  GENERAL: NAD, well-groomed, well-developed  HEAD:  Atraumatic, Normocephalic  EYES: EOMI, PERRLA, conjunctiva and sclera clear  NECK: Supple, No JVD, Normal thyroid  CHEST/LUNG: Clear to percussion bilaterally; No rales, rhonchi, wheezing, or rubs  HEART: Regular rate and rhythm; No murmurs, rubs, or gallops  ABDOMEN: Soft, Nontender, Nondistended; Bowel sounds present  EXTREMITIES:  2+ Peripheral Pulses, No clubbing, cyanosis, or edema    NERVOUS SYSTEM:  Cranial Nerves 2-12 intact [x  ] Abnormal  [  ]  ROM: WFL all extremities [ passive  ]  Abnormal [  ]  Motor Strength: WFL all extremities  [  ]  Abnormal [3-4/5  all  ext   ]  Sensation: intact to light touch [  ] Abnormal [ x ]  Reflexes: Symmetric [  ]  Abnormal [ x ]    FUNCTIONAL STATUS:  Bed Mobility: Independent [  ]  Supervision [  ]  Needs Assistance [  ]  N/A [  x]  Transfers: Independent [  ]  Supervision [  ]  Needs Assistance [  ]  N/A [x  ]   Ambulation: Independent [  ]  Supervision [  ]  Needs Assistance [  ]  N/A [ x ]  ADL: Independent [  ] Requires Assistance [  ] N/A [ x ]  SEE PT/OT IE NOTES    LABS:                        14.5   8.94  )-----------( 206      ( 08 Jan 2021 17:59 )             41.5     01-08    141  |  99  |  13  ----------------------------<  103<H>  3.2<L>   |  30  |  0.8    Ca    9.3      08 Jan 2021 17:59    TPro  7.0  /  Alb  4.4  /  TBili  2.0<H>  /  DBili  x   /  AST  14  /  ALT  10  /  AlkPhos  97  01-08    PT/INR - ( 08 Jan 2021 17:59 )   PT: 16.20 sec;   INR: 1.41 ratio         PTT - ( 08 Jan 2021 17:59 )  PTT:29.3 sec      RADIOLOGY & ADDITIONAL STUDIES:    Assesment:

## 2021-01-09 NOTE — PROGRESS NOTE ADULT - ASSESSMENT
70y old male with pmhx of  HTN, CAD s/p coronary stent placement, sleep apnea, high cholesterol and recent 3 week admission to a NH for rehab, presents to the ER for fall 2 days ago due to unsteady gait.     Problem/Plan - 1:  ·  Problem: Fall.    Plan: Ambulate with assistance.           fall precautions     Problem/Plan - 2:  ·  Problem: Impaired ambulation.    Plan: PT/Rehab consult          SW consult for placement      Problem/Plan - 3:  ·  Problem: Hypomagnesia   Plan: allergy to mag sulfate          rpt mag.      Problem/Plan - 4;  ·  Problem: Stented coronary artery.    Plan: Continue current meds.           DVT prophylaxis     Problem/Plan - 5:  ·  Problem: Hypertension.    Plan: continue previous medication.            monitor vss     Problem/Plan - 6:  ·  Problem: High blood cholesterol.    Plan: PPI.   70y old male with pmhx of  HTN, CAD s/p coronary stent placement, sleep apnea, high cholesterol and recent 3 week admission to a NH for rehab, presents to the ER for fall 2 days ago due to unsteady gait.     Problem/Plan - 1:  ·  Problem: Fall.    Plan: Ambulate with assistance.           fall precautions          pain mgmt     Problem/Plan - 2:  ·  Problem: Impaired ambulation.    Plan: PT/Rehab consult          SW consult for placement      Problem/Plan - 3:  ·  Problem: Hypomagnesia   Plan: allergy to mag sulfate          rpt mag.      Problem/Plan - 4;  ·  Problem: Stented coronary artery.    Plan: Continue current meds.           DVT prophylaxis     Problem/Plan - 5:  ·  Problem: Hypertension.    Plan: continue previous medication.            monitor vss     Problem/Plan - 6:  ·  Problem: High blood cholesterol.    Plan: PPI. 70y old male with pmhx of HTN, CAD s/p coronary stent placement, sleep apnea, high cholesterol and recent 3 week admission to a NH for rehab, presents to the ER for fall 2 days ago due to unsteady gait.      Debility/Falls/Dementia  -at [facility] for two months.  Discharged less than a week ago but pt was falling and isn't able to take care of himself   -fall precautions  -pain control  -PT eval   - eval for placement     CAD s/p PCI / HTN / DL  -asa, statin, BB, plavix, losartan   -outpatient cardio follow up    Progress Note Handoff  Pending Consults: none  Pending Tests: none  Pending Results: none  Family Discussion: discussed SNF placement vs. long term with pt's daughter, Radha, in detail - in agreement with plan of care   Disposition: Home_____/SNF__x____/Other_____/Unknown at this time_____    Please call me with any questions at extension [phone]

## 2021-01-09 NOTE — PHYSICAL THERAPY INITIAL EVALUATION ADULT - ADDITIONAL COMMENTS
Pt lives with family in pvt house, pt has 6 steps to enter with rail, and 6 and 6 steps to living area/bedroom. Pt used st cane PTA, but recently has begun using WC.

## 2021-01-09 NOTE — PATIENT PROFILE ADULT - VISION (WITH CORRECTIVE LENSES IF THE PATIENT USUALLY WEARS THEM):
Glasses upon admission/Partially impaired: cannot see medication labels or newsprint, but can see obstacles in path, and the surrounding layout; can count fingers at arm's length

## 2021-01-10 ENCOUNTER — TRANSCRIPTION ENCOUNTER (OUTPATIENT)
Age: 71
End: 2021-01-10

## 2021-01-10 LAB
APPEARANCE UR: CLEAR — SIGNIFICANT CHANGE UP
BACTERIA # UR AUTO: ABNORMAL
BILIRUB UR-MCNC: ABNORMAL
COLOR SPEC: YELLOW — SIGNIFICANT CHANGE UP
DIFF PNL FLD: NEGATIVE — SIGNIFICANT CHANGE UP
EPI CELLS # UR: ABNORMAL /HPF
GLUCOSE UR QL: NEGATIVE MG/DL — SIGNIFICANT CHANGE UP
KETONES UR-MCNC: ABNORMAL
LEUKOCYTE ESTERASE UR-ACNC: NEGATIVE — SIGNIFICANT CHANGE UP
MAGNESIUM SERPL-MCNC: 1.5 MG/DL — LOW (ref 1.8–2.4)
NITRITE UR-MCNC: NEGATIVE — SIGNIFICANT CHANGE UP
PH UR: 6 — SIGNIFICANT CHANGE UP (ref 5–8)
PROT UR-MCNC: ABNORMAL MG/DL
RBC CASTS # UR COMP ASSIST: SIGNIFICANT CHANGE UP /HPF
SP GR SPEC: 1.02 — SIGNIFICANT CHANGE UP (ref 1.01–1.03)
UROBILINOGEN FLD QL: 4 MG/DL (ref 0.2–0.2)
WBC UR QL: SIGNIFICANT CHANGE UP /HPF

## 2021-01-10 PROCEDURE — 99497 ADVNCD CARE PLAN 30 MIN: CPT | Mod: 25

## 2021-01-10 PROCEDURE — 99233 SBSQ HOSP IP/OBS HIGH 50: CPT

## 2021-01-10 RX ORDER — SENNA PLUS 8.6 MG/1
2 TABLET ORAL AT BEDTIME
Refills: 0 | Status: DISCONTINUED | OUTPATIENT
Start: 2021-01-10 | End: 2021-01-12

## 2021-01-10 RX ORDER — ONDANSETRON 8 MG/1
4 TABLET, FILM COATED ORAL ONCE
Refills: 0 | Status: COMPLETED | OUTPATIENT
Start: 2021-01-10 | End: 2021-01-10

## 2021-01-10 RX ORDER — POLYETHYLENE GLYCOL 3350 17 G/17G
17 POWDER, FOR SOLUTION ORAL DAILY
Refills: 0 | Status: DISCONTINUED | OUTPATIENT
Start: 2021-01-10 | End: 2021-01-12

## 2021-01-10 RX ORDER — MAGNESIUM OXIDE 400 MG ORAL TABLET 241.3 MG
400 TABLET ORAL ONCE
Refills: 0 | Status: COMPLETED | OUTPATIENT
Start: 2021-01-10 | End: 2021-01-10

## 2021-01-10 RX ORDER — FAMOTIDINE 10 MG/ML
20 INJECTION INTRAVENOUS ONCE
Refills: 0 | Status: COMPLETED | OUTPATIENT
Start: 2021-01-10 | End: 2021-01-10

## 2021-01-10 RX ORDER — PANTOPRAZOLE SODIUM 20 MG/1
40 TABLET, DELAYED RELEASE ORAL
Refills: 0 | Status: DISCONTINUED | OUTPATIENT
Start: 2021-01-10 | End: 2021-01-12

## 2021-01-10 RX ADMIN — ATORVASTATIN CALCIUM 20 MILLIGRAM(S): 80 TABLET, FILM COATED ORAL at 22:16

## 2021-01-10 RX ADMIN — SENNA PLUS 2 TABLET(S): 8.6 TABLET ORAL at 22:16

## 2021-01-10 RX ADMIN — CLOPIDOGREL BISULFATE 75 MILLIGRAM(S): 75 TABLET, FILM COATED ORAL at 11:46

## 2021-01-10 RX ADMIN — Medication 650 MILLIGRAM(S): at 00:35

## 2021-01-10 RX ADMIN — Medication 81 MILLIGRAM(S): at 11:46

## 2021-01-10 RX ADMIN — MAGNESIUM OXIDE 400 MG ORAL TABLET 400 MILLIGRAM(S): 241.3 TABLET ORAL at 12:01

## 2021-01-10 RX ADMIN — LOSARTAN POTASSIUM 50 MILLIGRAM(S): 100 TABLET, FILM COATED ORAL at 06:16

## 2021-01-10 RX ADMIN — HEPARIN SODIUM 5000 UNIT(S): 5000 INJECTION INTRAVENOUS; SUBCUTANEOUS at 18:30

## 2021-01-10 RX ADMIN — Medication 650 MILLIGRAM(S): at 19:47

## 2021-01-10 RX ADMIN — FAMOTIDINE 20 MILLIGRAM(S): 10 INJECTION INTRAVENOUS at 11:45

## 2021-01-10 RX ADMIN — HEPARIN SODIUM 5000 UNIT(S): 5000 INJECTION INTRAVENOUS; SUBCUTANEOUS at 06:16

## 2021-01-10 RX ADMIN — ATENOLOL 50 MILLIGRAM(S): 25 TABLET ORAL at 06:16

## 2021-01-10 RX ADMIN — ONDANSETRON 4 MILLIGRAM(S): 8 TABLET, FILM COATED ORAL at 12:01

## 2021-01-10 NOTE — DISCHARGE NOTE PROVIDER - CARE PROVIDER_API CALL
Spoke with patient via phone.   Last INR 8/7/19 was 2.2.  Dose maintained per protocol.   Today's INR is 2.1 and is within goal range.    Current warfarin dosing verified with patient. Patient reports he did start using marijuana again at the beginning of August. Patient was informed that their INR result is within therapeutic range and instructed to maintain current dose per protocol. Discussed dose and return date for next INR.    Dr. Sterling is in the office today supervising the treatment. Note forwarded to physician for review.    Patient was instructed to contact the clinic with any unusual bleeding or bruising, any changes in medications, diet, health status, lifestyle, or any other changes, questions or concerns. Patient verbalized understanding of all discussed.      EMILEE CORONADO  Internal Medicine  [address] [place] [address]  Phone: [phone]  Fax: [phone]  Follow Up Time: 1-3 days

## 2021-01-10 NOTE — DISCHARGE NOTE PROVIDER - NSDCMRMEDTOKEN_GEN_ALL_CORE_FT
acetaminophen 325 mg oral tablet: 2 tab(s) orally every 6 hours, As needed, mild pain  aspirin 81 mg oral tablet:   atenolol 50 mg oral tablet: 1 tab(s) orally once a day  atorvastatin 20 mg oral tablet: 1 tab(s) orally once a day  ciprofloxacin 500 mg oral tablet: 1 tab(s) orally every 12 hours for 3 days  losartan 50 mg oral tablet: 1 tab(s) orally once a day  oxycodone-acetaminophen 5 mg-325 mg oral tablet: 1 tab(s) orally every 6 hours, As needed, Moderate Pain (4 - 6)  Plavix: 75 milligram(s) orally once a day   acetaminophen 325 mg oral tablet: 2 tab(s) orally every 6 hours, As needed, mild pain  aspirin 81 mg oral tablet: orally once a day  atenolol 50 mg oral tablet: 1 tab(s) orally once a day  atorvastatin 20 mg oral tablet: 1 tab(s) orally once a day  losartan 50 mg oral tablet: 1 tab(s) orally once a day  Plavix: 75 milligram(s) orally once a day  polyethylene glycol 3350 oral powder for reconstitution: 17 gram(s) orally once a day, As needed, Constipation  senna oral tablet: 2 tab(s) orally once a day (at bedtime)   acetaminophen 325 mg oral tablet: 2 tab(s) orally every 6 hours, As needed, mild pain  aspirin 81 mg oral tablet: orally once a day  atenolol 50 mg oral tablet: 1 tab(s) orally once a day  atorvastatin 20 mg oral tablet: 1 tab(s) orally once a day  losartan 50 mg oral tablet: 2 tab(s) orally once a day  Plavix: 75 milligram(s) orally once a day  polyethylene glycol 3350 oral powder for reconstitution: 17 gram(s) orally once a day, As needed, Constipation  senna oral tablet: 2 tab(s) orally once a day (at bedtime)

## 2021-01-10 NOTE — PROGRESS NOTE ADULT - ASSESSMENT
70y old male with pmhx of HTN, CAD s/p coronary stent placement, sleep apnea, high cholesterol and recent 3 week admission to a NH for rehab, presents to the ER for fall 2 days ago due to unsteady gait.      # Debility/Falls/Dementia  - at [facility] for two months.  Discharged less than a week ago but pt was falling and isn't able to take care of himself   - fall precautions  - pain control  - PT evaluated - continue PT   - Physiatry recommended STR   -  eval for placement     # CAD s/p PCI / HTN / DL  - asa, statin, BB, plavix, losartan   - outpatient cardio follow up    # GERD   - Will add pantoprazole QD   - Maalox PRN     # Constipation   - Likely secondary to poor po intake   - Senna qPM   - Miralax PRN     Progress Note Handoff  Pending Consults: none  Pending Tests: none  Pending Results: none  Family Discussion: discussed SNF placement vs. long term with pt's daughter, Radha, in detail - in agreement with plan of care   Disposition: Home_____/SNF__x____/Other_____/Unknown at this time_____    Please call me with any questions at extension [phone] 70y old male with pmhx of HTN, CAD s/p coronary stent placement, sleep apnea, high cholesterol and recent 3 week admission to a NH for rehab, presents to the ER for fall 2 days ago due to unsteady gait.      # Debility/Falls/Dementia  - at [facility] for two months.  Discharged less than a week ago but pt was falling and isn't able to take care of himself   - fall precautions  - pain control  - PT evaluated - continue PT   - Physiatry recommended STR   -  eval for placement     # CAD s/p PCI / HTN / DL  - asa, statin, BB, plavix, losartan   - outpatient cardio follow up    # GERD   - Will add pantoprazole QD     # Constipation   - Likely secondary to poor po intake   - Senna qPM   - Miralax PRN     Progress Note Handoff  Pending Consults: none  Pending Tests: none  Pending Results: none  Family Discussion: discussed SNF placement vs. long term with pt's daughter, Radha, in detail - in agreement with plan of care   Disposition: Home_____/SNF__x____/Other_____/Unknown at this time_____    Please call me with any questions at extension [phone] 70y old male with pmhx of HTN, CAD s/p coronary stent placement, sleep apnea, high cholesterol and recent 3 week admission to a NH for rehab, presents to the ER for fall 2 days ago due to unsteady gait.      # Debility/Falls/Dementia  - at [facility] for two months.  Discharged less than a week ago but pt was falling and isn't able to take care of himself   - fall precautions  - pain control  - PT evaluated - continue PT   - Physiatry recommended STR   -  eval for placement     # CAD s/p PCI / HTN / DL  - asa, statin, BB, plavix, losartan   - outpatient cardio follow up    # GERD   - Will add pantoprazole QD     # Constipation   - Likely secondary to poor po intake   - Senna qPM   - Miralax PRN     FULL CODE    Progress Note Handoff  Pending Consults: none  Pending Tests: none  Pending Results: none  Family Discussion: discussed GOC, SNF placement vs. long term with pt's daughter, Radha, in detail - in agreement with plan of care   Disposition: Home_____/SNF__x____/Other_____/Unknown at this time_____    Please call me with any questions at extension [phone]

## 2021-01-10 NOTE — DISCHARGE NOTE PROVIDER - HOSPITAL COURSE
To be completed by the Attending     Patient is a 69 year old Male with a past medical history of CAD s/p Coronary stent placement, Sleep apnea, HTN, Hyperlipidemia, and recent 3 week admission to Nursing home presents to the ER on 1/8/21 with a chief complaint of unsteady gait with a mechanical fall. Patient was admitted for fall. Physiatry saw the patient and recommends Short term rehab in skilled nursing facility. Physical Therapy saw the patient while inpatient.     VITAL SIGNS       PHYSICAL EXAM:       IMAGING STUDIES:   CT Abdomen and Pelvis w/ IV Cont (01.08.21 @ 20:24)  IMPRESSION:  Limited study. The intravenous infiltrated during the injection for the examination therefore there is no significant vascular enhancement.  No evidence of intra-thoracic, abdominal or pelvic visceral laceration or hematoma.  No acute fractures are identified.    CT Chest w/ IV Cont (01.08.21 @ 20:24)   IMPRESSION  Limited study. The intravenous infiltrated during the injection for the examination therefore there is no significant vascular enhancement.  No evidence of intra-thoracic, abdominal or pelvic visceral laceration or hematoma.  No acute fractures are identified.    CT Cervical Spine No Cont (01.08.21 @ 20:15)   IMPRESSION:  No CT evidence of acute cervical spine injury.    CT Head No Cont (01.08.21 @ 20:14)   Impression:  No CT evidence of acute intracranial injury.    Xray Pelvis AP only (01.08.21 @ 18:11)   Impression:  No evidence of acute fracture.    Xray Chest 1 View- PORTABLE-Urgent (01.08.21 @ 18:11)   Impression:  No radiographic evidence of acute cardiopulmonary disease.     Patient is a 69 year old Male with a past medical history of CAD s/p Coronary stent placement, Sleep apnea, HTN, Hyperlipidemia, and recent 3 week admission to Nursing home presents to the ER on 1/8/21 with a chief complaint of unsteady gait with a mechanical fall. Patient was admitted for fall. Physiatry saw the patient and recommends Short term rehab in skilled nursing facility. Physical Therapy saw the patient while inpatient.     VITAL SIGNS   Vital Signs Last 24 Hrs  T(C): 36.3 (11 Jan 2021 04:47), Max: 37.1 (10 Gage 2021 14:14)  T(F): 97.4 (11 Jan 2021 04:47), Max: 98.7 (10 Gage 2021 14:14)  HR: 76 (11 Jan 2021 04:47) (70 - 76)  BP: 152/88 (11 Jan 2021 04:47) (99/53 - 160/75)  BP(mean): --  RR: 16 (11 Jan 2021 04:47) (16 - 16)  SpO2: 96% (11 Jan 2021 08:03) (96% - 96%)      PHYSICAL EXAM:   PHYSICAL EXAM:  GENERAL: NAD, well-groomed, well-developed  HEAD:  Atraumatic, Normocephalic  EYES: EOMI, PERRLA, conjunctiva and sclera clear  NERVOUS SYSTEM:  Awake, alert, moves all extremities   CHEST/LUNG: Clear to percussion bilaterally; No rales, rhonchi, wheezing, or rubs  HEART: Regular rate and rhythm; No murmurs, rubs, or gallops  ABDOMEN: Soft, Nontender, Nondistended; Bowel sounds present  EXTREMITIES:  2+ Peripheral Pulses, No clubbing, cyanosis, or edema  SKIN: No rashes or lesions    d/c to snf today  discussed with pt's daughter, Radha  d/c planning took over 45 min  dc papers done by me   Patient is a 69 year old Male with a past medical history of CAD s/p Coronary stent placement, Sleep apnea, HTN, Hyperlipidemia, and recent 3 week admission to Nursing home presents to the ER on 1/8/21 with a chief complaint of unsteady gait with a mechanical fall. Patient was admitted for fall. Physiatry saw the patient and recommends Short term rehab in skilled nursing facility. Physical Therapy saw the patient while inpatient.     VITAL SIGNS   Vital Signs Last 24 Hrs  T(C): 36.4 (12 Jan 2021 05:00), Max: 36.4 (12 Jan 2021 05:00)  T(F): 97.6 (12 Jan 2021 05:00), Max: 97.6 (12 Jan 2021 05:00)  HR: 60 (12 Jan 2021 09:49) (59 - 68)  BP: 153/67 (12 Jan 2021 09:49) (105/78 - 197/86)  BP(mean): --  RR: 18 (12 Jan 2021 05:00) (16 - 18)  SpO2: --      PHYSICAL EXAM:   PHYSICAL EXAM:  GENERAL: NAD, well-groomed, well-developed  HEAD:  Atraumatic, Normocephalic  EYES: EOMI, PERRLA, conjunctiva and sclera clear  NERVOUS SYSTEM:  Awake, alert, moves all extremities   CHEST/LUNG: Clear to percussion bilaterally; No rales, rhonchi, wheezing, or rubs  HEART: Regular rate and rhythm; No murmurs, rubs, or gallops  ABDOMEN: Soft, Nontender, Nondistended; Bowel sounds present  EXTREMITIES:  2+ Peripheral Pulses, No clubbing, cyanosis, or edema  SKIN: No rashes or lesions    d/c to snf today  discussed with pt's daughter, Radha  d/c planning took over 45 min  dc papers done by me

## 2021-01-10 NOTE — GOALS OF CARE CONVERSATION - ADVANCED CARE PLANNING - CONVERSATION DETAILS
Elderly male with dementia presents with falls and debility  Patient's daughter wishes for her father to remain a full code

## 2021-01-10 NOTE — PROGRESS NOTE ADULT - SUBJECTIVE AND OBJECTIVE BOX
LIZZY KENT  70y  Male    CHIEF COMPLAINTS  Patient is a 70y old  Male who presents with a chief complaint of Fall (09 Jan 2021 11:23)    INTERVAL HPI/OVERNIGHT EVENTS:  Patient was seen and examined at bedside. States he eat breakfast but only a small portion. Admits to abdominal discomfort and states he has a history of GERD and feels similar. Otherwise, no acute complaints     PAST MEDICAL HISTORY   PAST MEDICAL & SURGICAL HISTORY:  MI (myocardial infarction)  Arthritis  Kidney stones  Sleep apnea  Hypertension  High blood cholesterol  H/O heart artery stent  History of appendectomy  H/O chronic cholecystitis, cholecystectomy    HOME MEDICATIONS   Home Medications:  acetaminophen 325 mg oral tablet: 2 tab(s) orally every 6 hours, As needed, mild pain (24 Nov 2020 14:04)  aspirin 81 mg oral tablet:  (21 Nov 2020 22:11)  atenolol 50 mg oral tablet: 1 tab(s) orally once a day (21 Nov 2020 22:11)  atorvastatin 20 mg oral tablet: 1 tab(s) orally once a day (21 Nov 2020 22:11)  ciprofloxacin 500 mg oral tablet: 1 tab(s) orally every 12 hours for 3 days (25 Nov 2020 10:39)  losartan 50 mg oral tablet: 1 tab(s) orally once a day (21 Nov 2020 22:11)  oxycodone-acetaminophen 5 mg-325 mg oral tablet: 1 tab(s) orally every 6 hours, As needed, Moderate Pain (4 - 6) (24 Nov 2020 14:04)  Plavix: 75 milligram(s) orally once a day (21 Nov 2020 22:11)    SOCIAL HISTORY   Social History:  Denies alcohol or tobacco use (08 Jan 2021 22:17)    FAMILY HISTORY   FAMILY HISTORY:  CAD (coronary artery disease) (Father)    ALLERGIES  fish (Unknown)  iodine (Rash)  latex (Unknown)  magnesium sulfate (Rash)  sulfa drugs (Rash)    REVIEW OF SYSTEMS:  CONSTITUTIONAL: No fever, weight loss, or fatigue  EYES: No eye pain, visual disturbances, or discharge  ENMT:  No difficulty hearing, tinnitus, vertigo; No sinus or throat pain  NECK: No pain or stiffness  BREASTS: No pain, masses, or nipple discharge  RESPIRATORY: No cough, wheezing, chills or hemoptysis; No shortness of breath  CARDIOVASCULAR: No chest pain, palpitations, dizziness, or leg swelling  GASTROINTESTINAL: +Epgastric pain. No nausea, vomiting, or hematemesis; No diarrhea. +constipation. No melena or hematochezia.  GENITOURINARY: No dysuria, frequency, hematuria, or incontinence  NEUROLOGICAL: No headaches, memory loss, loss of strength, numbness, or tremors  SKIN: No itching, burning, rashes, or lesions. +Bruise on Left upper arm secondary to IV  LYMPH NODES: No enlarged glands  ENDOCRINE: No heat or cold intolerance; No hair loss  MUSCULOSKELETAL: No joint pain or swelling; No muscle, back, or extremity pain  PSYCHIATRIC: No depression, anxiety, mood swings, or difficulty sleeping  HEME/LYMPH: No easy bruising, or bleeding gums  ALLERY AND IMMUNOLOGIC: No hives or eczema      OBJECTIVES      VITAL SIGNS  Vital Signs Last 24 Hrs  T(C): 35.7 (10 Gage 2021 05:27), Max: 36.7 (09 Jan 2021 21:33)  T(F): 96.3 (10 Gage 2021 05:27), Max: 98 (09 Jan 2021 21:33)  HR: 68 (10 Gage 2021 05:27) (67 - 72)  BP: 156/69 (10 Gage 2021 05:27) (138/64 - 168/73)  RR: 18 (10 Gage 2021 05:27) (16 - 18)      PHYSICAL EXAM:  GENERAL: NAD, well-groomed, well-developed  HEAD:  Atraumatic, Normocephalic  EYES: EOMI, PERRLA, conjunctiva and sclera clear  ENMT: Moist mucous membranes  NECK: Supple, No JVD, Normal thyroid  NERVOUS SYSTEM:  awake, confused, moves all extremities   CHEST/LUNG: Clear to percussion bilaterally; No rales, rhonchi, wheezing, or rubs  HEART: Regular rate and rhythm; No murmurs, rubs, or gallops  ABDOMEN: Soft, Nontender (States it is discomfort not pain), Nondistended; Bowel sounds present  EXTREMITIES:  2+ Peripheral Pulses, No clubbing, cyanosis, or edema  LYMPH: No lymphadenopathy noted  SKIN: +Bruise on Left upper arm secondary to IV    CONSULTS  Consultant(s) Notes Reviewed:  [x ] YES  [ ] NO  Care Discussed with Consultants/Other Providers [ x] YES  [ ] NO    LABS:                        14.2   8.94  )-----------( 204      ( 09 Jan 2021 08:06 )             41.2     01-09    144  |  102  |  11  ----------------------------<  83  3.5   |  28  |  0.7    Ca    8.9      09 Jan 2021 08:06  Mg     1.6     01-09    TPro  6.4  /  Alb  3.9  /  TBili  1.8<H>  /  DBili  x   /  AST  13  /  ALT  9   /  AlkPhos  95  01-09    LIVER FUNCTIONS - ( 09 Jan 2021 08:06 )  Alb: 3.9 g/dL / Pro: 6.4 g/dL / ALK PHOS: 95 U/L / ALT: 9 U/L / AST: 13 U/L / GGT: x           CARDIAC MARKERS ( 08 Jan 2021 17:59 )  x     / 0.02 ng/mL / 82 U/L / x     / x              RADIOLOGY & ADDITIONAL TESTS:  Imaging Personally Reviewed:  [x] YES  [ ] NO  CT Abdomen and Pelvis w/ IV Cont (01.08.21 @ 20:24)  IMPRESSION:  Limited study. The intravenous infiltrated during the injection for the examination therefore there is no significant vascular enhancement.  No evidence of intra-thoracic, abdominal or pelvic visceral laceration or hematoma.  No acute fractures are identified.    CT Chest w/ IV Cont (01.08.21 @ 20:24)  IMPRESSION:  Limited study. The intravenous infiltrated during the injection for the examination therefore there is no significant vascular enhancement.  No evidence of intra-thoracic, abdominal or pelvic visceral laceration or hematoma.  No acute fractures are identified.    CT Cervical Spine No Cont (01.08.21 @ 20:15)   IMPRESSION:  No CT evidence of acute cervical spine injury.    CT Head No Cont (01.08.21 @ 20:14) >  Impression:  No CT evidence of acute intracranial injury.    Xray Pelvis AP only (01.08.21 @ 18:11) >  Impression:  No evidence of acute fracture.          MEDICATIONS  MEDICATIONS  (STANDING):  aspirin  chewable 81 milliGRAM(s) Oral daily  ATENolol  Tablet 50 milliGRAM(s) Oral daily  atorvastatin 20 milliGRAM(s) Oral at bedtime  clopidogrel Tablet 75 milliGRAM(s) Oral daily  heparin   Injectable 5000 Unit(s) SubCutaneous two times a day  influenza   Vaccine 0.5 milliLiter(s) IntraMuscular once  losartan 50 milliGRAM(s) Oral daily    MEDICATIONS  (PRN):  acetaminophen   Tablet .. 650 milliGRAM(s) Oral every 6 hours PRN Mild Pain (1 - 3)  acetaminophen   Tablet .. 650 milliGRAM(s) Oral every 6 hours PRN Moderate Pain (4 - 6)  oxycodone    5 mG/acetaminophen 325 mG 1 Tablet(s) Oral every 6 hours PRN Moderate Pain (4 - 6)       LIZZY KENT  70y  Male    CHIEF COMPLAINTS  Patient is a 70y old  Male who presents with a chief complaint of Fall (09 Jan 2021 11:23)    INTERVAL HPI/OVERNIGHT EVENTS:  Patient was seen and examined at bedside. States he eat breakfast but only a small portion. Admits to abdominal discomfort and states he has a history of GERD and feels similar. Otherwise, no acute complaints     PAST MEDICAL HISTORY   PAST MEDICAL & SURGICAL HISTORY:  MI (myocardial infarction)  Arthritis  Kidney stones  Sleep apnea  Hypertension  High blood cholesterol  H/O heart artery stent  History of appendectomy  H/O chronic cholecystitis, cholecystectomy    HOME MEDICATIONS   Home Medications:  acetaminophen 325 mg oral tablet: 2 tab(s) orally every 6 hours, As needed, mild pain (24 Nov 2020 14:04)  aspirin 81 mg oral tablet:  (21 Nov 2020 22:11)  atenolol 50 mg oral tablet: 1 tab(s) orally once a day (21 Nov 2020 22:11)  atorvastatin 20 mg oral tablet: 1 tab(s) orally once a day (21 Nov 2020 22:11)  ciprofloxacin 500 mg oral tablet: 1 tab(s) orally every 12 hours for 3 days (25 Nov 2020 10:39)  losartan 50 mg oral tablet: 1 tab(s) orally once a day (21 Nov 2020 22:11)  oxycodone-acetaminophen 5 mg-325 mg oral tablet: 1 tab(s) orally every 6 hours, As needed, Moderate Pain (4 - 6) (24 Nov 2020 14:04)  Plavix: 75 milligram(s) orally once a day (21 Nov 2020 22:11)    SOCIAL HISTORY   Social History:  Denies alcohol or tobacco use (08 Jan 2021 22:17)    FAMILY HISTORY   FAMILY HISTORY:  CAD (coronary artery disease) (Father)    ALLERGIES  fish (Unknown)  iodine (Rash)  latex (Unknown)  magnesium sulfate (Rash)  sulfa drugs (Rash)    REVIEW OF SYSTEMS:  CONSTITUTIONAL: No fever, weight loss, or fatigue  EYES: No eye pain, visual disturbances, or discharge  ENMT:  No difficulty hearing, tinnitus, vertigo; No sinus or throat pain  NECK: No pain or stiffness  BREASTS: No pain, masses, or nipple discharge  RESPIRATORY: No cough, wheezing, chills or hemoptysis; No shortness of breath  CARDIOVASCULAR: No chest pain, palpitations, dizziness, or leg swelling  GASTROINTESTINAL: +Epgastric pain. No nausea, vomiting, or hematemesis; No diarrhea. +constipation. No melena or hematochezia.  GENITOURINARY: No dysuria, frequency, hematuria, or incontinence  NEUROLOGICAL: No headaches, memory loss, loss of strength, numbness, or tremors  SKIN: No itching, burning, rashes, or lesions. +Bruise on Left upper arm secondary to IV  LYMPH NODES: No enlarged glands  ENDOCRINE: No heat or cold intolerance; No hair loss  MUSCULOSKELETAL: No joint pain or swelling; No muscle, back, or extremity pain  PSYCHIATRIC: No depression, anxiety, mood swings, or difficulty sleeping  HEME/LYMPH: No easy bruising, or bleeding gums  ALLERY AND IMMUNOLOGIC: No hives or eczema        VITAL SIGNS  Vital Signs Last 24 Hrs  T(C): 35.7 (10 Gage 2021 05:27), Max: 36.7 (09 Jan 2021 21:33)  T(F): 96.3 (10 Gage 2021 05:27), Max: 98 (09 Jan 2021 21:33)  HR: 68 (10 Gage 2021 05:27) (67 - 72)  BP: 156/69 (10 Gage 2021 05:27) (138/64 - 168/73)  RR: 18 (10 Gage 2021 05:27) (16 - 18)      PHYSICAL EXAM:  GENERAL: NAD, well-groomed, well-developed  HEAD:  Atraumatic, Normocephalic  EYES: EOMI, PERRLA, conjunctiva and sclera clear  ENMT: Moist mucous membranes  NECK: Supple, No JVD, Normal thyroid  NERVOUS SYSTEM:  awake, confused, moves all extremities   CHEST/LUNG: Clear to percussion bilaterally; No rales, rhonchi, wheezing, or rubs  HEART: Regular rate and rhythm; No murmurs, rubs, or gallops  ABDOMEN: Soft, Nontender (States it is discomfort not pain), Nondistended; Bowel sounds present  EXTREMITIES:  2+ Peripheral Pulses, No clubbing, cyanosis, or edema  LYMPH: No lymphadenopathy noted  SKIN: +Bruise on Left upper arm secondary to IV    CONSULTS  Consultant(s) Notes Reviewed:  [x ] YES  [ ] NO  Care Discussed with Consultants/Other Providers [ x] YES  [ ] NO    LABS:                          14.2   8.94  )-----------( 204      ( 09 Jan 2021 08:06 )             41.2     01-09    144  |  102  |  11  ----------------------------<  83  3.5   |  28  |  0.7    Ca    8.9      09 Jan 2021 08:06  Mg     1.5     01-10    TPro  6.4  /  Alb  3.9  /  TBili  1.8<H>  /  DBili  x   /  AST  13  /  ALT  9   /  AlkPhos  95  01-09        RADIOLOGY & ADDITIONAL TESTS:  Imaging Personally Reviewed:  [x] YES  [ ] NO  CT Abdomen and Pelvis w/ IV Cont (01.08.21 @ 20:24)  IMPRESSION:  Limited study. The intravenous infiltrated during the injection for the examination therefore there is no significant vascular enhancement.  No evidence of intra-thoracic, abdominal or pelvic visceral laceration or hematoma.  No acute fractures are identified.    CT Chest w/ IV Cont (01.08.21 @ 20:24)  IMPRESSION:  Limited study. The intravenous infiltrated during the injection for the examination therefore there is no significant vascular enhancement.  No evidence of intra-thoracic, abdominal or pelvic visceral laceration or hematoma.  No acute fractures are identified.    CT Cervical Spine No Cont (01.08.21 @ 20:15)   IMPRESSION:  No CT evidence of acute cervical spine injury.    CT Head No Cont (01.08.21 @ 20:14) >  Impression:  No CT evidence of acute intracranial injury.    Xray Pelvis AP only (01.08.21 @ 18:11) >  Impression:  No evidence of acute fracture.          MEDICATIONS  MEDICATIONS  (STANDING):  aspirin  chewable 81 milliGRAM(s) Oral daily  ATENolol  Tablet 50 milliGRAM(s) Oral daily  atorvastatin 20 milliGRAM(s) Oral at bedtime  clopidogrel Tablet 75 milliGRAM(s) Oral daily  famotidine    Tablet 20 milliGRAM(s) Oral once  heparin   Injectable 5000 Unit(s) SubCutaneous two times a day  influenza   Vaccine 0.5 milliLiter(s) IntraMuscular once  losartan 50 milliGRAM(s) Oral daily  pantoprazole    Tablet 40 milliGRAM(s) Oral before breakfast  senna 2 Tablet(s) Oral at bedtime    MEDICATIONS  (PRN):  acetaminophen   Tablet .. 650 milliGRAM(s) Oral every 6 hours PRN Mild Pain (1 - 3)  acetaminophen   Tablet .. 650 milliGRAM(s) Oral every 6 hours PRN Moderate Pain (4 - 6)  oxycodone    5 mG/acetaminophen 325 mG 1 Tablet(s) Oral every 6 hours PRN Moderate Pain (4 - 6)  polyethylene glycol 3350 17 Gram(s) Oral daily PRN Constipation

## 2021-01-10 NOTE — DISCHARGE NOTE PROVIDER - NSDCCPCAREPLAN_GEN_ALL_CORE_FT
PRINCIPAL DISCHARGE DIAGNOSIS  Diagnosis: Impaired ambulation  Assessment and Plan of Treatment: -  - Physiatry saw and recommends STR in SNF   - Physical therapy daily worked with the patient

## 2021-01-11 ENCOUNTER — TRANSCRIPTION ENCOUNTER (OUTPATIENT)
Age: 71
End: 2021-01-11

## 2021-01-11 LAB
ALBUMIN SERPL ELPH-MCNC: 3.7 G/DL — SIGNIFICANT CHANGE UP (ref 3.5–5.2)
ALP SERPL-CCNC: 82 U/L — SIGNIFICANT CHANGE UP (ref 30–115)
ALT FLD-CCNC: 7 U/L — SIGNIFICANT CHANGE UP (ref 0–41)
ANION GAP SERPL CALC-SCNC: 7 MMOL/L — SIGNIFICANT CHANGE UP (ref 7–14)
AST SERPL-CCNC: 10 U/L — SIGNIFICANT CHANGE UP (ref 0–41)
BILIRUB SERPL-MCNC: 1 MG/DL — SIGNIFICANT CHANGE UP (ref 0.2–1.2)
BUN SERPL-MCNC: 14 MG/DL — SIGNIFICANT CHANGE UP (ref 10–20)
CALCIUM SERPL-MCNC: 9.1 MG/DL — SIGNIFICANT CHANGE UP (ref 8.5–10.1)
CHLORIDE SERPL-SCNC: 103 MMOL/L — SIGNIFICANT CHANGE UP (ref 98–110)
CO2 SERPL-SCNC: 33 MMOL/L — HIGH (ref 17–32)
CREAT SERPL-MCNC: 0.6 MG/DL — LOW (ref 0.7–1.5)
GLUCOSE SERPL-MCNC: 100 MG/DL — HIGH (ref 70–99)
MAGNESIUM SERPL-MCNC: 1.5 MG/DL — LOW (ref 1.8–2.4)
POTASSIUM SERPL-MCNC: 4.2 MMOL/L — SIGNIFICANT CHANGE UP (ref 3.5–5)
POTASSIUM SERPL-SCNC: 4.2 MMOL/L — SIGNIFICANT CHANGE UP (ref 3.5–5)
PROT SERPL-MCNC: 6 G/DL — SIGNIFICANT CHANGE UP (ref 6–8)
RAPID RVP RESULT: SIGNIFICANT CHANGE UP
SARS-COV-2 IGG SERPL QL IA: NEGATIVE — SIGNIFICANT CHANGE UP
SARS-COV-2 IGM SERPL IA-ACNC: <0.1 INDEX — SIGNIFICANT CHANGE UP
SARS-COV-2 RNA SPEC QL NAA+PROBE: SIGNIFICANT CHANGE UP
SODIUM SERPL-SCNC: 143 MMOL/L — SIGNIFICANT CHANGE UP (ref 135–146)

## 2021-01-11 PROCEDURE — 99233 SBSQ HOSP IP/OBS HIGH 50: CPT

## 2021-01-11 RX ORDER — ASPIRIN/CALCIUM CARB/MAGNESIUM 324 MG
0 TABLET ORAL
Qty: 0 | Refills: 0 | DISCHARGE

## 2021-01-11 RX ORDER — CIPROFLOXACIN LACTATE 400MG/40ML
1 VIAL (ML) INTRAVENOUS
Qty: 0 | Refills: 0 | DISCHARGE

## 2021-01-11 RX ORDER — POLYETHYLENE GLYCOL 3350 17 G/17G
17 POWDER, FOR SOLUTION ORAL
Qty: 0 | Refills: 0 | DISCHARGE
Start: 2021-01-11

## 2021-01-11 RX ORDER — MAGNESIUM OXIDE 400 MG ORAL TABLET 241.3 MG
400 TABLET ORAL
Refills: 0 | Status: DISCONTINUED | OUTPATIENT
Start: 2021-01-11 | End: 2021-01-12

## 2021-01-11 RX ORDER — ONDANSETRON 8 MG/1
4 TABLET, FILM COATED ORAL ONCE
Refills: 0 | Status: COMPLETED | OUTPATIENT
Start: 2021-01-11 | End: 2021-01-11

## 2021-01-11 RX ORDER — SENNA PLUS 8.6 MG/1
2 TABLET ORAL
Qty: 0 | Refills: 0 | DISCHARGE
Start: 2021-01-11

## 2021-01-11 RX ADMIN — SENNA PLUS 2 TABLET(S): 8.6 TABLET ORAL at 20:07

## 2021-01-11 RX ADMIN — MAGNESIUM OXIDE 400 MG ORAL TABLET 400 MILLIGRAM(S): 241.3 TABLET ORAL at 17:35

## 2021-01-11 RX ADMIN — HEPARIN SODIUM 5000 UNIT(S): 5000 INJECTION INTRAVENOUS; SUBCUTANEOUS at 05:44

## 2021-01-11 RX ADMIN — INFLUENZA VIRUS VACCINE 0.5 MILLILITER(S): 15; 15; 15; 15 SUSPENSION INTRAMUSCULAR at 14:56

## 2021-01-11 RX ADMIN — LOSARTAN POTASSIUM 50 MILLIGRAM(S): 100 TABLET, FILM COATED ORAL at 05:43

## 2021-01-11 RX ADMIN — PANTOPRAZOLE SODIUM 40 MILLIGRAM(S): 20 TABLET, DELAYED RELEASE ORAL at 05:43

## 2021-01-11 RX ADMIN — Medication 81 MILLIGRAM(S): at 11:13

## 2021-01-11 RX ADMIN — Medication 0.1 MILLIGRAM(S): at 20:07

## 2021-01-11 RX ADMIN — Medication 2.5 MILLIGRAM(S): at 18:52

## 2021-01-11 RX ADMIN — ATORVASTATIN CALCIUM 20 MILLIGRAM(S): 80 TABLET, FILM COATED ORAL at 20:07

## 2021-01-11 RX ADMIN — CLOPIDOGREL BISULFATE 75 MILLIGRAM(S): 75 TABLET, FILM COATED ORAL at 11:14

## 2021-01-11 RX ADMIN — ATENOLOL 50 MILLIGRAM(S): 25 TABLET ORAL at 05:43

## 2021-01-11 RX ADMIN — ONDANSETRON 4 MILLIGRAM(S): 8 TABLET, FILM COATED ORAL at 20:07

## 2021-01-11 RX ADMIN — HEPARIN SODIUM 5000 UNIT(S): 5000 INJECTION INTRAVENOUS; SUBCUTANEOUS at 17:36

## 2021-01-11 RX ADMIN — OXYCODONE AND ACETAMINOPHEN 1 TABLET(S): 5; 325 TABLET ORAL at 21:44

## 2021-01-11 RX ADMIN — MAGNESIUM OXIDE 400 MG ORAL TABLET 400 MILLIGRAM(S): 241.3 TABLET ORAL at 12:40

## 2021-01-11 NOTE — DISCHARGE NOTE NURSING/CASE MANAGEMENT/SOCIAL WORK - PATIENT PORTAL LINK FT
You can access the FollowMyHealth Patient Portal offered by [facility] by registering at the following website: [email or web address]. By joining [employer]’s FollowMyHealth portal, you will also be able to view your health information using other applications (apps) compatible with our system.

## 2021-01-11 NOTE — CHART NOTE - NSCHARTNOTEFT_GEN_A_CORE
RN informs me that due to high blood pressure patient could not be discharged during the day time. In fact patient's blood pressure is still high and he is now nauseous. Will order a catapres and zofran and monitor

## 2021-01-11 NOTE — PROGRESS NOTE ADULT - ASSESSMENT
70y old male with pmhx of HTN, CAD s/p coronary stent placement, sleep apnea, high cholesterol and recent 3 week admission to a NH for rehab, presents to the ER for fall 2 days ago due to unsteady gait.      # Debility/Falls/Dementia  - at [facility] for two months.  Discharged less than a week ago but pt was falling and isn't able to take care of himself   - fall precautions  - pain control  - PT evaluated - continue PT   - Physiatry recommended STR   -  eval for placement     # CAD s/p PCI / HTN / DL  - asa, statin, BB, plavix, losartan   - outpatient cardio follow up    # GERD   - Will add pantoprazole QD     # Constipation   - Likely secondary to poor po intake   - Senna qPM   - Miralax PRN     FULL CODE    Progress Note Handoff  Pending Consults: none  Pending Tests: none  Pending Results: none  Family Discussion: discussed GOC, SNF placement vs. long term with pt's daughter, Radha, in detail - in agreement with plan of care   Disposition: Home_____/SNF__x____/Other_____/Unknown at this time_____    Please call me with any questions at extension [phone] 70y old male with pmhx of HTN, CAD s/p coronary stent placement, sleep apnea, high cholesterol and recent 3 week admission to a NH for rehab, presents to the ER for fall 2 days ago due to unsteady gait.      # Debility/Falls/Dementia  - at [facility] for two months.  Discharged less than a week ago but pt was falling and isn't able to take care of himself   - fall precautions  - pain control  - PT evaluated - continue PT   - Physiatry recommended STR   -  eval for placement     # CAD s/p PCI / HTN / DL  - asa, statin, BB, plavix, losartan   - outpatient cardio follow up    # GERD   - Continue with pantoprazole QD     # Constipation   - Likely secondary to poor po intake   - Senna qPM   - Miralax PRN     FULL CODE    Progress Note Handoff  Pending Consults: none  Pending Tests: none  Pending Results: none  Family Discussion: discussed GOC, SNF placement vs. long term with pt's daughter, Radha, in detail - in agreement with plan of care   Disposition: Home_____/SNF__x____/Other_____/Unknown at this time_____    Please call me with any questions at extension [phone] 70y old male with pmhx of HTN, CAD s/p coronary stent placement, sleep apnea, high cholesterol and recent 3 week admission to a NH for rehab, presents to the ER for fall 2 days ago due to unsteady gait.      # Debility/Falls/Dementia  - at [facility] for two months.  Discharged less than a week ago but pt was falling and isn't able to take care of himself   - fall precautions  - pain control  - PT evaluated - continue PT   - Physiatry recommended STR   -  eval for placement     # CAD s/p PCI / HTN / DL  - asa, statin, BB, plavix, losartan   - outpatient cardio follow up    # GERD   - Continue with pantoprazole QD     # Constipation   - Likely secondary to poor po intake   - Senna qPM   - Miralax PRN     # suspected magnesium deficiency  -start supplement; tolerated magnesium oxide yesterday without incident     FULL CODE    Progress Note Handoff  Pending Consults: none  Pending Tests: none  Pending Results: none  Family Discussion: discussed treatment plan, SNF placement vs. long term with pt's daughter, Radha, in detail - in agreement with plan of care   Disposition: Home_____/SNF__x____/Other_____/Unknown at this time_____    Please call me with any questions at extension [phone]

## 2021-01-11 NOTE — PROGRESS NOTE ADULT - SUBJECTIVE AND OBJECTIVE BOX
LIZZY KENT  70y  Male    CHIEF COMPLAINTS  Patient is a 70y old  Male who presents with a chief complaint of Fall (09 Jan 2021 11:23)    INTERVAL HPI/OVERNIGHT EVENTS:  Patient was seen and examined at bedside.     PAST MEDICAL HISTORY   PAST MEDICAL & SURGICAL HISTORY:  MI (myocardial infarction)  Arthritis  Kidney stones  Sleep apnea  Hypertension  High blood cholesterol  H/O heart artery stent  History of appendectomy  H/O chronic cholecystitis, cholecystectomy    HOME MEDICATIONS   Home Medications:  acetaminophen 325 mg oral tablet: 2 tab(s) orally every 6 hours, As needed, mild pain (24 Nov 2020 14:04)  aspirin 81 mg oral tablet:  (21 Nov 2020 22:11)  atenolol 50 mg oral tablet: 1 tab(s) orally once a day (21 Nov 2020 22:11)  atorvastatin 20 mg oral tablet: 1 tab(s) orally once a day (21 Nov 2020 22:11)  ciprofloxacin 500 mg oral tablet: 1 tab(s) orally every 12 hours for 3 days (25 Nov 2020 10:39)  losartan 50 mg oral tablet: 1 tab(s) orally once a day (21 Nov 2020 22:11)  oxycodone-acetaminophen 5 mg-325 mg oral tablet: 1 tab(s) orally every 6 hours, As needed, Moderate Pain (4 - 6) (24 Nov 2020 14:04)  Plavix: 75 milligram(s) orally once a day (21 Nov 2020 22:11)    SOCIAL HISTORY   Social History:  Denies alcohol or tobacco use (08 Jan 2021 22:17)    FAMILY HISTORY   FAMILY HISTORY:  CAD (coronary artery disease) (Father)    ALLERGIES  fish (Unknown)  iodine (Rash)  latex (Unknown)  magnesium sulfate (Rash)  sulfa drugs (Rash)    REVIEW OF SYSTEMS:  CONSTITUTIONAL: No fever, weight loss, or fatigue  EYES: No eye pain, visual disturbances, or discharge  ENMT:  No difficulty hearing, tinnitus, vertigo; No sinus or throat pain  NECK: No pain or stiffness  BREASTS: No pain, masses, or nipple discharge  RESPIRATORY: No cough, wheezing, chills or hemoptysis; No shortness of breath  CARDIOVASCULAR: No chest pain, palpitations, dizziness, or leg swelling  GASTROINTESTINAL: +Epgastric pain. No nausea, vomiting, or hematemesis; No diarrhea. +constipation. No melena or hematochezia.  GENITOURINARY: No dysuria, frequency, hematuria, or incontinence  NEUROLOGICAL: No headaches, memory loss, loss of strength, numbness, or tremors  SKIN: No itching, burning, rashes, or lesions. +Bruise on Left upper arm secondary to IV  LYMPH NODES: No enlarged glands  ENDOCRINE: No heat or cold intolerance; No hair loss  MUSCULOSKELETAL: No joint pain or swelling; No muscle, back, or extremity pain  PSYCHIATRIC: No depression, anxiety, mood swings, or difficulty sleeping  HEME/LYMPH: No easy bruising, or bleeding gums  ALLERY AND IMMUNOLOGIC: No hives or eczema        VITAL SIGNS  Vital Signs Last 24 Hrs  T(C): 35.7 (10 Gage 2021 05:27), Max: 36.7 (09 Jan 2021 21:33)  T(F): 96.3 (10 Gage 2021 05:27), Max: 98 (09 Jan 2021 21:33)  HR: 68 (10 Gage 2021 05:27) (67 - 72)  BP: 156/69 (10 Gage 2021 05:27) (138/64 - 168/73)  RR: 18 (10 Gage 2021 05:27) (16 - 18)      PHYSICAL EXAM:  GENERAL: NAD, well-groomed, well-developed  HEAD:  Atraumatic, Normocephalic  EYES: EOMI, PERRLA, conjunctiva and sclera clear  ENMT: Moist mucous membranes  NECK: Supple, No JVD, Normal thyroid  NERVOUS SYSTEM:  awake, confused, moves all extremities   CHEST/LUNG: Clear to percussion bilaterally; No rales, rhonchi, wheezing, or rubs  HEART: Regular rate and rhythm; No murmurs, rubs, or gallops  ABDOMEN: Soft, Nontender (States it is discomfort not pain), Nondistended; Bowel sounds present  EXTREMITIES:  2+ Peripheral Pulses, No clubbing, cyanosis, or edema  LYMPH: No lymphadenopathy noted  SKIN: +Bruise on Left upper arm secondary to IV    CONSULTS  Consultant(s) Notes Reviewed:  [x ] YES  [ ] NO  Care Discussed with Consultants/Other Providers [ x] YES  [ ] NO    LABS:                          14.2   8.94  )-----------( 204      ( 09 Jan 2021 08:06 )             41.2     01-09    144  |  102  |  11  ----------------------------<  83  3.5   |  28  |  0.7    Ca    8.9      09 Jan 2021 08:06  Mg     1.5     01-10    TPro  6.4  /  Alb  3.9  /  TBili  1.8<H>  /  DBili  x   /  AST  13  /  ALT  9   /  AlkPhos  95  01-09        RADIOLOGY & ADDITIONAL TESTS:  Imaging Personally Reviewed:  [x] YES  [ ] NO  CT Abdomen and Pelvis w/ IV Cont (01.08.21 @ 20:24)  IMPRESSION:  Limited study. The intravenous infiltrated during the injection for the examination therefore there is no significant vascular enhancement.  No evidence of intra-thoracic, abdominal or pelvic visceral laceration or hematoma.  No acute fractures are identified.    CT Chest w/ IV Cont (01.08.21 @ 20:24)  IMPRESSION:  Limited study. The intravenous infiltrated during the injection for the examination therefore there is no significant vascular enhancement.  No evidence of intra-thoracic, abdominal or pelvic visceral laceration or hematoma.  No acute fractures are identified.    CT Cervical Spine No Cont (01.08.21 @ 20:15)   IMPRESSION:  No CT evidence of acute cervical spine injury.    CT Head No Cont (01.08.21 @ 20:14) >  Impression:  No CT evidence of acute intracranial injury.    Xray Pelvis AP only (01.08.21 @ 18:11) >  Impression:  No evidence of acute fracture.          MEDICATIONS  MEDICATIONS  (STANDING):  aspirin  chewable 81 milliGRAM(s) Oral daily  ATENolol  Tablet 50 milliGRAM(s) Oral daily  atorvastatin 20 milliGRAM(s) Oral at bedtime  clopidogrel Tablet 75 milliGRAM(s) Oral daily  heparin   Injectable 5000 Unit(s) SubCutaneous two times a day  influenza   Vaccine 0.5 milliLiter(s) IntraMuscular once  losartan 50 milliGRAM(s) Oral daily  pantoprazole    Tablet 40 milliGRAM(s) Oral before breakfast  senna 2 Tablet(s) Oral at bedtime    MEDICATIONS  (PRN):  acetaminophen   Tablet .. 650 milliGRAM(s) Oral every 6 hours PRN Mild Pain (1 - 3)  acetaminophen   Tablet .. 650 milliGRAM(s) Oral every 6 hours PRN Moderate Pain (4 - 6)  oxycodone    5 mG/acetaminophen 325 mG 1 Tablet(s) Oral every 6 hours PRN Moderate Pain (4 - 6)  polyethylene glycol 3350 17 Gram(s) Oral daily PRN Constipation           LIZZY KENT  70y  Male    CHIEF COMPLAINTS  Patient is a 70y old  Male who presents with a chief complaint of Fall (09 Jan 2021 11:23)    INTERVAL HPI/OVERNIGHT EVENTS:  Patient was seen and examined at bedside. Patient is confused secondary to dementia, states he had a detailed dream that he was at [place] and his mother was sick. Patient admits to improved abdominal discomfort compared to yesterday, still constipated. Patient denies any fevers, chills, nausea, vomiting, chest pain, dysuria. States he moved from bed to chair.     PAST MEDICAL HISTORY   PAST MEDICAL & SURGICAL HISTORY:  MI (myocardial infarction)  Arthritis  Kidney stones  Sleep apnea  Hypertension  High blood cholesterol  H/O heart artery stent  History of appendectomy  H/O chronic cholecystitis, cholecystectomy    HOME MEDICATIONS   Home Medications:  acetaminophen 325 mg oral tablet: 2 tab(s) orally every 6 hours, As needed, mild pain (24 Nov 2020 14:04)  aspirin 81 mg oral tablet:  (21 Nov 2020 22:11)  atenolol 50 mg oral tablet: 1 tab(s) orally once a day (21 Nov 2020 22:11)  atorvastatin 20 mg oral tablet: 1 tab(s) orally once a day (21 Nov 2020 22:11)  ciprofloxacin 500 mg oral tablet: 1 tab(s) orally every 12 hours for 3 days (25 Nov 2020 10:39)  losartan 50 mg oral tablet: 1 tab(s) orally once a day (21 Nov 2020 22:11)  oxycodone-acetaminophen 5 mg-325 mg oral tablet: 1 tab(s) orally every 6 hours, As needed, Moderate Pain (4 - 6) (24 Nov 2020 14:04)  Plavix: 75 milligram(s) orally once a day (21 Nov 2020 22:11)    SOCIAL HISTORY   Social History:  Denies alcohol or tobacco use (08 Jan 2021 22:17)    FAMILY HISTORY   FAMILY HISTORY:  CAD (coronary artery disease) (Father)    ALLERGIES  fish (Unknown)  iodine (Rash)  latex (Unknown)  magnesium sulfate (Rash)  sulfa drugs (Rash)    REVIEW OF SYSTEMS:  CONSTITUTIONAL: No fever, weight loss, or fatigue  EYES: No eye pain, visual disturbances, or discharge  ENMT:  No difficulty hearing, tinnitus, vertigo; No sinus or throat pain  NECK: No pain or stiffness  BREASTS: No pain, masses, or nipple discharge  RESPIRATORY: No cough, wheezing, chills or hemoptysis; No shortness of breath  CARDIOVASCULAR: No chest pain, palpitations, dizziness, or leg swelling  GASTROINTESTINAL: +Epgastric pain improved. No nausea, vomiting, or hematemesis; No diarrhea. +constipation. No melena or hematochezia.  GENITOURINARY: No dysuria, frequency, hematuria, or incontinence  NEUROLOGICAL: No headaches, memory loss, loss of strength, numbness, or tremors  SKIN: No itching, burning, rashes, or lesions. +Bruise on Left upper arm secondary to IV  LYMPH NODES: No enlarged glands  ENDOCRINE: No heat or cold intolerance; No hair loss  MUSCULOSKELETAL: No joint pain or swelling; No muscle, back, or extremity pain  PSYCHIATRIC: No depression, anxiety, mood swings, or difficulty sleeping  HEME/LYMPH: No easy bruising, or bleeding gums  ALLERY AND IMMUNOLOGIC: No hives or eczema        VITAL SIGNS  Vital Signs Last 24 Hrs  T(C): 35.7 (10 Gage 2021 05:27), Max: 36.7 (09 Jan 2021 21:33)  T(F): 96.3 (10 Gage 2021 05:27), Max: 98 (09 Jan 2021 21:33)  HR: 68 (10 Gage 2021 05:27) (67 - 72)  BP: 156/69 (10 Gage 2021 05:27) (138/64 - 168/73)  RR: 18 (10 Gage 2021 05:27) (16 - 18)      PHYSICAL EXAM:  GENERAL: NAD, well-groomed, well-developed  HEAD:  Atraumatic, Normocephalic  EYES: EOMI, PERRLA, conjunctiva and sclera clear  ENMT: Moist mucous membranes  NECK: Supple, No JVD, Normal thyroid  NERVOUS SYSTEM:  awake, confused, moves all extremities   CHEST/LUNG: Clear to percussion bilaterally; No rales, rhonchi, wheezing, or rubs  HEART: Regular rate and rhythm; No murmurs, rubs, or gallops  ABDOMEN: Soft, Nontender, Nondistended; Bowel sounds present  EXTREMITIES:  2+ Peripheral Pulses, No clubbing, cyanosis, or edema  LYMPH: No lymphadenopathy noted  SKIN: +Bruise on Left upper arm secondary to IV    CONSULTS  Consultant(s) Notes Reviewed:  [x ] YES  [ ] NO  Care Discussed with Consultants/Other Providers [ x] YES  [ ] NO    LABS:                          14.2   8.94  )-----------( 204      ( 09 Jan 2021 08:06 )             41.2     01-09    144  |  102  |  11  ----------------------------<  83  3.5   |  28  |  0.7    Ca    8.9      09 Jan 2021 08:06  Mg     1.5     01-10    TPro  6.4  /  Alb  3.9  /  TBili  1.8<H>  /  DBili  x   /  AST  13  /  ALT  9   /  AlkPhos  95  01-09        RADIOLOGY & ADDITIONAL TESTS:  Imaging Personally Reviewed:  [x] YES  [ ] NO  CT Abdomen and Pelvis w/ IV Cont (01.08.21 @ 20:24)  IMPRESSION:  Limited study. The intravenous infiltrated during the injection for the examination therefore there is no significant vascular enhancement.  No evidence of intra-thoracic, abdominal or pelvic visceral laceration or hematoma.  No acute fractures are identified.    CT Chest w/ IV Cont (01.08.21 @ 20:24)  IMPRESSION:  Limited study. The intravenous infiltrated during the injection for the examination therefore there is no significant vascular enhancement.  No evidence of intra-thoracic, abdominal or pelvic visceral laceration or hematoma.  No acute fractures are identified.    CT Cervical Spine No Cont (01.08.21 @ 20:15)   IMPRESSION:  No CT evidence of acute cervical spine injury.    CT Head No Cont (01.08.21 @ 20:14) >  Impression:  No CT evidence of acute intracranial injury.    Xray Pelvis AP only (01.08.21 @ 18:11) >  Impression:  No evidence of acute fracture.          MEDICATIONS  MEDICATIONS  (STANDING):  aspirin  chewable 81 milliGRAM(s) Oral daily  ATENolol  Tablet 50 milliGRAM(s) Oral daily  atorvastatin 20 milliGRAM(s) Oral at bedtime  clopidogrel Tablet 75 milliGRAM(s) Oral daily  heparin   Injectable 5000 Unit(s) SubCutaneous two times a day  influenza   Vaccine 0.5 milliLiter(s) IntraMuscular once  losartan 50 milliGRAM(s) Oral daily  pantoprazole    Tablet 40 milliGRAM(s) Oral before breakfast  senna 2 Tablet(s) Oral at bedtime    MEDICATIONS  (PRN):  acetaminophen   Tablet .. 650 milliGRAM(s) Oral every 6 hours PRN Mild Pain (1 - 3)  acetaminophen   Tablet .. 650 milliGRAM(s) Oral every 6 hours PRN Moderate Pain (4 - 6)  oxycodone    5 mG/acetaminophen 325 mG 1 Tablet(s) Oral every 6 hours PRN Moderate Pain (4 - 6)  polyethylene glycol 3350 17 Gram(s) Oral daily PRN Constipation           LIZZY KENT  70y  Male    CHIEF COMPLAINTS  Patient is a 70y old  Male who presents with a chief complaint of Fall (09 Jan 2021 11:23)    INTERVAL HPI/OVERNIGHT EVENTS:  Patient was seen and examined at bedside. Patient is confused secondary to dementia, states he had a detailed dream that he was at [place] and his mother was sick. Patient admits to improved abdominal discomfort compared to yesterday.  S/P BM yesterday.   Patient denies any fevers, chills, nausea, vomiting, chest pain, dysuria.  States he moved from bed to chair.     PAST MEDICAL HISTORY   PAST MEDICAL & SURGICAL HISTORY:  MI (myocardial infarction)  Arthritis  Kidney stones  Sleep apnea  Hypertension  High blood cholesterol  H/O heart artery stent  History of appendectomy  H/O chronic cholecystitis, cholecystectomy    HOME MEDICATIONS   Home Medications:  acetaminophen 325 mg oral tablet: 2 tab(s) orally every 6 hours, As needed, mild pain (24 Nov 2020 14:04)  aspirin 81 mg oral tablet:  (21 Nov 2020 22:11)  atenolol 50 mg oral tablet: 1 tab(s) orally once a day (21 Nov 2020 22:11)  atorvastatin 20 mg oral tablet: 1 tab(s) orally once a day (21 Nov 2020 22:11)  ciprofloxacin 500 mg oral tablet: 1 tab(s) orally every 12 hours for 3 days (25 Nov 2020 10:39)  losartan 50 mg oral tablet: 1 tab(s) orally once a day (21 Nov 2020 22:11)  oxycodone-acetaminophen 5 mg-325 mg oral tablet: 1 tab(s) orally every 6 hours, As needed, Moderate Pain (4 - 6) (24 Nov 2020 14:04)  Plavix: 75 milligram(s) orally once a day (21 Nov 2020 22:11)    SOCIAL HISTORY   Social History:  Denies alcohol or tobacco use (08 Jan 2021 22:17)    FAMILY HISTORY   FAMILY HISTORY:  CAD (coronary artery disease) (Father)    ALLERGIES  fish (Unknown)  iodine (Rash)  latex (Unknown)  magnesium sulfate (Rash)  sulfa drugs (Rash)    REVIEW OF SYSTEMS:  CONSTITUTIONAL: No fever, weight loss, or fatigue  EYES: No eye pain, visual disturbances, or discharge  ENMT:  No difficulty hearing, tinnitus, vertigo; No sinus or throat pain  NECK: No pain or stiffness  BREASTS: No pain, masses, or nipple discharge  RESPIRATORY: No cough, wheezing, chills or hemoptysis; No shortness of breath  CARDIOVASCULAR: No chest pain, palpitations, dizziness, or leg swelling  GASTROINTESTINAL: +Epgastric pain improved. No nausea, vomiting, or hematemesis; No diarrhea. No melena or hematochezia.  GENITOURINARY: No dysuria, frequency, hematuria, or incontinence  NEUROLOGICAL: No headaches, memory loss, loss of strength, numbness, or tremors  SKIN: No itching, burning, rashes, or lesions. +Bruise on Left upper arm secondary to IV  LYMPH NODES: No enlarged glands  ENDOCRINE: No heat or cold intolerance; No hair loss  MUSCULOSKELETAL: No joint pain or swelling; No muscle, back, or extremity pain  PSYCHIATRIC: No depression, anxiety, mood swings, or difficulty sleeping  HEME/LYMPH: No easy bruising, or bleeding gums  ALLERY AND IMMUNOLOGIC: No hives or eczema        VITAL SIGNS  Vital Signs Last 24 Hrs  T(C): 35.7 (10 Gage 2021 05:27), Max: 36.7 (09 Jan 2021 21:33)  T(F): 96.3 (10 Gage 2021 05:27), Max: 98 (09 Jan 2021 21:33)  HR: 68 (10 Gage 2021 05:27) (67 - 72)  BP: 156/69 (10 Gage 2021 05:27) (138/64 - 168/73)  RR: 18 (10 Gage 2021 05:27) (16 - 18)      PHYSICAL EXAM:  GENERAL: NAD, well-groomed, well-developed  HEAD:  Atraumatic, Normocephalic  EYES: EOMI, PERRLA, conjunctiva and sclera clear  ENMT: Moist mucous membranes  NECK: Supple, No JVD, Normal thyroid  NERVOUS SYSTEM:  awake, confused, moves all extremities   CHEST/LUNG: Clear to percussion bilaterally; No rales, rhonchi, wheezing, or rubs  HEART: Regular rate and rhythm; No murmurs, rubs, or gallops  ABDOMEN: Soft, Nontender, Nondistended; Bowel sounds present  EXTREMITIES:  2+ Peripheral Pulses, No clubbing, cyanosis, or edema  LYMPH: No lymphadenopathy noted  SKIN: +Bruise on Left upper arm secondary to IV    CONSULTS  Consultant(s) Notes Reviewed:  [x ] YES  [ ] NO  Care Discussed with Consultants/Other Providers [ x] YES  [ ] NO    LABS:    01-11    143  |  103  |  14  ----------------------------<  100<H>  4.2   |  33<H>  |  0.6<L>    Ca    9.1      11 Jan 2021 09:06  Mg     1.5     01-11    TPro  6.0  /  Alb  3.7  /  TBili  1.0  /  DBili  x   /  AST  10  /  ALT  7   /  AlkPhos  82  01-11          RADIOLOGY & ADDITIONAL TESTS:  Imaging Personally Reviewed:  [x] YES  [ ] NO  CT Abdomen and Pelvis w/ IV Cont (01.08.21 @ 20:24)  IMPRESSION:  Limited study. The intravenous infiltrated during the injection for the examination therefore there is no significant vascular enhancement.  No evidence of intra-thoracic, abdominal or pelvic visceral laceration or hematoma.  No acute fractures are identified.    CT Chest w/ IV Cont (01.08.21 @ 20:24)  IMPRESSION:  Limited study. The intravenous infiltrated during the injection for the examination therefore there is no significant vascular enhancement.  No evidence of intra-thoracic, abdominal or pelvic visceral laceration or hematoma.  No acute fractures are identified.    CT Cervical Spine No Cont (01.08.21 @ 20:15)   IMPRESSION:  No CT evidence of acute cervical spine injury.    CT Head No Cont (01.08.21 @ 20:14) >  Impression:  No CT evidence of acute intracranial injury.    Xray Pelvis AP only (01.08.21 @ 18:11) >  Impression:  No evidence of acute fracture.          MEDICATIONS  MEDICATIONS  (STANDING):  aspirin  chewable 81 milliGRAM(s) Oral daily  ATENolol  Tablet 50 milliGRAM(s) Oral daily  atorvastatin 20 milliGRAM(s) Oral at bedtime  clopidogrel Tablet 75 milliGRAM(s) Oral daily  heparin   Injectable 5000 Unit(s) SubCutaneous two times a day  influenza   Vaccine 0.5 milliLiter(s) IntraMuscular once  losartan 50 milliGRAM(s) Oral daily  magnesium oxide 400 milliGRAM(s) Oral three times a day with meals  pantoprazole    Tablet 40 milliGRAM(s) Oral before breakfast  senna 2 Tablet(s) Oral at bedtime    MEDICATIONS  (PRN):  acetaminophen   Tablet .. 650 milliGRAM(s) Oral every 6 hours PRN Mild Pain (1 - 3)  acetaminophen   Tablet .. 650 milliGRAM(s) Oral every 6 hours PRN Moderate Pain (4 - 6)  oxycodone    5 mG/acetaminophen 325 mG 1 Tablet(s) Oral every 6 hours PRN Moderate Pain (4 - 6)  polyethylene glycol 3350 17 Gram(s) Oral daily PRN Constipation

## 2021-01-12 VITALS — DIASTOLIC BLOOD PRESSURE: 67 MMHG | SYSTOLIC BLOOD PRESSURE: 153 MMHG | HEART RATE: 60 BPM

## 2021-01-12 PROCEDURE — 99239 HOSP IP/OBS DSCHRG MGMT >30: CPT

## 2021-01-12 RX ORDER — LOSARTAN POTASSIUM 100 MG/1
50 TABLET, FILM COATED ORAL ONCE
Refills: 0 | Status: COMPLETED | OUTPATIENT
Start: 2021-01-12 | End: 2021-01-12

## 2021-01-12 RX ADMIN — MAGNESIUM OXIDE 400 MG ORAL TABLET 400 MILLIGRAM(S): 241.3 TABLET ORAL at 10:30

## 2021-01-12 RX ADMIN — ATENOLOL 50 MILLIGRAM(S): 25 TABLET ORAL at 05:32

## 2021-01-12 RX ADMIN — LOSARTAN POTASSIUM 50 MILLIGRAM(S): 100 TABLET, FILM COATED ORAL at 05:32

## 2021-01-12 RX ADMIN — CLOPIDOGREL BISULFATE 75 MILLIGRAM(S): 75 TABLET, FILM COATED ORAL at 10:30

## 2021-01-12 RX ADMIN — Medication 81 MILLIGRAM(S): at 10:30

## 2021-01-12 RX ADMIN — PANTOPRAZOLE SODIUM 40 MILLIGRAM(S): 20 TABLET, DELAYED RELEASE ORAL at 05:32

## 2021-01-12 RX ADMIN — HEPARIN SODIUM 5000 UNIT(S): 5000 INJECTION INTRAVENOUS; SUBCUTANEOUS at 05:32

## 2021-01-12 RX ADMIN — LOSARTAN POTASSIUM 50 MILLIGRAM(S): 100 TABLET, FILM COATED ORAL at 10:30

## 2021-01-15 NOTE — PATIENT PROFILE ADULT - IS THERE A SUSPICION OF ABUSE/NEGLIGENCE?
[facility] 
 
ICU Intensivist- Critical Care Progress Note Name: Raquel Gamez : 1981 MRN: [id number] Admit: 2020  7:26 PM   
 
Diagnoses:  
 
Principal Problem: 
  Severe ARDS due to suspected aspiration PNA Recent COVID PNA - hospitalized twice in Nov 
  Prolonged VDRF Trach status (trach tube placed 21) 
  Septic shock, resolved DANY, resolving 
  Severe sepsis with MODS 
  Constipation due to opioids Hypothyroidism 
  Down syndrome with severe baseline cognitive impairment Autism 
  
ICU Comprehensive Plan of Care:  
- vent settings reviewed and adjusted 
- monitor renal panel daily - correct electrolytes as indicated - Continue TFs at goal 
- Continue lactulose - Continue Vanc/cefepime for now (initiated ) 
- transfuse per usual guidelines - Change risperidone to seroquel due to agitation and insomnia chronically overnight 
- Cont fentanyl and midaz as needed for ventilator synchrony 
- Start home med ivabrandine for ST 
- Needs further [address] discussions. Family meeting planned for  with mother, Palliative Care Subjective:  
 
Progress Note:  
1/15/2021  
10:35 AM  
 
Reason for ICU Admission:  
Aspiration pneumonia of both lungs due to vomit ([address] Overnight Events:  
No major events SUBJ: 
Eyes open. Regards examiner. Not following commands (that is her baseline). Synchronous with vent when not coughing. Not weanable due to high vent requirements Past Medical History:  
 
 has a past medical history of Chronic kidney disease (CKD), stage II (mild) (2021), Endocrine disease, Gram negative septic shock ([address] (2020), Hypomagnesemia (2021), Hypophosphatemia (2021), Hypothyroid (2018), Ill-defined condition, and Lactic acidosis (2020). Past Surgical History:  
 
 has no past surgical history on file. Current Medications:  
 
Current Facility-Administered Medications Medication Dose Route Frequency  QUEtiapine (SEROquel) tablet 50 mg  50 mg Oral QHS  ivabradine (CORLANOR) tablet 5 mg  5 mg Oral BID WITH MEALS  vancomycin (VANCOCIN) 750 mg in 0.9% sodium chloride 250 mL (VIAL-MATE)  750 mg IntraVENous Q18H  
 [START ON 1/18/2021] epoetin marc-epbx (RETACRIT) 14,000 Units combo injection  14,000 Units SubCUTAneous Q MON, WED & FRI  melatonin tablet 3 mg  3 mg Oral QHS  lansoprazole (PREVACID) 3 mg/mL oral suspension 30 mg  30 mg Per G Tube ACB  fentaNYL citrate (PF) injection 50 mcg  50 mcg IntraVENous Q2H PRN  
 balsam peru-castor oiL (VENELEX) ointment   Topical TID  cefepime (MAXIPIME) 1 g in 0.9% sodium chloride (MBP/ADV) 50 mL MBP  1 g IntraVENous Q8H  
 midazolam (VERSED) injection 2 mg  2 mg IntraVENous Q2H PRN  
 lactulose (CHRONULAC) 10 gram/15 mL solution 15 mL  10 g Oral TID  levothyroxine (SYNTHROID) tablet 50 mcg  50 mcg Per NG tube DAILY  levothyroxine tube feed reminder note  1 Each Other BID  heparin (porcine) injection 5,000 Units  5,000 Units SubCUTAneous Q8H  
 alcohol 62% (NOZIN) nasal  1 Ampule  1 Ampule Topical Q12H  
 dextrose (D50W) injection syrg 12.5-25 g  25-50 mL IntraVENous PRN  
 insulin lispro (HUMALOG) injection   SubCUTAneous Q6H  
 acetaminophen (TYLENOL) tablet 650 mg  650 mg Per G Tube Q6H PRN Or  
 acetaminophen (TYLENOL) suppository 650 mg  650 mg Rectal Q6H PRN  chlorhexidine (ORAL CARE KIT) 0.12 % mouthwash 15 mL  15 mL Oral Q12H  
 heparin (porcine) pf 300 Units  300 Units InterCATHeter PRN  
 sodium chloride (NS) flush 5-40 mL  5-40 mL IntraVENous Q8H  
 sodium chloride (NS) flush 5-40 mL  5-40 mL IntraVENous PRN  
 ondansetron (ZOFRAN) injection 4 mg  4 mg IntraVENous Q6H PRN Facility-Administered Medications Ordered in Other Encounters Medication Dose Route Frequency  ceFAZolin (ANCEF) injection   IntraVENous PRN  
  0.9% sodium chloride infusion   IntraVENous CONTINUOUS  
 dexamethasone (DECADRON) 4 mg/mL injection   IntraVENous PRN Allergies/Social/Family History: No Known Allergies Social History Tobacco Use  Smoking status: Never Smoker  Smokeless tobacco: Never Used Substance Use Topics  Alcohol use: No  
  
History reviewed. No pertinent family history. Review of Systems:  
 
Review of systems not obtained due to patient factors. Objective:  
Vital Signs: 
Visit Vitals /75 Pulse (!) 119 Temp 99.5 °F (37.5 °C) Resp 20 Ht 5' 3\" (1.6 m) Wt 86.6 kg (190 lb 14.7 oz) SpO2 95% BMI 33.82 kg/m² O2 Device: Tracheostomy Temp (24hrs), Av °F (37.2 °C), Min:97.7 °F (36.5 °C), Max:99.6 °F (37.6 °C) Intake/Output:  
 
Intake/Output Summary (Last 24 hours) at 1/15/2021 1834 Last data filed at 1/15/2021 5761 Gross per 24 hour Intake 655 ml Output 150 ml Net 505 ml Physical Exam: 
General:  RASS 0. Not F/C. Synchronous with vent HEENT: NCAT, sclerae white Neck: Tracheostomy clean. No JVD Lungs:   Clear to auscultation bilaterally, good effort. Cardiovascular:  Reg rate/rythym, no murmer Abdomen:   Soft, + BS, NT. Extremities: No edema, warm. Skin: No lesions noted. Lines/Devices:  Intact, no erythema, drainage, or tenderness.  
  
 
LABS AND  DATA: Personally reviewed Recent Labs  
  01/15/21 
0508 21 
0440 WBC 16.3* 19.4* HGB 7.6* 7.5* HCT 23.0* 23.0*  
 235 Recent Labs  
  01/15/21 
0508 21 
0440  146*  
K 3.8 4.4  
* 112* CO2 28 28 BUN 19 18 CREA 1.26* 1.25* * 90  
CA 8.9 8.6 MG 1.8 2.2 PHOS 2.5* 3.1 Recent Labs  
  01/15/21 
0508 21 
0440 ALB 2.4* 2.4* No results for input(s): INR, PTP, APTT, INREXT, INREXT in the last 72 hours. No results for input(s): PHI, PCO2I, PO2I, FIO2I in the last 72 hours. No results for input(s): CPK, CKMB, TROIQ, BNPP in the last 72 hours. Ventilator Settings: 
Mode Rate Tidal Volume Pressure FiO2 PEEP Assist control, Volume control   350 ml  10 cm H2O 45 % 8 cm H20 Peak airway pressure: 40 cm H2O Minute ventilation: 13.4 l/min MEDS: Reviewed RADIOLOGY: 
CXR: 01/14: ARDS pattern with improved bibasilar aeration Multidisciplinary Rounds Completed:  Yes 
  
ABCDEF Bundle/Checklist - completed 1/15/2021  
  
T/L/D Tubes: Tracheostomy and PEG Tube Lines: Peripheral IV Drains: Dutta Catheter 
  
SPECIAL EQUIPMENT None 
  
DISPOSITION Stay in ICU 
  
CRITICAL CARE CONSULTANT NOTE I provided a face-to-face bedside physician/patient encounter, greater than the usual and customary amount normally needed, due to the high complexity of medical decision-making required. 
  
I reviewed and interpreted patient data including clinical events, labs, images, vital signs, I/O's, and examined patient.   
  
I have actively participated in multi-disciplinary discussions (Respiratory Therapy, Case Management, CCU nursing staff) regarding the case in formulating an optimal therapeutic plan, and effecting a management strategy for this patient. 
  
NOTE OF PERSONAL INVOLVEMENT IN CARE  
This patient has a high probability of imminent, clinically significant deterioration, which requires the highest level of preparedness to intervene urgently.  I participated in the decision-making and personally managed, or directed the management of, a myriad of life and organ supporting interventions which required my frequent-interval clinical reassessments, in order to treat or prevent imminent deterioration. 
  
 I personally spent 40 minutes of critical care time.  This is time spent at this critically ill patient's bedside actively involved in patient care as well as the coordination of care and discussions with the patient's family.  This does not include any procedural time which has been billed separately. Cintia Willis NP 
1/15/2021 no

## 2021-01-18 DIAGNOSIS — I25.10 ATHEROSCLEROTIC HEART DISEASE OF NATIVE CORONARY ARTERY WITHOUT ANGINA PECTORIS: ICD-10-CM

## 2021-01-18 DIAGNOSIS — Z79.02 LONG TERM (CURRENT) USE OF ANTITHROMBOTICS/ANTIPLATELETS: ICD-10-CM

## 2021-01-18 DIAGNOSIS — Z95.5 PRESENCE OF CORONARY ANGIOPLASTY IMPLANT AND GRAFT: ICD-10-CM

## 2021-01-18 DIAGNOSIS — E61.2 MAGNESIUM DEFICIENCY: ICD-10-CM

## 2021-01-18 DIAGNOSIS — R29.6 REPEATED FALLS: ICD-10-CM

## 2021-01-18 DIAGNOSIS — R26.81 UNSTEADINESS ON FEET: ICD-10-CM

## 2021-01-18 DIAGNOSIS — Z79.82 LONG TERM (CURRENT) USE OF ASPIRIN: ICD-10-CM

## 2021-01-18 DIAGNOSIS — E78.5 HYPERLIPIDEMIA, UNSPECIFIED: ICD-10-CM

## 2021-01-18 DIAGNOSIS — K59.00 CONSTIPATION, UNSPECIFIED: ICD-10-CM

## 2021-01-18 DIAGNOSIS — I25.2 OLD MYOCARDIAL INFARCTION: ICD-10-CM

## 2021-01-18 DIAGNOSIS — Z88.2 ALLERGY STATUS TO SULFONAMIDES: ICD-10-CM

## 2021-01-18 DIAGNOSIS — I10 ESSENTIAL (PRIMARY) HYPERTENSION: ICD-10-CM

## 2021-01-18 DIAGNOSIS — Z90.49 ACQUIRED ABSENCE OF OTHER SPECIFIED PARTS OF DIGESTIVE TRACT: ICD-10-CM

## 2021-01-18 DIAGNOSIS — K21.9 GASTRO-ESOPHAGEAL REFLUX DISEASE WITHOUT ESOPHAGITIS: ICD-10-CM

## 2021-01-18 DIAGNOSIS — E87.6 HYPOKALEMIA: ICD-10-CM

## 2021-01-18 DIAGNOSIS — F03.90 UNSPECIFIED DEMENTIA WITHOUT BEHAVIORAL DISTURBANCE: ICD-10-CM

## 2021-01-18 DIAGNOSIS — R53.81 OTHER MALAISE: ICD-10-CM

## 2021-01-18 DIAGNOSIS — G47.30 SLEEP APNEA, UNSPECIFIED: ICD-10-CM

## 2021-04-16 NOTE — PATIENT PROFILE ADULT - NSTRANSFERBELONGINGSRESP_GEN_A_NUR
4/21/2021      Adrianna Maddox  [address] [facility] [address]    Dear Ms. Maddox,    Your procedure is scheduled with Dr.Dennis Izaguirre on May 10, 2021 at 10:45 am at:    [facility]  [address] [place], [place] [id number]    Please register at [facility] on May 10, 2021 by 8:45 am.    You can expect to be contacted 1 to 3 days prior to the surgery to confirm arrival and surgery time. These times may change due to various OR schedule needs. We will call you ASAP if this happens.    The following appointment(s) have been scheduled for you:     · Post-op with Madeline Martinez at the [facility] ([address] [place] [address]) on May 28, 2021 at 9:40 am.    To better prepare for your surgery, please follow these instructions:    Pre-Procedure / Pre-Surgery COVID-19 Testing:  We have you scheduled for your COVID-19 Testing Visit on:  May 7, 2021 at 4:30 pm  for our ACL Lab site located at [place]  ([address] [place], [place] [address] [address]).    If you need to reschedule this appointment, please call our office ASAP for assistance.    Please remember the following instructions for after your test and before surgery:  Self-quarantine is recommended and minimizes your risk of exposure before your procedure. If you are unable to self-quarantine, please continue to wear a mask, practice social distancing and perform good hand hygiene. After your COVID-19 test and before your procedure, please continue to monitor for signs/symptoms of COVID-19 and notify your surgeon's office ASAP if you do experience any symptoms.              · PLEASE SCHEDULE A PREOPERATIVE APPOINTMENT WITH YOUR PRIMARY CARE PHYSICIAN FOR SURGICAL CLEARANCE.  THIS APPOINTMENT NEEDS TO TAKE PLACE 2-3 WEEKS PRIOR TO SURGERY.     · Do not take any anti--inflammatory medications (aspirin, ibuprofen, naproxen, etc) for 5 days before  surgery.  Acetaminophen (Tylenol) is acceptable.    · Do not eat or drink after midnight the night before your surgery.    · You will need someone to drive you home and remain with you up to 24 hours after you have been discharged.    If you have any work related and/or disability forms that need to be completed, please contact the Forms Completion Department at [phone]. Forms can be dropped off at any of our [facility] locations. Please be advised that it can take 7 to 10 business days to complete these requests.    If you have questions regarding the procedure, medications, rehab, etc., please contact the nursing staff at [phone] (Rodriguez).    If you have any scheduling questions or need to reschedule, please contact me at the telephone number and extension listed below.       Thank you,        Janny at [phone]  Surgery Scheduler for Dr. Izaguirre  [facility]      \"Help us grow our quality of service. We want to improve - and you can help us. You may receive a survey either in the mail or via e-mail. This is your opportunity to tell us what we did well, and where we could use some improvement. We value your input.\"                                    Insurance Authorization Need to Know’s    Prior to your surgical procedure, our team will contact your Insurance Company to initiate a PreAuthorization request.      This is not a guarantee of payment from your insurance company, but rather a step taken to ensure that we have all of the information and documentation for them to confirm the procedure is one that is eligible for coverage under your plan.    We will contact you if we either need more information from you to fulfill the requirements of your insurance company, or if we need to discuss any concerns that may lead to postponement or cancellation of your procedure. If you have any questions regarding your surgery authorization, please check with your insurance company or call  our office for an update.    If you need information regarding your level of benefits or out-of-pocket expenses, please contact your insurance company directly.  They can also confirm for you whether or not we (the surgeon and the hospital/surgery center) are in your plan’s preferred network (aka ‘in-network’).    What to do if… My Insurance Changes:  If, at any time, your insurance company, plan or even card changes… Please call our office so that our team can be sure to update your records.  We will need to make sure to submit any PreAuth or ayden to the correct, up-to-date insurance plan.      What to do if… My Insurance Requires A Referral:  If your insurance company requires a Referral for Specialty Care or to see a Specialist, you will need to confirm with them if you have one on file.    - If your insurance carrier does not have a referral, then you will need to contact your Primary Care Physician to have one directly submitted to your insurance company ASAP.    - Without a referral on file, your insurance company will not Pre-Authorize your surgery and may not cover any of your care with our specialty.    What to do if… I have a Workers Compensation (W/C) Claim:  If you have a W/C claim, please be sure to provide our reception team with the information you have regarding your claim ASAP.  We will contact your W/C carrier/adjustor to inform them of your upcoming surgery and check the status of your claim (open vs closed).  We will let you know if they advise of any concerns or issues with your claim.  - Even if you have an open W/C claim, please also provide us with your personal/family insurance.  We will want to be sure this plan is loaded into your account.  We always PreAuthorize with personal insurance as a back-up to W/C.  Otherwise, if W/C doesn’t cover something along the way, you will receive a bill for the services.    What to do if… I have Month-to-Month Coverage/Premiums:  If you have an  insurance plan that is paid for month to month, or is subject to plan change on a monthly basis, please be aware we cannot initiate PreAuthorization until just before the month of your surgery, as your insurance company will need to verify your premium payments/eligibility first.    What to do if… I Do Not Have Insurance Coverage or Have other Insurance/Billing Questions:  Please call our Patient Contact Center:  [phone] to speak with a team member about your billing needs, including possible coverage options, setting up payment plans, our fee schedule, etc.   yes

## 2021-07-13 NOTE — ED CDU PROVIDER INITIAL DAY NOTE - NS ED ATTENDING STATEMENT MOD
Called patient and scheduled I have personally performed a face to face diagnostic evaluation on this patient. I have reviewed the ACP note and agree with the history, exam and plan of care, except as noted.

## 2022-01-04 NOTE — H&P ADULT - HISTORY OF PRESENT ILLNESS
68y old male pmhx below presents to the ED complaining of 2days hx of intermittent chest pain that progressively got worse last night associated with nausea, no vomit. pt also complains of chronic back pain, but denies SOB, abdominal pain, fever or chills
Imaging Studies

## 2022-02-07 NOTE — ED ADULT NURSE NOTE - ISOLATION INDICATION AIRBORNE CONTACT
Detail Level: Detailed Quality 130: Documentation Of Current Medications In The Medical Record: Current Medications Documented Quality 431: Preventive Care And Screening: Unhealthy Alcohol Use - Screening: Patient not identified as an unhealthy alcohol user when screened for unhealthy alcohol use using a systematic screening method Quality 226: Preventive Care And Screening: Tobacco Use: Screening And Cessation Intervention: Patient screened for tobacco use and is an ex/non-smoker Quality 110: Preventive Care And Screening: Influenza Immunization: Influenza Immunization Administered during Influenza season Other Specify

## 2022-05-17 NOTE — ED ADULT TRIAGE NOTE - MEANS OF ARRIVAL
stretcher Isotretinoin Counseling: Patient should get monthly blood tests, not donate blood, not drive at night if vision affected, not share medication, and not undergo elective surgery for 6 months after tx completed. Side effects reviewed, pt to contact office should one occur.

## 2022-07-15 NOTE — PROGRESS NOTE ADULT - PROVIDER SPECIALTY LIST ADULT
Hospitalist Patient information on fall and injury prevention You can access the FollowMyHealth Patient Portal offered by [facility] by registering at the following website: [email or web address]. By joining [employer]’s FollowMyHealth portal, you will also be able to view your health information using other applications (apps) compatible with our system.

## 2022-08-24 ENCOUNTER — APPOINTMENT (OUTPATIENT)
Dept: OTOLARYNGOLOGY | Facility: CLINIC | Age: 72
End: 2022-08-24

## 2022-08-24 DIAGNOSIS — H92.01 OTALGIA, RIGHT EAR: ICD-10-CM

## 2022-08-24 DIAGNOSIS — H61.23 IMPACTED CERUMEN, BILATERAL: ICD-10-CM

## 2022-08-24 DIAGNOSIS — H90.5 UNSPECIFIED SENSORINEURAL HEARING LOSS: ICD-10-CM

## 2022-08-24 PROCEDURE — 69210 REMOVE IMPACTED EAR WAX UNI: CPT

## 2022-08-24 PROCEDURE — 99203 OFFICE O/P NEW LOW 30 MIN: CPT | Mod: 25

## 2022-08-24 RX ORDER — ASPIRIN 81 MG
6.5 TABLET, DELAYED RELEASE (ENTERIC COATED) ORAL
Qty: 1 | Refills: 0 | Status: ACTIVE | COMMUNITY
Start: 2022-08-24 | End: 1900-01-01

## 2022-08-24 NOTE — HISTORY OF PRESENT ILLNESS
[Hearing Loss] : hearing loss [Ear Fullness] : ear fullness [None] : No associated symptoms are reported. [de-identified] : Patient presents today c/o clogged ears . b/l  clogged ear, low on  hearing for three weeks,  .  No  recent  audiogram . No  history of  ear  infection . No ear drops being  used . Patient is transported via  ambulate , from [facility] .  [Tinnitus] : no tinnitus

## 2022-08-24 NOTE — PHYSICAL EXAM
[Normal] : mucosa is normal [Midline] : trachea located in midline position [de-identified] : severe cerumen impaction

## 2022-10-19 NOTE — ED PROVIDER NOTE - PMH
Arthritis    High blood cholesterol    Hypertension    Kidney stones    MI (myocardial infarction)    Sleep apnea Standing/Walking/Toileting

## 2022-10-22 NOTE — PATIENT PROFILE ADULT - LAST BOWEL MOVEMENT DATE
[facility] [address] [place] [address]  Phone: [phone]             October 31, 2022    Patient: Lv Draper   YOB: 2003   Date of Visit: 10/22/2022       To Whom It May Concern:    Lv Draper was seen and treated in our facility  beginning 10/22/2022 until 10/31/22. He may return to work on 11/1/22.       Sincerely,       Kirsty Dunham RN         Signature:__________________________________ 25-Mar-2020

## 2022-11-03 NOTE — ED PROVIDER NOTE - WR ORDER STATUS 1
I attest my time as attending is greater than 50% of the total combined time spent on qualifying patient care activities by the PA/NP and attending. I attest my time as attending is greater than 50% of the total combined time spent on qualifying patient care activities by the PA/NP and attending. Performed

## 2023-01-13 NOTE — PHYSICAL THERAPY INITIAL EVALUATION ADULT - GAIT DISTANCE, PT EVAL
Influenza A Influenza A Uncontrolled type 2 diabetes mellitus with hyperglycemia Influenza A Acute respiratory failure with hypoxia Influenza A Acute respiratory failure with hypoxia Influenza A 50 feet

## 2023-04-17 NOTE — ED ADULT NURSE NOTE - NSFALLRSKASSESSDT_ED_ALL_ED
This writer called Josh to confirm treatment. Patient is scheduled to be have an intake appointment at [facility] on Tuesday, 4/18 at 10am. They would provide transportation.    If the patient was to leave today, Josh would be able to get him into their inpatient unit, however they would not be able to provide transportation.     This writer called carrol Tam, to see if they could provide transportation today and the voicemail box was full.     I will follow up with Josh after the morning meeting   31-Dec-2018 06:50

## 2023-05-16 NOTE — ED ADULT TRIAGE NOTE - BMI (KG/M2)
CC: Skin check      HPI  Rissa Marks is a 58 year old male who presents for a skin examination.   No history of skin cancer.      History of AK's treated with LN    History of Dermatitis treated with Triamcinlone ointment     Area(s) of concern:  1. Itchy on the body sometimes    2. Lesion on cheek treated with LN at last visit still feels a little scaly       The patient does not wear sunscreen.  Pt does occasionally wear a hat with sun exposure.  A history of multiple sun burns: No  Tanning bed use:     Current: No     Past:  No  Family history of skin cancer: No    ALLERGIES:  No Known Allergies    ROS:   Feeling well, no other skin complaints    PHYSICAL EXAMINATION  WDWN, pleasant male in NAD.  AAOX3. The scalp, face, eyelids, conjunctiva, neck, chest, abdomen, back, upper extremities, lower extremities, palms, soles, fingernails, and buttocks were examined.   -brown cobbled papule(s) on the trunk/extremities  -Scattered brown macules and papules with regular border and pigment   -tan macules scattered on sun exposed face, trunk and extremities  -Scaly red macule(s) on the right medial cheek    The remainder of the examination is normal.    IMPRESSION/PLAN:  1. Seborrheic keratoses - Discussed the benign nature of these lesions.  No treatment is necessary unless it is inflamed, painful or itchy.        2. Multiple benign nevi -  The patient was reassured that all moles were clinically benign.  Sun protection was reviewed and it was recommended SPF 30 or higher and reapply every 2 hours. Wear a wide brimmed hat and sun protective clothing.  If a mole changes, the patient was instructed to let me know so I may evaluate.       3. Solar lentigines - discussed these are benign sun spots  -if they change shape or color they should be re-evaluated  -Sun protection was reviewed and it was recommended SPF 30 or higher; reapply every 2 hours, sooner if swimming or sweating.  Recommended a wide brimmed hat and sun  protective clothing.      4. Actinic keratoses - Diagnosis discussed.  These are precancerous growths, that have a 1-10% chance of evolving in to SCC skin cancer, therefore treatment was recommended.  -Discussed treatment options, LN at next yearly follow up or Efudex during the fall or winter months   -Rx Efudex to use BID for 2 weeks on the right medial cheek.  Discussed expected redness, crusting, possible open sores and need for strict sun protection during treatment and while the area is healing.  If the area gets too irritated, discontinue and use vaseline until feeling better, then resume treatment.       5. History of dermatitis  -Refilled Triamcinolone 0.1% ointment to use BID as needed for flares      Skin cancer screening   Sun protection was reviewed and I recommend SPF 30 or higher and to reapply every 2 hours.  Wear a wide brimmed hat and sun protective clothing.       FOLLOW UP 1 year skin exam, call sooner if any concerns    On 5/16/2023, IJanuary MA scribed the services personally performed by Dr. Lisa Muchard  The documentation recorded by the scribe accurately and completely reflects the service(s) I personally performed and the decisions made by me.         30.7

## 2023-08-15 ENCOUNTER — EMERGENCY (EMERGENCY)
Facility: HOSPITAL | Age: 73
LOS: 0 days | Discharge: HOME | End: 2023-08-16
Attending: EMERGENCY MEDICINE
Payer: MEDICARE

## 2023-08-15 VITALS
RESPIRATION RATE: 24 BRPM | OXYGEN SATURATION: 96 % | DIASTOLIC BLOOD PRESSURE: 70 MMHG | SYSTOLIC BLOOD PRESSURE: 151 MMHG | WEIGHT: 250 LBS | HEIGHT: 69 IN | TEMPERATURE: 98 F | HEART RATE: 60 BPM

## 2023-08-15 DIAGNOSIS — Z95.5 PRESENCE OF CORONARY ANGIOPLASTY IMPLANT AND GRAFT: Chronic | ICD-10-CM

## 2023-08-15 DIAGNOSIS — Z91.013 ALLERGY TO SEAFOOD: ICD-10-CM

## 2023-08-15 DIAGNOSIS — Z79.02 LONG TERM (CURRENT) USE OF ANTITHROMBOTICS/ANTIPLATELETS: ICD-10-CM

## 2023-08-15 DIAGNOSIS — M79.89 OTHER SPECIFIED SOFT TISSUE DISORDERS: ICD-10-CM

## 2023-08-15 DIAGNOSIS — Z90.49 ACQUIRED ABSENCE OF OTHER SPECIFIED PARTS OF DIGESTIVE TRACT: Chronic | ICD-10-CM

## 2023-08-15 DIAGNOSIS — Z88.2 ALLERGY STATUS TO SULFONAMIDES: ICD-10-CM

## 2023-08-15 DIAGNOSIS — Z88.8 ALLERGY STATUS TO OTHER DRUGS, MEDICAMENTS AND BIOLOGICAL SUBSTANCES: ICD-10-CM

## 2023-08-15 DIAGNOSIS — Z95.5 PRESENCE OF CORONARY ANGIOPLASTY IMPLANT AND GRAFT: ICD-10-CM

## 2023-08-15 DIAGNOSIS — R42 DIZZINESS AND GIDDINESS: ICD-10-CM

## 2023-08-15 DIAGNOSIS — Z79.82 LONG TERM (CURRENT) USE OF ASPIRIN: ICD-10-CM

## 2023-08-15 DIAGNOSIS — Z87.19 PERSONAL HISTORY OF OTHER DISEASES OF THE DIGESTIVE SYSTEM: Chronic | ICD-10-CM

## 2023-08-15 DIAGNOSIS — Z91.040 LATEX ALLERGY STATUS: ICD-10-CM

## 2023-08-15 DIAGNOSIS — I25.10 ATHEROSCLEROTIC HEART DISEASE OF NATIVE CORONARY ARTERY WITHOUT ANGINA PECTORIS: ICD-10-CM

## 2023-08-15 DIAGNOSIS — Z20.822 CONTACT WITH AND (SUSPECTED) EXPOSURE TO COVID-19: ICD-10-CM

## 2023-08-15 LAB
ALBUMIN SERPL ELPH-MCNC: 4.2 G/DL — SIGNIFICANT CHANGE UP (ref 3.5–5.2)
ALP SERPL-CCNC: 113 U/L — SIGNIFICANT CHANGE UP (ref 30–115)
ALT FLD-CCNC: 12 U/L — SIGNIFICANT CHANGE UP (ref 0–41)
ANION GAP SERPL CALC-SCNC: 9 MMOL/L — SIGNIFICANT CHANGE UP (ref 7–14)
AST SERPL-CCNC: 16 U/L — SIGNIFICANT CHANGE UP (ref 0–41)
BASOPHILS # BLD AUTO: 0.02 K/UL — SIGNIFICANT CHANGE UP (ref 0–0.2)
BASOPHILS NFR BLD AUTO: 0.3 % — SIGNIFICANT CHANGE UP (ref 0–1)
BILIRUB SERPL-MCNC: 0.7 MG/DL — SIGNIFICANT CHANGE UP (ref 0.2–1.2)
BUN SERPL-MCNC: 19 MG/DL — SIGNIFICANT CHANGE UP (ref 10–20)
CALCIUM SERPL-MCNC: 9 MG/DL — SIGNIFICANT CHANGE UP (ref 8.4–10.4)
CHLORIDE SERPL-SCNC: 100 MMOL/L — SIGNIFICANT CHANGE UP (ref 98–110)
CO2 SERPL-SCNC: 35 MMOL/L — HIGH (ref 17–32)
CREAT SERPL-MCNC: 1 MG/DL — SIGNIFICANT CHANGE UP (ref 0.7–1.5)
EGFR: 80 ML/MIN/1.73M2 — SIGNIFICANT CHANGE UP
EOSINOPHIL # BLD AUTO: 0.03 K/UL — SIGNIFICANT CHANGE UP (ref 0–0.7)
EOSINOPHIL NFR BLD AUTO: 0.4 % — SIGNIFICANT CHANGE UP (ref 0–8)
FLUAV AG NPH QL: SIGNIFICANT CHANGE UP
FLUBV AG NPH QL: SIGNIFICANT CHANGE UP
GLUCOSE SERPL-MCNC: 103 MG/DL — HIGH (ref 70–99)
HCT VFR BLD CALC: 39 % — LOW (ref 42–52)
HGB BLD-MCNC: 12.7 G/DL — LOW (ref 14–18)
IMM GRANULOCYTES NFR BLD AUTO: 0.4 % — HIGH (ref 0.1–0.3)
LYMPHOCYTES # BLD AUTO: 1.4 K/UL — SIGNIFICANT CHANGE UP (ref 1.2–3.4)
LYMPHOCYTES # BLD AUTO: 20.7 % — SIGNIFICANT CHANGE UP (ref 20.5–51.1)
MAGNESIUM SERPL-MCNC: 1.9 MG/DL — SIGNIFICANT CHANGE UP (ref 1.8–2.4)
MCHC RBC-ENTMCNC: 32.6 G/DL — SIGNIFICANT CHANGE UP (ref 32–37)
MCHC RBC-ENTMCNC: 32.6 PG — HIGH (ref 27–31)
MCV RBC AUTO: 100 FL — HIGH (ref 80–94)
MONOCYTES # BLD AUTO: 0.56 K/UL — SIGNIFICANT CHANGE UP (ref 0.1–0.6)
MONOCYTES NFR BLD AUTO: 8.3 % — SIGNIFICANT CHANGE UP (ref 1.7–9.3)
NEUTROPHILS # BLD AUTO: 4.73 K/UL — SIGNIFICANT CHANGE UP (ref 1.4–6.5)
NEUTROPHILS NFR BLD AUTO: 69.9 % — SIGNIFICANT CHANGE UP (ref 42.2–75.2)
NRBC # BLD: 0 /100 WBCS — SIGNIFICANT CHANGE UP (ref 0–0)
NT-PROBNP SERPL-SCNC: 249 PG/ML — SIGNIFICANT CHANGE UP (ref 0–300)
PLATELET # BLD AUTO: 243 K/UL — SIGNIFICANT CHANGE UP (ref 130–400)
PMV BLD: 8.5 FL — SIGNIFICANT CHANGE UP (ref 7.4–10.4)
POTASSIUM SERPL-MCNC: 3.5 MMOL/L — SIGNIFICANT CHANGE UP (ref 3.5–5)
POTASSIUM SERPL-SCNC: 3.5 MMOL/L — SIGNIFICANT CHANGE UP (ref 3.5–5)
PROT SERPL-MCNC: 6.9 G/DL — SIGNIFICANT CHANGE UP (ref 6–8)
RBC # BLD: 3.9 M/UL — LOW (ref 4.7–6.1)
RBC # FLD: 13.9 % — SIGNIFICANT CHANGE UP (ref 11.5–14.5)
RSV RNA NPH QL NAA+NON-PROBE: SIGNIFICANT CHANGE UP
SARS-COV-2 RNA SPEC QL NAA+PROBE: SIGNIFICANT CHANGE UP
SODIUM SERPL-SCNC: 144 MMOL/L — SIGNIFICANT CHANGE UP (ref 135–146)
TROPONIN T SERPL-MCNC: <0.01 NG/ML — SIGNIFICANT CHANGE UP
WBC # BLD: 6.77 K/UL — SIGNIFICANT CHANGE UP (ref 4.8–10.8)
WBC # FLD AUTO: 6.77 K/UL — SIGNIFICANT CHANGE UP (ref 4.8–10.8)

## 2023-08-15 PROCEDURE — 70450 CT HEAD/BRAIN W/O DYE: CPT | Mod: 26,MA

## 2023-08-15 PROCEDURE — 84132 ASSAY OF SERUM POTASSIUM: CPT

## 2023-08-15 PROCEDURE — 71045 X-RAY EXAM CHEST 1 VIEW: CPT

## 2023-08-15 PROCEDURE — 71275 CT ANGIOGRAPHY CHEST: CPT | Mod: MA

## 2023-08-15 PROCEDURE — 85025 COMPLETE CBC W/AUTO DIFF WBC: CPT

## 2023-08-15 PROCEDURE — 84295 ASSAY OF SERUM SODIUM: CPT

## 2023-08-15 PROCEDURE — 93010 ELECTROCARDIOGRAM REPORT: CPT

## 2023-08-15 PROCEDURE — 83735 ASSAY OF MAGNESIUM: CPT

## 2023-08-15 PROCEDURE — 71045 X-RAY EXAM CHEST 1 VIEW: CPT | Mod: 26

## 2023-08-15 PROCEDURE — 85014 HEMATOCRIT: CPT

## 2023-08-15 PROCEDURE — 36415 COLL VENOUS BLD VENIPUNCTURE: CPT

## 2023-08-15 PROCEDURE — 99285 EMERGENCY DEPT VISIT HI MDM: CPT

## 2023-08-15 PROCEDURE — 99285 EMERGENCY DEPT VISIT HI MDM: CPT | Mod: 25

## 2023-08-15 PROCEDURE — 82803 BLOOD GASES ANY COMBINATION: CPT

## 2023-08-15 PROCEDURE — 70450 CT HEAD/BRAIN W/O DYE: CPT | Mod: MA

## 2023-08-15 PROCEDURE — 93005 ELECTROCARDIOGRAM TRACING: CPT

## 2023-08-15 PROCEDURE — 0241U: CPT

## 2023-08-15 PROCEDURE — 83880 ASSAY OF NATRIURETIC PEPTIDE: CPT

## 2023-08-15 PROCEDURE — 82330 ASSAY OF CALCIUM: CPT

## 2023-08-15 PROCEDURE — 83605 ASSAY OF LACTIC ACID: CPT

## 2023-08-15 PROCEDURE — 84484 ASSAY OF TROPONIN QUANT: CPT

## 2023-08-15 PROCEDURE — 80053 COMPREHEN METABOLIC PANEL: CPT

## 2023-08-15 PROCEDURE — 85018 HEMOGLOBIN: CPT

## 2023-08-15 RX ORDER — MECLIZINE HCL 12.5 MG
25 TABLET ORAL ONCE
Refills: 0 | Status: COMPLETED | OUTPATIENT
Start: 2023-08-15 | End: 2023-08-15

## 2023-08-15 RX ADMIN — Medication 25 MILLIGRAM(S): at 18:46

## 2023-08-15 NOTE — ED PROVIDER NOTE - PHYSICAL EXAMINATION
CONSTITUTIONAL: well developed; well nourished; well appearing in no acute distress  HEAD: normocephalic; atraumatic  EYES: no conjunctival injection, no scleral icterus  ENT: no nasal discharge; airway clear.  NECK: supple; non tender. + full passive ROM in all directions  CARD: S1, S2 normal; no murmurs, gallops, or rubs. Regular rate and rhythm  RESP: no wheezes, rales or rhonchi. Good air movement bilaterally without significant accessory muscle use  ABD: soft; non-distended; non-tender. No rebound, no guarding, no pulsatile abdominal mass  EXT: moving all extremities spontaneously, normal ROM. No clubbing, cyanos, +significant b/l LE edema  SKIN: warm and dry, no lesions noted  NEURO: alert, oriented, CN II-XII grossly intact, motor and sensory grossly intact, speech nonslurred, gait deferred, no focal deficits. GCS 15  PSYCH: calm, cooperative, appropriate, good eye contact, logical thought process, no apparent danger to self or others

## 2023-08-15 NOTE — ED PROVIDER NOTE - OBJECTIVE STATEMENT
73yM CAD w/ stents p/w vertigo - reports room spinning sensation since yesterday causing him to be off balance.  No falls since sx onset, but says he fell last week after similar episode.  No CP or recent illness.  Pt told triage he was feeling SOB.  He currently denies any dyspnea at rest but admits to b/l leg swelling that is new compared to his baseline.

## 2023-08-15 NOTE — ED ADULT NURSE NOTE - NSFALLHARMRISKINTERV_ED_ALL_ED

## 2023-08-15 NOTE — ED PROVIDER NOTE - PATIENT PORTAL LINK FT
You can access the FollowMyHealth Patient Portal offered by [facility] by registering at the following website: [email or web address]. By joining [employer]’s FollowMyHealth portal, you will also be able to view your health information using other applications (apps) compatible with our system.

## 2023-08-16 VITALS
HEART RATE: 72 BPM | DIASTOLIC BLOOD PRESSURE: 67 MMHG | OXYGEN SATURATION: 96 % | SYSTOLIC BLOOD PRESSURE: 152 MMHG | RESPIRATION RATE: 18 BRPM

## 2023-08-16 LAB
BASE EXCESS BLDV CALC-SCNC: 13 MMOL/L — HIGH (ref -2–3)
CA-I SERPL-SCNC: 1.12 MMOL/L — LOW (ref 1.15–1.33)
GAS PNL BLDV: 139 MMOL/L — SIGNIFICANT CHANGE UP (ref 136–145)
GAS PNL BLDV: SIGNIFICANT CHANGE UP
HCO3 BLDV-SCNC: 42 MMOL/L — HIGH (ref 22–29)
HCT VFR BLDA CALC: 37 % — LOW (ref 39–51)
HGB BLD CALC-MCNC: 12.2 G/DL — LOW (ref 12.6–17.4)
LACTATE BLDV-MCNC: 0.6 MMOL/L — SIGNIFICANT CHANGE UP (ref 0.5–2)
PCO2 BLDV: 80 MMHG — HIGH (ref 42–55)
PH BLDV: 7.33 — SIGNIFICANT CHANGE UP (ref 7.32–7.43)
PO2 BLDV: 26 MMHG — SIGNIFICANT CHANGE UP
POTASSIUM BLDV-SCNC: 3.3 MMOL/L — LOW (ref 3.5–5.1)
SAO2 % BLDV: 36 % — SIGNIFICANT CHANGE UP

## 2023-08-16 PROCEDURE — 93010 ELECTROCARDIOGRAM REPORT: CPT

## 2023-08-16 PROCEDURE — 71275 CT ANGIOGRAPHY CHEST: CPT | Mod: 26,MA

## 2023-08-16 NOTE — ED ADULT NURSE REASSESSMENT NOTE - NS ED NURSE REASSESS COMMENT FT1
Received pt from previous nurse with c/o of dizziness and SOB, patient maintaining oxgen 2lnc 96%/ Labs were done by via ultrasound

## 2023-08-16 NOTE — ED ADULT NURSE REASSESSMENT NOTE - NS ED NURSE REASSESS COMMENT FT1
pt resting in bed, cardiac monitoring continued, pt AO3, O2 3L, saturating well, VS stable, awaiting transport. Pt has no complaints at this time, in no distress.

## 2023-09-12 NOTE — PHYSICAL THERAPY INITIAL EVALUATION ADULT - SITTING BALANCE: STATIC
good minus Repair Performed By Another Provider Text (Leave Blank If You Do Not Want): After the tissue was excised the defect was repaired by another provider.

## 2024-02-02 NOTE — PATIENT PROFILE ADULT - HAVE YOU EXPERIENCED A TRAUMATIC EVENT?
[FreeTextEntry1] : Two inpatient hospitalizations, last time 2014, trials of Klonopin: memory issues. no

## 2024-03-11 NOTE — PROGRESS NOTE ADULT - NSTELEHEALTH_GEN_ALL_CORE
No
Start taking a vitamin D supplement every day.  Call to schedule appointment with the specialist that you have been referred to.    
No
No

## 2024-08-29 NOTE — ED PROCEDURE NOTE - CPROC ED NUMBER OF ATTEMPTS1
1 DVT ppx: Lovenox: preferred in malignancy  Diet: Regular, Halal, 1.5L fluid restriction  Code: Full Code currently

## 2024-11-01 NOTE — ED ADULT TRIAGE NOTE - HEART RATE (BEATS/MIN)
67 Expected Date Of Service: 11/01/2024 Bill For Surgical Tray: no Billing Type: Third-Party Bill Performing Laboratory: -120

## 2025-01-28 NOTE — PATIENT PROFILE ADULT - NSPROHMCARDIOMGMTSTRAT_GEN_A_NUR
Department of Anesthesiology  Postprocedure Note    Patient: Darian Russo  MRN: [id number]  YOB: 1950  Date of evaluation: 1/28/2025    Procedure Summary       Date: 01/28/25 Room / Location: [facility] OP OR 05 / [facility]    Anesthesia Start: 0847 Anesthesia Stop: 0941    Procedure: CYSTOSCOPY LEFT URETEROSCOPY LASER LITHOTRIPSY LEFT URETERAL STENT EXCHANGE (Left: Ureter) Diagnosis:       Stricture of ureter      Kidney stone      (Stricture of ureter [N13.5])      (Kidney stone [N20.0])    Surgeons: David Browne MD Responsible Provider: Mark Latif MD    Anesthesia Type: general ASA Status: 2            Anesthesia Type: No value filed.    Alek Phase I: Alek Score: 6    Alek Phase II: Alek Score: 10    Anesthesia Post Evaluation    Patient location during evaluation: PACU  Patient participation: complete - patient participated  Level of consciousness: awake and alert  Airway patency: patent  Nausea & Vomiting: no nausea and no vomiting  Cardiovascular status: hemodynamically stable  Respiratory status: acceptable, nonlabored ventilation and spontaneous ventilation  Hydration status: euvolemic  Comments: BP (!) 144/81   Pulse 96   Temp 97.2 °F (36.2 °C) (Infrared)   Resp 12   Ht 1.778 m (5' 10\")   Wt 102.1 kg (225 lb)   SpO2 96%   BMI 32.28 kg/m²     Multimodal analgesia pain management approach  Pain management: adequate and satisfactory to patient        No notable events documented.   medication therapy

## 2025-02-18 NOTE — ED PROVIDER NOTE - MDM PATIENT STATEMENT FOR ADDL TREATMENT
Patient left a voice message, returning call.    Please call   Patient with one or more new problems requiring additional work-up/treatment.

## 2025-06-09 NOTE — ED ADULT NURSE NOTE - NSFALLRSKUNASSIST_ED_ALL_ED
HOSPITALIST PROGRESS NOTE    MIAH GIL  [id number]  58yMale    Patient is a 58y old  Male who presents with a chief complaint of intractable pain, N/V (08 Jun 2025 07:54)      SUBJECTIVE:   Chart reviewed since admission.    58 year old male with Esophageal cancer s/p esophagectomy and gastric pull through presented with abdominal pain, nausea and vomiting. Leukocytosis and metabolic acidosis in ER. No obstruction on CT. Admitted for GI consultation, made NPO and prescribed IVF and analgesics    Patient seen and examined at bedside.      OBJECTIVE:  Vital Signs Last 24 Hrs  T(C): 36.9 (09 Jun 2025 11:52), Max: 37 (08 Jun 2025 23:46)  T(F): 98.5 (09 Jun 2025 11:52), Max: 98.6 (08 Jun 2025 23:46)  HR: 95 (09 Jun 2025 11:52) (80 - 97)  BP: 154/89 (09 Jun 2025 11:52) (120/84 - 166/91)  BP(mean): 111 (09 Jun 2025 11:52) (111 - 111)  RR: 18 (09 Jun 2025 11:52) (18 - 18)  SpO2: 94% (09 Jun 2025 11:52) (94% - 99%)    Parameters below as of 09 Jun 2025 11:52  Patient On (Oxygen Delivery Method): room air        PHYSICAL EXAMINATION  General:   HEENT:    NECK:    CVS:   RESP:    GI:    :   MSK:    CNS:    INTEG:    PSYCH:      MONITOR:  CAPILLARY BLOOD GLUCOSE            I&O's Summary    08 Jun 2025 07:01  -  09 Jun 2025 07:00  --------------------------------------------------------  IN: 600 mL / OUT: 0 mL / NET: 600 mL                            13.8   10.05 )-----------( 279      ( 09 Jun 2025 04:20 )             41.0     PT/INR - ( 07 Jun 2025 20:20 )   PT: 11.8 sec;   INR: 1.02 ratio         PTT - ( 07 Jun 2025 20:20 )  PTT:27.8 sec  06-09    135  |  98  |  20.5[H]  ----------------------------<  109[H]  4.0   |  23.0  |  0.67    Ca    8.7      09 Jun 2025 04:20  Phos  2.4     06-09  Mg     2.1     06-09    TPro  7.1  /  Alb  4.3  /  TBili  0.5  /  DBili  x   /  AST  28  /  ALT  24  /  AlkPhos  87  06-09        Urinalysis Basic - ( 09 Jun 2025 04:20 )    Color: x / Appearance: x / SG: x / pH: x  Gluc: 109 mg/dL / Ketone: x  / Bili: x / Urobili: x   Blood: x / Protein: x / Nitrite: x   Leuk Esterase: x / RBC: x / WBC x   Sq Epi: x / Non Sq Epi: x / Bacteria: x            TTE:    RADIOLOGY  < from: Xray Chest 1 View- PORTABLE-Urgent (Xray Chest 1 View- PORTABLE-Urgent .) (06.07.25 @ 20:43) >  IMPRESSION: No radiographic evidence of acute cardiopulmonary disease.    < end of copied text >      < from: CT Abdomen and Pelvis w/ Oral Cont and w/ IV Cont (06.08.25 @ 05:52) >  IMPRESSION:  No evidence of bowel obstruction, active inflammatory process, or   intra-abdominal source for infection.    Partially imaged esophagectomy and gastric pull-through is grossly   unchanged in appearance dating back to 05/18/2020.    < end of copied text >    MEDICATIONS  (STANDING):  lactated ringers. 1000 milliLiter(s) (75 mL/Hr) IV Continuous <Continuous>  metoclopramide Injectable 10 milliGRAM(s) IV Push every 6 hours  pantoprazole  Injectable 40 milliGRAM(s) IV Push every 12 hours      MEDICATIONS  (PRN):  aluminum hydroxide/magnesium hydroxide/simethicone Suspension 30 milliLiter(s) Oral every 4 hours PRN Dyspepsia  HYDROmorphone  Injectable 1 milliGRAM(s) IV Push every 3 hours PRN Severe Pain (7 - 10)  ketorolac   Injectable 30 milliGRAM(s) IV Push every 6 hours PRN Moderate Pain (4 - 6)  naloxone Injectable 0.4 milliGRAM(s) IV Push every 3 minutes PRN opioid oversedation  ondansetron Injectable 4 milliGRAM(s) IV Push every 8 hours PRN Nausea and/or Vomiting     HOSPITALIST PROGRESS NOTE    MIAH GIL  [id number]  58yMale    Patient is a 58y old  Male who presents with a chief complaint of intractable pain, N/V (08 Jun 2025 07:54)      SUBJECTIVE:   Chart reviewed since admission.    58 year old male with Esophageal cancer s/p esophagectomy and gastric pull through presented with abdominal pain, nausea and vomiting. Leukocytosis and metabolic acidosis in ER. No obstruction on CT. Admitted for GI consultation, made NPO and prescribed IVF and analgesics    Patient seen and examined at bedside for abdominal pain, nausea and vomiting x 3 days.  Has nausea intermittient after last EGD; usually dilatation last 6 months. Metoclopramide PRN, Erythromycin  Feels better; no longer vomiting though continues to feel nauseous. Doesn't feel hungry, wants to try ice chips. Awaiting GI evaluation.      OBJECTIVE:  Vital Signs Last 24 Hrs  T(C): 36.9 (09 Jun 2025 11:52), Max: 37 (08 Jun 2025 23:46)  T(F): 98.5 (09 Jun 2025 11:52), Max: 98.6 (08 Jun 2025 23:46)  HR: 95 (09 Jun 2025 11:52) (80 - 97)  BP: 154/89 (09 Jun 2025 11:52) (120/84 - 166/91)  BP(mean): 111 (09 Jun 2025 11:52) (111 - 111)  RR: 18 (09 Jun 2025 11:52) (18 - 18)  SpO2: 94% (09 Jun 2025 11:52) (94% - 99%)    Parameters below as of 09 Jun 2025 11:52  Patient On (Oxygen Delivery Method): room air        PHYSICAL EXAMINATION  General: Middle aged male lying on stretcher, comfortable  HEENT:  Anicteric sclera  NECK:  Supple  CVS: regular rate and rhythm S1 S2  RESP:  Fair air entry bilaterally  GI:  Soft with nondistended nontender BS+  : No suprapubic tenderness  MSK:  Moves all extremities  CNS:  Awake, alert, oriented. No gross focal or global deficit   INTEG:  warm skin  PSYCH:  Fair mood    MONITOR:  CAPILLARY BLOOD GLUCOSE            I&O's Summary    08 Jun 2025 07:01  -  09 Jun 2025 07:00  --------------------------------------------------------  IN: 600 mL / OUT: 0 mL / NET: 600 mL                            13.8   10.05 )-----------( 279      ( 09 Jun 2025 04:20 )             41.0     PT/INR - ( 07 Jun 2025 20:20 )   PT: 11.8 sec;   INR: 1.02 ratio         PTT - ( 07 Jun 2025 20:20 )  PTT:27.8 sec  06-09    135  |  98  |  20.5[H]  ----------------------------<  109[H]  4.0   |  23.0  |  0.67    Ca    8.7      09 Jun 2025 04:20  Phos  2.4     06-09  Mg     2.1     06-09    TPro  7.1  /  Alb  4.3  /  TBili  0.5  /  DBili  x   /  AST  28  /  ALT  24  /  AlkPhos  87  06-09        Urinalysis Basic - ( 09 Jun 2025 04:20 )    Color: x / Appearance: x / SG: x / pH: x  Gluc: 109 mg/dL / Ketone: x  / Bili: x / Urobili: x   Blood: x / Protein: x / Nitrite: x   Leuk Esterase: x / RBC: x / WBC x   Sq Epi: x / Non Sq Epi: x / Bacteria: x            TTE:    RADIOLOGY  < from: Xray Chest 1 View- PORTABLE-Urgent (Xray Chest 1 View- PORTABLE-Urgent .) (06.07.25 @ 20:43) >  IMPRESSION: No radiographic evidence of acute cardiopulmonary disease.    < end of copied text >      < from: CT Abdomen and Pelvis w/ Oral Cont and w/ IV Cont (06.08.25 @ 05:52) >  IMPRESSION:  No evidence of bowel obstruction, active inflammatory process, or   intra-abdominal source for infection.    Partially imaged esophagectomy and gastric pull-through is grossly   unchanged in appearance dating back to 05/18/2020.    < end of copied text >    MEDICATIONS  (STANDING):  lactated ringers. 1000 milliLiter(s) (75 mL/Hr) IV Continuous <Continuous>  metoclopramide Injectable 10 milliGRAM(s) IV Push every 6 hours  pantoprazole  Injectable 40 milliGRAM(s) IV Push every 12 hours      MEDICATIONS  (PRN):  aluminum hydroxide/magnesium hydroxide/simethicone Suspension 30 milliLiter(s) Oral every 4 hours PRN Dyspepsia  HYDROmorphone  Injectable 1 milliGRAM(s) IV Push every 3 hours PRN Severe Pain (7 - 10)  ketorolac   Injectable 30 milliGRAM(s) IV Push every 6 hours PRN Moderate Pain (4 - 6)  naloxone Injectable 0.4 milliGRAM(s) IV Push every 3 minutes PRN opioid oversedation  ondansetron Injectable 4 milliGRAM(s) IV Push every 8 hours PRN Nausea and/or Vomiting   no

## 2025-07-21 NOTE — CHART NOTE - NSCHARTNOTESELECT_GEN_ALL_CORE
Event Note [Alert] : alert [Well Nourished] : well nourished [Healthy Appearance] : healthy appearance [No Acute Distress] : no acute distress [Well Developed] : well developed [Normal Voice/Communication] : normal voice communication [Normal Sclera/Conjunctiva] : normal sclera/conjunctiva [Normal Optic Discs] : normal optic discs [No Lid Lag] : no lid lag [Normal Outer Ear/Nose] : the ears and nose were normal in appearance [Normal Lips/Gums] : the lips and gums were normal [Normal Nasal Mucosa] : the nasal mucosa was normal [No Respiratory Distress] : no respiratory distress [No Accessory Muscle Use] : no accessory muscle use [Oriented x3] : oriented to person, place, and time [Normal Affect] : the affect was normal [Normal Insight/Judgement] : insight and judgment were intact [Normal Mood] : the mood was normal [de-identified] : Enlarged multinodular thyroid mildly increased in consistency, painless, mobile with swallowing with some submandibular enlarged lymph nodes, soft, will request US copy from [facility]